# Patient Record
Sex: MALE | Race: WHITE | NOT HISPANIC OR LATINO | Employment: OTHER | URBAN - METROPOLITAN AREA
[De-identification: names, ages, dates, MRNs, and addresses within clinical notes are randomized per-mention and may not be internally consistent; named-entity substitution may affect disease eponyms.]

---

## 2017-06-06 ENCOUNTER — APPOINTMENT (OUTPATIENT)
Dept: LAB | Facility: CLINIC | Age: 67
End: 2017-06-06
Payer: MEDICARE

## 2017-06-06 ENCOUNTER — TRANSCRIBE ORDERS (OUTPATIENT)
Dept: LAB | Facility: CLINIC | Age: 67
End: 2017-06-06

## 2017-06-06 DIAGNOSIS — I10 ESSENTIAL (PRIMARY) HYPERTENSION: ICD-10-CM

## 2017-06-06 DIAGNOSIS — E78.00 PURE HYPERCHOLESTEROLEMIA: ICD-10-CM

## 2017-06-06 DIAGNOSIS — E83.51 HYPOCALCEMIA: ICD-10-CM

## 2017-06-06 DIAGNOSIS — M10.9 GOUT: ICD-10-CM

## 2017-06-06 DIAGNOSIS — D50.9 IRON DEFICIENCY ANEMIA: ICD-10-CM

## 2017-06-06 DIAGNOSIS — D51.0 VITAMIN B12 DEFICIENCY ANEMIA DUE TO INTRINSIC FACTOR DEFICIENCY: ICD-10-CM

## 2017-06-06 LAB
ALBUMIN SERPL BCP-MCNC: 3.3 G/DL (ref 3.5–5)
ALP SERPL-CCNC: 66 U/L (ref 46–116)
ALT SERPL W P-5'-P-CCNC: 32 U/L (ref 12–78)
ANION GAP SERPL CALCULATED.3IONS-SCNC: 6 MMOL/L (ref 4–13)
AST SERPL W P-5'-P-CCNC: 26 U/L (ref 5–45)
BASOPHILS # BLD AUTO: 0.03 THOUSANDS/ΜL (ref 0–0.1)
BASOPHILS NFR BLD AUTO: 1 % (ref 0–1)
BILIRUB SERPL-MCNC: 0.59 MG/DL (ref 0.2–1)
BUN SERPL-MCNC: 17 MG/DL (ref 5–25)
CALCIUM SERPL-MCNC: 8.1 MG/DL (ref 8.3–10.1)
CHLORIDE SERPL-SCNC: 105 MMOL/L (ref 100–108)
CHOLEST SERPL-MCNC: 171 MG/DL (ref 50–200)
CO2 SERPL-SCNC: 30 MMOL/L (ref 21–32)
CREAT SERPL-MCNC: 0.76 MG/DL (ref 0.6–1.3)
EOSINOPHIL # BLD AUTO: 0.27 THOUSAND/ΜL (ref 0–0.61)
EOSINOPHIL NFR BLD AUTO: 5 % (ref 0–6)
ERYTHROCYTE [DISTWIDTH] IN BLOOD BY AUTOMATED COUNT: 13.1 % (ref 11.6–15.1)
GFR SERPL CREATININE-BSD FRML MDRD: >60 ML/MIN/1.73SQ M
GLUCOSE P FAST SERPL-MCNC: 100 MG/DL (ref 65–99)
HCT VFR BLD AUTO: 43.3 % (ref 36.5–49.3)
HDLC SERPL-MCNC: 33 MG/DL (ref 40–60)
HGB BLD-MCNC: 14.7 G/DL (ref 12–17)
LDLC SERPL CALC-MCNC: 110 MG/DL (ref 0–100)
LYMPHOCYTES # BLD AUTO: 1.01 THOUSANDS/ΜL (ref 0.6–4.47)
LYMPHOCYTES NFR BLD AUTO: 20 % (ref 14–44)
MCH RBC QN AUTO: 30.4 PG (ref 26.8–34.3)
MCHC RBC AUTO-ENTMCNC: 33.9 G/DL (ref 31.4–37.4)
MCV RBC AUTO: 90 FL (ref 82–98)
MONOCYTES # BLD AUTO: 0.58 THOUSAND/ΜL (ref 0.17–1.22)
MONOCYTES NFR BLD AUTO: 11 % (ref 4–12)
NEUTROPHILS # BLD AUTO: 3.28 THOUSANDS/ΜL (ref 1.85–7.62)
NEUTS SEG NFR BLD AUTO: 63 % (ref 43–75)
NRBC BLD AUTO-RTO: 0 /100 WBCS
PLATELET # BLD AUTO: 177 THOUSANDS/UL (ref 149–390)
PMV BLD AUTO: 10.6 FL (ref 8.9–12.7)
POTASSIUM SERPL-SCNC: 4 MMOL/L (ref 3.5–5.3)
PROT SERPL-MCNC: 6.1 G/DL (ref 6.4–8.2)
PTH-INTACT SERPL-MCNC: 50.7 PG/ML (ref 14–72)
RBC # BLD AUTO: 4.84 MILLION/UL (ref 3.88–5.62)
SODIUM SERPL-SCNC: 141 MMOL/L (ref 136–145)
TRIGL SERPL-MCNC: 139 MG/DL
URATE SERPL-MCNC: 6.8 MG/DL (ref 4.2–8)
VIT B12 SERPL-MCNC: 2077 PG/ML (ref 100–900)
WBC # BLD AUTO: 5.18 THOUSAND/UL (ref 4.31–10.16)

## 2017-06-06 PROCEDURE — 83970 ASSAY OF PARATHORMONE: CPT

## 2017-06-06 PROCEDURE — 84550 ASSAY OF BLOOD/URIC ACID: CPT

## 2017-06-06 PROCEDURE — 80061 LIPID PANEL: CPT

## 2017-06-06 PROCEDURE — 80053 COMPREHEN METABOLIC PANEL: CPT

## 2017-06-06 PROCEDURE — 85025 COMPLETE CBC W/AUTO DIFF WBC: CPT

## 2017-06-06 PROCEDURE — 82607 VITAMIN B-12: CPT

## 2017-06-06 PROCEDURE — 36415 COLL VENOUS BLD VENIPUNCTURE: CPT

## 2017-06-12 ENCOUNTER — GENERIC CONVERSION - ENCOUNTER (OUTPATIENT)
Dept: OTHER | Facility: OTHER | Age: 67
End: 2017-06-12

## 2017-06-26 ENCOUNTER — ALLSCRIPTS OFFICE VISIT (OUTPATIENT)
Dept: OTHER | Facility: OTHER | Age: 67
End: 2017-06-26

## 2017-07-07 ENCOUNTER — APPOINTMENT (OUTPATIENT)
Dept: LAB | Facility: CLINIC | Age: 67
End: 2017-07-07
Payer: MEDICARE

## 2017-07-07 ENCOUNTER — TRANSCRIBE ORDERS (OUTPATIENT)
Dept: LAB | Facility: CLINIC | Age: 67
End: 2017-07-07

## 2017-07-07 DIAGNOSIS — Z12.5 SPECIAL SCREENING FOR MALIGNANT NEOPLASM OF PROSTATE: Primary | ICD-10-CM

## 2017-07-07 DIAGNOSIS — Z12.5 SPECIAL SCREENING FOR MALIGNANT NEOPLASM OF PROSTATE: ICD-10-CM

## 2017-07-07 LAB — PSA SERPL-MCNC: 2.5 NG/ML (ref 0–4)

## 2017-07-07 PROCEDURE — G0103 PSA SCREENING: HCPCS

## 2017-07-07 PROCEDURE — 36415 COLL VENOUS BLD VENIPUNCTURE: CPT

## 2017-07-20 ENCOUNTER — GENERIC CONVERSION - ENCOUNTER (OUTPATIENT)
Dept: OTHER | Facility: OTHER | Age: 67
End: 2017-07-20

## 2017-11-27 DIAGNOSIS — E78.00 PURE HYPERCHOLESTEROLEMIA: ICD-10-CM

## 2017-12-06 ENCOUNTER — APPOINTMENT (OUTPATIENT)
Dept: LAB | Facility: CLINIC | Age: 67
End: 2017-12-06
Payer: MEDICARE

## 2017-12-06 ENCOUNTER — TRANSCRIBE ORDERS (OUTPATIENT)
Dept: LAB | Facility: CLINIC | Age: 67
End: 2017-12-06

## 2017-12-06 DIAGNOSIS — E78.00 PURE HYPERCHOLESTEROLEMIA: ICD-10-CM

## 2017-12-06 LAB
ALBUMIN SERPL BCP-MCNC: 3.5 G/DL (ref 3.5–5)
ALP SERPL-CCNC: 64 U/L (ref 46–116)
ALT SERPL W P-5'-P-CCNC: 36 U/L (ref 12–78)
ANION GAP SERPL CALCULATED.3IONS-SCNC: 3 MMOL/L (ref 4–13)
AST SERPL W P-5'-P-CCNC: 23 U/L (ref 5–45)
BILIRUB SERPL-MCNC: 0.6 MG/DL (ref 0.2–1)
BUN SERPL-MCNC: 17 MG/DL (ref 5–25)
CALCIUM SERPL-MCNC: 8.5 MG/DL (ref 8.3–10.1)
CHLORIDE SERPL-SCNC: 104 MMOL/L (ref 100–108)
CHOLEST SERPL-MCNC: 167 MG/DL (ref 50–200)
CO2 SERPL-SCNC: 31 MMOL/L (ref 21–32)
CREAT SERPL-MCNC: 0.77 MG/DL (ref 0.6–1.3)
GFR SERPL CREATININE-BSD FRML MDRD: 95 ML/MIN/1.73SQ M
GLUCOSE P FAST SERPL-MCNC: 99 MG/DL (ref 65–99)
HDLC SERPL-MCNC: 30 MG/DL (ref 40–60)
LDLC SERPL CALC-MCNC: 89 MG/DL (ref 0–100)
POTASSIUM SERPL-SCNC: 4.1 MMOL/L (ref 3.5–5.3)
PROT SERPL-MCNC: 6.7 G/DL (ref 6.4–8.2)
SODIUM SERPL-SCNC: 138 MMOL/L (ref 136–145)
TRIGL SERPL-MCNC: 242 MG/DL

## 2017-12-06 PROCEDURE — 36415 COLL VENOUS BLD VENIPUNCTURE: CPT

## 2017-12-06 PROCEDURE — 80053 COMPREHEN METABOLIC PANEL: CPT

## 2017-12-06 PROCEDURE — 80061 LIPID PANEL: CPT

## 2017-12-28 ENCOUNTER — GENERIC CONVERSION - ENCOUNTER (OUTPATIENT)
Dept: FAMILY MEDICINE CLINIC | Facility: CLINIC | Age: 67
End: 2017-12-28

## 2017-12-28 ENCOUNTER — ALLSCRIPTS OFFICE VISIT (OUTPATIENT)
Dept: OTHER | Facility: OTHER | Age: 67
End: 2017-12-28

## 2017-12-29 NOTE — PROGRESS NOTES
Assessment   1  Hyperlipidemia (272 4) (E78 5)  2  Benign enlargement of prostate (600 00) (N40 0)  3  Anemia, pernicious (281 0) (D51 0)  4  Right bundle branch block (RBBB) (426 4) (I45 10)1      1 Amended By: Murray Belcher; Dec 28 2017 9:25 AM EST      Plan   Anemia, pernicious, Herniated lumbar intervertebral disc, Hypercholesteremia,    Hyperlipidemia    · (1) CBC/PLT/DIFF; Status:Active; Requested PIQ:05ZQY1001;    · (1) COMPREHENSIVE METABOLIC PANEL; Status:Active; Requested JWH:16OTF6109;    · (1) HEP C ANTIBODY; Status:Active; Requested NELIDA:55IMD2072;    · (1) LIPID PANEL, FASTING; Status:Active; Requested RFY:52DMC2128;    · (1) URIC ACID; Status:Active; Requested HRA:58NMM1451;    · (1) VITAMIN B12; Status:Active; Requested GBR:20DXM9701; Health Maintenance    · Follow-up visit in 6 months Evaluation and Treatment  Follow-up  Status: Complete     Done: 80GJH7227  Hypertension    · Renew: Metoprolol Succinate  MG Oral Tablet Extended Release 24 Hour; TAKE 1    TABLET DAILY    Discussion/Summary      We are increasing his Toprol from  mg daily  He will continue to monitor his blood pressure at home  Continue follow-up with Urology regarding his BPH and GI regarding his history of colon polyps  He is current with colonoscopy  We reviewed the pathology of his right bundle-branch block  1     The patient was counseled regarding diagnostic results,-- instructions for management  Possible side effects of new medications were reviewed with the patient/guardian today1  The treatment plan was reviewed with the patient/guardian  The patient/guardian understands and agrees with the treatment plan1        1 Amended By: Murray Belcher; Dec 28 2017 9:26 AM EST      Chief Complaint   Checkup    Patient is here today for follow up of chronic conditions described in HPI  History of Present Illness   Patient comes in for checkup        Review of Systems        Constitutional: No fever or chills, feels well, no tiredness, no recent weight gain or weight loss  Cardiovascular: No complaints of slow heart rate, no fast heart rate, no chest pain, no palpitations, no leg claudication, no lower extremity  Respiratory: No complaints of shortness of breath, no wheezing, no cough, no SOB on exertion, no orthopnea or PND  Active Problems   1  Acute sinusitis (461 9) (J01 90)  2  Anemia, iron deficiency (280 9) (D50 9)  3  Anemia, pernicious (281 0) (D51 0)  4  Arthritis, hip (716 95) (M16 10)  5  Benign enlargement of prostate (600 00) (N40 0)  6  Diverticulosis (562 10) (K57 90)  7  Eczema (692 9) (L30 9)  8  Encounter for prostate cancer screening (V76 44) (Z12 5)  9  Encounter for screening colonoscopy (V76 51) (Z12 11)  10  Gout (274 9) (M10 9)  11  Herniated lumbar intervertebral disc (722 10) (M51 26)  12  Hypercholesteremia (272 0) (E78 00)  13  Hyperlipidemia (272 4) (E78 5)  14  Hypertension (401 9) (I10)  15  Hypocalcemia (275 41) (E83 51)  16  Microscopic hematuria (599 72) (R31 29)  17  Need for diphtheria-tetanus-pertussis (Tdap) vaccine (V06 1) (Z23)  18  Need for shingles vaccine (V04 89) (Z23)  19  Obesity (278 00) (E66 9)  20  Preoperative examination (V72 84) (Z01 818)  21  Proteinuria (791 0) (R80 9)  22  Screening for genitourinary condition (V81 6) (Z13 89)  23  Sleep apnea (780 57) (G47 30)    Past Medical History   1  History of anemia (V12 3) (Z86 2)  2  History of colonoscopy (V45 89) (Z98 890)  3  History of esophagogastroduodenoscopy (V15 29) (Z98 89)  4  History of renal calculi (V13 01) (D11 962)     The active problems and past medical history were reviewed and updated today  Surgical History   1  History of Appendectomy  2  History of Arthroscopy Knee  3  History of Colostomy  4  History of Needle Biopsy Of Prostate  5  History of Rotator Cuff Repair  6  History of Total Hip Replacement    Family History   Mother   1  Family history of   2   Family history of chronic obstructive pulmonary disease (V17 6) (Z82 5)  3  Family history of malignant neoplasm of breast (V16 3) (Z80 3)  Father   4  Family history of   5  Family history of thoracic aortic aneurysm (V17 49) (Z82 49)  Family History   6  Denied: Family history of mental disorder  7  Denied: Family history of substance abuse    Social History    · Denied: History of Alcohol use   · Denied: History of Drug use   · Never a smoker    Current Meds   1  Enalapril Maleate 10 MG Oral Tablet; take 1 tablet by mouth once daily; Therapy: 94NWT7102 to (Evaluate:58Ble3385)  Requested for: 75IQM1283; Last     Rx:65Eys5085 Ordered  2  HydrALAZINE HCl - 50 MG Oral Tablet; TAKE 1 TABLET TWICE DAILY  Requested for:     78Arf9370; Last Rx:20Ief3525 Ordered  3  Ibuprofen 400 MG Oral Tablet; take 1 tablet every 6 to 8 hours as needed Recorded  4  Iron 325 (65 Fe) MG Oral Tablet; Take 1 tablet twice daily Recorded  5  Losartan Potassium-HCTZ 100-12 5 MG Oral Tablet; Take 1 tablet daily; Therapy: 86GQG1363 to (Last Rx:82Nev3129)  Requested for: 34Xxp6445 Ordered  6  Metoprolol Succinate ER 50 MG Oral Tablet Extended Release 24 Hour; Take 1 tablet     daily  Requested for: 71Gcp6951; Last Rx:01Hhs3665 Ordered     The medication list was reviewed and updated today  Allergies   1  Ativan    Vitals   Vital Signs    Recorded: 49Stl2114 09:11AM Recorded: 36PVU4900 08:30AM   Heart Rate  83   Systolic 665 458   Diastolic 78 92   Height  6 ft 1 in   Weight  322 lb    BMI Calculated  42 48   BSA Calculated  2 63   O2 Saturation  98     Physical Exam        Constitutional      General appearance: Abnormal   obese  Eyes      Conjunctiva and lids: No swelling, erythema, or discharge  Pupils and irises: Equal, round and reactive to light  Ears, Nose, Mouth, and Throat      External inspection of ears and nose: Normal        Otoscopic examination: Tympanic membrance translucent with normal light reflex   Canals patent without erythema  Oropharynx: Normal with no erythema, edema, exudate or lesions  Pulmonary      Respiratory effort: No increased work of breathing or signs of respiratory distress  Auscultation of lungs: Clear to auscultation, equal breath sounds bilaterally, no wheezes, no rales, no rhonci  Cardiovascular      Auscultation of heart: Normal rate and rhythm, normal S1 and S2, without murmurs  Examination of extremities for edema and/or varicosities: Normal        Carotid pulses: Normal        Abdomen      Abdomen: Non-tender, no masses  Liver and spleen: No hepatomegaly or splenomegaly  Lymphatic      Palpation of lymph nodes in neck: No lymphadenopathy  Musculoskeletal      Gait and station: Normal        Digits and nails: Normal without clubbing or cyanosis  Inspection/palpation of joints, bones, and muscles: Normal        Skin      Skin and subcutaneous tissue: Normal without rashes or lesions  Psychiatric      Orientation to person, place and time: Normal        Mood and affect: Normal           Results/Data   PHQ-9 Adult Depression Screening 00Cjr4513 08:47AM User, s      Test Name Result Flag Reference   PHQ-9 Adult Depression Score 0     Over the last two weeks, how often have you been bothered by any of the following problems? Little interest or pleasure in doing things: Not at all - 0     Feeling down, depressed, or hopeless: Not at all - 0     Trouble falling or staying asleep, or sleeping too much: Not at all - 0     Feeling tired or having little energy: Not at all - 0     Poor appetite or over eating: Not at all - 0     Feeling bad about yourself - or that you are a failure or have let yourself or your family down: Not at all - 0     Trouble concentrating on things, such as reading the newspaper or watching television: Not at all - 0     Moving or speaking so slowly that other people could have noticed   Or the opposite -  being so fidgety or restless that you have been moving around a lot more than usual: Not at all - 0     Thoughts that you would be better off dead, or of hurting yourself in some way: Not at all - 0   PHQ-9 Adult Depression Screening Negative     PHQ-9 Difficulty Level Not difficult at all     PHQ-9 Severity No Depression        Falls Risk Assessment (Dx Z13 89 Screen for Neurologic Disorder) 86WCB0604 08:47AM User, Ahs      Test Name Result Flag Reference   Falls Risk      No falls in the past year      (1) COMPREHENSIVE METABOLIC PANEL 11GAJ3381 58:79YB Tru Beck Order Number: [id number]      Test Name Result Flag Reference   SODIUM 138 mmol/L  136-145   POTASSIUM 4 1 mmol/L  3 5-5 3   CHLORIDE 104 mmol/L  100-108   CARBON DIOXIDE 31 mmol/L  21-32   ANION GAP (CALC) 3 mmol/L L 4-13   BLOOD UREA NITROGEN 17 mg/dL  5-25   CREATININE 0 77 mg/dL  0 60-1 30   Standardized to IDMS reference method   CALCIUM 8 5 mg/dL  8 3-10 1   BILI, TOTAL 0 60 mg/dL  0 20-1 00   ALK PHOSPHATAS 64 U/L     ALT (SGPT) 36 U/L  12-78   Specimen collection should occur prior to Sulfasalazine and/or Sulfapyridine administration due to the potential for falsely depressed results  AST(SGOT) 23 U/L  5-45   Specimen collection should occur prior to Sulfasalazine administration due to the potential for falsely depressed results  ALBUMIN 3 5 g/dL  3 5-5 0   TOTAL PROTEIN 6 7 g/dL  6 4-8 2   eGFR 95 ml/min/1 73sq m     National Kidney Disease Education Program recommendations are as follows:     GFR calculation is accurate only with a steady state creatinine     Chronic Kidney disease less than 60 ml/min/1 73 sq  meters     Kidney failure less than 15 ml/min/1 73 sq  meters  GLUCOSE FASTING 99 mg/dL  65-99   Specimen collection should occur prior to Sulfasalazine administration due to the potential for falsely depressed results   Specimen collection should occur prior to Sulfapyridine administration due to the potential for falsely elevated results  (1) LIPID PANEL, FASTING 09XXF3931 06:50AM Honey Mueller Order Number: [id number]      Test Name Result Flag Reference   CHOLESTEROL 167 mg/dL     HDL,DIRECT 30 mg/dL L 40-60   Specimen collection should occur prior to Metamizole administration due to the potential for falsley depressed results  LDL CHOLESTEROL CALCULATED 89 mg/dL  0-100   Triglyceride:           Normal <150 mg/dl      Borderline High 150-199 mg/dl      High 200-499 mg/dl      Very High >499 mg/dl               Cholesterol:          Desirable <200 mg/dl       Borderline High 200-239 mg/dl       High >239 mg/dl               HDL Cholesterol:          High>59 mg/dL       Low <41 mg/dL               This screening LDL is a calculated result  It does not have the accuracy of the Direct Measured LDL in the monitoring of patients with hyperlipidemia and/or statin therapy  Direct Measure LDL (DPA063) must be ordered separately in these patients  TRIGLYCERIDES 242 mg/dL H <=150   Specimen collection should occur prior to N-Acetylcysteine or Metamizole administration due to the potential for falsely depressed results  (1) PSA (SCREEN) (Dx V76 44 Screen for Prostate Cancer) 77TLG3187 07:07AM EPIC, Provider   Test ordered by: Christine Mcneill      Test Name Result Flag Reference   PROSTATE SPECIFIC ANTIGEN 2 5 ng/mL  0 0-4 0   American Urological Association Guidelines define biochemical recurrence of prostate cancer as a detectable or rising PSA value post-radical prostatectomy that is greater than or equal to 0 2 ng/mL with a second confirmatory level of greater than or equal to 0 2 ng/mL  This bloodwork is non-fasting  Please drink two glasses of water morning of     bloodwork        Signatures    Electronically signed by : LEVI Randolph ; Dec 28 2017  9:22AM EST                       (Author)     Electronically signed by : LEVI Randolph ; Dec 28 2017  9:26AM EST (Author)

## 2018-01-13 VITALS
WEIGHT: 315 LBS | HEART RATE: 86 BPM | OXYGEN SATURATION: 97 % | SYSTOLIC BLOOD PRESSURE: 130 MMHG | HEIGHT: 73 IN | BODY MASS INDEX: 41.75 KG/M2 | DIASTOLIC BLOOD PRESSURE: 90 MMHG

## 2018-01-23 VITALS
SYSTOLIC BLOOD PRESSURE: 152 MMHG | WEIGHT: 315 LBS | HEIGHT: 73 IN | DIASTOLIC BLOOD PRESSURE: 78 MMHG | HEART RATE: 83 BPM | BODY MASS INDEX: 41.75 KG/M2 | OXYGEN SATURATION: 98 %

## 2018-02-27 ENCOUNTER — APPOINTMENT (INPATIENT)
Dept: CT IMAGING | Facility: HOSPITAL | Age: 68
DRG: 193 | End: 2018-02-27
Payer: MEDICARE

## 2018-02-27 ENCOUNTER — APPOINTMENT (EMERGENCY)
Dept: RADIOLOGY | Facility: HOSPITAL | Age: 68
DRG: 193 | End: 2018-02-27
Payer: MEDICARE

## 2018-02-27 ENCOUNTER — APPOINTMENT (INPATIENT)
Dept: NON INVASIVE DIAGNOSTICS | Facility: HOSPITAL | Age: 68
DRG: 193 | End: 2018-02-27
Payer: MEDICARE

## 2018-02-27 ENCOUNTER — HOSPITAL ENCOUNTER (INPATIENT)
Facility: HOSPITAL | Age: 68
LOS: 2 days | Discharge: HOME | DRG: 193 | End: 2018-03-01
Attending: EMERGENCY MEDICINE | Admitting: GENERAL PRACTICE
Payer: MEDICARE

## 2018-02-27 ENCOUNTER — APPOINTMENT (EMERGENCY)
Dept: CT IMAGING | Facility: HOSPITAL | Age: 68
DRG: 193 | End: 2018-02-27
Payer: MEDICARE

## 2018-02-27 DIAGNOSIS — I10 ESSENTIAL HYPERTENSION: Chronic | ICD-10-CM

## 2018-02-27 DIAGNOSIS — E66.01 MORBID OBESITY WITH BMI OF 40.0-44.9, ADULT (HCC): ICD-10-CM

## 2018-02-27 DIAGNOSIS — R77.8 ELEVATED TROPONIN: Primary | ICD-10-CM

## 2018-02-27 DIAGNOSIS — I21.4 NSTEMI (NON-ST ELEVATION MYOCARDIAL INFARCTION) (HCC): ICD-10-CM

## 2018-02-27 DIAGNOSIS — J18.9 CAP (COMMUNITY ACQUIRED PNEUMONIA): ICD-10-CM

## 2018-02-27 PROBLEM — I71.9 AORTIC ANEURYSM (HCC): Status: ACTIVE | Noted: 2018-02-27

## 2018-02-27 PROBLEM — R79.89 ELEVATED TROPONIN: Status: ACTIVE | Noted: 2018-02-27

## 2018-02-27 PROBLEM — G47.33 OBSTRUCTIVE SLEEP APNEA: Status: ACTIVE | Noted: 2018-02-27

## 2018-02-27 LAB
ALBUMIN SERPL BCP-MCNC: 3.3 G/DL (ref 3.5–5)
ALP SERPL-CCNC: 67 U/L (ref 46–116)
ALT SERPL W P-5'-P-CCNC: 35 U/L (ref 12–78)
ANION GAP SERPL CALCULATED.3IONS-SCNC: 5 MMOL/L (ref 4–13)
APTT PPP: 29 SECONDS (ref 23–35)
AST SERPL W P-5'-P-CCNC: 29 U/L (ref 5–45)
ATRIAL RATE: 105 BPM
ATRIAL RATE: 109 BPM
ATRIAL RATE: 88 BPM
ATRIAL RATE: 95 BPM
BASOPHILS # BLD AUTO: 0.02 THOUSANDS/ΜL (ref 0–0.1)
BASOPHILS NFR BLD AUTO: 0 % (ref 0–1)
BILIRUB SERPL-MCNC: 0.5 MG/DL (ref 0.2–1)
BUN SERPL-MCNC: 14 MG/DL (ref 5–25)
CALCIUM SERPL-MCNC: 8.4 MG/DL (ref 8.3–10.1)
CHLORIDE SERPL-SCNC: 104 MMOL/L (ref 100–108)
CO2 SERPL-SCNC: 31 MMOL/L (ref 21–32)
CREAT SERPL-MCNC: 0.93 MG/DL (ref 0.6–1.3)
EOSINOPHIL # BLD AUTO: 0.19 THOUSAND/ΜL (ref 0–0.61)
EOSINOPHIL NFR BLD AUTO: 3 % (ref 0–6)
ERYTHROCYTE [DISTWIDTH] IN BLOOD BY AUTOMATED COUNT: 12.7 % (ref 11.6–15.1)
FLUAV AG SPEC QL: NORMAL
FLUBV AG SPEC QL: NORMAL
GFR SERPL CREATININE-BSD FRML MDRD: 86 ML/MIN/1.73SQ M
GLUCOSE SERPL-MCNC: 105 MG/DL (ref 65–140)
HCT VFR BLD AUTO: 44.1 % (ref 36.5–49.3)
HGB BLD-MCNC: 14.8 G/DL (ref 12–17)
INR PPP: 1.06 (ref 0.86–1.16)
LACTATE SERPL-SCNC: 1.5 MMOL/L (ref 0.5–2)
LYMPHOCYTES # BLD AUTO: 0.99 THOUSANDS/ΜL (ref 0.6–4.47)
LYMPHOCYTES NFR BLD AUTO: 14 % (ref 14–44)
MCH RBC QN AUTO: 30.5 PG (ref 26.8–34.3)
MCHC RBC AUTO-ENTMCNC: 33.6 G/DL (ref 31.4–37.4)
MCV RBC AUTO: 91 FL (ref 82–98)
MONOCYTES # BLD AUTO: 0.51 THOUSAND/ΜL (ref 0.17–1.22)
MONOCYTES NFR BLD AUTO: 7 % (ref 4–12)
NEUTROPHILS # BLD AUTO: 5.26 THOUSANDS/ΜL (ref 1.85–7.62)
NEUTS SEG NFR BLD AUTO: 75 % (ref 43–75)
NRBC BLD AUTO-RTO: 0 /100 WBCS
NT-PROBNP SERPL-MCNC: 46 PG/ML
P AXIS: 22 DEGREES
P AXIS: 43 DEGREES
P AXIS: 44 DEGREES
P AXIS: 45 DEGREES
PLATELET # BLD AUTO: 132 THOUSANDS/UL (ref 149–390)
PLATELET # BLD AUTO: 140 THOUSANDS/UL (ref 149–390)
PMV BLD AUTO: 9.4 FL (ref 8.9–12.7)
PMV BLD AUTO: 9.4 FL (ref 8.9–12.7)
POTASSIUM SERPL-SCNC: 3.9 MMOL/L (ref 3.5–5.3)
PR INTERVAL: 144 MS
PR INTERVAL: 150 MS
PR INTERVAL: 152 MS
PR INTERVAL: 160 MS
PROT SERPL-MCNC: 6.7 G/DL (ref 6.4–8.2)
PROTHROMBIN TIME: 14 SECONDS (ref 12.1–14.4)
QRS AXIS: -61 DEGREES
QRS AXIS: -62 DEGREES
QRS AXIS: -65 DEGREES
QRS AXIS: -65 DEGREES
QRSD INTERVAL: 142 MS
QRSD INTERVAL: 166 MS
QT INTERVAL: 376 MS
QT INTERVAL: 378 MS
QT INTERVAL: 380 MS
QT INTERVAL: 394 MS
QTC INTERVAL: 476 MS
QTC INTERVAL: 477 MS
QTC INTERVAL: 496 MS
QTC INTERVAL: 509 MS
RBC # BLD AUTO: 4.86 MILLION/UL (ref 3.88–5.62)
RSV B RNA SPEC QL NAA+PROBE: NORMAL
S PNEUM AG UR QL: NEGATIVE
SODIUM SERPL-SCNC: 140 MMOL/L (ref 136–145)
T WAVE AXIS: 53 DEGREES
T WAVE AXIS: 66 DEGREES
T WAVE AXIS: 69 DEGREES
T WAVE AXIS: 74 DEGREES
TROPONIN I SERPL-MCNC: 0.03 NG/ML
TROPONIN I SERPL-MCNC: 0.03 NG/ML
TROPONIN I SERPL-MCNC: 0.05 NG/ML
TROPONIN I SERPL-MCNC: 0.05 NG/ML
VENTRICULAR RATE: 105 BPM
VENTRICULAR RATE: 109 BPM
VENTRICULAR RATE: 88 BPM
VENTRICULAR RATE: 95 BPM
WBC # BLD AUTO: 7.02 THOUSAND/UL (ref 4.31–10.16)

## 2018-02-27 PROCEDURE — 85610 PROTHROMBIN TIME: CPT | Performed by: EMERGENCY MEDICINE

## 2018-02-27 PROCEDURE — 84484 ASSAY OF TROPONIN QUANT: CPT | Performed by: PHYSICIAN ASSISTANT

## 2018-02-27 PROCEDURE — 93306 TTE W/DOPPLER COMPLETE: CPT

## 2018-02-27 PROCEDURE — 71250 CT THORAX DX C-: CPT

## 2018-02-27 PROCEDURE — 93005 ELECTROCARDIOGRAM TRACING: CPT

## 2018-02-27 PROCEDURE — 84484 ASSAY OF TROPONIN QUANT: CPT | Performed by: EMERGENCY MEDICINE

## 2018-02-27 PROCEDURE — 87040 BLOOD CULTURE FOR BACTERIA: CPT | Performed by: EMERGENCY MEDICINE

## 2018-02-27 PROCEDURE — 80053 COMPREHEN METABOLIC PANEL: CPT | Performed by: EMERGENCY MEDICINE

## 2018-02-27 PROCEDURE — 71275 CT ANGIOGRAPHY CHEST: CPT

## 2018-02-27 PROCEDURE — 93306 TTE W/DOPPLER COMPLETE: CPT | Performed by: INTERNAL MEDICINE

## 2018-02-27 PROCEDURE — 85730 THROMBOPLASTIN TIME PARTIAL: CPT | Performed by: EMERGENCY MEDICINE

## 2018-02-27 PROCEDURE — 99223 1ST HOSP IP/OBS HIGH 75: CPT | Performed by: INTERNAL MEDICINE

## 2018-02-27 PROCEDURE — 36415 COLL VENOUS BLD VENIPUNCTURE: CPT | Performed by: EMERGENCY MEDICINE

## 2018-02-27 PROCEDURE — 85049 AUTOMATED PLATELET COUNT: CPT | Performed by: PHYSICIAN ASSISTANT

## 2018-02-27 PROCEDURE — 99285 EMERGENCY DEPT VISIT HI MDM: CPT

## 2018-02-27 PROCEDURE — 85025 COMPLETE CBC W/AUTO DIFF WBC: CPT | Performed by: EMERGENCY MEDICINE

## 2018-02-27 PROCEDURE — 93010 ELECTROCARDIOGRAM REPORT: CPT | Performed by: INTERNAL MEDICINE

## 2018-02-27 PROCEDURE — 71046 X-RAY EXAM CHEST 2 VIEWS: CPT

## 2018-02-27 PROCEDURE — 99222 1ST HOSP IP/OBS MODERATE 55: CPT | Performed by: INTERNAL MEDICINE

## 2018-02-27 PROCEDURE — 94640 AIRWAY INHALATION TREATMENT: CPT

## 2018-02-27 PROCEDURE — 87798 DETECT AGENT NOS DNA AMP: CPT | Performed by: EMERGENCY MEDICINE

## 2018-02-27 PROCEDURE — 94760 N-INVAS EAR/PLS OXIMETRY 1: CPT

## 2018-02-27 PROCEDURE — 87205 SMEAR GRAM STAIN: CPT | Performed by: PHYSICIAN ASSISTANT

## 2018-02-27 PROCEDURE — 87070 CULTURE OTHR SPECIMN AEROBIC: CPT | Performed by: PHYSICIAN ASSISTANT

## 2018-02-27 PROCEDURE — 83880 ASSAY OF NATRIURETIC PEPTIDE: CPT | Performed by: EMERGENCY MEDICINE

## 2018-02-27 PROCEDURE — 83605 ASSAY OF LACTIC ACID: CPT | Performed by: EMERGENCY MEDICINE

## 2018-02-27 PROCEDURE — 87449 NOS EACH ORGANISM AG IA: CPT | Performed by: PHYSICIAN ASSISTANT

## 2018-02-27 RX ORDER — METOPROLOL SUCCINATE 100 MG/1
100 TABLET, EXTENDED RELEASE ORAL DAILY
Status: DISCONTINUED | OUTPATIENT
Start: 2018-02-27 | End: 2018-03-01 | Stop reason: HOSPADM

## 2018-02-27 RX ORDER — ONDANSETRON 2 MG/ML
4 INJECTION INTRAMUSCULAR; INTRAVENOUS EVERY 6 HOURS PRN
Status: DISCONTINUED | OUTPATIENT
Start: 2018-02-27 | End: 2018-03-01 | Stop reason: HOSPADM

## 2018-02-27 RX ORDER — LOSARTAN POTASSIUM AND HYDROCHLOROTHIAZIDE 12.5; 1 MG/1; MG/1
1 TABLET ORAL DAILY
COMMUNITY
End: 2018-03-05 | Stop reason: SDUPTHER

## 2018-02-27 RX ORDER — ACETAMINOPHEN 325 MG/1
650 TABLET ORAL EVERY 6 HOURS PRN
Status: DISCONTINUED | OUTPATIENT
Start: 2018-02-27 | End: 2018-03-01 | Stop reason: HOSPADM

## 2018-02-27 RX ORDER — HYDRALAZINE HYDROCHLORIDE 25 MG/1
50 TABLET, FILM COATED ORAL 2 TIMES DAILY
Status: DISCONTINUED | OUTPATIENT
Start: 2018-02-27 | End: 2018-02-28

## 2018-02-27 RX ORDER — AZITHROMYCIN 250 MG/1
500 TABLET, FILM COATED ORAL ONCE
Status: DISCONTINUED | OUTPATIENT
Start: 2018-02-27 | End: 2018-02-27

## 2018-02-27 RX ORDER — 0.9 % SODIUM CHLORIDE 0.9 %
3 VIAL (ML) INJECTION AS NEEDED
Status: DISCONTINUED | OUTPATIENT
Start: 2018-02-27 | End: 2018-03-01 | Stop reason: HOSPADM

## 2018-02-27 RX ORDER — METOPROLOL SUCCINATE 100 MG/1
100 TABLET, EXTENDED RELEASE ORAL DAILY
COMMUNITY
End: 2019-01-31 | Stop reason: SDUPTHER

## 2018-02-27 RX ORDER — HYDRALAZINE HYDROCHLORIDE 50 MG/1
50 TABLET, FILM COATED ORAL DAILY
COMMUNITY
End: 2018-03-01 | Stop reason: HOSPADM

## 2018-02-27 RX ORDER — ASPIRIN 81 MG/1
325 TABLET, CHEWABLE ORAL DAILY
Status: DISCONTINUED | OUTPATIENT
Start: 2018-02-28 | End: 2018-03-01 | Stop reason: HOSPADM

## 2018-02-27 RX ORDER — ASPIRIN 325 MG
325 TABLET ORAL ONCE
Status: COMPLETED | OUTPATIENT
Start: 2018-02-27 | End: 2018-02-27

## 2018-02-27 RX ORDER — OSELTAMIVIR PHOSPHATE 75 MG/1
75 CAPSULE ORAL EVERY 12 HOURS SCHEDULED
Status: DISCONTINUED | OUTPATIENT
Start: 2018-02-27 | End: 2018-02-27

## 2018-02-27 RX ORDER — HYDRALAZINE HYDROCHLORIDE 25 MG/1
50 TABLET, FILM COATED ORAL DAILY
Status: DISCONTINUED | OUTPATIENT
Start: 2018-02-27 | End: 2018-02-27

## 2018-02-27 RX ORDER — SODIUM CHLORIDE 9 MG/ML
75 INJECTION, SOLUTION INTRAVENOUS CONTINUOUS
Status: DISCONTINUED | OUTPATIENT
Start: 2018-02-27 | End: 2018-03-01 | Stop reason: HOSPADM

## 2018-02-27 RX ORDER — ENALAPRIL MALEATE 10 MG/1
10 TABLET ORAL DAILY
COMMUNITY
End: 2018-09-07 | Stop reason: SDUPTHER

## 2018-02-27 RX ORDER — ALBUTEROL SULFATE 2.5 MG/3ML
5 SOLUTION RESPIRATORY (INHALATION) ONCE
Status: COMPLETED | OUTPATIENT
Start: 2018-02-27 | End: 2018-02-27

## 2018-02-27 RX ORDER — ENALAPRIL MALEATE 10 MG/1
10 TABLET ORAL DAILY
Status: DISCONTINUED | OUTPATIENT
Start: 2018-02-27 | End: 2018-03-01 | Stop reason: HOSPADM

## 2018-02-27 RX ADMIN — SODIUM CHLORIDE 75 ML/HR: 0.9 INJECTION, SOLUTION INTRAVENOUS at 23:22

## 2018-02-27 RX ADMIN — METOPROLOL SUCCINATE 100 MG: 100 TABLET, FILM COATED, EXTENDED RELEASE ORAL at 09:00

## 2018-02-27 RX ADMIN — ENALAPRIL MALEATE 10 MG: 10 TABLET ORAL at 13:31

## 2018-02-27 RX ADMIN — AZITHROMYCIN MONOHYDRATE 500 MG: 500 INJECTION, POWDER, LYOPHILIZED, FOR SOLUTION INTRAVENOUS at 10:53

## 2018-02-27 RX ADMIN — ENOXAPARIN SODIUM 40 MG: 40 INJECTION SUBCUTANEOUS at 09:01

## 2018-02-27 RX ADMIN — CEFTRIAXONE 1000 MG: 1 INJECTION, SOLUTION INTRAVENOUS at 09:20

## 2018-02-27 RX ADMIN — HYDRALAZINE HYDROCHLORIDE 50 MG: 25 TABLET, FILM COATED ORAL at 17:06

## 2018-02-27 RX ADMIN — SODIUM CHLORIDE 75 ML/HR: 0.9 INJECTION, SOLUTION INTRAVENOUS at 09:01

## 2018-02-27 RX ADMIN — ASPIRIN 325 MG: 325 TABLET ORAL at 05:47

## 2018-02-27 RX ADMIN — ALBUTEROL SULFATE 5 MG: 2.5 SOLUTION RESPIRATORY (INHALATION) at 04:49

## 2018-02-27 RX ADMIN — IOHEXOL 85 ML: 350 INJECTION, SOLUTION INTRAVENOUS at 08:20

## 2018-02-27 RX ADMIN — IPRATROPIUM BROMIDE 0.5 MG: 0.5 SOLUTION RESPIRATORY (INHALATION) at 04:49

## 2018-02-27 RX ADMIN — OSELTAMIVIR PHOSPHATE 75 MG: 75 CAPSULE ORAL at 09:00

## 2018-02-27 RX ADMIN — LOSARTAN POTASSIUM: 50 TABLET ORAL at 13:31

## 2018-02-27 NOTE — CONSULTS
Consultation - Cardiology Team One  Paresh Augustine 74 y o  male MRN: [id number]  Unit/Bed#: -01 Encounter: [id number]    Inpatient consult to Cardiology  Consult performed by: Joselin Morse ordered by: Rolan [facility]          Physician Requesting Consult: Chanell Jamison MD  Reason for Consult / Principal Problem: SOB, NSTEMI    HPI: Cardiologist Dr Castellano Manasa is a 74y o  year old male who has a history of hypertension presenting with complaints of shortness of breath about 4 days ago  Patient presented with SOB and a general feeling of malaise, progressively worse last night  Denies chest pain/pressure, orthopnea, fever, chills, leg swelling  CTA chest shows no evidence of pulmonary embolism but does show persistent bilateral lower lobe infiltrates suggestive of pneumonia and a stable mild ascending aortic aneurysm  Nonsmoker  Patient's father had aortic aneurysm  REVIEW OF SYSTEMS:  Constitutional:  +malaise, +body aches  Denies fever or chills   Eyes:  Denies change in visual acuity   HENT:  Denies nasal congestion or sore throat   Respiratory:  +SOB  Cardiovascular:  Denies chest pain or edema   GI:  Denies abdominal pain, nausea, vomiting, bloody stools or diarrhea   :  Denies dysuria, frequency, difficulty in micturition and nocturia  Musculoskeletal:  Denies back pain or joint pain   Neurologic:  Denies headache, focal weakness or sensory changes   Endocrine:  Denies polyuria or polydipsia   Lymphatic:  Denies swollen glands   Psychiatric:  Denies depression or anxiety     Historical Information   Past Medical History:   Diagnosis Date    Hypertension      Past Surgical History:   Procedure Laterality Date    COLON SURGERY       History   Alcohol Use    Yes     Comment: social     History   Drug Use No     History   Smoking Status    Never Smoker   Smokeless Tobacco    Never Used       Family History: History reviewed   No pertinent family history  MEDS & ALLERGIES:  all current active meds have been reviewed and current meds: Current Facility-Administered Medications   Medication Dose Route Frequency    acetaminophen (TYLENOL) tablet 650 mg  650 mg Oral Q6H PRN    [START ON 2/28/2018] aspirin chewable tablet 325 mg  325 mg Oral Daily    cefTRIAXone (ROCEPHIN) IVPB (premix) 1,000 mg  1,000 mg Intravenous Q24H    And    azithromycin (ZITHROMAX) 500 mg in sodium chloride 0 9% 250mL IVPB 500 mg  500 mg Intravenous Q24H    enalapril (VASOTEC) tablet 10 mg  10 mg Oral Daily    enoxaparin (LOVENOX) subcutaneous injection 40 mg  40 mg Subcutaneous Daily    hydrALAZINE (APRESOLINE) tablet 50 mg  50 mg Oral Daily    losartan potassium-hydrochlorothiazide (HYZAAR 100/12  5) combo dose   Oral Daily    metoprolol succinate (TOPROL-XL) 24 hr tablet 100 mg  100 mg Oral Daily    ondansetron (ZOFRAN) injection 4 mg  4 mg Intravenous Q6H PRN    oseltamivir (TAMIFLU) capsule 75 mg  75 mg Oral Q12H [address]    sodium chloride (PF) 0 9 % injection 3 mL  3 mL Intravenous PRN    sodium chloride 0 9 % infusion  75 mL/hr Intravenous Continuous       sodium chloride 75 mL/hr     Allergies   Allergen Reactions    Lorazepam Other (See Comments)     Psychosis       OBJECTIVE:  Vitals:   Vitals:    02/27/18 0709   BP: 148/69   Pulse: (!) 107   Resp: 20   Temp:    SpO2: 96%     Body mass index is 43 66 kg/m²  Systolic (29QAU), MOA:778 , Min:148 , VLF:525     Diastolic (97XOX), VPA:92, Min:69, Max:103    No intake or output data in the 24 hours ending 02/27/18 0839  Weight (last 2 days)     Date/Time   Weight    02/27/18 0429  (!)  150 (330 91)            Invasive Devices     Peripheral Intravenous Line            Peripheral IV 02/27/18 Left Antecubital less than 1 day                PHYSICAL EXAMS:  General:  Patient is not in acute distress, laying in the bed comfortably, awake, alert responding to commands  Head: Normocephalic, Atraumatic     HEENT: White sclera, pink conjunctiva,  PERRLA,pharynx benign  Neck:  Supple, no neck vein distention, carotids+2/+2 no bruits, thyromegaly, adenopathy  Respiratory: +decreased breath sounds  Cardiovascular:  +tachycardic  PMI normal, S1-S2 normal, No  Murmurs, thrills, gallops, rubs   Regular rhythm  GI:  Abdomen soft nontender   No hepatosplenomegaly, adenopathy, ascites,or rebound tenderness  Extremities: No edema, normal pulses, no calf tenderness, no joint deformities, no venous disease   Integument:  No skin rashes or ulceration  Lymphatic:  No cervical or inguinal lymphadenopathy  Neurologic:  Patient is awake alert, responding to command, well-oriented to time and place and person moving all extremities    LABORATORY RESULTS:    Results from last 7 days  Lab Units 02/27/18  0457   TROPONIN I ng/mL 0 05*     CBC with diff:   Results from last 7 days  Lab Units 02/27/18  0457   WBC Thousand/uL 7 02   HEMOGLOBIN g/dL 14 8   HEMATOCRIT % 44 1   MCV fL 91   PLATELETS Thousands/uL 140*   MCH pg 30 5   MCHC g/dL 33 6   RDW % 12 7   MPV fL 9 4   NRBC AUTO /100 WBCs 0       CMP:  Results from last 7 days  Lab Units 02/27/18  0457   SODIUM mmol/L 140   POTASSIUM mmol/L 3 9   CHLORIDE mmol/L 104   CO2 mmol/L 31   ANION GAP mmol/L 5   BUN mg/dL 14   CREATININE mg/dL 0 93   GLUCOSE RANDOM mg/dL 105   CALCIUM mg/dL 8 4   AST U/L 29   ALT U/L 35   ALK PHOS U/L 67   TOTAL PROTEIN g/dL 6 7   BILIRUBIN TOTAL mg/dL 0 50   EGFR ml/min/1 73sq m 86       BMP:  Results from last 7 days  Lab Units 02/27/18  0457   SODIUM mmol/L 140   POTASSIUM mmol/L 3 9   CHLORIDE mmol/L 104   CO2 mmol/L 31   BUN mg/dL 14   CREATININE mg/dL 0 93   GLUCOSE RANDOM mg/dL 105   CALCIUM mg/dL 8 4         Results from last 7 days  Lab Units 02/27/18  0457   NT-PRO BNP pg/mL 46                    Results from last 7 days  Lab Units 02/27/18  0631   INR  1 06       Lipid Profile:   Lab Results   Component Value Date    CHOL 167 12/06/2017    CHOL 171 06/06/2017    CHOL 155 10/11/2016     Lab Results   Component Value Date    HDL 30 (L) 12/06/2017    HDL 33 (L) 06/06/2017    HDL 31 (L) 10/11/2016     Lab Results   Component Value Date    LDLCALC 89 12/06/2017    LDLCALC 110 (H) 06/06/2017    LDLCALC 87 10/11/2016     Lab Results   Component Value Date    TRIG 242 (H) 12/06/2017    TRIG 139 06/06/2017    TRIG 187 (H) 10/11/2016       Cardiac testing:   No results found for this or any previous visit  No results found for this or any previous visit  No procedure found  No results found for this or any previous visit  Imaging:   I have personally reviewed pertinent reports  EKG reviewed personally:  Unavailable to me at this time    Telemetry reviewed personally:   Sinus tachycardia    Assessment/Plan:  1  SOB:  Likely secondary to pneumonia, however will rule out cardiac etiology  Will order echocardiogram to assess EF and regional wall motion abnormalities  Will hold off on stress test     2  NSTEMI likely type 2:  Serial troponin 0 05, 0 03  Trending down  Likely secondary to pneumonia  3   Ascending aortic aneurysm:  Incidental finding of 4 5 cm aortic aneurysm seen on CT  Will need repeat CT scan in 1 year to monitor progression  4   Hypertension:  Stable  Continue present regimen  Code Status: Level 1 - Full Code    Counseling / Coordination of Care  Total floor / unit time spent today 35 minutes  Greater than 50% of total time was spent with the patient and / or family counseling and / or coordination of care  A description of the counseling / coordination of care: Review of history, current assessment, development of a plan      Jerrell Bumpers, PA-C  2/27/2018,8:39 AM

## 2018-02-27 NOTE — ASSESSMENT & PLAN NOTE
Nutrition consult to assist with counseling and lifestyle modifications  Patient admits today is the 1st day of his weight loss program   States he has done this before when he needed had hip replacement

## 2018-02-27 NOTE — PROGRESS NOTES
Will add morbid obesity with BMI of 43 to diagnosis and have nutrition see the patient  Noted diagnosis supports NSTEMI type 2 and patient has 4 5 cm aortic aneurysm  Will need re-imaging in future and good BP control  Patient aware of diagnosis

## 2018-02-27 NOTE — SOCIAL WORK
CM met with pt and wife Lakisha Escalante at bedside  Pt lives with his wife in a bilevel house with 10 steps with railing to reach the main living quarters and one step-no railing to enter the residence  Pt has no problem navigating steps and is independent with ADL's  She has used [facility] for OP/PT following hip surgery,  He also used [place] services at that time, but does not remember the name of the agency  He uses a C-pap machine at   His PCP is Dr Rashaad Ortiz  He denies substance abuse or mental health issues  He used [employer] on [employer] and has no problem with his co-pays  He does not have a POA or Advanced Directive  CM supplied info on both  He is retired, but still works part-time  He does drive, but his wife will transport home when medically cleared  CM discussed discharge needs and [facility]  He is planning on returning to work as soon as he is able  He explains that he has been diagnosed with an aneurysm  CM will find out who will be monitoring the size and make sure pt has a f/u appoint  CM department will continue to follow

## 2018-02-27 NOTE — ASSESSMENT & PLAN NOTE
Admit  Begin IV Rocephin and azithromycin  Sputum culture pending  Influenza culture pending  Tamiflu

## 2018-02-27 NOTE — ASSESSMENT & PLAN NOTE
Control BP aggressively  Reimage in next 12 months    Patient has primary care follow-up already scheduled

## 2018-02-27 NOTE — ASSESSMENT & PLAN NOTE
· Likely Type 2, do to pneumonia   Flu testing negative  · Cardiology cleared patient    · Control Bp, continue beta blocker, aspirin

## 2018-02-27 NOTE — PLAN OF CARE
Problem: DISCHARGE PLANNING - CARE MANAGEMENT  Goal: Discharge to post-acute care or home with appropriate resources  INTERVENTIONS:  - Conduct assessment to determine patient/family and health care team treatment goals, and need for post-acute services based on payer coverage, community resources, and patient preferences, and barriers to discharge  - Address psychosocial, clinical, and financial barriers to discharge as identified in assessment in conjunction with the patient/family and health care team  - Arrange appropriate level of post-acute services according to patients   needs and preference and payer coverage in collaboration with the physician and health care team  - Communicate with and update the patient/family, physician, and health care team regarding progress on the discharge plan  - Arrange appropriate transportation to post-acute venues  Outcome: Progressing  CM met with pt and wife Author Matt at bedside  Pt lives with his wife in a bilevel house with 10 steps with railing to reach the main living quarters and one step-no railing to enter the residence  Pt has no problem navigating steps and is independent with ADL's  She has used [facility] for OP/PT following hip surgery,  He also used [place] services at that time, but does not remember the name of the agency  He uses a C-pap machine at hs  His PCP is Dr Spike Eagle  He denies substance abuse or mental health issues  He used [employer] on [employer]  [facility] [facility] and has no problem with his co-pays  He does not have a POA or Advanced Directive  CM supplied info on both  He is retired, but still works part-time  He does drive, but his wife will transport home when medically cleared  CM discussed discharge needs and pt does not feel he will need [place] services  He is planning on returning to work as soon as he is able  He explains that he has been diagnosed with an aneurysm    CM will find out who will be monitoring the size and make sure pt has a f/u appoint  CM department will continue to follow

## 2018-02-27 NOTE — ED NOTES
Patient transported to [address] [place] [address],[address] Msb, RN  02/27/18 [id number] [facility], RN  02/27/18 4399

## 2018-02-27 NOTE — ED PROVIDER NOTES
History  Chief Complaint   Patient presents with    Flu Symptoms     Pt reports flu like symtpoms over the last week  Today pt was driving and felt near syncopal  After taking Nyquil tonight, Pt woke up SOB, denies respiratory hx      53-year-old male presents with multiple complaints  Patient states he has had "flu-like symptoms" for a week which he describes as cough and nasal congestion  Over the past day he has notes progressive, worsening dyspnea that he states he has exertional but worsened acutely tonight upon awakening  Patient also notes dizziness that he describes both as lightheadedness and vertiginous symptoms  Patient states he had an episode of near syncope that occurred yesterday at approximately 1730  Patient states the symptoms resolved spontaneous  ROS: the patient denies any hematochezia or melena, nausea/vomiting, diarrhea, visual changes, gait abnormalities, focal neurologic deficits, headache, chest pain, abdominal pain, back/neck pain, palpitations  All other review of systems reviewed and noted to be negative  The patient denies any history of congestive heart failure, valvular head disease, coronary artery disease, venous thrombotic disease, or cardiac arrhythmia  Patient denies any recent change in medications  Patient denies any use of alcohol or illicit drugs  The patient denies any recent trauma  Objective:  PHYSICAL EXAM:  Constitutional :  Non-toxic  Well developed, well-nourished with no acute distress  Eyes:  PERRL, EOMI  No resting nystagmus or with gaze fixation  HENT: Atraumatic  Neck: no carotid bruit, no tenderness to palpitation  CV:  Regular rate and rhythm, no murmur  Peripheral pulses intact and equal   Respiratory:  Lungs with wheezing bilaterally, mainly inspiratory wheezing without increased expiratory phase  Abdomen: Morbid obesity  Soft, non-tender, non-distended    Back:  No vertebral tenderness  Skin:  Normal color, warm and dry  Extremities: Non-tender, no pedal edema  Neuro:  Alert and answers questions appropriately  No pronator drift  Normal finger to nose  Normal fine motor function with rapid finger movements  Normal hand tap  Cranial nerves II through XII grossly intact  Visual fields grossly intact  Upper and lower motor strength 5/5 and symmetric  Normal light touch sensory exam    Normal DTRs  MDM  Patient presenting with multiple symptoms  Considering patient's dyspnea, likely secondary to progression of viral URI type symptoms, possibly influenza  Patient has diffuse bilateral wheezing with no history of asthma, COPD, or congestive heart failure  Patient states he has had echocardiogram in the past but there is no history in the medical record and no notification of this in the prior evaluation with his primary care physician in December  Will evaluate for cardiac etiologies, infectious etiologies, and metabolic etiologies  Will obtain chest x-ray  Will obtain BNP considering patient's dyspnea and unclear history that would result in his wheezing  Regarding patient's syncope, EKG obtained and demonstrates normal sinus rhythm with bifascicular block  There is no old EKG to compare against   No acute ST segment changes noted  There is a isolated flipped T in aVL and also in V2, on distribution pattern  Considering patient's dyspnea associated with syncope, concerning for potential cardiac event  Will evaluate with cardiac evaluation and place patient on monitor  Will evaluate with infectious evaluation considering symptoms, likely pneumonia versus influenza  Likely admission for continued monitoring and management  Prior to Admission Medications   Prescriptions Last Dose Informant Patient Reported?  Taking?   enalapril (VASOTEC) 10 mg tablet   Yes Yes   Sig: Take 10 mg by mouth daily   hydrALAZINE (APRESOLINE) 50 mg tablet   Yes Yes   Sig: Take 50 mg by mouth daily   losartan-hydrochlorothiazide (HYZAAR) 100-12 5 MG per tablet   Yes Yes   Sig: Take 1 tablet by mouth daily   metoprolol succinate (TOPROL-XL) 100 mg 24 hr tablet   Yes Yes   Sig: Take 100 mg by mouth daily      Facility-Administered Medications: None       Past Medical History:   Diagnosis Date    Hypertension        Past Surgical History:   Procedure Laterality Date    COLON SURGERY         History reviewed  No pertinent family history  I have reviewed and agree with the history as documented  Social History   Substance Use Topics    Smoking status: Never Smoker    Smokeless tobacco: Never Used    Alcohol use Yes      Comment: social        Review of Systems    Physical Exam  ED Triage Vitals [02/27/18 0429]   Temperature Pulse Respirations Blood Pressure SpO2   97 6 °F (36 4 °C) 86 20 (!) 212/103 95 %      Temp Source Heart Rate Source Patient Position - Orthostatic VS BP Location FiO2 (%)   Oral Monitor Lying Right arm --      Pain Score       No Pain           Orthostatic Vital Signs  Vitals:    02/28/18 1500 02/28/18 1900 02/28/18 2352 03/01/18 0309   BP: 167/99 125/88 151/96 142/87   Pulse: 66 74 79 77   Patient Position - Orthostatic VS: Lying Lying Lying Lying       Physical Exam    ED Medications  Medications   sodium chloride (PF) 0 9 % injection 3 mL (not administered)   enalapril (VASOTEC) tablet 10 mg (10 mg Oral Given 2/28/18 0935)   losartan potassium-hydrochlorothiazide (HYZAAR 100/12  5) combo dose ( Oral Given 2/28/18 0826)   metoprolol succinate (TOPROL-XL) 24 hr tablet 100 mg (100 mg Oral Given 2/28/18 0827)   sodium chloride 0 9 % infusion (75 mL/hr Intravenous New Bag 2/28/18 1337)   acetaminophen (TYLENOL) tablet 650 mg (not administered)   enoxaparin (LOVENOX) subcutaneous injection 40 mg (40 mg Subcutaneous Given 2/28/18 0825)   cefTRIAXone (ROCEPHIN) IVPB (premix) 1,000 mg (0 mg Intravenous Stopped 2/28/18 0949)   ondansetron (ZOFRAN) injection 4 mg (not administered)   aspirin chewable tablet 325 mg (324 mg Oral Given 2/28/18 0825)   influenza inactivated quadrivalent vaccine (FLULAVAL) IM injection 0 5 mL (not administered)   azithromycin (ZITHROMAX) tablet 500 mg (not administered)   hydrALAZINE (APRESOLINE) tablet 50 mg (50 mg Oral Given 3/1/18 0521)   albuterol inhalation solution 5 mg (5 mg Nebulization Given 2/27/18 0449)   ipratropium (ATROVENT) 0 02 % inhalation solution 0 5 mg (0 5 mg Nebulization Given 2/27/18 0449)   aspirin tablet 325 mg (325 mg Oral Given 2/27/18 0547)   iohexol (OMNIPAQUE) 350 MG/ML injection (MULTI-DOSE) 85 mL (85 mL Intravenous Given 2/27/18 0820)       Diagnostic Studies  Results Reviewed     Procedure Component Value Units Date/Time    Blood culture #1 [[id number]] Collected:  02/27/18 7970    Lab Status:  Preliminary result Specimen:  Blood from Arm, Left Updated:  02/28/18 0901     Blood Culture No Growth at 24 hrs  Blood culture #2 [[id number]] Collected:  02/27/18 1558    Lab Status:  Preliminary result Specimen:  Blood from Arm, Right Updated:  02/28/18 0901     Blood Culture No Growth at 24 hrs  Influenza A/B and RSV by PCR (indicated for patients >2 mo of age) [[id number]]  (Normal) Collected:  02/27/18 0544    Lab Status:  Final result Specimen:  Nasopharyngeal from Nasopharyngeal Swab Updated:  02/27/18 1157     INFLU A PCR None Detected     INFLU B PCR None Detected     RSV PCR None Detected    Lactic acid, plasma [[id number]]  (Normal) Collected:  02/27/18 7675    Lab Status:  Final result Specimen:  Blood from Arm, Left Updated:  02/27/18 0654     LACTIC ACID 1 5 mmol/L     Narrative:         Result may be elevated if tourniquet was used during collection      Protime-INR [[id number]]  (Normal) Collected:  02/27/18 0631    Lab Status:  Final result Specimen:  Blood from Arm, Left Updated:  02/27/18 0650     Protime 14 0 seconds      INR 1 06    APTT [[id number]]  (Normal) Collected:  02/27/18 0631    Lab Status:  Final result Specimen:  Blood from Arm, Left Updated:  02/27/18 0650     PTT 29 seconds     Narrative: Therapeutic Heparin Range = 60-90 seconds    BNP [[id number]]  (Normal) Collected:  02/27/18 0457    Lab Status:  Final result Specimen:  Blood from Arm, Left Updated:  02/27/18 0531     NT-proBNP 46 pg/mL     Troponin I [[id number]]  (Abnormal) Collected:  02/27/18 0457    Lab Status:  Final result Specimen:  Blood from Arm, Left Updated:  02/27/18 0526     Troponin I 0 05 (H) ng/mL     Narrative:         [employer] Chemistry analyzer 99% cutoff is > 0 04 ng/mL in network labs    o cTnI 99% cutoff is useful only when applied to patients in the clinical setting of myocardial ischemia  o cTnI 99% cutoff should be interpreted in the context of clinical history, ECG findings and possibly cardiac imaging to establish correct diagnosis  o cTnI 99% cutoff may be suggestive but clearly not indicative of a coronary event without the clinical setting of myocardial ischemia  Comprehensive metabolic panel [[id number]]  (Abnormal) Collected:  02/27/18 0457    Lab Status:  Final result Specimen:  Blood from Arm, Left Updated:  02/27/18 0524     Sodium 140 mmol/L      Potassium 3 9 mmol/L      Chloride 104 mmol/L      CO2 31 mmol/L      Anion Gap 5 mmol/L      BUN 14 mg/dL      Creatinine 0 93 mg/dL      Glucose 105 mg/dL      Calcium 8 4 mg/dL      AST 29 U/L      ALT 35 U/L      Alkaline Phosphatase 67 U/L      Total Protein 6 7 g/dL      Albumin 3 3 (L) g/dL      Total Bilirubin 0 50 mg/dL      eGFR 86 ml/min/1 73sq m     Narrative:         National Kidney Disease Education Program recommendations are as follows:  GFR calculation is accurate only with a steady state creatinine  Chronic Kidney disease less than 60 ml/min/1 73 sq  meters  Kidney failure less than 15 ml/min/1 73 sq  meters      CBC and differential [[id number]]  (Abnormal) Collected:  02/27/18 0457    Lab Status:  Final result Specimen:  Blood from Arm, Left Updated:  02/27/18 0507     WBC 7 02 Thousand/uL      RBC 4 86 Million/uL Hemoglobin 14 8 g/dL      Hematocrit 44 1 %      MCV 91 fL      MCH 30 5 pg      MCHC 33 6 g/dL      RDW 12 7 %      MPV 9 4 fL      Platelets 110 (L) Thousands/uL      nRBC 0 /100 WBCs      Neutrophils Relative 75 %      Lymphocytes Relative 14 %      Monocytes Relative 7 %      Eosinophils Relative 3 %      Basophils Relative 0 %      Neutrophils Absolute 5 26 Thousands/µL      Lymphocytes Absolute 0 99 Thousands/µL      Monocytes Absolute 0 51 Thousand/µL      Eosinophils Absolute 0 19 Thousand/µL      Basophils Absolute 0 02 Thousands/µL                  CTA chest pe study   Final Result by Justino Dixon MD (02/27 3331)      No pulmonary embolism  Persistent bilateral lower lobe infiltrates suggestive of pneumonia  Recommend follow-up after appropriate therapy to ensure resolution  Stable mild ascending aortic aneurysm  Workstation performed: [id number]         XR chest 2 views   ED Interpretation by Antonio Landry MD (02/27 5653)   Unusual bilateral lower pattern, lower edema versus infiltrate  CT shows infiltrate  Final Result by Lakisha Still MD (02/27 4911)      Bibasilar infiltrates concerning for pneumonia  As per comments in the PACS workstation, findings are concordant with preliminary interpretation provided by the emergency room physician  Workstation performed: [id number]         CT chest without contrast   Final Result by Aydee Guillen DO (02/27 8441)   1  Bibasilar reticulonodular infiltrates, most compatible with multifocal/multi lobar pneumonia  2   4 5 cm ascending aortic aneurysm  A verbal report will be called by the reading room liaison following this dictation  Workstation performed: [id number]                    Procedures  Procedures       Phone Contacts  ED Phone Contact    ED Course  ED Course as of Mar 01 0608   Tue Feb 27, 2018   4213 Will give aspirin  No EKG findings concerning for potential STEMI    Patient has bifascicular block but no concordant changes  Possibly type 2 NSTEMI secondary to strain on patient's heart from infectious etiology  Considering patient has no significant cardiac history with persistent wheezing, likely secondary to potential congestive heart failure  Likely admission for continued monitoring and evaluation as patient has no known cardiac disease or risk factors that would make it more likely troponin falsely elevated  Troponin I: (!) 0 05   0541 Nebulized albuterol and ipratropium did not significantly improved patient's symptoms  Obtaining CT imaging of patient's chest to evaluate for additional pathology  MDM  CritCare Time    Disposition  Final diagnoses:   Elevated troponin   Morbid obesity with BMI of 40 0-44 9, adult ([facility] [address] )     Time reflects when diagnosis was documented in both MDM as applicable and the Disposition within this note     Time User Action Codes Description Comment    2/27/2018  6:55 AM Chavez Monaco Add [R74 8] Elevated troponin     2/27/2018 12:47 PM Pancho SCOTT Add [E66 01,  Z68 41] Morbid obesity with BMI of 40 0-44 9, adult [facility])       ED Disposition     None      Follow-up Information     Follow up With Specialties Details Why Contact Info    Johnna Armas MD Family Medicine Follow up Appointment set up for 3/5/18 at 2:15pm Rosemaryrajiv Medel [address] [place] [address] 89  [phone]          Current Discharge Medication List      CONTINUE these medications which have NOT CHANGED    Details   enalapril (VASOTEC) 10 mg tablet Take 10 mg by mouth daily      hydrALAZINE (APRESOLINE) 50 mg tablet Take 50 mg by mouth daily      losartan-hydrochlorothiazide (HYZAAR) 100-12 5 MG per tablet Take 1 tablet by mouth daily      metoprolol succinate (TOPROL-XL) 100 mg 24 hr tablet Take 100 mg by mouth daily           No discharge procedures on file      ED Provider  Electronically Signed by           Ny Ivan MD  03/01/18 3555

## 2018-02-27 NOTE — PLAN OF CARE
Problem: DISCHARGE PLANNING - CARE MANAGEMENT  Goal: Discharge to post-acute care or home with appropriate resources  INTERVENTIONS:  - Conduct assessment to determine patient/family and health care team treatment goals, and need for post-acute services based on payer coverage, community resources, and patient preferences, and barriers to discharge  - Address psychosocial, clinical, and financial barriers to discharge as identified in assessment in conjunction with the patient/family and health care team  - Arrange appropriate level of post-acute services according to patients   needs and preference and payer coverage in collaboration with the physician and health care team  - Communicate with and update the patient/family, physician, and health care team regarding progress on the discharge plan  - Arrange appropriate transportation to post-acute venues   Outcome: Progressing  Pt has an aneurysm that measures 4 5 cm  Appoint set up with his PCP-Dr Avi Huggins  for 3/5/18 at 2:15pm to f/u

## 2018-02-27 NOTE — H&P
H&P- Clinton Barnes 1950, 74 y o  male MRN: [id number]    Unit/Bed#: ED 26 Encounter: [id number]    Primary Care Provider: Mitchell Barrow MD   Date and time admitted to hospital: 2/27/2018  4:24 AM        * CAP (community acquired pneumonia)   Assessment & Plan    Admit  Begin IV Rocephin and azithromycin  Sputum culture pending  Influenza culture pending  Tamiflu        Elevated troponin   Assessment & Plan    Cardiology consult  Trend troponin  Telemetry monitoring  CTA PE study pending        HTN (hypertension)   Assessment & Plan    Continue enalpril, hydralazine, Hyzaar, metoprolol          VTE Prophylaxis: Enoxaparin (Lovenox)  / sequential compression device   Code Status: Full  POLST: There is no POLST form on file for this patient (pre-hospital)  Discussion with family: No family available for discussion    Anticipated Length of Stay:  Patient will be admitted on an Inpatient basis with an anticipated length of stay of  more than 2 midnights  Justification for Hospital Stay:  IV antibiotics    Total Time for Visit, including Counseling / Coordination of Care: 30 minutes  Greater than 50% of this total time spent on direct patient counseling and coordination of care  Chief Complaint:   Shortness of breath    History of Present Illness:    Clinton Barnes is a 74 y o  male who presents with complaints shortness of breath  Patient states about 4 days ago began having shortness of breath and chest pressure  He also felt as though he was achy and had coughing  Patient denies any fever, chills, nausea, vomiting, abdominal pain  Patient denies having the flu shot  Patient states that last night he became more short of breath  Review of Systems:    Review of Systems   Respiratory: Positive for shortness of breath  All other systems reviewed and are negative        Past Medical and Surgical History:     Past Medical History:   Diagnosis Date    Hypertension        Past Surgical History: Procedure Laterality Date    COLON SURGERY         Meds/Allergies:    Prior to Admission medications    Medication Sig Start Date End Date Taking? Authorizing Provider   enalapril (VASOTEC) 10 mg tablet Take 10 mg by mouth daily   Yes Historical Provider, MD   hydrALAZINE (APRESOLINE) 50 mg tablet Take 50 mg by mouth daily   Yes Historical Provider, MD   losartan-hydrochlorothiazide (HYZAAR) 100-12 5 MG per tablet Take 1 tablet by mouth daily   Yes Historical Provider, MD   metoprolol succinate (TOPROL-XL) 100 mg 24 hr tablet Take 100 mg by mouth daily   Yes Historical Provider, MD     I have reviewed home medications with patient personally  Allergies: Allergies   Allergen Reactions    Lorazepam Other (See Comments)     Psychosis       Social History:     Marital Status: /Civil Union   Occupation:  Retired  Patient Pre-hospital Living Situation:  Lives at home  Patient Pre-hospital Level of Mobility:  Ambulatory  Patient Pre-hospital Diet Restrictions:  None  Substance Use History:   History   Alcohol Use    Yes     Comment: social     History   Smoking Status    Never Smoker   Smokeless Tobacco    Never Used     History   Drug Use No       Family History:    History reviewed  No pertinent family history  Physical Exam:     Vitals:   Blood Pressure: (!) 200/87 (02/27/18 1227)  Pulse: (!) 109 (02/27/18 0608)  Temperature: 97 6 °F (36 4 °C) (02/27/18 0429)  Temp Source: Oral (02/27/18 0429)  Respirations: 20 (02/27/18 1090)  Height: 6' 1" (185 4 cm) (02/27/18 0429)  Weight - Scale: (!) 150 kg (330 lb 14 6 oz) (02/27/18 0429)  SpO2: 95 % (02/27/18 7498)    Physical Exam   Constitutional: He is oriented to person, place, and time  He appears well-developed and well-nourished  HENT:   Head: Normocephalic and atraumatic     Right Ear: External ear normal    Left Ear: External ear normal    Nose: Nose normal    Mouth/Throat: Oropharynx is clear and moist    Eyes: Conjunctivae and EOM are normal  Pupils are equal, round, and reactive to light  Neck: Normal range of motion  Cardiovascular: Normal rate, regular rhythm and normal heart sounds  Pulmonary/Chest: Effort normal  He has decreased breath sounds  Abdominal: Soft  Bowel sounds are normal    Musculoskeletal: Normal range of motion  He exhibits no edema  Neurological: He is alert and oriented to person, place, and time  Skin: Skin is warm and dry  Psychiatric: He has a normal mood and affect  His behavior is normal  Judgment and thought content normal    Vitals reviewed  Additional Data:     Lab Results: I have personally reviewed pertinent reports  Results from last 7 days  Lab Units 02/27/18  0457   WBC Thousand/uL 7 02   HEMOGLOBIN g/dL 14 8   HEMATOCRIT % 44 1   PLATELETS Thousands/uL 140*   NEUTROS PCT % 75   LYMPHS PCT % 14   MONOS PCT % 7   EOS PCT % 3       Results from last 7 days  Lab Units 02/27/18  0457   SODIUM mmol/L 140   POTASSIUM mmol/L 3 9   CHLORIDE mmol/L 104   CO2 mmol/L 31   BUN mg/dL 14   CREATININE mg/dL 0 93   CALCIUM mg/dL 8 4   TOTAL PROTEIN g/dL 6 7   BILIRUBIN TOTAL mg/dL 0 50   ALK PHOS U/L 67   ALT U/L 35   AST U/L 29   GLUCOSE RANDOM mg/dL 105       Results from last 7 days  Lab Units 02/27/18  0631   INR  1 06       Imaging: I have personally reviewed pertinent reports  CT chest without contrast   Final Result by Chasity Gilmore DO (02/27 5208)   1  Bibasilar reticulonodular infiltrates, most compatible with multifocal/multi lobar pneumonia  2   4 5 cm ascending aortic aneurysm  A verbal report will be called by the reading room liaison following this dictation  Workstation performed: [id number]         XR chest 2 views   ED Interpretation by Mila Vital MD (02/27 0496)   Unusual bilateral lower pattern, lower edema versus infiltrate  CT shows infiltrate            EKG, Pathology, and Other Studies Reviewed on Admission:   · EKG:  Sinus tachycardia with bifascicular block    Allscripts / Epic Records Reviewed: Yes     ** Please Note: This note has been constructed using a voice recognition system   **

## 2018-02-27 NOTE — SOCIAL WORK
Pt has an aneurysm that measures 4 5 cm  Appoint set up with his PCP-Dr Vincenza Schlatter  for 3/5/18 at 2:15pm to f/u

## 2018-02-27 NOTE — CASE MANAGEMENT
Initial Clinical Review    Admission: Date/Time/Statement: 2/27/18 @ 0654     Orders Placed This Encounter   Procedures    Inpatient Admission     Standing Status:   Standing     Number of Occurrences:   1     Order Specific Question:   Admitting Physician     Answer:   Trinh Ryan [[id number]]     Order Specific Question:   Level of Care     Answer:   Med Surg [16]     Order Specific Question:   Estimated length of stay     Answer:   More than 2 Midnights     Order Specific Question:   Certification     Answer:   I certify that inpatient services are medically necessary for this patient for a duration of greater than two midnights  See H&P and MD Progress Notes for additional information about the patient's course of treatment  ED: Date/Time/Mode of Arrival:   ED Arrival Information     Expected Arrival Acuity Means of Arrival Escorted By Service Admission Type    - 2/27/2018 04:21 Urgent Walk-In Self General Medicine Urgent    Arrival Complaint    SOB          Chief Complaint:   Chief Complaint   Patient presents with    Flu Symptoms     Pt reports flu like symtpoms over the last week  Today pt was driving and felt near syncopal  After taking Nyquil tonight, Pt woke up SOB, denies respiratory hx  History of Illness: 74 y o  male who presents with complaints shortness of breath  Patient states about 4 days ago began having shortness of breath and chest pressure  He also felt as though he was achy and had coughing  Patient denies any fever, chills, nausea, vomiting, abdominal pain  Patient denies having the flu shot  Patient states that last night he became more short of breath      ED Vital Signs:   ED Triage Vitals [02/27/18 0429]   Temperature Pulse Respirations Blood Pressure SpO2   97 6 °F (36 4 °C) 86 20 (!) 212/103 95 %      Temp Source Heart Rate Source Patient Position - Orthostatic VS BP Location FiO2 (%)   Oral Monitor Lying Right arm --      Pain Score       No Pain        Wt Readings from Last 1 Encounters:   02/27/18 (!) 150 kg (330 lb 14 6 oz)       Vital Signs (abnormal): Abnormal Labs/Diagnostic Test Results: trop 0 05--alb 3 3--platlets 140  Ct of chest-  Impression:    1   Bibasilar reticulonodular infiltrates, most compatible with multifocal/multi lobar pneumonia  2   4 5 cm ascending aortic aneurysm  ED Treatment:   Medication Administration from 02/27/2018 0421 to 02/27/2018 0759       Date/Time Order Dose Route Action Action by Comments     02/27/2018 0449 albuterol inhalation solution 5 mg 5 mg Nebulization Given Nirmala Craft RN      02/27/2018 0449 ipratropium (ATROVENT) 0 02 % inhalation solution 0 5 mg 0 5 mg Nebulization Given Rigo Hurtado RN      02/27/2018 0547 aspirin tablet 325 mg 325 mg Oral Given Rigo Hurtado RN           Past Medical/Surgical History:    Active Ambulatory Problems     Diagnosis Date Noted    No Active Ambulatory Problems     Resolved Ambulatory Problems     Diagnosis Date Noted    No Resolved Ambulatory Problems     Past Medical History:   Diagnosis Date    Hypertension        Admitting Diagnosis: SOB (shortness of breath) [R06 02]  Elevated troponin [R74 8]    Age/Sex: 74 y o  male    Assessment/Plan:   * CAP (community acquired pneumonia)   Assessment & Plan     Admit  Begin IV Rocephin and azithromycin  Sputum culture pending  Influenza culture pending  Tamiflu          Elevated troponin   Assessment & Plan     Cardiology consult  Trend troponin  Telemetry monitoring  CTA PE study pending          HTN (hypertension)   Assessment & Plan     Continue enalpril, hydralazine, Hyzaar, metoprolol             VTE Prophylaxis: Enoxaparin (Lovenox)  / sequential compression device   Code Status: Full  POLST: There is no POLST form on file for this patient (pre-hospital)  Discussion with family: No family available for discussion     Anticipated Length of Stay:  Patient will be admitted on an Inpatient basis with an anticipated length of stay of  more than 2 midnights  Justification for Hospital Stay:  IV antibiotics     Total Time for Visit, including Counseling / Coordination of Care: 30 minutes  Greater than 50% of this total time spent on direct patient counseling and coordination of care      Chief Complaint:   Shortness of breath      Admission Orders:  Scheduled Meds:   Current Facility-Administered Medications:  acetaminophen 650 mg Oral Q6H PRN Joseph Valencia PA-C    [START ON 2/28/2018] aspirin 325 mg Oral Daily Joseph Valencia PA-C    cefTRIAXone 1,000 mg Intravenous Q24H Joseph Valencia PA-C Last Rate: 1,000 mg (02/27/18 0920)   And        azithromycin 500 mg Intravenous Q24H Joseph Valencia PA-C    enalapril 10 mg Oral Daily Joseph Valencia PA-C    enoxaparin 40 mg Subcutaneous Daily Joseph Valencia PA-C    hydrALAZINE 50 mg Oral Daily Joseph Valencia PA-C    influenza vaccine 0 5 mL Intramuscular Prior to discharge Ghislaine Eduardo MD    losartan potassium-hydrochlorothiazide (HYZAAR 100/12  5) combo dose  Oral Daily Joseph Valencia PA-C    metoprolol succinate 100 mg Oral Daily Joseph Valencia PA-C    ondansetron 4 mg Intravenous Q6H PRN Joseph Valencia PA-C    oseltamivir 75 mg Oral Q12H [address] Joseph Valencia PA-C    sodium chloride (PF) 3 mL Intravenous PRN Melody Thornton MD    sodium chloride 75 mL/hr Intravenous Continuous Joseph Valencia PA-C Last Rate: 75 mL/hr (02/27/18 0901)     Continuous Infusions:   sodium chloride 75 mL/hr Last Rate: 75 mL/hr (02/27/18 0901)     PRN Meds:   acetaminophen    influenza vaccine    ondansetron    Insert peripheral IV **AND** sodium chloride (PF)     Echo  Consult cardiology  cardiac diet  Sputum culture  trop q3  venodynes  telm  Blood cultures  Flu swab

## 2018-02-28 PROBLEM — E87.6 HYPOKALEMIA: Status: ACTIVE | Noted: 2018-02-28

## 2018-02-28 LAB
ANION GAP SERPL CALCULATED.3IONS-SCNC: 8 MMOL/L (ref 4–13)
BUN SERPL-MCNC: 15 MG/DL (ref 5–25)
CALCIUM SERPL-MCNC: 8.1 MG/DL (ref 8.3–10.1)
CHLORIDE SERPL-SCNC: 104 MMOL/L (ref 100–108)
CO2 SERPL-SCNC: 29 MMOL/L (ref 21–32)
CREAT SERPL-MCNC: 0.9 MG/DL (ref 0.6–1.3)
ERYTHROCYTE [DISTWIDTH] IN BLOOD BY AUTOMATED COUNT: 13 % (ref 11.6–15.1)
GFR SERPL CREATININE-BSD FRML MDRD: 89 ML/MIN/1.73SQ M
GLUCOSE SERPL-MCNC: 100 MG/DL (ref 65–140)
HCT VFR BLD AUTO: 39 % (ref 36.5–49.3)
HGB BLD-MCNC: 12.8 G/DL (ref 12–17)
MCH RBC QN AUTO: 30 PG (ref 26.8–34.3)
MCHC RBC AUTO-ENTMCNC: 32.8 G/DL (ref 31.4–37.4)
MCV RBC AUTO: 92 FL (ref 82–98)
PLATELET # BLD AUTO: 123 THOUSANDS/UL (ref 149–390)
PMV BLD AUTO: 9.2 FL (ref 8.9–12.7)
POTASSIUM SERPL-SCNC: 3.3 MMOL/L (ref 3.5–5.3)
RBC # BLD AUTO: 4.26 MILLION/UL (ref 3.88–5.62)
SODIUM SERPL-SCNC: 141 MMOL/L (ref 136–145)
WBC # BLD AUTO: 6.16 THOUSAND/UL (ref 4.31–10.16)

## 2018-02-28 PROCEDURE — 80048 BASIC METABOLIC PNL TOTAL CA: CPT | Performed by: PHYSICIAN ASSISTANT

## 2018-02-28 PROCEDURE — 94760 N-INVAS EAR/PLS OXIMETRY 1: CPT

## 2018-02-28 PROCEDURE — 85027 COMPLETE CBC AUTOMATED: CPT | Performed by: PHYSICIAN ASSISTANT

## 2018-02-28 PROCEDURE — 99232 SBSQ HOSP IP/OBS MODERATE 35: CPT | Performed by: INTERNAL MEDICINE

## 2018-02-28 RX ORDER — HYDRALAZINE HYDROCHLORIDE 25 MG/1
50 TABLET, FILM COATED ORAL EVERY 8 HOURS SCHEDULED
Status: DISCONTINUED | OUTPATIENT
Start: 2018-02-28 | End: 2018-03-01 | Stop reason: HOSPADM

## 2018-02-28 RX ORDER — AZITHROMYCIN 250 MG/1
500 TABLET, FILM COATED ORAL EVERY 24 HOURS
Status: DISCONTINUED | OUTPATIENT
Start: 2018-03-01 | End: 2018-03-01 | Stop reason: HOSPADM

## 2018-02-28 RX ADMIN — ENOXAPARIN SODIUM 40 MG: 40 INJECTION SUBCUTANEOUS at 08:25

## 2018-02-28 RX ADMIN — ASPIRIN 81 MG 324 MG: 81 TABLET ORAL at 08:25

## 2018-02-28 RX ADMIN — CEFTRIAXONE 1000 MG: 1 INJECTION, SOLUTION INTRAVENOUS at 09:19

## 2018-02-28 RX ADMIN — LOSARTAN POTASSIUM: 50 TABLET ORAL at 08:26

## 2018-02-28 RX ADMIN — ENALAPRIL MALEATE 10 MG: 10 TABLET ORAL at 09:35

## 2018-02-28 RX ADMIN — HYDRALAZINE HYDROCHLORIDE 50 MG: 25 TABLET, FILM COATED ORAL at 08:25

## 2018-02-28 RX ADMIN — METOPROLOL SUCCINATE 100 MG: 100 TABLET, FILM COATED, EXTENDED RELEASE ORAL at 08:27

## 2018-02-28 RX ADMIN — HYDRALAZINE HYDROCHLORIDE 50 MG: 25 TABLET, FILM COATED ORAL at 21:00

## 2018-02-28 RX ADMIN — AZITHROMYCIN MONOHYDRATE 500 MG: 500 INJECTION, POWDER, LYOPHILIZED, FOR SOLUTION INTRAVENOUS at 10:06

## 2018-02-28 RX ADMIN — SODIUM CHLORIDE 75 ML/HR: 0.9 INJECTION, SOLUTION INTRAVENOUS at 13:37

## 2018-02-28 NOTE — PROGRESS NOTES
General Cardiology   Progress Note   Yazmin Serrano 74 y o  male MRN: [id number]  Unit/Bed#: -01 Encounter: [id number]      SUBJECTIVE:   SOB significantly improved  Denies chest pain, leg swelling, orthopnea, fevers, chills  OBJECTIVE:   Vitals:  Vitals:    02/28/18 1500   BP: 167/99   Pulse: 66   Resp: 18   Temp: 97 9 °F (36 6 °C)   SpO2: 97%     Body mass index is 43 66 kg/m²  Systolic (18XNX), EPP:019 , Min:122 , EWZ:998     Diastolic (73RFN), JWE:57, Min:69, Max:99      Intake/Output Summary (Last 24 hours) at 02/28/18 1541  Last data filed at 02/28/18 1300   Gross per 24 hour   Intake              680 ml   Output             1375 ml   Net             -695 ml     Weight (last 2 days)     Date/Time   Weight    02/27/18 0429  (!)  150 (330 91)              Telemetry Review: No significant arrhythmias seen on telemetry review  PHYSICAL EXAMS:  General:  Patient is not in acute distress, laying in the bed comfortably, awake, alert responding to commands  Head: Normocephalic, Atraumatic  HEENT:  Both pupils normal-size atraumatic, normocephalic, nonicteric  Neck:  JVP not raised  Trachea central  Respiratory:  Bronchovascular breathing all over the chest without any accompaniment  Cardiovascular:  RRR, no murmurs rubs gallops  GI:  Abdomen soft nontender   Liver and spleen normal size  Lymphatic:  No cervical or inguinal lymphadenopathy  Neurologic:  Patient is awake alert, responding to command, well-oriented to time and place and person moving     LABORATORY RESULTS:    Results from last 7 days  Lab Units 02/27/18  1418 02/27/18  1139 02/27/18  0849   TROPONIN I ng/mL 0 03 0 05* 0 03       CBC with diff:   Results from last 7 days  Lab Units 02/28/18  0456 02/27/18  0849 02/27/18  0457   WBC Thousand/uL 6 16  --  7 02   HEMOGLOBIN g/dL 12 8  --  14 8   HEMATOCRIT % 39 0  --  44 1   MCV fL 92  --  91   PLATELETS Thousands/uL 123* 132* 140*   MCH pg 30 0  --  30 5   MCHC g/dL 32 8  --  33 6   RDW % 13 0  --  12 7   MPV fL 9 2 9 4 9 4   NRBC AUTO /100 WBCs  --   --  0       CMP:  Results from last 7 days  Lab Units 18  0456 18  0457   SODIUM mmol/L 141 140   POTASSIUM mmol/L 3 3* 3 9   CHLORIDE mmol/L 104 104   CO2 mmol/L 29 31   ANION GAP mmol/L 8 5   BUN mg/dL 15 14   CREATININE mg/dL 0 90 0 93   GLUCOSE RANDOM mg/dL 100 105   CALCIUM mg/dL 8 1* 8 4   AST U/L  --  29   ALT U/L  --  35   ALK PHOS U/L  --  67   TOTAL PROTEIN g/dL  --  6 7   BILIRUBIN TOTAL mg/dL  --  0 50   EGFR ml/min/1 73sq m 89 86       BMP:  Results from last 7 days  Lab Units 18  0456 18  0457   SODIUM mmol/L 141 140   POTASSIUM mmol/L 3 3* 3 9   CHLORIDE mmol/L 104 104   CO2 mmol/L 29 31   BUN mg/dL 15 14   CREATININE mg/dL 0 90 0 93   GLUCOSE RANDOM mg/dL 100 105   CALCIUM mg/dL 8 1* 8 4         Results from last 7 days  Lab Units 18  0457   NT-PRO BNP pg/mL 46                    Results from last 7 days  Lab Units 18  0631   INR  1 06       Lipid Profile:   Lab Results   Component Value Date    CHOL 167 2017    CHOL 171 2017    CHOL 155 10/11/2016     Lab Results   Component Value Date    HDL 30 (L) 2017    HDL 33 (L) 2017    HDL 31 (L) 10/11/2016     Lab Results   Component Value Date    LDLCALC 89 2017    LDLCALC 110 (H) 2017    LDLCALC 87 10/11/2016     Lab Results   Component Value Date    TRIG 242 (H) 2017    TRIG 139 2017    TRIG 187 (H) 10/11/2016       Cardiac testing:  Results for orders placed during the hospital encounter of 18   Echo complete with contrast if indicated    Narrative [address] [place] [id number] [phone]    Transthoracic Echocardiogram  2D, M-mode, Doppler, and Color Doppler    Study date:  2018    Patient: Jennifer Adam  MR number: [id number]  Account number: [de-identified]  : 1950  Age: 74 years  Gender: Male  Status: Inpatient  Location: Bedside  Height: 73 in  Weight: 330 lb  BP: 148/ 69 mmHg    Indications: Dyspnea, Shortness of Breath, Wheezing, Tachypnea    Diagnoses: R06 00 - Dyspnea, unspecified, R06 02 - Shortness of breath    Sonographer:  SHARON Suero,RDCS  Interpreting Physician:  Steven Silva MD  Referring Physician:  Nathalia So PA-C  Group:  [facility]    SUMMARY    LEFT VENTRICLE:  Ejection fraction was estimated to be 60 %  Although no diagnostic regional wall motion abnormality was identified, this possibility cannot be completely excluded on the basis of this study  Concentric hypertrophy was present  LEFT ATRIUM:  The atrium was mildly dilated  MITRAL VALVE:  There was mild annular calcification  There was trace regurgitation  AORTIC VALVE:  There was very mild stenosis by doppler  HISTORY: PRIOR HISTORY: Hypertension, Elevated Troponins, Shortness of Breath    PROCEDURE: The procedure was performed at the bedside  This was a routine study  The transthoracic approach was used  The study included complete 2D imaging, M-mode, complete spectral Doppler, and color Doppler  The heart rate was 101 bpm,  at the start of the study  Images were obtained from the parasternal, apical, subcostal, and suprasternal notch acoustic windows  Echocardiographic views were limited due to poor acoustic window availability, decreased penetration, and  lung interference  This was a technically difficult study  LEFT VENTRICLE: Size was normal  Ejection fraction was estimated to be 60 %  Although no diagnostic regional wall motion abnormality was identified, this possibility cannot be completely excluded on the basis of this study  Concentric  hypertrophy was present  RIGHT VENTRICLE: The size was normal  Systolic function was normal  Wall thickness was normal     LEFT ATRIUM: The atrium was mildly dilated  RIGHT ATRIUM: Size was normal     MITRAL VALVE: There was mild annular calcification  Valve structure was normal  There was normal leaflet separation  DOPPLER: The transmitral velocity was within the normal range  There was no evidence for stenosis  There was trace  regurgitation  AORTIC VALVE: The valve was not well visualized  DOPPLER: There was very mild stenosis by doppler  TRICUSPID VALVE: The valve structure was normal  There was normal leaflet separation  DOPPLER: The transtricuspid velocity was within the normal range  There was no evidence for stenosis  There was no regurgitation  PULMONIC VALVE: Leaflets exhibited normal thickness, no calcification, and normal cuspal separation  DOPPLER: The transpulmonic velocity was within the normal range  There was no regurgitation  PERICARDIUM: There was no pericardial effusion  The pericardium was normal in appearance  AORTA: The root exhibited normal size  SYSTEM MEASUREMENT TABLES    2D  %FS: 35 8 %  Ao Diam: 3 4 cm  EDV(Teich): 62 9 ml  EF(Teich): 66 1 %  ESV(Teich): 21 4 ml  IVSd: 1 6 cm  LA Area: 25 1 cm2  LA Diam: 3 6 cm  LVEDV MOD A4C: 147 1 ml  LVEF MOD A4C: 57 3 %  LVESV MOD A4C: 62 8 ml  LVIDd: 3 8 cm  LVIDs: 2 5 cm  LVLd A4C: 9 1 cm  LVLs A4C: 8 cm  LVOT Diam: 2 cm  LVPWd: 1 5 cm  RA Area: 15 1 cm2  SV MOD A4C: 84 3 ml  SV(Teich): 41 6 ml    CW  AV Env  Ti: 216 9 ms  AV VTI: 31 6 cm  AV Vmax: 2 m/s  AV Vmean: 1 5 m/s  AV maxPG: 15 9 mmHg  AV meanP 2 mmHg    MM  TAPSE: 2 6 cm    PW  MIN (VTI): 2 2 cm2  MIN Vmax: 1 9 cm2  E': 0 1 m/s  E/E': 13 9  LVOT Env  Ti: 266 4 ms  LVOT VTI: 23 cm  LVOT Vmax: 1 2 m/s  LVOT Vmean: 0 9 m/s  LVOT maxP 2 mmHg  LVOT meanPG: 3 5 mmHg  LVSV Dopp: 70 9 ml  MV A Alexis: 1 4 m/s  MV Dec Haakon: 10 6 m/s2  MV DecT: 84 2 ms  MV E Alexis: 0 9 m/s  MV E/A Ratio: 0 6  MV PHT: 24 4 ms  MVA By PHT: 9 cm2    [employer] Accredited Echocardiography Laboratory    Prepared and electronically signed by    Omar Campbell MD  Signed 2018 13:17:53         Meds/Allergies   all current active meds have been reviewed and current meds:   Current Facility-Administered Medications   Medication Dose Route Frequency    acetaminophen (TYLENOL) tablet 650 mg  650 mg Oral Q6H PRN    aspirin chewable tablet 325 mg  325 mg Oral Daily    [START ON 3/1/2018] azithromycin (ZITHROMAX) tablet 500 mg  500 mg Oral Q24H    cefTRIAXone (ROCEPHIN) IVPB (premix) 1,000 mg  1,000 mg Intravenous Q24H    enalapril (VASOTEC) tablet 10 mg  10 mg Oral Daily    enoxaparin (LOVENOX) subcutaneous injection 40 mg  40 mg Subcutaneous Daily    hydrALAZINE (APRESOLINE) tablet 50 mg  50 mg Oral BID    influenza inactivated quadrivalent vaccine (FLULAVAL) IM injection 0 5 mL  0 5 mL Intramuscular Prior to discharge    losartan potassium-hydrochlorothiazide (HYZAAR 100/12  5) combo dose   Oral Daily    metoprolol succinate (TOPROL-XL) 24 hr tablet 100 mg  100 mg Oral Daily    ondansetron (ZOFRAN) injection 4 mg  4 mg Intravenous Q6H PRN    sodium chloride (PF) 0 9 % injection 3 mL  3 mL Intravenous PRN    sodium chloride 0 9 % infusion  75 mL/hr Intravenous Continuous     Prescriptions Prior to Admission   Medication    enalapril (VASOTEC) 10 mg tablet    hydrALAZINE (APRESOLINE) 50 mg tablet    losartan-hydrochlorothiazide (HYZAAR) 100-12 5 MG per tablet    metoprolol succinate (TOPROL-XL) 100 mg 24 hr tablet       sodium chloride 75 mL/hr Last Rate: 75 mL/hr (02/28/18 1337)       ASSESSMENT & PLAN   1  SOB:    -Likely secondary to pneumonia  -ECHO demonstrates 60% EF, concentric hypertrophy of LV      2  NSTEMI likely type 2:  Serial troponin 0 05, 0 03  Trending down  Likely secondary to pneumonia      3  Ascending aortic aneurysm:  Incidental finding of 4 5 cm aortic aneurysm seen on CT  Will need repeat CT scan in 1 year to monitor progression      4  Hypertension:   last recorded /99  Will increase hydralazine to 3 times a day  Continue all other medications      Reeda January, ARIES  2/28/2018,3:41 PM    Portions of the record may have been created with voice recognition software   Occasional wrong word or "sound a like" substitutions may have occurred due to the inherent limitations of voice recognition software   Read the chart carefully and recognize, using context, where substitutions have occurred

## 2018-02-28 NOTE — PROGRESS NOTES
The azithromycin has / have been converted to Oral per [facility] IV-to-PO Auto-Conversion Protocol for Adults as approved by the Pharmacy and Therapeutics Committee  The patient met all eligible criteria:  3 Age = 27years old   2) Received at least one dose of the IV form   3) Receiving at least one other scheduled oral/enteral medication   4) Tolerating an oral/enteral diet     and did not have any exclusions:   1) Critical care patient   2) Active GI bleed (IF assessing H2RAs or PPIs)   3) Continuous tube feeding (IF assessing cipro, doxycycline, levofloxacin, minocycline, rifampin, or voriconazole)   4) Receiving PO vancomycin (IF assessing metronidazole)   5) Persistent nausea and/or vomiting   6) Ileus or gastrointestinal obstruction   7) Mark/nasogastric tube set for continuous suction   8) Specific order not to automatically convert to PO (in the order's comments or if discussed in the most recent Infectious Disease or primary team's progress notes)

## 2018-02-28 NOTE — PLAN OF CARE
CARDIOVASCULAR - ADULT     Maintains optimal cardiac output and hemodynamic stability Progressing     Absence of cardiac dysrhythmias or at baseline rhythm Progressing        DISCHARGE PLANNING     Discharge to home or other facility with appropriate resources Progressing        DISCHARGE PLANNING - CARE MANAGEMENT     Discharge to post-acute care or home with appropriate resources Progressing        INFECTION - ADULT     Absence or prevention of progression during hospitalization Progressing        Knowledge Deficit     Patient/family/caregiver demonstrates understanding of disease process, treatment plan, medications, and discharge instructions Progressing        PAIN - ADULT     Verbalizes/displays adequate comfort level or baseline comfort level Progressing        Potential for Falls     Patient will remain free of falls Progressing        RESPIRATORY - ADULT     Achieves optimal ventilation and oxygenation Progressing

## 2018-03-01 ENCOUNTER — TELEPHONE (OUTPATIENT)
Dept: CARDIOLOGY CLINIC | Facility: CLINIC | Age: 68
End: 2018-03-01

## 2018-03-01 VITALS
SYSTOLIC BLOOD PRESSURE: 159 MMHG | OXYGEN SATURATION: 94 % | DIASTOLIC BLOOD PRESSURE: 92 MMHG | BODY MASS INDEX: 41.75 KG/M2 | HEART RATE: 73 BPM | RESPIRATION RATE: 18 BRPM | TEMPERATURE: 97.3 F | WEIGHT: 315 LBS | HEIGHT: 73 IN

## 2018-03-01 LAB
ANION GAP SERPL CALCULATED.3IONS-SCNC: 8 MMOL/L (ref 4–13)
BACTERIA SPT RESP CULT: NORMAL
BUN SERPL-MCNC: 14 MG/DL (ref 5–25)
CALCIUM SERPL-MCNC: 8 MG/DL (ref 8.3–10.1)
CHLORIDE SERPL-SCNC: 107 MMOL/L (ref 100–108)
CO2 SERPL-SCNC: 27 MMOL/L (ref 21–32)
CREAT SERPL-MCNC: 0.75 MG/DL (ref 0.6–1.3)
GFR SERPL CREATININE-BSD FRML MDRD: 96 ML/MIN/1.73SQ M
GLUCOSE SERPL-MCNC: 98 MG/DL (ref 65–140)
GRAM STN SPEC: NORMAL
POTASSIUM SERPL-SCNC: 3.7 MMOL/L (ref 3.5–5.3)
SODIUM SERPL-SCNC: 142 MMOL/L (ref 136–145)

## 2018-03-01 PROCEDURE — 99238 HOSP IP/OBS DSCHRG MGMT 30/<: CPT | Performed by: INTERNAL MEDICINE

## 2018-03-01 PROCEDURE — 80048 BASIC METABOLIC PNL TOTAL CA: CPT | Performed by: INTERNAL MEDICINE

## 2018-03-01 PROCEDURE — 99232 SBSQ HOSP IP/OBS MODERATE 35: CPT | Performed by: INTERNAL MEDICINE

## 2018-03-01 RX ORDER — OSELTAMIVIR PHOSPHATE 75 MG/1
75 CAPSULE ORAL DAILY
Qty: 14 CAPSULE | Refills: 0 | Status: SHIPPED | OUTPATIENT
Start: 2018-03-01 | End: 2018-03-05

## 2018-03-01 RX ORDER — AZITHROMYCIN 500 MG/1
500 TABLET, FILM COATED ORAL EVERY 24 HOURS
Qty: 7 TABLET | Refills: 0 | Status: SHIPPED | OUTPATIENT
Start: 2018-03-02 | End: 2018-03-09

## 2018-03-01 RX ORDER — OSELTAMIVIR PHOSPHATE 75 MG/1
75 CAPSULE ORAL EVERY 24 HOURS
Status: DISCONTINUED | OUTPATIENT
Start: 2018-03-01 | End: 2018-03-01

## 2018-03-01 RX ORDER — HYDRALAZINE HYDROCHLORIDE 50 MG/1
50 TABLET, FILM COATED ORAL EVERY 8 HOURS SCHEDULED
Qty: 90 TABLET | Refills: 0 | Status: SHIPPED | OUTPATIENT
Start: 2018-03-01 | End: 2018-03-05 | Stop reason: SDUPTHER

## 2018-03-01 RX ORDER — CEFDINIR 300 MG/1
300 CAPSULE ORAL EVERY 12 HOURS SCHEDULED
Qty: 14 CAPSULE | Refills: 0 | Status: SHIPPED | OUTPATIENT
Start: 2018-03-01 | End: 2018-03-08

## 2018-03-01 RX ORDER — GUAIFENESIN 600 MG
600 TABLET, EXTENDED RELEASE 12 HR ORAL 2 TIMES DAILY
Qty: 20 TABLET | Refills: 0 | Status: SHIPPED | OUTPATIENT
Start: 2018-03-01 | End: 2018-03-11

## 2018-03-01 RX ORDER — ASPIRIN 81 MG/1
325 TABLET, CHEWABLE ORAL DAILY
Qty: 30 TABLET | Refills: 0 | Status: SHIPPED | OUTPATIENT
Start: 2018-03-02 | End: 2019-04-24

## 2018-03-01 RX ADMIN — ASPIRIN 81 MG 324 MG: 81 TABLET ORAL at 08:49

## 2018-03-01 RX ADMIN — ENALAPRIL MALEATE 10 MG: 10 TABLET ORAL at 08:50

## 2018-03-01 RX ADMIN — ENOXAPARIN SODIUM 40 MG: 40 INJECTION SUBCUTANEOUS at 08:52

## 2018-03-01 RX ADMIN — CEFTRIAXONE 1000 MG: 1 INJECTION, SOLUTION INTRAVENOUS at 08:53

## 2018-03-01 RX ADMIN — AZITHROMYCIN 500 MG: 250 TABLET, FILM COATED ORAL at 08:48

## 2018-03-01 RX ADMIN — METOPROLOL SUCCINATE 100 MG: 100 TABLET, FILM COATED, EXTENDED RELEASE ORAL at 08:53

## 2018-03-01 RX ADMIN — LOSARTAN POTASSIUM: 50 TABLET ORAL at 08:48

## 2018-03-01 RX ADMIN — HYDRALAZINE HYDROCHLORIDE 50 MG: 25 TABLET, FILM COATED ORAL at 05:21

## 2018-03-01 NOTE — PLAN OF CARE
Problem: DISCHARGE PLANNING - CARE MANAGEMENT  Goal: Discharge to post-acute care or home with appropriate resources  INTERVENTIONS:  - Conduct assessment to determine patient/family and health care team treatment goals, and need for post-acute services based on payer coverage, community resources, and patient preferences, and barriers to discharge  - Address psychosocial, clinical, and financial barriers to discharge as identified in assessment in conjunction with the patient/family and health care team  - Arrange appropriate level of post-acute services according to patient's   needs and preference and payer coverage in collaboration with the physician and health care team  - Communicate with and update the patient/family, physician, and health care team regarding progress on the discharge plan  - Arrange appropriate transportation to post-acute venues   Outcome: Completed Date Met: 03/01/18  CM met with pt at bedside  Pt to be discharged home w/no needs  His son Michael Hard will transport home

## 2018-03-01 NOTE — TELEPHONE ENCOUNTER
PT HAS APPT SCHED   L/M ON PT VM ASKING THEM TO CALL BACK AND CONFIRM THIS APPT WORKS FOR THEM AS A HOSP F/U

## 2018-03-01 NOTE — PROGRESS NOTES
Progress Note - Maribeth Null 1950, 74 y o  male MRN: [id number]    Unit/Bed#: -01 Encounter: [id number]    Primary Care Provider: Srinath Quinn MD   Date and time admitted to hospital: 2/27/2018  4:24 AM        * CAP (community acquired pneumonia)   Assessment & Plan    Patient with significant improvement diagnosis remains community-acquired pneumonia day 2  Of ceftriaxone and azithromycin  Pulmonary symptoms improving  Influenza testing negative  Consideration for discharged home on or therapy in a m  if remains stable        NSTEMI (non-ST elevation myocardial infarction) [facility])   Assessment & Plan    · Likely Type 2, do to pneumonia   Flu testing negative  · Cardiology cleared patient  · Control Bp, continue beta blocker, aspirin        Elevated troponin   Assessment & Plan    None ST-elevation myocardial infarction type 2 secondary to respiratory distress, related to pneumonia  Cardiology feels no further investigation warranted  Continue aspirin beta-blocker and other antihypertensives  Morbid obesity with BMI of 40 0-44 9, adult [facility])   Assessment & Plan    Nutrition consult to assist with counseling and lifestyle modifications  Patient admits today is the 1st day of his weight loss program   States he has done this before when he needed had hip replacement  Hypokalemia   Assessment & Plan    This very mild will recheck in a m  and replete if necessary        Obstructive sleep apnea   Assessment & Plan    Continue with home CPAP        Aortic aneurysm (HCC)   Assessment & Plan    Control BP aggressively  Reimage in next 12 months  Patient has primary care follow-up already scheduled            VTE Pharmacologic Prophylaxis:   Pharmacologic: Enoxaparin (Lovenox)  Mechanical: Mechanical VTE prophylaxis in place  Patient Centered Rounds: I have evaluated patient without nursing staff present due to Due to nursing title    Reviewed  before and after visit  Discussions with Specialists or Other Care Team Provider:  Reviewed cardiology note  Education and Discussions with Family / Patient:  None  Time Spent for Care: 30 minutes  More than 50% of total time spent on counseling and coordination of care as described above  Current Length of Stay: 1 day(s)  Current Patient Status: Inpatient   Certification Statement: The patient will continue to require additional inpatient hospital stay due to Continued IV antibiotic therapy    Discharge Plan:  Possible in a m  if doing much better  Code Status: Level 1 - Full Code    Subjective:   Patient states breathing improved  Able to get up and around without discomfort in his chest or breathing  Less cough T-max 98 8° flu is negative  Objective:   Vitals:   Temp (24hrs), Av 1 °F (36 7 °C), Min:97 5 °F (36 4 °C), Max:98 8 °F (37 1 °C)    HR:  [66-84] 74  Resp:  [17-18] 18  BP: (122-167)/(69-99) 125/88  SpO2:  [94 %-97 %] 96 %  Body mass index is 43 66 kg/m²  Input and Output Summary (last 24 hours): Intake/Output Summary (Last 24 hours) at 18 2256  Last data filed at 18 1803   Gross per 24 hour   Intake              880 ml   Output              750 ml   Net              130 ml       Physical Exam:     Physical Exam   Constitutional: He is oriented to person, place, and time  He appears well-developed  No distress  HENT:   Head: Normocephalic and atraumatic  Right Ear: External ear normal    Left Ear: External ear normal    Nose: Nose normal    Mouth/Throat: Oropharynx is clear and moist  No oropharyngeal exudate  Eyes: Conjunctivae and EOM are normal  Pupils are equal, round, and reactive to light  Right eye exhibits no discharge  Left eye exhibits no discharge  No scleral icterus  Neck: Normal range of motion  Neck supple  No JVD present  No thyromegaly present  Cardiovascular: Normal rate, regular rhythm, normal heart sounds and intact distal pulses  Exam reveals no gallop and no friction rub  No murmur heard  Pulmonary/Chest: Breath sounds normal  No respiratory distress  He has no wheezes  He has no rales  Abdominal: Soft  Bowel sounds are normal  He exhibits no distension  There is no tenderness  There is no rebound and no guarding  Obese   Musculoskeletal: Normal range of motion  He exhibits no edema or deformity  Lymphadenopathy:     He has no cervical adenopathy  Neurological: He is alert and oriented to person, place, and time  He has normal reflexes  No cranial nerve deficit  He exhibits normal muscle tone  Skin: Skin is warm and dry  No rash noted  He is not diaphoretic  No erythema  Psychiatric: He has a normal mood and affect  Vitals reviewed  Additional Data:   Labs:    Results from last 7 days  Lab Units 02/28/18 0456  02/27/18 0457   WBC Thousand/uL 6 16  --  7 02   HEMOGLOBIN g/dL 12 8  --  14 8   HEMATOCRIT % 39 0  --  44 1   PLATELETS Thousands/uL 123*  < > 140*   NEUTROS PCT %  --   --  75   LYMPHS PCT %  --   --  14   MONOS PCT %  --   --  7   EOS PCT %  --   --  3   < > = values in this interval not displayed  Results from last 7 days  Lab Units 02/28/18 0456 02/27/18 0457   SODIUM mmol/L 141 140   POTASSIUM mmol/L 3 3* 3 9   CHLORIDE mmol/L 104 104   CO2 mmol/L 29 31   BUN mg/dL 15 14   CREATININE mg/dL 0 90 0 93   CALCIUM mg/dL 8 1* 8 4   TOTAL PROTEIN g/dL  --  6 7   BILIRUBIN TOTAL mg/dL  --  0 50   ALK PHOS U/L  --  67   ALT U/L  --  35   AST U/L  --  29   GLUCOSE RANDOM mg/dL 100 105       Results from last 7 days  Lab Units 02/27/18  0631   INR  1 06       * I Have Reviewed All Lab Data Listed Above  * Additional Pertinent Lab Tests Reviewed:  All Labs Within Last 24 Hours Reviewed    Imaging:    Imaging Reports Reviewed Today Include:  None    Cultures:   Blood Culture:   Lab Results   Component Value Date    BLOODCX No Growth at 24 hrs  02/27/2018    BLOODCX No Growth at 24 hrs  02/27/2018     Urine Culture: No results found for: URINECX  Sputum Culture: No components found for: SPUTUMCX  Wound Culture: No results found for: WOUNDCULT    Last 24 Hours Medication List:     Current Facility-Administered Medications:  acetaminophen 650 mg Oral Q6H PRN Ronold Matter, PA-C    aspirin 325 mg Oral Daily Ronold Matter, PA-C    [START ON 3/1/2018] azithromycin 500 mg Oral Q24H Chanell Jamison MD    cefTRIAXone 1,000 mg Intravenous Q24H Ronold Matter, PA-C Last Rate: Stopped (02/28/18 4474)   enalapril 10 mg Oral Daily Ronold Matter, PA-C    enoxaparin 40 mg Subcutaneous Daily Ronold Matter, PA-C    hydrALAZINE 50 mg Oral [facility] Reeda January, PA-C    influenza vaccine 0 5 mL Intramuscular Prior to discharge Chanell Jamison MD    losartan potassium-hydrochlorothiazide (HYZAAR 100/12  5) combo dose  Oral Daily Ronold Matter, PA-C    metoprolol succinate 100 mg Oral Daily Ronold Matter, PA-C    ondansetron 4 mg Intravenous Q6H PRN Ronold Matter, PA-C    sodium chloride (PF) 3 mL Intravenous PRN Garry Wells MD    sodium chloride 75 mL/hr Intravenous Continuous Ronold Matter, PA-C Last Rate: 75 mL/hr (02/28/18 3002)        Today, Patient Was Seen By: Chanell Jamison MD    ** Please Note: Dragon 360 Dictation voice to text software may have been used in the creation of this document   **

## 2018-03-01 NOTE — PROGRESS NOTES
General Cardiology   Progress Note   Yazmin Serrano 74 y o  male MRN: [id number]  Unit/Bed#: -01 Encounter: [id number]        Subjective:   No significant events since the last encounter  SOB improved  Denies chest pain, palpitations, lightheadedness, leg swelling, syncope  Objective:   Vitals:  Vitals:    03/01/18 0700   BP: 159/92   Pulse: 73   Resp: 18   Temp: (!) 97 3 °F (36 3 °C)   SpO2: 94%       Body mass index is 43 66 kg/m²  Systolic (09YJT), PXA:820 , Min:125 , VFE:370     Diastolic (57RIV), IPC:60, Min:72, Max:99      Intake/Output Summary (Last 24 hours) at 03/01/18 1034  Last data filed at 03/01/18 0829   Gross per 24 hour   Intake              920 ml   Output             2000 ml   Net            -1080 ml     Weight (last 2 days)     Date/Time   Weight    02/27/18 0429  (!)  150 (330 91)              Telemetry Review: No significant arrhythmias seen on telemetry review  PHYSICAL EXAMS:  General:  Patient is not in acute distress, laying in the bed comfortably, awake, alert responding to commands  Head: Normocephalic, Atraumatic  HEENT: White sclera, pink conjunctiva,  PERRLA,pharynx benign  Neck:  Supple, no neck vein distention, carotids+2/+2 no bruits, thyromegaly, adenopathy  Respiratory: clear to P/A  Cardiovascular:  PMI normal, S1-S2 normal, No  Murmurs, thrills, gallops, rubs   Regular rhythm  GI:  Abdomen soft nontender   No hepatosplenomegaly, adenopathy, ascites,or rebound tenderness  Extremities: No edema, normal pulses, no calf tenderness, no joint deformities, no venous disease   Integument:  No skin rashes or ulceration  Lymphatic:  No cervical or inguinal lymphadenopathy  Neurologic:  Patient is awake alert, responding to command, well-oriented to time and place and person moving all extremities      LABORATORY RESULTS:    Results from last 7 days  Lab Units 02/27/18  1418 02/27/18  1139 02/27/18  0849   TROPONIN I ng/mL 0 03 0 05* 0 03     CBC with diff:   Results from last 7 days  Lab Units 02/28/18 0456 02/27/18  0849 02/27/18  0457   WBC Thousand/uL 6 16  --  7 02   HEMOGLOBIN g/dL 12 8  --  14 8   HEMATOCRIT % 39 0  --  44 1   MCV fL 92  --  91   PLATELETS Thousands/uL 123* 132* 140*   MCH pg 30 0  --  30 5   MCHC g/dL 32 8  --  33 6   RDW % 13 0  --  12 7   MPV fL 9 2 9 4 9 4   NRBC AUTO /100 WBCs  --   --  0       CMP:  Results from last 7 days  Lab Units 03/01/18  0430 02/28/18 0456 02/27/18  0457   SODIUM mmol/L 142 141 140   POTASSIUM mmol/L 3 7 3 3* 3 9   CHLORIDE mmol/L 107 104 104   CO2 mmol/L 27 29 31   ANION GAP mmol/L 8 8 5   BUN mg/dL 14 15 14   CREATININE mg/dL 0 75 0 90 0 93   GLUCOSE RANDOM mg/dL 98 100 105   CALCIUM mg/dL 8 0* 8 1* 8 4   AST U/L  --   --  29   ALT U/L  --   --  35   ALK PHOS U/L  --   --  67   TOTAL PROTEIN g/dL  --   --  6 7   BILIRUBIN TOTAL mg/dL  --   --  0 50   EGFR ml/min/1 73sq m 96 89 86       BMP:  Results from last 7 days  Lab Units 03/01/18  0430 02/28/18 0456 02/27/18 0457   SODIUM mmol/L 142 141 140   POTASSIUM mmol/L 3 7 3 3* 3 9   CHLORIDE mmol/L 107 104 104   CO2 mmol/L 27 29 31   BUN mg/dL 14 15 14   CREATININE mg/dL 0 75 0 90 0 93   GLUCOSE RANDOM mg/dL 98 100 105   CALCIUM mg/dL 8 0* 8 1* 8 4           Results from last 7 days  Lab Units 02/27/18  0457   NT-PRO BNP pg/mL 46                    Results from last 7 days  Lab Units 02/27/18  0631   INR  1 06       Lipid Profile:   Lab Results   Component Value Date    CHOL 167 12/06/2017    CHOL 171 06/06/2017    CHOL 155 10/11/2016     Lab Results   Component Value Date    HDL 30 (L) 12/06/2017    HDL 33 (L) 06/06/2017    HDL 31 (L) 10/11/2016     Lab Results   Component Value Date    LDLCALC 89 12/06/2017    LDLCALC 110 (H) 06/06/2017    LDLCALC 87 10/11/2016     Lab Results   Component Value Date    TRIG 242 (H) 12/06/2017    TRIG 139 06/06/2017    TRIG 187 (H) 10/11/2016       Cardiac testing:   Results for orders placed during the hospital encounter of 02/27/18   Echo complete with contrast if indicated    [place], [place] [address]  [phone]    Transthoracic Echocardiogram  2D, M-mode, Doppler, and Color Doppler    Study date:  2018    Patient: Peace Clark  MR number: [id number]  Account number: [de-identified]  : 1950  Age: 74 years  Gender: Male  Status: Inpatient  Location: Bedside  Height: 73 in  Weight: 330 lb  BP: 148/ 69 mmHg    Indications: Dyspnea, Shortness of Breath, Wheezing, Tachypnea    Diagnoses: R06 00 - Dyspnea, unspecified, R06 02 - Shortness of breath    Sonographer:  SHARON Stewart,RDCS  Interpreting Physician:  Gallo Keen MD  Referring Physician:  Jossie Buerger, PA-C  Group:  [facility]    SUMMARY    LEFT VENTRICLE:  Ejection fraction was estimated to be 60 %  Although no diagnostic regional wall motion abnormality was identified, this possibility cannot be completely excluded on the basis of this study  Concentric hypertrophy was present  LEFT ATRIUM:  The atrium was mildly dilated  MITRAL VALVE:  There was mild annular calcification  There was trace regurgitation  AORTIC VALVE:  There was very mild stenosis by doppler  HISTORY: PRIOR HISTORY: Hypertension, Elevated Troponins, Shortness of Breath    PROCEDURE: The procedure was performed at the bedside  This was a routine study  The transthoracic approach was used  The study included complete 2D imaging, M-mode, complete spectral Doppler, and color Doppler  The heart rate was 101 bpm,  at the start of the study  Images were obtained from the parasternal, apical, subcostal, and suprasternal notch acoustic windows  Echocardiographic views were limited due to poor acoustic window availability, decreased penetration, and  lung interference  This was a technically difficult study  LEFT VENTRICLE: Size was normal  Ejection fraction was estimated to be 60 %   Although no diagnostic regional wall motion abnormality was identified, this possibility cannot be completely excluded on the basis of this study  Concentric  hypertrophy was present  RIGHT VENTRICLE: The size was normal  Systolic function was normal  Wall thickness was normal     LEFT ATRIUM: The atrium was mildly dilated  RIGHT ATRIUM: Size was normal     MITRAL VALVE: There was mild annular calcification  Valve structure was normal  There was normal leaflet separation  DOPPLER: The transmitral velocity was within the normal range  There was no evidence for stenosis  There was trace  regurgitation  AORTIC VALVE: The valve was not well visualized  DOPPLER: There was very mild stenosis by doppler  TRICUSPID VALVE: The valve structure was normal  There was normal leaflet separation  DOPPLER: The transtricuspid velocity was within the normal range  There was no evidence for stenosis  There was no regurgitation  PULMONIC VALVE: Leaflets exhibited normal thickness, no calcification, and normal cuspal separation  DOPPLER: The transpulmonic velocity was within the normal range  There was no regurgitation  PERICARDIUM: There was no pericardial effusion  The pericardium was normal in appearance  AORTA: The root exhibited normal size  SYSTEM MEASUREMENT TABLES    2D  %FS: 35 8 %  Ao Diam: 3 4 cm  EDV(Teich): 62 9 ml  EF(Teich): 66 1 %  ESV(Teich): 21 4 ml  IVSd: 1 6 cm  LA Area: 25 1 cm2  LA Diam: 3 6 cm  LVEDV MOD A4C: 147 1 ml  LVEF MOD A4C: 57 3 %  LVESV MOD A4C: 62 8 ml  LVIDd: 3 8 cm  LVIDs: 2 5 cm  LVLd A4C: 9 1 cm  LVLs A4C: 8 cm  LVOT Diam: 2 cm  LVPWd: 1 5 cm  RA Area: 15 1 cm2  SV MOD A4C: 84 3 ml  SV(Teich): 41 6 ml    CW  AV Env  Ti: 216 9 ms  AV VTI: 31 6 cm  AV Vmax: 2 m/s  AV Vmean: 1 5 m/s  AV maxPG: 15 9 mmHg  AV meanP 2 mmHg    MM  TAPSE: 2 6 cm    PW  MIN (VTI): 2 2 cm2  MIN Vmax: 1 9 cm2  E': 0 1 m/s  E/E': 13 9  LVOT Env  Ti: 266 4 ms  LVOT VTI: 23 cm  LVOT Vmax: 1 2 m/s  LVOT Vmean: 0 9 m/s  LVOT maxP 2 mmHg  LVOT meanPG: 3 5 mmHg  LVSV Dopp: 70 9 ml  MV A Alexis: 1 4 m/s  MV Dec [place]: 10 6 m/s2  MV DecT: 84 2 ms  MV E Alexis: 0 9 m/s  MV E/A Ratio: 0 6  MV PHT: 24 4 ms  MVA By PHT: 9 cm2    [address] [facility]redited [facility]    Prepared and electronically signed by    Glorious Carrel, MD  Signed 2018 13:17:53         Meds/Allergies   all current active meds have been reviewed and current meds:   Current Facility-Administered Medications   Medication Dose Route Frequency    acetaminophen (TYLENOL) tablet 650 mg  650 mg Oral Q6H PRN    aspirin chewable tablet 325 mg  325 mg Oral Daily    azithromycin (ZITHROMAX) tablet 500 mg  500 mg Oral Q24H    cefTRIAXone (ROCEPHIN) IVPB (premix) 1,000 mg  1,000 mg Intravenous Q24H    enalapril (VASOTEC) tablet 10 mg  10 mg Oral Daily    enoxaparin (LOVENOX) subcutaneous injection 40 mg  40 mg Subcutaneous Daily    hydrALAZINE (APRESOLINE) tablet 50 mg  50 mg Oral Q8H [address]    influenza inactivated quadrivalent vaccine (FLULAVAL) IM injection 0 5 mL  0 5 mL Intramuscular Prior to discharge    losartan potassium-hydrochlorothiazide (HYZAAR 100/12  5) combo dose   Oral Daily    metoprolol succinate (TOPROL-XL) 24 hr tablet 100 mg  100 mg Oral Daily    ondansetron (ZOFRAN) injection 4 mg  4 mg Intravenous Q6H PRN    sodium chloride (PF) 0 9 % injection 3 mL  3 mL Intravenous PRN    sodium chloride 0 9 % infusion  75 mL/hr Intravenous Continuous     Prescriptions Prior to Admission   Medication    enalapril (VASOTEC) 10 mg tablet    hydrALAZINE (APRESOLINE) 50 mg tablet    losartan-hydrochlorothiazide (HYZAAR) 100-12 5 MG per tablet    metoprolol succinate (TOPROL-XL) 100 mg 24 hr tablet         sodium chloride 75 mL/hr Last Rate: 75 mL/hr (18 1337)       Assessment/Plan:  1   SOB:    -Likely secondary to pneumonia     -ECHO demonstrates 60% EF, concentric hypertrophy of LV      2   NSTEMI likely type 2:  Serial troponin 0 05, 0 03   Trending down   Likely secondary to pneumonia      3   Ascending aortic aneurysm:  Incidental finding of 4 5 cm aortic aneurysm seen on CT  Will need repeat CT scan in 1 year to monitor progression  Will establish care with Dr Alexis Rogers for yearly follow-up      4   Hypertension:   last recorded /99  Will increase hydralazine to 3 times a day  Continue all other medications  F/u [place] 2 weeks  ** Please Note: Dragon 360 Dictation voice to text software may have been used in the creation of this document   **

## 2018-03-01 NOTE — ASSESSMENT & PLAN NOTE
Patient with significant improvement diagnosis remains community-acquired pneumonia day 2  Of ceftriaxone and azithromycin  Pulmonary symptoms improving  Influenza testing negative    Consideration for discharged home on or therapy in a   if remains stable

## 2018-03-01 NOTE — DISCHARGE INSTR - AVS FIRST PAGE
If your wife has tested positive for the flu , I have provided tamiflu for you once daily for the next two weeks  This should only be used if she has had a positive flu test, otherwise do not fill or take  There are risks of adverse reaction that you can review with the pharmacist if you  the drugs so that you are aware

## 2018-03-01 NOTE — SOCIAL WORK
CM met with pt at bedside  Pt to be discharged home w/no needs  His son Michael Hard will transport home

## 2018-03-01 NOTE — ASSESSMENT & PLAN NOTE
None ST-elevation myocardial infarction type 2 secondary to respiratory distress, related to pneumonia  Cardiology feels no further investigation warranted  Continue aspirin beta-blocker and other antihypertensives

## 2018-03-03 NOTE — ASSESSMENT & PLAN NOTE
Patient treated for community-acquired pneumonia  Found to be influenza negative  He will complete a course of antibiotic therapy orally at home consisting of 7 more days of azithromycin /Omnicef

## 2018-03-03 NOTE — ASSESSMENT & PLAN NOTE
Continue losartan with hydrochlorothiazide, ACE-inhibitor and Toprol  Patient has been instructed is hydralazine has been increased at the request of Cardiology to improve blood pressure control in the context of knowing he has a an acute abdominal aneurysm

## 2018-03-03 NOTE — DISCHARGE SUMMARY
Discharge- Tiffany Kilpatrick 1950, 74 y o  male MRN: [id number]    Unit/Bed#: -01 Encounter: [id number]    Primary Care Provider: Hussein Chapa MD   Date and time admitted to hospital: 2/27/2018  4:24 AM        * CAP (community acquired pneumonia)   Assessment & Plan    Patient treated for community-acquired pneumonia  Found to be influenza negative  He will complete a course of antibiotic therapy orally at home consisting of 7 more days of azithromycin /Omnicef  NSTEMI (non-ST elevation myocardial infarction) [facility])   Assessment & Plan    · Likely Type 2, do to pneumonia   Flu testing negative  · Cardiology cleared patient  · Control Bp, continue beta blocker, aspirin        Elevated troponin   Assessment & Plan    Non ST-elevation myocardial infarction type 2 secondary to respiratory distress, related to pneumonia  Cardiology feels no further investigation warranted  Continue aspirin beta-blocker and other antihypertensives  Morbid obesity with BMI of 40 0-44 9, adult [facility])   Assessment & Plan    Nutrition consult to assist with counseling and lifestyle modifications  Patient admits today is the 1st day of his weight loss program   States he has done this before when he needed had hip replacement  HTN (hypertension)   Assessment & Plan    Continue losartan with hydrochlorothiazide, ACE-inhibitor and Toprol  Patient has been instructed is hydralazine has been increased at the request of Cardiology to improve blood pressure control in the context of knowing he has a an acute abdominal aneurysm  Hypokalemia   Assessment & Plan    Repleted as needed        Obstructive sleep apnea   Assessment & Plan    Continue with home CPAP        Aortic aneurysm (HCC)   Assessment & Plan    Control BP aggressively  Reimage in next 12 months    Patient has primary care follow-up already scheduled                Resolved Problems  Date Reviewed: 2/28/2018    None          Consultations During Hospital Stay:  · Cardiology  · Nutrition    Procedures Performed:     · X-ray chest:  By basilar infiltrates concerning for pneumonia  · Chest  Without contrast: bibasilar reticulonodular infiltrates, multifocal lobar pneumonia  · 4 5 ascending aortic aneurysm  · CTA PE study: Persistent bilateral lower lobe infiltrates, ascending aoritc aneurysm stable  Significant Findings / Test Results:     · As above  · Crt: 0 75    Incidental Findings:   · Aneurysm as described    Test Results Pending at Discharge (will require follow up): · None     Outpatient Tests Requested:  · None    Complications:  None    Reason for Admission: Shortness of breath and cough    Hospital Course:     Yojana Badillo is a 74 y o  male patient who originally presented to the hospital on 2/27/2018 due to 4 days of shortness of breath and chest pressure  Symptoms accompanied by achiness and cough  Patient was found to have elevated blood pressure, elevated heart rate without fever  He underwent CT of chest which showed bilateral reticular nodular infiltrates and an undiagnosed aortic aneurysm  He was found to have elevated cardiac markers felt to represent a NSTEMI type 2   Cardiology saw the patient and cleared him from further testing as this was felt to be secondary to his pneumonia  Recommendations were for continue aspirin therapy, and better blood pressure control   His   He was initially started on tamiflu until his testing came back negative  With aggressive pulmonary toilet the patient felt much improved by day 3 of admission   He was discharged to home on increased oral hydralazine and a prescription for prophylactic tamiflu as he reported his wife was possibly being diagnosed with the flu officially  Please see above list of diagnoses and related plan for additional information  Condition at Discharge: good     Discharge Day Visit / Exam:     Subjective:  I feel great!    Patient able to move around without shortness of breath   Still with small cough , less wet sounding  Vitals: Blood Pressure: 159/92 (03/01/18 0700)  Pulse: 73 (03/01/18 0700)  Temperature: (!) 97 3 °F (36 3 °C) (03/01/18 0700)  Temp Source: Oral (03/01/18 0700)  Respirations: 18 (03/01/18 0700)  Height: 6' 1" (185 4 cm) (02/27/18 0429)  Weight - Scale: (!) 150 kg (330 lb 14 6 oz) (02/27/18 0429)  SpO2: 94 % (03/01/18 0700)  Exam:   Physical Exam  General well developed , well nourished obese male in no acute distress  Nc, At PERRL, EOMi, anicteric , MMM, no oral lesions  Chest Decreased with no wheezing, no rhonchi at present but cough is loose  CVS: RRR, S1, S2 no MRG  Abd: obese, not tender or distended  Ext: no CCCE  Neuro: awake alert and oriented CN II-xII intact    Discussion with Family: none, spouse ill and at the doctor    Discharge instructions/Information to patient and family:   See after visit summary for information provided to patient and family  Provisions for Follow-Up Care:  See after visit summary for information related to follow-up care and any pertinent home health orders  Disposition:     Home    For Discharges to [facility]:   · Not Applicable to this Patient - Not Applicable to this Patient    Planned Readmission: None     Discharge Statement:  I spent 25 minutes discharging the patient  This time was spent on the day of discharge  I had direct contact with the patient on the day of discharge  Greater than 50% of the total time was spent examining patient, answering all patient questions, arranging and discussing plan of care with patient as well as directly providing post-discharge instructions  Additional time then spent on discharge activities  Discharge Medications:  See after visit summary for reconciled discharge medications provided to patient and family        ** Please Note: This note has been constructed using a voice recognition system **

## 2018-03-03 NOTE — ASSESSMENT & PLAN NOTE
Non ST-elevation myocardial infarction type 2 secondary to respiratory distress, related to pneumonia  Cardiology feels no further investigation warranted  Continue aspirin beta-blocker and other antihypertensives

## 2018-03-04 LAB
BACTERIA BLD CULT: NORMAL
BACTERIA BLD CULT: NORMAL

## 2018-03-05 ENCOUNTER — OFFICE VISIT (OUTPATIENT)
Dept: FAMILY MEDICINE CLINIC | Facility: CLINIC | Age: 68
End: 2018-03-05
Payer: MEDICARE

## 2018-03-05 VITALS
HEIGHT: 73 IN | WEIGHT: 315 LBS | HEART RATE: 76 BPM | TEMPERATURE: 97.6 F | OXYGEN SATURATION: 96 % | SYSTOLIC BLOOD PRESSURE: 136 MMHG | DIASTOLIC BLOOD PRESSURE: 90 MMHG | BODY MASS INDEX: 41.75 KG/M2

## 2018-03-05 DIAGNOSIS — I71.2 THORACIC AORTIC ANEURYSM WITHOUT RUPTURE (HCC): ICD-10-CM

## 2018-03-05 DIAGNOSIS — I10 ESSENTIAL HYPERTENSION: Primary | Chronic | ICD-10-CM

## 2018-03-05 DIAGNOSIS — J18.9 COMMUNITY ACQUIRED PNEUMONIA, UNSPECIFIED LATERALITY: ICD-10-CM

## 2018-03-05 PROCEDURE — 99214 OFFICE O/P EST MOD 30 MIN: CPT | Performed by: FAMILY MEDICINE

## 2018-03-05 RX ORDER — LOSARTAN POTASSIUM AND HYDROCHLOROTHIAZIDE 12.5; 1 MG/1; MG/1
1 TABLET ORAL DAILY
Qty: 30 TABLET | Refills: 5 | Status: SHIPPED | OUTPATIENT
Start: 2018-03-05 | End: 2018-09-07 | Stop reason: SDUPTHER

## 2018-03-05 RX ORDER — HYDRALAZINE HYDROCHLORIDE 50 MG/1
50 TABLET, FILM COATED ORAL EVERY 8 HOURS SCHEDULED
Qty: 90 TABLET | Refills: 5 | Status: SHIPPED | OUTPATIENT
Start: 2018-03-05 | End: 2018-12-31 | Stop reason: SDUPTHER

## 2018-03-05 NOTE — PROGRESS NOTES
Assessment/Plan:           Problem List Items Addressed This Visit     CAP (community acquired pneumonia)    Hypertension - Primary (Chronic)    Relevant Medications    losartan-hydrochlorothiazide (HYZAAR) 100-12 5 MG per tablet    hydrALAZINE (APRESOLINE) 50 mg tablet    Aortic aneurysm (HCC)            Subjective:      Patient ID: Mauricio Michaels is a 74 y o  male  Patient comes in for hospital follow-up  Had community acquired pneumonia  While in the hospital is found have aortic aneurysm  He also had elevated troponin felt to be due to his pneumonia  The following portions of the patient's history were reviewed and updated as appropriate: allergies, current medications, past family history, past medical history, past social history, past surgical history and problem list     Review of Systems   Constitutional: Positive for fatigue  HENT: Negative  Respiratory: Negative  Cardiovascular: Negative  Objective:      /90   Pulse 76   Temp 97 6 °F (36 4 °C)   Ht 6' 1" (1 854 m)   Wt (!) 146 kg (321 lb 6 4 oz)   SpO2 96%   BMI 42 40 kg/m²          Physical Exam   Constitutional: He is oriented to person, place, and time  He appears well-developed and well-nourished  HENT:   Head: Normocephalic and atraumatic  Right Ear: Tympanic membrane normal    Left Ear: Tympanic membrane normal    Eyes: EOM are normal  Pupils are equal, round, and reactive to light  Neck: Neck supple  Cardiovascular: Normal rate, regular rhythm and normal heart sounds  Pulmonary/Chest: Effort normal and breath sounds normal    Abdominal: Soft  Bowel sounds are normal    Musculoskeletal: Normal range of motion  Neurological: He is alert and oriented to person, place, and time  Skin: Skin is warm and dry  Psychiatric: He has a normal mood and affect  Thought content normal    Nursing note and vitals reviewed

## 2018-03-05 NOTE — PATIENT INSTRUCTIONS
He will follow up with Cardiology regarding elevated troponin and is thoracic aneurysm    He will return here with regular appointment

## 2018-03-21 ENCOUNTER — TELEPHONE (OUTPATIENT)
Dept: CARDIOLOGY CLINIC | Facility: CLINIC | Age: 68
End: 2018-03-21

## 2018-04-17 ENCOUNTER — OFFICE VISIT (OUTPATIENT)
Dept: CARDIOLOGY CLINIC | Facility: CLINIC | Age: 68
End: 2018-04-17
Payer: MEDICARE

## 2018-04-17 VITALS
DIASTOLIC BLOOD PRESSURE: 90 MMHG | WEIGHT: 315 LBS | SYSTOLIC BLOOD PRESSURE: 138 MMHG | HEART RATE: 77 BPM | OXYGEN SATURATION: 96 % | BODY MASS INDEX: 41.75 KG/M2 | HEIGHT: 73 IN

## 2018-04-17 DIAGNOSIS — R77.8 ELEVATED TROPONIN: ICD-10-CM

## 2018-04-17 DIAGNOSIS — E66.01 MORBID OBESITY WITH BMI OF 40.0-44.9, ADULT (HCC): ICD-10-CM

## 2018-04-17 DIAGNOSIS — R06.00 DYSPNEA ON EXERTION: ICD-10-CM

## 2018-04-17 DIAGNOSIS — I10 ESSENTIAL HYPERTENSION: Chronic | ICD-10-CM

## 2018-04-17 DIAGNOSIS — I71.2 THORACIC AORTIC ANEURYSM WITHOUT RUPTURE (HCC): ICD-10-CM

## 2018-04-17 DIAGNOSIS — G47.33 OBSTRUCTIVE SLEEP APNEA: ICD-10-CM

## 2018-04-17 DIAGNOSIS — I21.4 NSTEMI (NON-ST ELEVATION MYOCARDIAL INFARCTION) (HCC): Primary | ICD-10-CM

## 2018-04-17 PROBLEM — R06.09 DYSPNEA ON EXERTION: Status: ACTIVE | Noted: 2018-04-17

## 2018-04-17 PROCEDURE — 99214 OFFICE O/P EST MOD 30 MIN: CPT | Performed by: INTERNAL MEDICINE

## 2018-04-17 RX ORDER — FERROUS SULFATE 325(65) MG
325 TABLET ORAL
COMMUNITY

## 2018-04-17 RX ORDER — COLCHICINE 0.6 MG/1
TABLET, FILM COATED ORAL
Refills: 0 | COMMUNITY
Start: 2018-04-09 | End: 2018-06-28

## 2018-04-17 NOTE — PROGRESS NOTES
Cardiology Consultation     Shailesh Whitaker  [id number]  1950  [facility] [address] [address]    Chief complaints:  Hospital follow-up NSTEMI, hypertension and shortness of breath     HPI:  74 old male with past medical history of hypertension is here for follow-up from hospital discharge for NSTEMI and shortness of breath  He had CTA while he was in the hospital which showed ascending aortic aneurysm measuring 4 5 cm  Patient also had significant elevated blood pressure in hospital and was started on hydralazine 50 mg t i d     Since discharge the dyspnea has significantly improved and the blood pressure is also significantly improved  He denies any cardiac symptoms including chest pain, shortness of breath, palpitations, dizziness or syncope  Blood pressure is still mildly elevated  Patient is working was salt restriction weight loss  Patient has lost about 5 lb since discharge        Past Medical History:   Diagnosis Date    Anemia     [facility]     Elevated troponin     History of colonoscopy     6/5/13    History of esophagogastroduodenoscopy     6/5/13    Hypertension     Hypokalemia     Morbid obesity (HCC)     Renal calculi     STEMI ([facility] elevation myocardial infarction) ([facility] )      Social History     Social History    Marital status: /Civil Union     Spouse name: N/A    Number of children: N/A    Years of education: N/A     Occupational History    Not on file       Social History Main Topics    Smoking status: Never Smoker    Smokeless tobacco: Never Used    Alcohol use Yes      Comment: social    Drug use: No    Sexual activity: Not on file     Other Topics Concern    Not on file     Social History Narrative    No narrative on file      Family History   Problem Relation Age of Onset   Davy Stoll COPD Mother    Davygeorges Stoll Breast cancer Mother malignant neoplasm    Other Father      thoracic aortic aneurysm     Past Surgical History:   Procedure Laterality Date    APPENDECTOMY      ARTHROSCOPY KNEE Left     COLON SURGERY      COLOSTOMY      ROTATOR CUFF REPAIR Left     SATURATION BIOPSY OF PROSTATE      TOTAL HIP ARTHROPLASTY      Last Assessed:5/8/15       Current Outpatient Prescriptions:     aspirin 81 mg chewable tablet, Chew 4 tablets (325 mg total) daily, Disp: 30 tablet, Rfl: 0    COLCRYS 0 6 MG tablet, , Disp: , Rfl: 0    enalapril (VASOTEC) 10 mg tablet, Take 10 mg by mouth daily, Disp: , Rfl:     hydrALAZINE (APRESOLINE) 50 mg tablet, Take 1 tablet (50 mg total) by mouth every 8 (eight) hours, Disp: 90 tablet, Rfl: 5    losartan-hydrochlorothiazide (HYZAAR) 100-12 5 MG per tablet, Take 1 tablet by mouth daily, Disp: 30 tablet, Rfl: 5    metoprolol succinate (TOPROL-XL) 100 mg 24 hr tablet, Take 100 mg by mouth daily, Disp: , Rfl:   Allergies   Allergen Reactions    Lorazepam Other (See Comments)     Psychosis     There were no vitals filed for this visit      Labs:  Admission on 02/27/2018, Discharged on 03/01/2018   Component Date Value    WBC 02/27/2018 7 02     RBC 02/27/2018 4 86     Hemoglobin 02/27/2018 14 8     Hematocrit 02/27/2018 44 1     MCV 02/27/2018 91     MCH 02/27/2018 30 5     MCHC 02/27/2018 33 6     RDW 02/27/2018 12 7     MPV 02/27/2018 9 4     Platelets 61/59/8910 140*    nRBC 02/27/2018 0     Neutrophils Relative 02/27/2018 75     Lymphocytes Relative 02/27/2018 14     Monocytes Relative 02/27/2018 7     Eosinophils Relative 02/27/2018 3     Basophils Relative 02/27/2018 0     Neutrophils Absolute 02/27/2018 5 26     Lymphocytes Absolute 02/27/2018 0 99     Monocytes Absolute 02/27/2018 0 51     Eosinophils Absolute 02/27/2018 0 19     Basophils Absolute 02/27/2018 0 02     Sodium 02/27/2018 140     Potassium 02/27/2018 3 9     Chloride 02/27/2018 104     CO2 02/27/2018 31     Anion Gap 02/27/2018 5     BUN 02/27/2018 14     Creatinine 02/27/2018 0 93     Glucose 02/27/2018 105     Calcium 02/27/2018 8 4     AST 02/27/2018 29     ALT 02/27/2018 35     Alkaline Phosphatase 02/27/2018 67     Total Protein 02/27/2018 6 7     Albumin 02/27/2018 3 3*    Total Bilirubin 02/27/2018 0 50     eGFR 02/27/2018 86     Troponin I 02/27/2018 0 05*    NT-proBNP 02/27/2018 46     INFLU A PCR 02/27/2018 None Detected     INFLU B PCR 02/27/2018 None Detected     RSV PCR 02/27/2018 None Detected     Blood Culture 02/27/2018 No Growth After 5 Days   Blood Culture 02/27/2018 No Growth After 5 Days       LACTIC ACID 02/27/2018 1 5     Protime 02/27/2018 14 0     INR 02/27/2018 1 06     PTT 02/27/2018 29     Sputum Culture 02/27/2018 3+ Growth of      Gram Stain Result 02/27/2018 1+ Epithelial cells per low power field     Gram Stain Result 02/27/2018 2+ Polys     Gram Stain Result 02/27/2018 2+ Gram positive cocci in pairs     Gram Stain Result 02/27/2018 Rare Gram negative rods     Gram Stain Result 02/27/2018 1+ Gram positive cocci in clusters     Strep pneumoniae antigen* 02/27/2018 Negative     Platelets 97/24/8017 132*    MPV 02/27/2018 9 4     Troponin I 02/27/2018 0 03     Troponin I 02/27/2018 0 05*    Troponin I 02/27/2018 0 03     Ventricular Rate 02/27/2018 95     Atrial Rate 02/27/2018 95     AL Interval 02/27/2018 144     QRSD Interval 02/27/2018 166     QT Interval 02/27/2018 380     QTC Interval 02/27/2018 477     P Axis 02/27/2018 22     QRS Axis 02/27/2018 -61     T Wave Axis 02/27/2018 53     Ventricular Rate 02/27/2018 105     Atrial Rate 02/27/2018 105     AL Interval 02/27/2018 150     QRSD Interval 02/27/2018 142     QT Interval 02/27/2018 376     QTC Interval 02/27/2018 496     P Axis 02/27/2018 43     QRS Axis 02/27/2018 -65     T Wave Axis 02/27/2018 69     Ventricular Rate 02/27/2018 109     Atrial Rate 02/27/2018 109     AL Interval 02/27/2018 152     QRSD Interval 02/27/2018 142     QT Interval 02/27/2018 378     QTC Interval 02/27/2018 509     P Axis 02/27/2018 45     QRS Axis 02/27/2018 -72     T Wave Axis 02/27/2018 74     Ventricular Rate 02/27/2018 88     Atrial Rate 02/27/2018 88     RI Interval 02/27/2018 160     QRSD Interval 02/27/2018 142     QT Interval 02/27/2018 394     QTC Interval 02/27/2018 476     P Axis 02/27/2018 44     QRS Axis 02/27/2018 -62     T Wave Axis 02/27/2018 66     Sodium 02/28/2018 141     Potassium 02/28/2018 3 3*    Chloride 02/28/2018 104     CO2 02/28/2018 29     Anion Gap 02/28/2018 8     BUN 02/28/2018 15     Creatinine 02/28/2018 0 90     Glucose 02/28/2018 100     Calcium 02/28/2018 8 1*    eGFR 02/28/2018 89     WBC 02/28/2018 6 16     RBC 02/28/2018 4 26     Hemoglobin 02/28/2018 12 8     Hematocrit 02/28/2018 39 0     MCV 02/28/2018 92     MCH 02/28/2018 30 0     MCHC 02/28/2018 32 8     RDW 02/28/2018 13 0     Platelets 87/47/5818 123*    MPV 02/28/2018 9 2     Sodium 03/01/2018 142     Potassium 03/01/2018 3 7     Chloride 03/01/2018 107     CO2 03/01/2018 27     Anion Gap 03/01/2018 8     BUN 03/01/2018 14     Creatinine 03/01/2018 0 75     Glucose 03/01/2018 98     Calcium 03/01/2018 8 0*    eGFR 03/01/2018 96      Imaging: No results found  Review of Systems:  Review of Systems   Constitutional: Negative for diaphoresis and fatigue  HENT: Negative for congestion and facial swelling  Eyes: Negative for photophobia and visual disturbance  Respiratory: Negative for chest tightness and shortness of breath  Cardiovascular: Negative for chest pain, palpitations and leg swelling  Gastrointestinal: Negative for abdominal pain and blood in stool  Endocrine: Negative for cold intolerance and heat intolerance  Musculoskeletal: Negative for arthralgias and myalgias  Skin: Negative for pallor and rash     Neurological: Negative for dizziness and syncope  Physical Exam:  Physical Exam   Constitutional: He is oriented to person, place, and time  He appears well-developed and well-nourished  HENT:   Head: Normocephalic and atraumatic  Eyes: Conjunctivae and EOM are normal  Pupils are equal, round, and reactive to light  Neck: Normal range of motion  Neck supple  No JVD present  No thyromegaly present  Cardiovascular: Normal rate, regular rhythm, normal heart sounds and intact distal pulses  Exam reveals no gallop and no friction rub  No murmur heard  Pulmonary/Chest: Effort normal and breath sounds normal  No respiratory distress  He has no wheezes  He has no rales  Abdominal: Soft  Bowel sounds are normal  He exhibits no distension  There is no tenderness  Musculoskeletal: Normal range of motion  He exhibits no edema  Neurological: He is alert and oriented to person, place, and time  He has normal reflexes  Skin: Skin is warm and dry  Psychiatric: He has a normal mood and affect  Discussion/Summary:  1   SOB: Improvement   -Likely secondary to pneumonia     -ECHO demonstrates 60% EF, concentric hypertrophy of LV      2  NSTEMI likely type 2:  Serial troponin 0 05, 0 03  Will get SPECT to r/o ischemia      3   Ascending aortic aneurysm:  Incidental finding of 4 5 cm aortic aneurysm seen on CT  Will need repeat CT scan in 6 months to monitor progression  Patient has family history of aortic aneurysm rupture in father at the age of 61     3   Hypertension:  Improved    Continue current medications, salt restriction and weight loss    Follow-up in 6 months

## 2018-05-02 ENCOUNTER — HOSPITAL ENCOUNTER (OUTPATIENT)
Dept: NON INVASIVE DIAGNOSTICS | Facility: CLINIC | Age: 68
Discharge: HOME | End: 2018-05-02
Payer: MEDICARE

## 2018-05-02 DIAGNOSIS — R06.00 DYSPNEA ON EXERTION: ICD-10-CM

## 2018-05-02 DIAGNOSIS — R77.8 ELEVATED TROPONIN: ICD-10-CM

## 2018-05-02 DIAGNOSIS — I21.4 NSTEMI (NON-ST ELEVATION MYOCARDIAL INFARCTION) (HCC): ICD-10-CM

## 2018-05-02 LAB
MAX DIASTOLIC BP: 84 MMHG
MAX HEART RATE: 94 BPM
MAX PREDICTED HEART RATE: 155 BPM
MAX. SYSTOLIC BP: 138 MMHG
PROTOCOL NAME: NORMAL
REASON FOR TERMINATION: NORMAL
TARGET HR FORMULA: NORMAL
TIME IN EXERCISE PHASE: NORMAL

## 2018-05-02 PROCEDURE — 78452 HT MUSCLE IMAGE SPECT MULT: CPT

## 2018-05-02 PROCEDURE — 93017 CV STRESS TEST TRACING ONLY: CPT

## 2018-05-02 PROCEDURE — A9502 TC99M TETROFOSMIN: HCPCS

## 2018-05-02 RX ORDER — AMINOPHYLLINE DIHYDRATE 25 MG/ML
50 INJECTION, SOLUTION INTRAVENOUS ONCE
Status: CANCELLED | OUTPATIENT
Start: 2018-05-02 | End: 2018-05-02

## 2018-05-02 RX ADMIN — REGADENOSON 0.4 MG: 0.08 INJECTION, SOLUTION INTRAVENOUS at 09:08

## 2018-05-03 ENCOUNTER — TELEPHONE (OUTPATIENT)
Dept: CARDIOLOGY CLINIC | Facility: CLINIC | Age: 68
End: 2018-05-03

## 2018-06-12 ENCOUNTER — APPOINTMENT (OUTPATIENT)
Dept: LAB | Facility: CLINIC | Age: 68
End: 2018-06-12
Payer: MEDICARE

## 2018-06-12 DIAGNOSIS — E78.5 HYPERLIPIDEMIA: ICD-10-CM

## 2018-06-12 DIAGNOSIS — D51.0 VITAMIN B12 DEFICIENCY ANEMIA DUE TO INTRINSIC FACTOR DEFICIENCY: ICD-10-CM

## 2018-06-12 DIAGNOSIS — M51.26 OTHER INTERVERTEBRAL DISC DISPLACEMENT, LUMBAR REGION: ICD-10-CM

## 2018-06-12 DIAGNOSIS — E78.00 PURE HYPERCHOLESTEROLEMIA: ICD-10-CM

## 2018-06-12 LAB
ALBUMIN SERPL BCP-MCNC: 3.5 G/DL (ref 3.5–5)
ALP SERPL-CCNC: 66 U/L (ref 46–116)
ALT SERPL W P-5'-P-CCNC: 40 U/L (ref 12–78)
ANION GAP SERPL CALCULATED.3IONS-SCNC: 6 MMOL/L (ref 4–13)
AST SERPL W P-5'-P-CCNC: 46 U/L (ref 5–45)
BASOPHILS # BLD AUTO: 0.03 THOUSANDS/ΜL (ref 0–0.1)
BASOPHILS NFR BLD AUTO: 1 % (ref 0–1)
BILIRUB SERPL-MCNC: 0.53 MG/DL (ref 0.2–1)
BUN SERPL-MCNC: 20 MG/DL (ref 5–25)
CALCIUM SERPL-MCNC: 8.4 MG/DL (ref 8.3–10.1)
CHLORIDE SERPL-SCNC: 104 MMOL/L (ref 100–108)
CHOLEST SERPL-MCNC: 150 MG/DL (ref 50–200)
CO2 SERPL-SCNC: 29 MMOL/L (ref 21–32)
CREAT SERPL-MCNC: 0.89 MG/DL (ref 0.6–1.3)
EOSINOPHIL # BLD AUTO: 0.31 THOUSAND/ΜL (ref 0–0.61)
EOSINOPHIL NFR BLD AUTO: 5 % (ref 0–6)
ERYTHROCYTE [DISTWIDTH] IN BLOOD BY AUTOMATED COUNT: 13.2 % (ref 11.6–15.1)
GFR SERPL CREATININE-BSD FRML MDRD: 90 ML/MIN/1.73SQ M
GLUCOSE P FAST SERPL-MCNC: 92 MG/DL (ref 65–99)
HCT VFR BLD AUTO: 46.1 % (ref 36.5–49.3)
HCV AB SER QL: NORMAL
HDLC SERPL-MCNC: 27 MG/DL (ref 40–60)
HGB BLD-MCNC: 15.1 G/DL (ref 12–17)
IMM GRANULOCYTES # BLD AUTO: 0.01 THOUSAND/UL (ref 0–0.2)
IMM GRANULOCYTES NFR BLD AUTO: 0 % (ref 0–2)
LDLC SERPL CALC-MCNC: 91 MG/DL (ref 0–100)
LYMPHOCYTES # BLD AUTO: 0.94 THOUSANDS/ΜL (ref 0.6–4.47)
LYMPHOCYTES NFR BLD AUTO: 16 % (ref 14–44)
MCH RBC QN AUTO: 29.2 PG (ref 26.8–34.3)
MCHC RBC AUTO-ENTMCNC: 32.8 G/DL (ref 31.4–37.4)
MCV RBC AUTO: 89 FL (ref 82–98)
MONOCYTES # BLD AUTO: 0.47 THOUSAND/ΜL (ref 0.17–1.22)
MONOCYTES NFR BLD AUTO: 8 % (ref 4–12)
NEUTROPHILS # BLD AUTO: 4 THOUSANDS/ΜL (ref 1.85–7.62)
NEUTS SEG NFR BLD AUTO: 70 % (ref 43–75)
NONHDLC SERPL-MCNC: 123 MG/DL
NRBC BLD AUTO-RTO: 0 /100 WBCS
PLATELET # BLD AUTO: 172 THOUSANDS/UL (ref 149–390)
PMV BLD AUTO: 10.2 FL (ref 8.9–12.7)
POTASSIUM SERPL-SCNC: 3.9 MMOL/L (ref 3.5–5.3)
PROT SERPL-MCNC: 6.6 G/DL (ref 6.4–8.2)
RBC # BLD AUTO: 5.17 MILLION/UL (ref 3.88–5.62)
SODIUM SERPL-SCNC: 139 MMOL/L (ref 136–145)
TRIGL SERPL-MCNC: 160 MG/DL
URATE SERPL-MCNC: 7.2 MG/DL (ref 4.2–8)
VIT B12 SERPL-MCNC: 1016 PG/ML (ref 100–900)
WBC # BLD AUTO: 5.76 THOUSAND/UL (ref 4.31–10.16)

## 2018-06-12 PROCEDURE — 86803 HEPATITIS C AB TEST: CPT

## 2018-06-12 PROCEDURE — 80061 LIPID PANEL: CPT

## 2018-06-12 PROCEDURE — 84550 ASSAY OF BLOOD/URIC ACID: CPT

## 2018-06-12 PROCEDURE — 36415 COLL VENOUS BLD VENIPUNCTURE: CPT

## 2018-06-12 PROCEDURE — 85025 COMPLETE CBC W/AUTO DIFF WBC: CPT

## 2018-06-12 PROCEDURE — 80053 COMPREHEN METABOLIC PANEL: CPT

## 2018-06-12 PROCEDURE — 82607 VITAMIN B-12: CPT

## 2018-06-14 DIAGNOSIS — E78.5 HYPERLIPIDEMIA: ICD-10-CM

## 2018-06-14 DIAGNOSIS — M51.26 OTHER INTERVERTEBRAL DISC DISPLACEMENT, LUMBAR REGION: ICD-10-CM

## 2018-06-14 DIAGNOSIS — E78.00 PURE HYPERCHOLESTEROLEMIA: ICD-10-CM

## 2018-06-14 DIAGNOSIS — D51.0 VITAMIN B12 DEFICIENCY ANEMIA DUE TO INTRINSIC FACTOR DEFICIENCY: ICD-10-CM

## 2018-06-28 ENCOUNTER — OFFICE VISIT (OUTPATIENT)
Dept: FAMILY MEDICINE CLINIC | Facility: CLINIC | Age: 68
End: 2018-06-28
Payer: MEDICARE

## 2018-06-28 VITALS
DIASTOLIC BLOOD PRESSURE: 82 MMHG | TEMPERATURE: 97.3 F | BODY MASS INDEX: 41.62 KG/M2 | OXYGEN SATURATION: 97 % | SYSTOLIC BLOOD PRESSURE: 122 MMHG | HEIGHT: 73 IN | WEIGHT: 314 LBS | HEART RATE: 98 BPM

## 2018-06-28 DIAGNOSIS — I10 ESSENTIAL HYPERTENSION: Primary | Chronic | ICD-10-CM

## 2018-06-28 DIAGNOSIS — G47.33 OBSTRUCTIVE SLEEP APNEA: ICD-10-CM

## 2018-06-28 DIAGNOSIS — E78.5 HYPERLIPIDEMIA, UNSPECIFIED HYPERLIPIDEMIA TYPE: ICD-10-CM

## 2018-06-28 DIAGNOSIS — D51.9 ANEMIA DUE TO VITAMIN B12 DEFICIENCY, UNSPECIFIED B12 DEFICIENCY TYPE: ICD-10-CM

## 2018-06-28 DIAGNOSIS — I71.2 THORACIC AORTIC ANEURYSM WITHOUT RUPTURE (HCC): ICD-10-CM

## 2018-06-28 DIAGNOSIS — I25.10 CORONARY ARTERY DISEASE INVOLVING NATIVE CORONARY ARTERY OF NATIVE HEART WITHOUT ANGINA PECTORIS: ICD-10-CM

## 2018-06-28 PROBLEM — R77.8 ELEVATED TROPONIN: Status: RESOLVED | Noted: 2018-02-27 | Resolved: 2018-06-28

## 2018-06-28 PROBLEM — J18.9 CAP (COMMUNITY ACQUIRED PNEUMONIA): Status: RESOLVED | Noted: 2018-02-27 | Resolved: 2018-06-28

## 2018-06-28 PROBLEM — E87.6 HYPOKALEMIA: Status: RESOLVED | Noted: 2018-02-28 | Resolved: 2018-06-28

## 2018-06-28 PROBLEM — D64.9 ANEMIA: Status: ACTIVE | Noted: 2018-06-28

## 2018-06-28 PROBLEM — R79.89 ELEVATED TROPONIN: Status: RESOLVED | Noted: 2018-02-27 | Resolved: 2018-06-28

## 2018-06-28 PROBLEM — I21.4 NSTEMI (NON-ST ELEVATION MYOCARDIAL INFARCTION) (HCC): Status: RESOLVED | Noted: 2018-02-27 | Resolved: 2018-06-28

## 2018-06-28 PROCEDURE — 99214 OFFICE O/P EST MOD 30 MIN: CPT | Performed by: FAMILY MEDICINE

## 2018-06-28 RX ORDER — IBUPROFEN 400 MG/1
200 TABLET ORAL EVERY 6 HOURS PRN
COMMUNITY

## 2018-06-28 NOTE — PROGRESS NOTES
Assessment/Plan:           Problem List Items Addressed This Visit     Hypertension - Primary (Chronic)    Relevant Orders    Comprehensive metabolic panel    Uric acid    Aortic aneurysm (HCC)    Obstructive sleep apnea    Relevant Orders    Cpap DME    Coronary artery disease involving native coronary artery of native heart without angina pectoris     Follow-up with Cardiology         Hyperlipidemia    Relevant Orders    Lipid panel    Anemia     Continue iron and B12 supplementation         Relevant Orders    CBC and differential            Subjective:      Patient ID: Oliver Paz is a 79 y o  male  Patient comes in for a checkup  He had a non STEMI and was found to have aortic aneurysm since he was here last         The following portions of the patient's history were reviewed and updated as appropriate: allergies, current medications, past family history, past medical history, past social history, past surgical history and problem list     Review of Systems   Constitutional: Negative  HENT: Negative  Respiratory: Negative  Cardiovascular: Negative  Objective:      /82   Pulse 98   Temp (!) 97 3 °F (36 3 °C)   Ht 6' 1" (1 854 m)   Wt (!) 142 kg (314 lb)   SpO2 97%   BMI 41 43 kg/m²          Physical Exam   Constitutional: He is oriented to person, place, and time  He appears well-developed and well-nourished  HENT:   Head: Normocephalic and atraumatic  Right Ear: Tympanic membrane normal    Left Ear: Tympanic membrane normal    Eyes: EOM are normal  Pupils are equal, round, and reactive to light  Neck: Neck supple  Cardiovascular: Normal rate, regular rhythm and normal heart sounds  Pulmonary/Chest: Effort normal and breath sounds normal    Abdominal: Soft  Bowel sounds are normal    Musculoskeletal: Normal range of motion  Neurological: He is alert and oriented to person, place, and time  Skin: Skin is warm and dry     Psychiatric: He has a normal mood and affect  Thought content normal    Nursing note and vitals reviewed

## 2018-07-10 ENCOUNTER — TRANSCRIBE ORDERS (OUTPATIENT)
Dept: LAB | Facility: CLINIC | Age: 68
End: 2018-07-10

## 2018-07-10 ENCOUNTER — APPOINTMENT (OUTPATIENT)
Dept: LAB | Facility: CLINIC | Age: 68
End: 2018-07-10
Payer: MEDICARE

## 2018-07-10 DIAGNOSIS — Z12.5 SPECIAL SCREENING FOR MALIGNANT NEOPLASM OF PROSTATE: Primary | ICD-10-CM

## 2018-07-10 DIAGNOSIS — Z12.5 SPECIAL SCREENING FOR MALIGNANT NEOPLASM OF PROSTATE: ICD-10-CM

## 2018-07-10 LAB — PSA SERPL-MCNC: 2.1 NG/ML (ref 0–4)

## 2018-07-10 PROCEDURE — 36415 COLL VENOUS BLD VENIPUNCTURE: CPT

## 2018-07-10 PROCEDURE — G0103 PSA SCREENING: HCPCS

## 2018-09-07 DIAGNOSIS — I10 ESSENTIAL HYPERTENSION: Chronic | ICD-10-CM

## 2018-09-07 RX ORDER — ENALAPRIL MALEATE 10 MG/1
10 TABLET ORAL DAILY
Qty: 90 TABLET | Refills: 0 | Status: SHIPPED | OUTPATIENT
Start: 2018-09-07 | End: 2018-11-27 | Stop reason: SDUPTHER

## 2018-09-07 RX ORDER — LOSARTAN POTASSIUM AND HYDROCHLOROTHIAZIDE 12.5; 1 MG/1; MG/1
1 TABLET ORAL DAILY
Qty: 90 TABLET | Refills: 0 | Status: SHIPPED | OUTPATIENT
Start: 2018-09-07 | End: 2018-11-27 | Stop reason: SDUPTHER

## 2018-10-12 ENCOUNTER — OFFICE VISIT (OUTPATIENT)
Dept: CARDIOLOGY CLINIC | Facility: CLINIC | Age: 68
End: 2018-10-12
Payer: MEDICARE

## 2018-10-12 VITALS
HEIGHT: 73 IN | SYSTOLIC BLOOD PRESSURE: 140 MMHG | OXYGEN SATURATION: 96 % | WEIGHT: 315 LBS | BODY MASS INDEX: 41.75 KG/M2 | DIASTOLIC BLOOD PRESSURE: 94 MMHG | HEART RATE: 93 BPM

## 2018-10-12 DIAGNOSIS — E66.01 MORBID OBESITY WITH BMI OF 40.0-44.9, ADULT (HCC): ICD-10-CM

## 2018-10-12 DIAGNOSIS — I51.7 MODERATE CONCENTRIC LEFT VENTRICULAR HYPERTROPHY: ICD-10-CM

## 2018-10-12 DIAGNOSIS — I71.2 THORACIC AORTIC ANEURYSM WITHOUT RUPTURE (HCC): Primary | ICD-10-CM

## 2018-10-12 DIAGNOSIS — I10 ESSENTIAL HYPERTENSION: Chronic | ICD-10-CM

## 2018-10-12 DIAGNOSIS — E78.5 HYPERLIPIDEMIA, UNSPECIFIED HYPERLIPIDEMIA TYPE: ICD-10-CM

## 2018-10-12 PROCEDURE — 99214 OFFICE O/P EST MOD 30 MIN: CPT | Performed by: INTERNAL MEDICINE

## 2018-10-12 NOTE — PROGRESS NOTES
Cardiology Consultation     Shantal Baca  [id number]  1950  [place] [address]    Chief complaints:  Hospital follow-up NSTEMI, hypertension and shortness of breath      HPI:   24-year-old male with past medical history of hypertension, mild ascending aortic aneurysm, moderate concentric left ventricular hypertrophy, mild hypertriglyceridemia is here for follow-up  Patient is doing well and denies having any cardiac symptoms including chest pain, shortness of breath, palpitations, dizziness or syncope       patient's blood pressure is elevated in office  But patient is checks his blood pressure at home and systolic blood pressure is usually in 120s  Patient Active Problem List   Diagnosis    Hypertension    Morbid obesity with BMI of 40 0-44 9, adult ([facility] )    [facility]    Obstructive sleep apnea    Dyspnea on exertion    Coronary artery disease involving native coronary artery of native heart without angina pectoris    Hyperlipidemia    Anemia     Past Medical History:   Diagnosis Date    Anemia     Aortic aneurysm (HCC)     Elevated troponin     History of colonoscopy     6/5/13    History of esophagogastroduodenoscopy     6/5/13    Hypertension     Hypokalemia     Morbid obesity (HCC)     Renal calculi     STEMI (ST elevation myocardial infarction) ([facility] )      Social History     Social History    Marital status: /Civil Union     Spouse name: N/A    Number of children: N/A    Years of education: N/A     Occupational History    Not on file       Social History Main Topics    Smoking status: Never Smoker    Smokeless tobacco: Never Used    Alcohol use Yes      Comment: social    Drug use: No    Sexual activity: Not on file     Other Topics Concern    Not on file     Social History Narrative    No narrative on file      Family History   Problem Relation Age of Onset    COPD Mother     Breast cancer Mother         malignant neoplasm    Other Father         thoracic aortic aneurysm     Past Surgical History:   Procedure Laterality Date    APPENDECTOMY      ARTHROSCOPY KNEE Left     COLON SURGERY      COLOSTOMY      ROTATOR CUFF REPAIR Left     SATURATION BIOPSY OF PROSTATE      TOTAL HIP ARTHROPLASTY      Last Assessed:5/8/15       Current Outpatient Prescriptions:     enalapril (VASOTEC) 10 mg tablet, Take 1 tablet (10 mg total) by mouth daily, Disp: 90 tablet, Rfl: 0    ferrous sulfate 325 (65 Fe) mg tablet, Take 325 mg by mouth daily with breakfast, Disp: , Rfl:     hydrALAZINE (APRESOLINE) 50 mg tablet, Take 1 tablet (50 mg total) by mouth every 8 (eight) hours (Patient taking differently: Take 50 mg by mouth 3 (three) times a day  ), Disp: 90 tablet, Rfl: 5    ibuprofen (MOTRIN) 400 mg tablet, Take 200 mg by mouth every 6 (six) hours as needed for mild pain (PRN ), Disp: , Rfl:     losartan-hydrochlorothiazide (HYZAAR) 100-12 5 MG per tablet, Take 1 tablet by mouth daily, Disp: 90 tablet, Rfl: 0    metoprolol succinate (TOPROL-XL) 100 mg 24 hr tablet, Take 100 mg by mouth daily, Disp: , Rfl:     aspirin 81 mg chewable tablet, Chew 4 tablets (325 mg total) daily (Patient not taking: Reported on 10/12/2018 ), Disp: 30 tablet, Rfl: 0  Allergies   Allergen Reactions    Lorazepam Other (See Comments)     very agitated  Psychosis     Vitals:    10/12/18 0816 10/12/18 0821   BP: (!) 160/104 140/94   BP Location: Left arm Right arm   Patient Position: Sitting Sitting   Cuff Size: Large Large   Pulse: 93    SpO2: 96%    Weight: (!) 145 kg (320 lb)    Height: 6' 1" (1 854 m)        Labs:  No visits with results within 2 Month(s) from this visit  Latest known visit with results is:   Appointment on 07/10/2018   Component Date Value    PSA 07/10/2018 2 1      Imaging: No results found      Review of Systems:  Review of Systems Constitutional: Negative for diaphoresis and fatigue  HENT: Negative for congestion and facial swelling  Eyes: Negative for photophobia and visual disturbance  Respiratory: Negative for chest tightness and shortness of breath  Cardiovascular: Negative for chest pain, palpitations and leg swelling  Gastrointestinal: Negative for abdominal pain and blood in stool  Endocrine: Negative for cold intolerance and heat intolerance  Musculoskeletal: Negative for arthralgias and myalgias  Skin: Negative for pallor and rash  Neurological: Negative for dizziness and syncope  Physical Exam:  Physical Exam   Constitutional: He is oriented to person, place, and time  He appears well-developed and well-nourished  HENT:   Head: Normocephalic and atraumatic  Eyes: Pupils are equal, round, and reactive to light  Conjunctivae and EOM are normal    Neck: Normal range of motion  Neck supple  No JVD present  No thyromegaly present  Cardiovascular: Normal rate, regular rhythm, normal heart sounds and intact distal pulses  Exam reveals no gallop and no friction rub  No murmur heard  Pulmonary/Chest: Effort normal and breath sounds normal  No respiratory distress  He has no wheezes  He has no rales  Abdominal: Soft  Bowel sounds are normal  He exhibits no distension  There is no tenderness  Musculoskeletal: Normal range of motion  He exhibits no edema  Neurological: He is alert and oriented to person, place, and time  He has normal reflexes  Skin: Skin is warm and dry  Psychiatric: He has a normal mood and affect  Discussion/Summary:  1   Ascending aortic aneurysm:  Incidental finding of 4 5 cm aortic aneurysm seen on CT on February 2018  Patient has family history of aortic aneurysm rupture in father at the age of 61     I will get CTA  Of abdomen and pelvis to evaluate for thoracic and abdominal aortic aneurysm      2   Hypertension:    High in office but blood pressure is well controlled in home  Continue to monitor blood pressure at home  Continue current medications, salt restriction and weight loss  3   Moderate concentric Left ventricular hypertrophy,  Septal diameter was 1 6 cm, posterior wall diameter was 1 5 cm   repeat echocardiogram next year do evaluate progression   on antihypertensive medications    No evidence of CHF on exam     3   Hyperlipidemia, low HDL and high triglycerides   recommended exercise     Follow-up in 6 months

## 2018-10-26 ENCOUNTER — TELEPHONE (OUTPATIENT)
Dept: CARDIOLOGY CLINIC | Facility: CLINIC | Age: 68
End: 2018-10-26

## 2018-10-26 DIAGNOSIS — I71.2 THORACIC AORTIC ANEURYSM WITHOUT RUPTURE (HCC): Primary | ICD-10-CM

## 2018-10-26 NOTE — TELEPHONE ENCOUNTER
PT IS HAVING A PROC ON 10/30/18 & RADIOLOGY WOULD LIKE PT'S CMP BW ORDER PUT INTO EPIC SO PT CAN GET IT DONE BEFORE HIS PROC   THERE IS A CMP THAT IS ACTIVE FROM [facility] BUT [facility] WANTS A MORE CURRENT SCRIPT IN EPIC FOR THE CMP

## 2018-10-30 ENCOUNTER — APPOINTMENT (OUTPATIENT)
Dept: LAB | Facility: HOSPITAL | Age: 68
End: 2018-10-30
Attending: INTERNAL MEDICINE
Payer: MEDICARE

## 2018-10-30 ENCOUNTER — TELEPHONE (OUTPATIENT)
Dept: CARDIOLOGY CLINIC | Facility: CLINIC | Age: 68
End: 2018-10-30

## 2018-10-30 ENCOUNTER — HOSPITAL ENCOUNTER (OUTPATIENT)
Dept: CT IMAGING | Facility: HOSPITAL | Age: 68
Discharge: HOME | End: 2018-10-30
Attending: INTERNAL MEDICINE
Payer: MEDICARE

## 2018-10-30 DIAGNOSIS — I71.2 THORACIC AORTIC ANEURYSM WITHOUT RUPTURE (HCC): ICD-10-CM

## 2018-10-30 LAB
ALBUMIN SERPL BCP-MCNC: 3.5 G/DL (ref 3.5–5)
ALP SERPL-CCNC: 74 U/L (ref 46–116)
ALT SERPL W P-5'-P-CCNC: 39 U/L (ref 12–78)
ANION GAP SERPL CALCULATED.3IONS-SCNC: 4 MMOL/L (ref 4–13)
AST SERPL W P-5'-P-CCNC: 26 U/L (ref 5–45)
BILIRUB SERPL-MCNC: 0.6 MG/DL (ref 0.2–1)
BUN SERPL-MCNC: 17 MG/DL (ref 5–25)
CALCIUM SERPL-MCNC: 9 MG/DL (ref 8.3–10.1)
CHLORIDE SERPL-SCNC: 105 MMOL/L (ref 100–108)
CO2 SERPL-SCNC: 34 MMOL/L (ref 21–32)
CREAT SERPL-MCNC: 0.94 MG/DL (ref 0.6–1.3)
GFR SERPL CREATININE-BSD FRML MDRD: 84 ML/MIN/1.73SQ M
GLUCOSE P FAST SERPL-MCNC: 99 MG/DL (ref 65–99)
POTASSIUM SERPL-SCNC: 4.4 MMOL/L (ref 3.5–5.3)
PROT SERPL-MCNC: 6.8 G/DL (ref 6.4–8.2)
SODIUM SERPL-SCNC: 143 MMOL/L (ref 136–145)

## 2018-10-30 PROCEDURE — 74175 CTA ABDOMEN W/CONTRAST: CPT

## 2018-10-30 PROCEDURE — 80053 COMPREHEN METABOLIC PANEL: CPT

## 2018-10-30 PROCEDURE — 36415 COLL VENOUS BLD VENIPUNCTURE: CPT

## 2018-10-30 PROCEDURE — 71275 CT ANGIOGRAPHY CHEST: CPT

## 2018-10-30 RX ADMIN — IOHEXOL 100 ML: 350 INJECTION, SOLUTION INTRAVENOUS at 12:45

## 2018-10-30 NOTE — TELEPHONE ENCOUNTER
----- Message from Jerry Mcnamara MD sent at 10/30/2018  2:35 PM EDT -----  Please call the patient and tell him that his labs are normal

## 2018-10-31 ENCOUNTER — TELEPHONE (OUTPATIENT)
Dept: CARDIOLOGY CLINIC | Facility: CLINIC | Age: 68
End: 2018-10-31

## 2018-10-31 ENCOUNTER — IMMUNIZATION (OUTPATIENT)
Dept: FAMILY MEDICINE CLINIC | Facility: CLINIC | Age: 68
End: 2018-10-31
Payer: MEDICARE

## 2018-10-31 DIAGNOSIS — Z23 ENCOUNTER FOR IMMUNIZATION: ICD-10-CM

## 2018-10-31 PROCEDURE — 90662 IIV NO PRSV INCREASED AG IM: CPT

## 2018-10-31 PROCEDURE — G0008 ADMIN INFLUENZA VIRUS VAC: HCPCS

## 2018-10-31 NOTE — TELEPHONE ENCOUNTER
----- Message from Zulema Gruber MD sent at 10/31/2018  3:31 PM EDT -----  Please call the patient and tell him that he has ascending aortic aneurysm measuring 4 5 cm which is unchanged from previous test   No indication for surgery at this time

## 2018-11-27 ENCOUNTER — TELEPHONE (OUTPATIENT)
Dept: FAMILY MEDICINE CLINIC | Facility: CLINIC | Age: 68
End: 2018-11-27

## 2018-11-27 DIAGNOSIS — I10 ESSENTIAL HYPERTENSION: Chronic | ICD-10-CM

## 2018-11-27 RX ORDER — ENALAPRIL MALEATE 10 MG/1
10 TABLET ORAL DAILY
Qty: 90 TABLET | Refills: 0 | Status: SHIPPED | OUTPATIENT
Start: 2018-11-27 | End: 2019-01-31 | Stop reason: SDUPTHER

## 2018-11-27 RX ORDER — LOSARTAN POTASSIUM AND HYDROCHLOROTHIAZIDE 12.5; 1 MG/1; MG/1
1 TABLET ORAL DAILY
Qty: 90 TABLET | Refills: 0 | Status: SHIPPED | OUTPATIENT
Start: 2018-11-27 | End: 2019-01-31 | Stop reason: SDUPTHER

## 2018-11-27 NOTE — TELEPHONE ENCOUNTER
rf losartan -hctz 100-12 5mg   And enalapril maleate  10mg   [facility] [place]   Can you fill since BD isnt here ?

## 2018-12-18 ENCOUNTER — APPOINTMENT (OUTPATIENT)
Dept: LAB | Facility: CLINIC | Age: 68
End: 2018-12-18
Payer: MEDICARE

## 2018-12-18 ENCOUNTER — TRANSCRIBE ORDERS (OUTPATIENT)
Dept: LAB | Facility: CLINIC | Age: 68
End: 2018-12-18

## 2018-12-18 DIAGNOSIS — E78.5 HYPERLIPIDEMIA, UNSPECIFIED HYPERLIPIDEMIA TYPE: ICD-10-CM

## 2018-12-18 DIAGNOSIS — I10 ESSENTIAL HYPERTENSION: Chronic | ICD-10-CM

## 2018-12-18 DIAGNOSIS — D51.9 ANEMIA DUE TO VITAMIN B12 DEFICIENCY, UNSPECIFIED B12 DEFICIENCY TYPE: ICD-10-CM

## 2018-12-18 DIAGNOSIS — I10 ESSENTIAL HYPERTENSION, MALIGNANT: ICD-10-CM

## 2018-12-18 DIAGNOSIS — I10 ESSENTIAL HYPERTENSION, MALIGNANT: Primary | ICD-10-CM

## 2018-12-18 LAB
ALBUMIN SERPL BCP-MCNC: 3.3 G/DL (ref 3.5–5)
ALP SERPL-CCNC: 61 U/L (ref 46–116)
ALT SERPL W P-5'-P-CCNC: 31 U/L (ref 12–78)
ANION GAP SERPL CALCULATED.3IONS-SCNC: 7 MMOL/L (ref 4–13)
AST SERPL W P-5'-P-CCNC: 22 U/L (ref 5–45)
BASOPHILS # BLD AUTO: 0.04 THOUSANDS/ΜL (ref 0–0.1)
BASOPHILS NFR BLD AUTO: 1 % (ref 0–1)
BILIRUB SERPL-MCNC: 0.42 MG/DL (ref 0.2–1)
BUN SERPL-MCNC: 21 MG/DL (ref 5–25)
CALCIUM SERPL-MCNC: 8.4 MG/DL (ref 8.3–10.1)
CHLORIDE SERPL-SCNC: 104 MMOL/L (ref 100–108)
CHOLEST SERPL-MCNC: 172 MG/DL (ref 50–200)
CO2 SERPL-SCNC: 27 MMOL/L (ref 21–32)
CREAT SERPL-MCNC: 0.86 MG/DL (ref 0.6–1.3)
EOSINOPHIL # BLD AUTO: 0.23 THOUSAND/ΜL (ref 0–0.61)
EOSINOPHIL NFR BLD AUTO: 5 % (ref 0–6)
ERYTHROCYTE [DISTWIDTH] IN BLOOD BY AUTOMATED COUNT: 12.6 % (ref 11.6–15.1)
GFR SERPL CREATININE-BSD FRML MDRD: 90 ML/MIN/1.73SQ M
GLUCOSE P FAST SERPL-MCNC: 103 MG/DL (ref 65–99)
HCT VFR BLD AUTO: 42.7 % (ref 36.5–49.3)
HDLC SERPL-MCNC: 28 MG/DL (ref 40–60)
HGB BLD-MCNC: 13.9 G/DL (ref 12–17)
IMM GRANULOCYTES # BLD AUTO: 0.01 THOUSAND/UL (ref 0–0.2)
IMM GRANULOCYTES NFR BLD AUTO: 0 % (ref 0–2)
LDLC SERPL CALC-MCNC: 106 MG/DL (ref 0–100)
LYMPHOCYTES # BLD AUTO: 0.9 THOUSANDS/ΜL (ref 0.6–4.47)
LYMPHOCYTES NFR BLD AUTO: 19 % (ref 14–44)
MCH RBC QN AUTO: 28.8 PG (ref 26.8–34.3)
MCHC RBC AUTO-ENTMCNC: 32.6 G/DL (ref 31.4–37.4)
MCV RBC AUTO: 88 FL (ref 82–98)
MONOCYTES # BLD AUTO: 0.49 THOUSAND/ΜL (ref 0.17–1.22)
MONOCYTES NFR BLD AUTO: 11 % (ref 4–12)
NEUTROPHILS # BLD AUTO: 2.99 THOUSANDS/ΜL (ref 1.85–7.62)
NEUTS SEG NFR BLD AUTO: 64 % (ref 43–75)
NONHDLC SERPL-MCNC: 144 MG/DL
NRBC BLD AUTO-RTO: 0 /100 WBCS
PLATELET # BLD AUTO: 167 THOUSANDS/UL (ref 149–390)
PMV BLD AUTO: 10.4 FL (ref 8.9–12.7)
POTASSIUM SERPL-SCNC: 3.8 MMOL/L (ref 3.5–5.3)
PROT SERPL-MCNC: 6.6 G/DL (ref 6.4–8.2)
RBC # BLD AUTO: 4.83 MILLION/UL (ref 3.88–5.62)
SODIUM SERPL-SCNC: 138 MMOL/L (ref 136–145)
TRIGL SERPL-MCNC: 188 MG/DL
URATE SERPL-MCNC: 7.8 MG/DL (ref 4.2–8)
WBC # BLD AUTO: 4.66 THOUSAND/UL (ref 4.31–10.16)

## 2018-12-18 PROCEDURE — 36415 COLL VENOUS BLD VENIPUNCTURE: CPT

## 2018-12-18 PROCEDURE — 80061 LIPID PANEL: CPT

## 2018-12-18 PROCEDURE — 80053 COMPREHEN METABOLIC PANEL: CPT

## 2018-12-18 PROCEDURE — 85025 COMPLETE CBC W/AUTO DIFF WBC: CPT

## 2018-12-18 PROCEDURE — 84550 ASSAY OF BLOOD/URIC ACID: CPT

## 2018-12-31 ENCOUNTER — OFFICE VISIT (OUTPATIENT)
Dept: FAMILY MEDICINE CLINIC | Facility: CLINIC | Age: 68
End: 2018-12-31
Payer: MEDICARE

## 2018-12-31 VITALS
BODY MASS INDEX: 41.75 KG/M2 | SYSTOLIC BLOOD PRESSURE: 132 MMHG | WEIGHT: 315 LBS | TEMPERATURE: 98.4 F | DIASTOLIC BLOOD PRESSURE: 90 MMHG | OXYGEN SATURATION: 98 % | RESPIRATION RATE: 16 BRPM | HEART RATE: 81 BPM | HEIGHT: 73 IN

## 2018-12-31 DIAGNOSIS — G47.33 OBSTRUCTIVE SLEEP APNEA: ICD-10-CM

## 2018-12-31 DIAGNOSIS — M10.9 GOUT, UNSPECIFIED CAUSE, UNSPECIFIED CHRONICITY, UNSPECIFIED SITE: ICD-10-CM

## 2018-12-31 DIAGNOSIS — I71.2 THORACIC AORTIC ANEURYSM WITHOUT RUPTURE (HCC): ICD-10-CM

## 2018-12-31 DIAGNOSIS — I10 ESSENTIAL HYPERTENSION: Chronic | ICD-10-CM

## 2018-12-31 DIAGNOSIS — E66.01 MORBID OBESITY WITH BMI OF 40.0-44.9, ADULT (HCC): ICD-10-CM

## 2018-12-31 DIAGNOSIS — E78.5 HYPERLIPIDEMIA, UNSPECIFIED HYPERLIPIDEMIA TYPE: Primary | ICD-10-CM

## 2018-12-31 DIAGNOSIS — Z00.00 MEDICARE ANNUAL WELLNESS VISIT, SUBSEQUENT: ICD-10-CM

## 2018-12-31 DIAGNOSIS — R73.9 ELEVATED BLOOD SUGAR: ICD-10-CM

## 2018-12-31 DIAGNOSIS — I25.10 CORONARY ARTERY DISEASE INVOLVING NATIVE CORONARY ARTERY OF NATIVE HEART WITHOUT ANGINA PECTORIS: ICD-10-CM

## 2018-12-31 PROCEDURE — 99214 OFFICE O/P EST MOD 30 MIN: CPT | Performed by: FAMILY MEDICINE

## 2018-12-31 PROCEDURE — G0439 PPPS, SUBSEQ VISIT: HCPCS | Performed by: FAMILY MEDICINE

## 2018-12-31 RX ORDER — COLCHICINE 0.6 MG/1
TABLET ORAL
Qty: 15 TABLET | Refills: 3 | Status: SHIPPED | OUTPATIENT
Start: 2018-12-31 | End: 2019-04-24

## 2018-12-31 RX ORDER — HYDRALAZINE HYDROCHLORIDE 50 MG/1
50 TABLET, FILM COATED ORAL EVERY 8 HOURS SCHEDULED
Qty: 270 TABLET | Refills: 3 | Status: SHIPPED | OUTPATIENT
Start: 2018-12-31 | End: 2018-12-31 | Stop reason: SDUPTHER

## 2018-12-31 RX ORDER — HYDRALAZINE HYDROCHLORIDE 50 MG/1
50 TABLET, FILM COATED ORAL EVERY 8 HOURS SCHEDULED
Qty: 270 TABLET | Refills: 0 | Status: SHIPPED | OUTPATIENT
Start: 2018-12-31 | End: 2019-04-26 | Stop reason: SDUPTHER

## 2018-12-31 NOTE — PROGRESS NOTES
Assessment/Plan:    Return visit 6 months with fasting blood work prior to visit       Problem List Items Addressed This Visit     Hypertension (Chronic)     Continue losartan-hydrochlorothiazide 100-12 5 an enalapril 10 mg daily along with hydralazine 50 mg t i d  and Toprol- mg daily         Relevant Medications    hydrALAZINE (APRESOLINE) 50 mg tablet    Morbid obesity with BMI of 40 0-44 9, adult ([facility] )    Aortic aneurysm ([facility] )     This is stable and being followed by Cardiology         Obstructive sleep apnea     Continue nasal CPAP         Coronary artery disease involving native coronary artery of native heart without angina pectoris     Follow-up with Cardiology         Hyperlipidemia - Primary    Relevant Orders    Comprehensive metabolic panel    Lipid panel    Gout    Relevant Medications    colchicine (COLCRYS) 0 6 mg tablet    Other Relevant Orders    Uric acid    Elevated blood sugar     Low carb diet         Relevant Orders    Hemoglobin A1C      Other Visit Diagnoses     Medicare annual wellness visit, subsequent                Subjective:      Patient ID: Shantal Kelley is a 79 y o  male  Patient comes in for a checkup  He recently had a gout attack  He has not had 1 several years  The following portions of the patient's history were reviewed and updated as appropriate: allergies, current medications, past family history, past medical history, past social history, past surgical history and problem list     Review of Systems   Constitutional: Negative  Respiratory: Negative  Cardiovascular: Negative  Objective:      /90   Pulse 81   Temp 98 4 °F (36 9 °C)   Resp 16   Ht 6' 1" (1 854 m)   Wt (!) 148 kg (327 lb)   SpO2 98%   BMI 43 14 kg/m²          Physical Exam   Constitutional: He is oriented to person, place, and time  He appears well-developed and well-nourished  Obese   HENT:   Head: Normocephalic and atraumatic     Right Ear: Tympanic membrane and external ear normal    Left Ear: Tympanic membrane and external ear normal    Eyes: Pupils are equal, round, and reactive to light  EOM are normal    Neck: Neck supple  Cardiovascular: Normal rate, regular rhythm and normal heart sounds  Pulmonary/Chest: Effort normal and breath sounds normal    Abdominal: Soft  Bowel sounds are normal    Musculoskeletal: Normal range of motion  Neurological: He is alert and oriented to person, place, and time  Skin: Skin is warm and dry  Psychiatric: He has a normal mood and affect   Thought content normal

## 2018-12-31 NOTE — ASSESSMENT & PLAN NOTE
Continue losartan-hydrochlorothiazide 100-12 5 an enalapril 10 mg daily along with hydralazine 50 mg t i d  and Toprol- mg daily

## 2018-12-31 NOTE — PROGRESS NOTES
Assessment and Plan:    Problem List Items Addressed This Visit     None      Visit Diagnoses     Medicare annual wellness visit, subsequent    -  Primary        Health Maintenance Due   Topic Date Due    Medicare Annual Wellness Visit (AWV)  1950         HPI:  Terri Stephens is a 79 y o  male here for his Subsequent Wellness Visit      Patient Active Problem List   Diagnosis    Hypertension    Morbid obesity with BMI of 40 0-44 9, adult ([address] )    Aortic aneurysm (HCC)    Obstructive sleep apnea    Dyspnea on exertion    Coronary artery disease involving native coronary artery of native heart without angina pectoris    Hyperlipidemia    Anemia    Moderate concentric left ventricular hypertrophy     Past Medical History:   Diagnosis Date    Anemia     Aortic aneurysm (HCC)     Elevated troponin     History of colonoscopy     6/5/13    History of esophagogastroduodenoscopy     6/5/13    Hypertension     Hypokalemia     Morbid obesity (HCC)     Renal calculi     STEMI (ST elevation myocardial infarction) (HCC)      Past Surgical History:   Procedure Laterality Date    APPENDECTOMY      ARTHROSCOPY KNEE Left     COLON SURGERY      COLOSTOMY      ROTATOR CUFF REPAIR Left     SATURATION BIOPSY OF PROSTATE      TOTAL HIP ARTHROPLASTY      Last Assessed:5/8/15     Family History   Problem Relation Age of Onset    COPD Mother     Breast cancer Mother         malignant neoplasm    Other Father         thoracic aortic aneurysm     History   Smoking Status    Never Smoker   Smokeless Tobacco    Never Used     History   Alcohol Use    Yes     Comment: social      History   Drug Use No       Current Outpatient Prescriptions   Medication Sig Dispense Refill    aspirin 81 mg chewable tablet Chew 4 tablets (325 mg total) daily 30 tablet 0    enalapril (VASOTEC) 10 mg tablet Take 1 tablet (10 mg total) by mouth daily 90 tablet 0    ferrous sulfate 325 (65 Fe) mg tablet Take 325 mg by mouth daily with breakfast      hydrALAZINE (APRESOLINE) 50 mg tablet Take 1 tablet (50 mg total) by mouth every 8 (eight) hours (Patient taking differently: Take 50 mg by mouth 3 (three) times a day  ) 90 tablet 5    ibuprofen (MOTRIN) 400 mg tablet Take 200 mg by mouth every 6 (six) hours as needed for mild pain (PRN )      losartan-hydrochlorothiazide (HYZAAR) 100-12 5 MG per tablet Take 1 tablet by mouth daily 90 tablet 0    metoprolol succinate (TOPROL-XL) 100 mg 24 hr tablet Take 100 mg by mouth daily       No current facility-administered medications for this visit  Allergies   Allergen Reactions    Lorazepam Other (See Comments)     very agitated  Psychosis     Immunization History   Administered Date(s) Administered    Influenza 10/31/2018    Influenza, high dose seasonal 0 5 mL 10/31/2018       Patient Care Team:  Cody Feliciano MD as PCP - General  Anu Alves MD    Medicare Screening Tests and Risk Assessments:  Last Medicare Wellness visit information reviewed, patient interviewed, no change since last AWV  Health Risk Assessment:  Patient rates overall health as fair  Patient feels that their physical health rating is Slightly worse  Eyesight was rated as Same  Hearing was rated as Same  Patient feels that their emotional and mental health rating is Same  Pain experienced by patient in the last 7 days has been None  Patient states that he has experienced weight loss or gain in last 6 months  Emotional/Mental Health:  Patient has been feeling nervous/anxious  PHQ-9 Depression Screening:    Frequency of the following problems over the past two weeks:      1  Little interest or pleasure in doing things: 0 - not at all      2  Feeling down, depressed, or hopeless: 0 - not at all  PHQ-2 Score: 0          Broken Bones/Falls:     Fall Risk Assessment:    In the past year, patient has experienced: No history of falling in past year          Bladder/Bowel:  Patient has not leaked urine accidently in the last six months  Patient reports no loss of bowel control  Immunizations:  Patient has had a flu vaccination within the last year  Patient has not received a pneumonia shot  Patient has not received a shingles shot  Patient has received tetanus/diphtheria shot  Home Safety:  Patient does not have trouble with stairs inside or outside of their home  Patient currently reports that there are no safety hazards present in home, working smoke alarms, working carbon monoxide detectors  Preventative Screenings:   prostate cancer screen performed, colon cancer screen completed, cholesterol screen completed, glaucoma eye exam completed,     Nutrition:  Current diet: Regular with servings of the following:    Medications:  Patient is currently taking over-the-counter supplements  Patient is able to manage medications  Lifestyle Choices:  Patient reports no tobacco use  Patient has not smoked or used tobacco in the past   Patient reports alcohol use  Alcohol use per week: Varies  Patient drives a vehicle  Patient wears seat belt  Current level of exercise of physical activity described by patient as: walking  Activities of Daily Living:  Can get out of bed by his or her self, able to dress self, able to make own meals, able to do own shopping, able to bathe self, can do own laundry/housekeeping, can manage own money, pay bills and track expenses    Previous Hospitalizations:  Hospitalization or ED visit in past 12 months  Number of hospitalizations within the last year: 1-2        Advanced Directives:  Patient has decided on a power of   Patient has spoken to designated power of   Patient has completed advanced directive          Preventative Screening/Counseling:      Cardiovascular:      General: Screening Current          Diabetes:      General: Screening Current          Colorectal Cancer:      General: Screening Current          Prostate Cancer: General: Screening Current          Osteoporosis:      General: Screening Not Indicated          AAA:      General: Screening Current          Glaucoma:      General: Risks and Benefits Discussed          HIV:      General: Screening Not Indicated          Advanced Directives:   Patient has living will for healthcare, has durable POA for healthcare, Information on ACP and/or AD provided  No 5 wishes given  End of life assessment reviewed with patient  Provider agrees with end of life decisions        Immunizations:      Influenza: Influenza UTD This Year      Pneumococcal: Lifetime Vaccine Completed      Shingrix: Risks & Benefits Discussed      TDAP: Risks & Benefits Discussed

## 2019-01-09 ENCOUNTER — CLINICAL SUPPORT (OUTPATIENT)
Dept: FAMILY MEDICINE CLINIC | Facility: CLINIC | Age: 69
End: 2019-01-09
Payer: MEDICARE

## 2019-01-09 DIAGNOSIS — Z23 NEED FOR VACCINATION WITH 13-POLYVALENT PNEUMOCOCCAL CONJUGATE VACCINE: Primary | ICD-10-CM

## 2019-01-09 PROCEDURE — G0009 ADMIN PNEUMOCOCCAL VACCINE: HCPCS

## 2019-01-09 PROCEDURE — 90670 PCV13 VACCINE IM: CPT

## 2019-01-31 DIAGNOSIS — I10 ESSENTIAL HYPERTENSION: Chronic | ICD-10-CM

## 2019-01-31 RX ORDER — ENALAPRIL MALEATE 10 MG/1
10 TABLET ORAL DAILY
Qty: 90 TABLET | Refills: 3 | Status: SHIPPED | OUTPATIENT
Start: 2019-01-31 | End: 2019-04-24 | Stop reason: SDUPTHER

## 2019-01-31 RX ORDER — METOPROLOL SUCCINATE 100 MG/1
100 TABLET, EXTENDED RELEASE ORAL DAILY
Qty: 90 TABLET | Refills: 3 | Status: SHIPPED | OUTPATIENT
Start: 2019-01-31 | End: 2020-01-05

## 2019-01-31 RX ORDER — LOSARTAN POTASSIUM AND HYDROCHLOROTHIAZIDE 12.5; 1 MG/1; MG/1
1 TABLET ORAL DAILY
Qty: 90 TABLET | Refills: 3 | Status: SHIPPED | OUTPATIENT
Start: 2019-01-31 | End: 2019-04-24

## 2019-01-31 NOTE — TELEPHONE ENCOUNTER
rf metoprolol succinate (TOPROL-XL) 100 mg 24 hr tablet  enalapril (VASOTEC) 10 mg tablet  losartan-hydrochlorothiazide (HYZAAR) 100-12 5 MG per tablet  [facility]  [place]

## 2019-04-24 ENCOUNTER — OFFICE VISIT (OUTPATIENT)
Dept: CARDIOLOGY CLINIC | Facility: CLINIC | Age: 69
End: 2019-04-24
Payer: MEDICARE

## 2019-04-24 VITALS
BODY MASS INDEX: 41.75 KG/M2 | HEART RATE: 83 BPM | WEIGHT: 315 LBS | HEIGHT: 73 IN | SYSTOLIC BLOOD PRESSURE: 156 MMHG | DIASTOLIC BLOOD PRESSURE: 86 MMHG | OXYGEN SATURATION: 96 %

## 2019-04-24 DIAGNOSIS — G47.33 OBSTRUCTIVE SLEEP APNEA: ICD-10-CM

## 2019-04-24 DIAGNOSIS — E66.01 MORBID OBESITY WITH BMI OF 40.0-44.9, ADULT (HCC): ICD-10-CM

## 2019-04-24 DIAGNOSIS — I35.0 NON-RHEUMATIC AORTIC STENOSIS: ICD-10-CM

## 2019-04-24 DIAGNOSIS — E78.2 MIXED HYPERLIPIDEMIA: ICD-10-CM

## 2019-04-24 DIAGNOSIS — I10 ESSENTIAL HYPERTENSION: ICD-10-CM

## 2019-04-24 DIAGNOSIS — I51.7 MODERATE CONCENTRIC LEFT VENTRICULAR HYPERTROPHY: ICD-10-CM

## 2019-04-24 DIAGNOSIS — I71.2 THORACIC AORTIC ANEURYSM WITHOUT RUPTURE (HCC): Primary | ICD-10-CM

## 2019-04-24 PROCEDURE — 99214 OFFICE O/P EST MOD 30 MIN: CPT | Performed by: INTERNAL MEDICINE

## 2019-04-24 RX ORDER — ENALAPRIL MALEATE 10 MG/1
15 TABLET ORAL DAILY
Qty: 135 TABLET | Refills: 1 | Status: SHIPPED | OUTPATIENT
Start: 2019-04-24 | End: 2019-06-14 | Stop reason: SDUPTHER

## 2019-04-24 RX ORDER — HYDROCHLOROTHIAZIDE 25 MG/1
25 TABLET ORAL DAILY
Qty: 90 TABLET | Refills: 1 | Status: SHIPPED | OUTPATIENT
Start: 2019-04-24 | End: 2019-10-09 | Stop reason: SDUPTHER

## 2019-04-26 ENCOUNTER — TELEPHONE (OUTPATIENT)
Dept: FAMILY MEDICINE CLINIC | Facility: CLINIC | Age: 69
End: 2019-04-26

## 2019-04-26 DIAGNOSIS — I10 ESSENTIAL HYPERTENSION: Chronic | ICD-10-CM

## 2019-04-26 RX ORDER — HYDRALAZINE HYDROCHLORIDE 50 MG/1
50 TABLET, FILM COATED ORAL EVERY 8 HOURS SCHEDULED
Qty: 270 TABLET | Refills: 3 | Status: SHIPPED | OUTPATIENT
Start: 2019-04-26 | End: 2020-09-16 | Stop reason: SDUPTHER

## 2019-06-14 DIAGNOSIS — I10 ESSENTIAL HYPERTENSION: ICD-10-CM

## 2019-06-14 RX ORDER — ENALAPRIL MALEATE 10 MG/1
15 TABLET ORAL DAILY
Qty: 135 TABLET | Refills: 3 | Status: SHIPPED | OUTPATIENT
Start: 2019-06-14 | End: 2020-06-01 | Stop reason: SDUPTHER

## 2019-06-18 ENCOUNTER — APPOINTMENT (OUTPATIENT)
Dept: LAB | Facility: CLINIC | Age: 69
End: 2019-06-18
Payer: MEDICARE

## 2019-06-18 DIAGNOSIS — M10.9 GOUT, UNSPECIFIED CAUSE, UNSPECIFIED CHRONICITY, UNSPECIFIED SITE: ICD-10-CM

## 2019-06-18 DIAGNOSIS — E78.5 HYPERLIPIDEMIA, UNSPECIFIED HYPERLIPIDEMIA TYPE: ICD-10-CM

## 2019-06-18 DIAGNOSIS — R73.9 ELEVATED BLOOD SUGAR: ICD-10-CM

## 2019-06-18 LAB
ALBUMIN SERPL BCP-MCNC: 3.4 G/DL (ref 3.5–5)
ALP SERPL-CCNC: 62 U/L (ref 46–116)
ALT SERPL W P-5'-P-CCNC: 33 U/L (ref 12–78)
ANION GAP SERPL CALCULATED.3IONS-SCNC: 3 MMOL/L (ref 4–13)
AST SERPL W P-5'-P-CCNC: 25 U/L (ref 5–45)
BILIRUB SERPL-MCNC: 0.45 MG/DL (ref 0.2–1)
BUN SERPL-MCNC: 23 MG/DL (ref 5–25)
CALCIUM SERPL-MCNC: 8.3 MG/DL (ref 8.3–10.1)
CHLORIDE SERPL-SCNC: 105 MMOL/L (ref 100–108)
CHOLEST SERPL-MCNC: 159 MG/DL (ref 50–200)
CO2 SERPL-SCNC: 30 MMOL/L (ref 21–32)
CREAT SERPL-MCNC: 1.01 MG/DL (ref 0.6–1.3)
EST. AVERAGE GLUCOSE BLD GHB EST-MCNC: 94 MG/DL
GFR SERPL CREATININE-BSD FRML MDRD: 77 ML/MIN/1.73SQ M
GLUCOSE P FAST SERPL-MCNC: 104 MG/DL (ref 65–99)
HBA1C MFR BLD: 4.9 % (ref 4.2–6.3)
HDLC SERPL-MCNC: 30 MG/DL (ref 40–60)
LDLC SERPL CALC-MCNC: 92 MG/DL (ref 0–100)
NONHDLC SERPL-MCNC: 129 MG/DL
POTASSIUM SERPL-SCNC: 3.8 MMOL/L (ref 3.5–5.3)
PROT SERPL-MCNC: 6.4 G/DL (ref 6.4–8.2)
SODIUM SERPL-SCNC: 138 MMOL/L (ref 136–145)
TRIGL SERPL-MCNC: 186 MG/DL
URATE SERPL-MCNC: 9.3 MG/DL (ref 4.2–8)

## 2019-06-18 PROCEDURE — 84550 ASSAY OF BLOOD/URIC ACID: CPT

## 2019-06-18 PROCEDURE — 36415 COLL VENOUS BLD VENIPUNCTURE: CPT

## 2019-06-18 PROCEDURE — 83036 HEMOGLOBIN GLYCOSYLATED A1C: CPT

## 2019-06-18 PROCEDURE — 80061 LIPID PANEL: CPT

## 2019-06-18 PROCEDURE — 80053 COMPREHEN METABOLIC PANEL: CPT

## 2019-07-02 ENCOUNTER — OFFICE VISIT (OUTPATIENT)
Dept: FAMILY MEDICINE CLINIC | Facility: CLINIC | Age: 69
End: 2019-07-02
Payer: MEDICARE

## 2019-07-02 VITALS
TEMPERATURE: 97.5 F | HEIGHT: 73 IN | HEART RATE: 78 BPM | BODY MASS INDEX: 41.75 KG/M2 | DIASTOLIC BLOOD PRESSURE: 76 MMHG | RESPIRATION RATE: 16 BRPM | WEIGHT: 315 LBS | SYSTOLIC BLOOD PRESSURE: 138 MMHG

## 2019-07-02 DIAGNOSIS — E66.01 MORBID OBESITY WITH BMI OF 40.0-44.9, ADULT (HCC): ICD-10-CM

## 2019-07-02 DIAGNOSIS — Z12.5 PROSTATE CANCER SCREENING: ICD-10-CM

## 2019-07-02 DIAGNOSIS — I10 ESSENTIAL HYPERTENSION: Primary | Chronic | ICD-10-CM

## 2019-07-02 DIAGNOSIS — I71.2 THORACIC AORTIC ANEURYSM WITHOUT RUPTURE (HCC): ICD-10-CM

## 2019-07-02 DIAGNOSIS — R73.9 ELEVATED BLOOD SUGAR: ICD-10-CM

## 2019-07-02 DIAGNOSIS — M10.9 GOUT, UNSPECIFIED CAUSE, UNSPECIFIED CHRONICITY, UNSPECIFIED SITE: ICD-10-CM

## 2019-07-02 DIAGNOSIS — E78.2 MIXED HYPERLIPIDEMIA: ICD-10-CM

## 2019-07-02 DIAGNOSIS — G47.33 OBSTRUCTIVE SLEEP APNEA: ICD-10-CM

## 2019-07-02 PROCEDURE — 99214 OFFICE O/P EST MOD 30 MIN: CPT | Performed by: FAMILY MEDICINE

## 2019-07-02 NOTE — PROGRESS NOTES
Assessment/Plan:  Return visit in 6 months with fasting blood work prior to visit     Diagnoses and all orders for this visit:    Essential hypertension    Mixed hyperlipidemia  -     CBC and differential; Future  -     Comprehensive metabolic panel; Future  -     Lipid panel; Future    Thoracic aortic aneurysm without rupture (HCC)    Obstructive sleep apnea    Gout, unspecified cause, unspecified chronicity, unspecified site  -     Uric acid; Future    Morbid obesity with BMI of 40 0-44 9, adult (HCC)    Elevated blood sugar  -     Hemoglobin A1C; Future    Prostate cancer screening  -     PSA, Total Screen; Future        Obstructive sleep apnea  Continue CPAP    Hypertension  Continue Vasa tech 15 mg, HydroDIURIL 25 mg and Toprol- mg daily as well as hydralazine 50 mg t i d     Elevated blood sugar  Low carb diet    Morbid obesity with BMI of 40 0-44 9, adult (HCC)  Weight loss recommended        Subjective:      Patient ID: Demetrio Kawasaki is a 81 y o  male  Patient comes in for checkup  His blood pressure medications were recently changed which are noted      The following portions of the patient's history were reviewed and updated as appropriate:   He has a past medical history of Anemia, Aortic aneurysm ([address] ), Elevated troponin, History of colonoscopy, History of esophagogastroduodenoscopy, Hypertension, Hypokalemia, Morbid obesity ([address] ), Renal calculi, and STEMI (ST elevation myocardial infarction) ([address] )  ,  does not have any pertinent problems on file  ,   has a past surgical history that includes Colon surgery; Appendectomy; ARTHROSCOPY KNEE (Left); Colostomy; Saturation biopsy of prostate; Rotator cuff repair (Left); and Total hip arthroplasty  ,  family history includes Breast cancer in his mother; COPD in his mother; Other in his father  ,   reports that he has never smoked  He has never used smokeless tobacco  He reports that he drinks alcohol  He reports that he does not use drugs  ,  is allergic to lorazepam   Current Outpatient Medications   Medication Sig Dispense Refill    enalapril (VASOTEC) 10 mg tablet Take 1 5 tablets (15 mg total) by mouth daily 135 tablet 3    ferrous sulfate 325 (65 Fe) mg tablet Take 325 mg by mouth daily with breakfast      hydrALAZINE (APRESOLINE) 50 mg tablet Take 1 tablet (50 mg total) by mouth every 8 (eight) hours 270 tablet 3    hydrochlorothiazide (HYDRODIURIL) 25 mg tablet Take 1 tablet (25 mg total) by mouth daily 90 tablet 1    ibuprofen (MOTRIN) 400 mg tablet Take 200 mg by mouth every 6 (six) hours as needed for mild pain (PRN )      metoprolol succinate (TOPROL-XL) 100 mg 24 hr tablet Take 1 tablet (100 mg total) by mouth daily 90 tablet 3     No current facility-administered medications for this visit  Review of Systems   Respiratory: Negative  Cardiovascular: Negative  Gastrointestinal: Negative  Objective:  Vitals:    07/02/19 0847   BP: 138/76   Pulse: 78   Resp: 16   Temp: 97 5 °F (36 4 °C)   Weight: (!) 147 kg (324 lb)   Height: 6' 1" (1 854 m)     Body mass index is 42 75 kg/m²  Physical Exam   Constitutional: He is oriented to person, place, and time  He appears well-developed  Obese   HENT:   Head: Normocephalic and atraumatic  Right Ear: Tympanic membrane and external ear normal    Left Ear: Tympanic membrane and external ear normal    Eyes: Pupils are equal, round, and reactive to light  EOM are normal    Neck: Neck supple  Cardiovascular: Normal rate, regular rhythm and normal heart sounds  Pulmonary/Chest: Effort normal and breath sounds normal    Abdominal: Soft  Bowel sounds are normal    Musculoskeletal: Normal range of motion  Neurological: He is alert and oriented to person, place, and time  Skin: Skin is warm and dry  Psychiatric: He has a normal mood and affect  Thought content normal      BMI Counseling: Body mass index is 42 75 kg/m²  Discussed the patient's BMI with him  The BMI is above average  BMI counseling and education was provided to the patient  Nutrition recommendations include decreasing overall calorie intake

## 2019-07-02 NOTE — ASSESSMENT & PLAN NOTE
Continue Vasa tech 15 mg, HydroDIURIL 25 mg and Toprol- mg daily as well as hydralazine 50 mg t i d

## 2019-07-16 ENCOUNTER — HOSPITAL ENCOUNTER (OUTPATIENT)
Dept: NON INVASIVE DIAGNOSTICS | Facility: CLINIC | Age: 69
Discharge: HOME | End: 2019-07-16
Payer: MEDICARE

## 2019-07-16 DIAGNOSIS — I71.2 THORACIC AORTIC ANEURYSM WITHOUT RUPTURE (HCC): ICD-10-CM

## 2019-07-16 DIAGNOSIS — I51.7 MODERATE CONCENTRIC LEFT VENTRICULAR HYPERTROPHY: ICD-10-CM

## 2019-07-16 PROCEDURE — 93306 TTE W/DOPPLER COMPLETE: CPT | Performed by: INTERNAL MEDICINE

## 2019-07-16 PROCEDURE — 93306 TTE W/DOPPLER COMPLETE: CPT

## 2019-07-19 ENCOUNTER — TELEPHONE (OUTPATIENT)
Dept: CARDIOLOGY CLINIC | Facility: CLINIC | Age: 69
End: 2019-07-19

## 2019-07-19 NOTE — TELEPHONE ENCOUNTER
S/w pt and he verbally understood per Dr Mendez Mcgovern aortic valve-midl stenosis-aorta mild enlargment and overall okay echo

## 2019-07-23 ENCOUNTER — APPOINTMENT (OUTPATIENT)
Dept: LAB | Facility: CLINIC | Age: 69
End: 2019-07-23
Payer: MEDICARE

## 2019-07-23 ENCOUNTER — TRANSCRIBE ORDERS (OUTPATIENT)
Dept: LAB | Facility: CLINIC | Age: 69
End: 2019-07-23

## 2019-07-23 DIAGNOSIS — Z12.5 SPECIAL SCREENING FOR MALIGNANT NEOPLASM OF PROSTATE: ICD-10-CM

## 2019-07-23 DIAGNOSIS — Z12.5 SPECIAL SCREENING FOR MALIGNANT NEOPLASM OF PROSTATE: Primary | ICD-10-CM

## 2019-07-23 LAB — PSA SERPL-MCNC: 3.1 NG/ML (ref 0–4)

## 2019-07-23 PROCEDURE — G0103 PSA SCREENING: HCPCS

## 2019-07-23 PROCEDURE — 36415 COLL VENOUS BLD VENIPUNCTURE: CPT

## 2019-10-09 DIAGNOSIS — I10 ESSENTIAL HYPERTENSION: ICD-10-CM

## 2019-10-09 RX ORDER — HYDROCHLOROTHIAZIDE 25 MG/1
25 TABLET ORAL DAILY
Qty: 90 TABLET | Refills: 3 | Status: SHIPPED | OUTPATIENT
Start: 2019-10-09 | End: 2020-09-16 | Stop reason: SDUPTHER

## 2019-10-23 ENCOUNTER — IMMUNIZATIONS (OUTPATIENT)
Dept: FAMILY MEDICINE CLINIC | Facility: CLINIC | Age: 69
End: 2019-10-23
Payer: MEDICARE

## 2019-10-23 DIAGNOSIS — Z23 ENCOUNTER FOR IMMUNIZATION: ICD-10-CM

## 2019-10-23 PROCEDURE — G0008 ADMIN INFLUENZA VIRUS VAC: HCPCS

## 2019-10-23 PROCEDURE — 90662 IIV NO PRSV INCREASED AG IM: CPT

## 2019-12-16 NOTE — PROGRESS NOTES
Assessment    1  Encounter for preventive health examination (V70 0) (Z00 00)   2  Hyperlipidemia (272 4) (E78 5)   3  Benign enlargement of prostate (600 00) (N40 0)   4  Anemia, pernicious (281 0) (D51 0)    Plan  Anemia, pernicious, Herniated lumbar intervertebral disc, Hypercholesteremia,  Hyperlipidemia    · (1) CBC/PLT/DIFF; Status:Active; Requested CPN:[id number];    · (1) COMPREHENSIVE METABOLIC PANEL; Status:Active; Requested JDS:[id number];    · (1) HEP C ANTIBODY; Status:Active; Requested KVX:97CJX1887;    · (1) LIPID PANEL, FASTING; Status:Active; Requested [id number];    · (1) URIC ACID; Status:Active; Requested ZMI:[id number];    · (1) VITAMIN B12; Status:Active; Requested AZC:[id number]; Health Maintenance    · Follow-up visit in 6 months Evaluation and Treatment  Follow-up  Status: Complete   Done: 83ZHM1877  Hypertension    · Metoprolol Succinate  MG Oral Tablet Extended Release 24 Hour; TAKE 1  TABLET DAILY    Discussion/Summary    He will be due for follow-up colonoscopy next year  Impression: Welcome to Medicare Visit  Cardiovascular screening and counseling: screening is current  Diabetes screening and counseling: screening is current  Colorectal cancer screening and counseling: screening is current  Prostate cancer screening and counseling: screening is current  Osteoporosis screening and counseling: screening not indicated  Abdominal aortic aneurysm screening and counseling: screening not indicated  Glaucoma screening and counseling: screening is current  HIV screening and counseling: screening not indicated  Hepatitis C Screening: the patient was counseled on Hepatitis C screening  The patient agrees to Hepatitis C screening  Chief Complaint  Welcome to [facility]      Advance Directives  Advance Directive [facility]:   NO - Patient does not have an advance health care directive  History of Present Illness  HPI: Patient comes in for checkup     Welcome to Medicare and Wellness Visits: The patient is being seen for the welcome to medicare visit  Medicare Screening and Risk Factors   Hospitalizations: no previous hospitalizations  Medicare Screening Tests Risk Questions   Osteoporosis risk assessment:  and over 48years of age  HIV risk assessment: none indicated  Drug and Alcohol Use: The patient has never smoked cigarettes and has never used smokeless tobacco  The patient reports occasional alcohol use  He has never used illicit drugs  Diet and Physical Activity: Current diet includes well balanced meals  He exercises 3-5 times per week  Exercise: walking  Mood Disorder and Cognitive Impairment Screening: PHQ-9 Depression Scale   Over the past 2 weeks, how often have you been bothered by the following problems? 1 ) Little interest or pleasure in doing things? Not at all    2 ) Feeling down, depressed or hopeless? Not at all    3 ) Trouble falling asleep or sleeping too much? Not at all    4 ) Feeling tired or having little energy? Not at all    5 ) Poor appetite or overeating? Not at all    6 ) Feeling bad about yourself, or that you are a failure, or have let yourself or your family down? Not at all    7 ) Trouble concentrating on things, such as reading a newspaper or watching television? Not at all    8 ) Moving or speaking so slowly that other people could have noticed, or the opposite, moving or speaking faster than usual? Not at all    9 ) Thoughts that you would be off dead or of hurting yourself in some way? Not at all  Functional Ability/Level of Safety: Hearing is normal bilaterally, normal in the right ear and normal in the left ear  He does not use a hearing aid  The patient is currently able to do activities of daily living without limitations, able to do instrumental activities of daily living without limitations, able to participate in social activities without limitations and able to drive without limitations   Activities of daily living details: does not need help using the phone, no transportation help needed, does not need help shopping, no meal preparation help needed, does not need help doing housework, does not need help doing laundry, does not need help managing medications and does not need help managing money  Fall risk factors: The patient fell 0 times in the past 12 months  Home safety risk factors:  no unfamiliar surroundings, no loose rugs, no poor household lighting, no uneven floors, no household clutter, grab bars in the bathroom and handrails on the stairs  Advance Directives: Advance directives: durable power of  for health care directives, but no living will and no advance directives  Co-Managers and Medical Equipment/Suppliers: See Patient Care Team      Patient Care Team    Care Team Member Role Specialty Office Number   Nicole Jolley MD  Colon and Rectal Surgery [phone]   [place] M[place] GARCIA  Orthopedic Surgery [phone]     Review of Systems    Constitutional: negative  Cardiovascular: negative  Respiratory: negative  Active Problems    1  Acute sinusitis (461 9) (J01 90)   2  Anemia, iron deficiency (280 9) (D50 9)   3  Anemia, pernicious (281 0) (D51 0)   4  Arthritis, hip (716 95) (M16 10)   5  Benign enlargement of prostate (600 00) (N40 0)   6  Diverticulosis (562 10) (K57 90)   7  Eczema (692 9) (L30 9)   8  Encounter for prostate cancer screening (V76 44) (Z12 5)   9  Encounter for screening colonoscopy (V76 51) (Z12 11)   10  Gout (274 9) (M10 9)   11  Herniated lumbar intervertebral disc (722 10) (M51 26)   12  Hypercholesteremia (272 0) (E78 00)   13  Hyperlipidemia (272 4) (E78 5)   14  Hypertension (401 9) (I10)   15  Hypocalcemia (275 41) (E83 51)   16  Microscopic hematuria (599 72) (R31 29)   17  Need for diphtheria-tetanus-pertussis (Tdap) vaccine (V06 1) (Z23)   18  Need for shingles vaccine (V04 89) (Z23)   19  Obesity (278 00) (E66 9)   20   Preoperative examination (V72 84) (Z01 818)   21  Proteinuria (791 0) (R80 9)   22  Screening for genitourinary condition (V81 6) (Z13 89)   23  Sleep apnea (780 57) (G47 30)    Past Medical History    · History of anemia (V12 3) (Z86 2)   · History of colonoscopy (V45 89) (B64 037)   · History of esophagogastroduodenoscopy (V15 29) (Z98 89)   · History of renal calculi (V13 01) (J92 277)    The active problems and past medical history were reviewed and updated today  Surgical History    · History of Appendectomy   · History of Arthroscopy Knee   · History of Colostomy   · History of Needle Biopsy Of Prostate   · History of Rotator Cuff Repair   · History of Total Hip Replacement    The surgical history was reviewed and updated today  Family History  Mother    · Family history of    · Family history of chronic obstructive pulmonary disease (V17 6) (Z82 5)   · Family history of malignant neoplasm of breast (V16 3) (Z80 3)  Father    · Family history of    · Family history of thoracic aortic aneurysm (V17 49) (Z82 49)  Family History    · Denied: Family history of mental disorder   · Denied: Family history of substance abuse    The family history was reviewed and updated today  Social History    · Denied: History of Alcohol use   · Denied: History of Drug use   · Never a smoker  The social history was reviewed and updated today  The social history was reviewed and is unchanged  Current Meds   1  Enalapril Maleate 10 MG Oral Tablet; take 1 tablet by mouth once daily; Therapy: 40YAQ6802 to (Evaluate:71Wrr4943)  Requested for: 85SYV6062; Last   Rx:2017 Ordered   2  HydrALAZINE HCl - 50 MG Oral Tablet; TAKE 1 TABLET TWICE DAILY  Requested for:   87Dyg1619; Last Rx:74Zvs6422 Ordered   3  Ibuprofen 400 MG Oral Tablet; take 1 tablet every 6 to 8 hours as needed Recorded   4  Iron 325 (65 Fe) MG Oral Tablet; Take 1 tablet twice daily Recorded   5   Losartan Potassium-HCTZ 100-12 5 MG Oral Tablet; Take 1 tablet daily; Therapy: 60RJH2136 to (Last Rx:19Zyp2985)  Requested for: 98Xti8352 Ordered   6  Metoprolol Succinate ER 50 MG Oral Tablet Extended Release 24 Hour; Take 1 tablet   daily  Requested for: 30Imy4175; Last Rx:95Ijc4697 Ordered    The medication list was reviewed and updated today  Allergies    1  Ativan    Immunizations   1    Tdap  Temporarily Deferred: Pt requests deferral    Zoster  Temporarily Deferred: Pt requests deferral     Vitals  Signs    Systolic: 095  Diastolic: 78   Heart Rate: 83  Systolic: 007  Diastolic: 92  Height: 6 ft 1 in  Weight: 322 lb   BMI Calculated: 42 48  BSA Calculated: 2 63  O2 Saturation: 98    Results/Data  PHQ-9 Adult Depression Screening 46Jqy6710 08:47AM User, [employer]     Test Name Result Flag Reference   PHQ-9 Adult Depression Score 0     Over the last two weeks, how often have you been bothered by any of the following problems? Little interest or pleasure in doing things: Not at all - 0  Feeling down, depressed, or hopeless: Not at all - 0  Trouble falling or staying asleep, or sleeping too much: Not at all - 0  Feeling tired or having little energy: Not at all - 0  Poor appetite or over eating: Not at all - 0  Feeling bad about yourself - or that you are a failure or have let yourself or your family down: Not at all - 0  Trouble concentrating on things, such as reading the newspaper or watching television: Not at all - 0  Moving or speaking so slowly that other people could have noticed   Or the opposite -  being so fidgety or restless that you have been moving around a lot more than usual: Not at all - 0  Thoughts that you would be better off dead, or of hurting yourself in some way: Not at all - 0   PHQ-9 Adult Depression Screening Negative     PHQ-9 Difficulty Level Not difficult at all     PHQ-9 Severity No Depression       Falls Risk Assessment (Dx Z13 89 Screen for Neurologic Disorder) 55OPY9501 08:47AM User, [employer]     Test Name Result Flag Reference   Falls Risk      No falls in the past year       Procedure    Procedure:   Results: 20/13 in both eyes without corrective device, 20/13 in the right eye without corrective device, 20/13 in the left eye without corrective device      Signatures   Electronically signed by : LEVI Robles ; Dec 28 2017  9:17AM EST                       (Author) Cyclophosphamide Counseling:  I discussed with the patient the risks of cyclophosphamide including but not limited to hair loss, hormonal abnormalities, decreased fertility, abdominal pain, diarrhea, nausea and vomiting, bone marrow suppression and infection. The patient understands that monitoring is required while taking this medication.

## 2019-12-18 ENCOUNTER — APPOINTMENT (OUTPATIENT)
Dept: LAB | Facility: CLINIC | Age: 69
End: 2019-12-18
Payer: MEDICARE

## 2019-12-18 DIAGNOSIS — R73.9 ELEVATED BLOOD SUGAR: ICD-10-CM

## 2019-12-18 DIAGNOSIS — M10.9 GOUT, UNSPECIFIED CAUSE, UNSPECIFIED CHRONICITY, UNSPECIFIED SITE: ICD-10-CM

## 2019-12-18 DIAGNOSIS — Z12.5 PROSTATE CANCER SCREENING: ICD-10-CM

## 2019-12-18 DIAGNOSIS — E78.2 MIXED HYPERLIPIDEMIA: ICD-10-CM

## 2019-12-18 LAB
ALBUMIN SERPL BCP-MCNC: 3.5 G/DL (ref 3.5–5)
ALP SERPL-CCNC: 64 U/L (ref 46–116)
ALT SERPL W P-5'-P-CCNC: 38 U/L (ref 12–78)
ANION GAP SERPL CALCULATED.3IONS-SCNC: 0 MMOL/L (ref 4–13)
AST SERPL W P-5'-P-CCNC: 22 U/L (ref 5–45)
BASOPHILS # BLD AUTO: 0.05 THOUSANDS/ΜL (ref 0–0.1)
BASOPHILS NFR BLD AUTO: 1 % (ref 0–1)
BILIRUB SERPL-MCNC: 0.55 MG/DL (ref 0.2–1)
BUN SERPL-MCNC: 16 MG/DL (ref 5–25)
CALCIUM SERPL-MCNC: 8.6 MG/DL (ref 8.3–10.1)
CHLORIDE SERPL-SCNC: 106 MMOL/L (ref 100–108)
CHOLEST SERPL-MCNC: 171 MG/DL (ref 50–200)
CO2 SERPL-SCNC: 34 MMOL/L (ref 21–32)
CREAT SERPL-MCNC: 0.93 MG/DL (ref 0.6–1.3)
EOSINOPHIL # BLD AUTO: 0.34 THOUSAND/ΜL (ref 0–0.61)
EOSINOPHIL NFR BLD AUTO: 7 % (ref 0–6)
ERYTHROCYTE [DISTWIDTH] IN BLOOD BY AUTOMATED COUNT: 14.3 % (ref 11.6–15.1)
EST. AVERAGE GLUCOSE BLD GHB EST-MCNC: 97 MG/DL
GFR SERPL CREATININE-BSD FRML MDRD: 85 ML/MIN/1.73SQ M
GLUCOSE P FAST SERPL-MCNC: 96 MG/DL (ref 65–99)
HBA1C MFR BLD: 5 % (ref 4.2–6.3)
HCT VFR BLD AUTO: 45.4 % (ref 36.5–49.3)
HDLC SERPL-MCNC: 31 MG/DL
HGB BLD-MCNC: 14.1 G/DL (ref 12–17)
IMM GRANULOCYTES # BLD AUTO: 0.01 THOUSAND/UL (ref 0–0.2)
IMM GRANULOCYTES NFR BLD AUTO: 0 % (ref 0–2)
LDLC SERPL CALC-MCNC: 96 MG/DL (ref 0–100)
LYMPHOCYTES # BLD AUTO: 1.17 THOUSANDS/ΜL (ref 0.6–4.47)
LYMPHOCYTES NFR BLD AUTO: 23 % (ref 14–44)
MCH RBC QN AUTO: 27.6 PG (ref 26.8–34.3)
MCHC RBC AUTO-ENTMCNC: 31.1 G/DL (ref 31.4–37.4)
MCV RBC AUTO: 89 FL (ref 82–98)
MONOCYTES # BLD AUTO: 0.58 THOUSAND/ΜL (ref 0.17–1.22)
MONOCYTES NFR BLD AUTO: 11 % (ref 4–12)
NEUTROPHILS # BLD AUTO: 2.95 THOUSANDS/ΜL (ref 1.85–7.62)
NEUTS SEG NFR BLD AUTO: 58 % (ref 43–75)
NONHDLC SERPL-MCNC: 140 MG/DL
NRBC BLD AUTO-RTO: 0 /100 WBCS
PLATELET # BLD AUTO: 160 THOUSANDS/UL (ref 149–390)
PMV BLD AUTO: 10.7 FL (ref 8.9–12.7)
POTASSIUM SERPL-SCNC: 4.2 MMOL/L (ref 3.5–5.3)
PROT SERPL-MCNC: 6.7 G/DL (ref 6.4–8.2)
PSA SERPL-MCNC: 3.2 NG/ML (ref 0–4)
RBC # BLD AUTO: 5.11 MILLION/UL (ref 3.88–5.62)
SODIUM SERPL-SCNC: 140 MMOL/L (ref 136–145)
TRIGL SERPL-MCNC: 221 MG/DL
URATE SERPL-MCNC: 8.2 MG/DL (ref 4.2–8)
WBC # BLD AUTO: 5.1 THOUSAND/UL (ref 4.31–10.16)

## 2019-12-18 PROCEDURE — 36415 COLL VENOUS BLD VENIPUNCTURE: CPT

## 2019-12-18 PROCEDURE — 84550 ASSAY OF BLOOD/URIC ACID: CPT

## 2019-12-18 PROCEDURE — 83036 HEMOGLOBIN GLYCOSYLATED A1C: CPT

## 2019-12-18 PROCEDURE — 80053 COMPREHEN METABOLIC PANEL: CPT

## 2019-12-18 PROCEDURE — 80061 LIPID PANEL: CPT

## 2019-12-18 PROCEDURE — G0103 PSA SCREENING: HCPCS

## 2019-12-18 PROCEDURE — 85025 COMPLETE CBC W/AUTO DIFF WBC: CPT

## 2020-01-05 DIAGNOSIS — I10 ESSENTIAL HYPERTENSION: Chronic | ICD-10-CM

## 2020-01-05 RX ORDER — METOPROLOL SUCCINATE 100 MG/1
TABLET, EXTENDED RELEASE ORAL
Qty: 90 TABLET | Refills: 0 | Status: SHIPPED | OUTPATIENT
Start: 2020-01-05 | End: 2020-01-07 | Stop reason: SDUPTHER

## 2020-01-07 ENCOUNTER — OFFICE VISIT (OUTPATIENT)
Dept: FAMILY MEDICINE CLINIC | Facility: CLINIC | Age: 70
End: 2020-01-07
Payer: MEDICARE

## 2020-01-07 VITALS
WEIGHT: 315 LBS | BODY MASS INDEX: 41.75 KG/M2 | TEMPERATURE: 97 F | OXYGEN SATURATION: 99 % | RESPIRATION RATE: 16 BRPM | SYSTOLIC BLOOD PRESSURE: 134 MMHG | HEART RATE: 78 BPM | DIASTOLIC BLOOD PRESSURE: 80 MMHG | HEIGHT: 73 IN

## 2020-01-07 DIAGNOSIS — M10.9 GOUT, UNSPECIFIED CAUSE, UNSPECIFIED CHRONICITY, UNSPECIFIED SITE: Primary | ICD-10-CM

## 2020-01-07 DIAGNOSIS — I10 ESSENTIAL HYPERTENSION: Chronic | ICD-10-CM

## 2020-01-07 DIAGNOSIS — I71.2 THORACIC AORTIC ANEURYSM WITHOUT RUPTURE (HCC): ICD-10-CM

## 2020-01-07 DIAGNOSIS — E66.01 MORBID OBESITY WITH BMI OF 40.0-44.9, ADULT (HCC): ICD-10-CM

## 2020-01-07 DIAGNOSIS — E78.2 MIXED HYPERLIPIDEMIA: ICD-10-CM

## 2020-01-07 DIAGNOSIS — Z00.00 MEDICARE ANNUAL WELLNESS VISIT, SUBSEQUENT: ICD-10-CM

## 2020-01-07 PROBLEM — R06.00 DYSPNEA ON EXERTION: Status: RESOLVED | Noted: 2018-04-17 | Resolved: 2020-01-07

## 2020-01-07 PROBLEM — R06.09 DYSPNEA ON EXERTION: Status: RESOLVED | Noted: 2018-04-17 | Resolved: 2020-01-07

## 2020-01-07 PROCEDURE — G0439 PPPS, SUBSEQ VISIT: HCPCS | Performed by: FAMILY MEDICINE

## 2020-01-07 PROCEDURE — 1123F ACP DISCUSS/DSCN MKR DOCD: CPT | Performed by: FAMILY MEDICINE

## 2020-01-07 PROCEDURE — 99214 OFFICE O/P EST MOD 30 MIN: CPT | Performed by: FAMILY MEDICINE

## 2020-01-07 RX ORDER — METOPROLOL SUCCINATE 100 MG/1
100 TABLET, EXTENDED RELEASE ORAL DAILY
Qty: 90 TABLET | Refills: 5 | Status: SHIPPED | OUTPATIENT
Start: 2020-01-07 | End: 2020-03-30 | Stop reason: SDUPTHER

## 2020-01-07 RX ORDER — COLCHICINE 0.6 MG/1
TABLET, FILM COATED ORAL
Qty: 30 TABLET | Refills: 3 | Status: SHIPPED | OUTPATIENT
Start: 2020-01-07 | End: 2020-12-04 | Stop reason: SDUPTHER

## 2020-01-07 NOTE — PATIENT INSTRUCTIONS
Medicare Preventive Visit Patient Instructions  Thank you for completing your Welcome to Medicare Visit or Medicare Annual Wellness Visit today  Your next wellness visit will be due in one year (1/7/2021)  The screening/preventive services that you may require over the next 5-10 years are detailed below  Some tests may not apply to you based off risk factors and/or age  Screening tests ordered at today's visit but not completed yet may show as past due  Also, please note that scanned in results may not display below  Preventive Screenings:  Service Recommendations Previous Testing/Comments   Colorectal Cancer Screening  · Colonoscopy    · Fecal Occult Blood Test (FOBT)/Fecal Immunochemical Test (FIT)  · Fecal DNA/Cologuard Test  · Flexible Sigmoidoscopy Age: 54-65 years old   Colonoscopy: every 10 years (May be performed more frequently if at higher risk)  OR  FOBT/FIT: every 1 year  OR  Cologuard: every 3 years  OR  Sigmoidoscopy: every 5 years  Screening may be recommended earlier than age 48 if at higher risk for colorectal cancer  Also, an individualized decision between you and your healthcare provider will decide whether screening between the ages of 74-80 would be appropriate   Colonoscopy: 06/05/2013  FOBT/FIT: Not on file  Cologuard: Not on file  Sigmoidoscopy: Not on file    Screening Current     Prostate Cancer Screening Individualized decision between patient and health care provider in men between ages of 53-78   Medicare will cover every 12 months beginning on the day after your 50th birthday PSA: 3 2 ng/mL     Screening Current     Hepatitis C Screening Once for adults born between 1945 and 1965  More frequently in patients at high risk for Hepatitis C Hep C Antibody: 06/12/2018    Screening Current   Diabetes Screening 1-2 times per year if you're at risk for diabetes or have pre-diabetes Fasting glucose: 96 mg/dL   A1C: 5 0 %    Screening Current   Cholesterol Screening Once every 5 years if you don't have a lipid disorder  May order more often based on risk factors  Lipid panel: 12/18/2019    Screening Not Indicated  History Lipid Disorder      Other Preventive Screenings Covered by Medicare:  1  Abdominal Aortic Aneurysm (AAA) Screening: covered once if your at risk  You're considered to be at risk if you have a family history of AAA or a male between the age of 73-68 who smoking at least 100 cigarettes in your lifetime  2  Lung Cancer Screening: covers low dose CT scan once per year if you meet all of the following conditions: (1) Age 50-69; (2) No signs or symptoms of lung cancer; (3) Current smoker or have quit smoking within the last 15 years; (4) You have a tobacco smoking history of at least 30 pack years (packs per day x number of years you smoked); (5) You get a written order from a healthcare provider  3  Glaucoma Screening: covered annually if you're considered high risk: (1) You have diabetes OR (2) Family history of glaucoma OR (3)  aged 48 and older OR (3)  American aged 72 and older  3  Osteoporosis Screening: covered every 2 years if you meet one of the following conditions: (1) Have a vertebral abnormality; (2) On glucocorticoid therapy for more than 3 months; (3) Have primary hyperparathyroidism; (4) On osteoporosis medications and need to assess response to drug therapy  5  HIV Screening: covered annually if you're between the age of 12-76  Also covered annually if you are younger than 13 and older than 72 with risk factors for HIV infection  For pregnant patients, it is covered up to 3 times per pregnancy      Immunizations:  Immunization Recommendations   Influenza Vaccine Annual influenza vaccination during flu season is recommended for all persons aged >= 6 months who do not have contraindications   Pneumococcal Vaccine (Prevnar and Pneumovax)  * Prevnar = PCV13  * Pneumovax = PPSV23 Adults 25-60 years old: 1-3 doses may be recommended based on certain risk factors  Adults 72 years old: Prevnar (PCV13) vaccine recommended followed by Pneumovax (PPSV23) vaccine  If already received PPSV23 since turning 65, then PCV13 recommended at least one year after PPSV23 dose  Hepatitis B Vaccine 3 dose series if at intermediate or high risk (ex: diabetes, end stage renal disease, liver disease)   Tetanus (Td) Vaccine - COST NOT COVERED BY MEDICARE PART B Following completion of primary series, a booster dose should be given every 10 years to maintain immunity against tetanus  Td may also be given as tetanus wound prophylaxis  Tdap Vaccine - COST NOT COVERED BY MEDICARE PART B Recommended at least once for all adults  For pregnant patients, recommended with each pregnancy  Shingles Vaccine (Shingrix) - COST NOT COVERED BY MEDICARE PART B  2 shot series recommended in those aged 48 and above     Health Maintenance Due:      Topic Date Due    CRC Screening: Colonoscopy  06/05/2023    Hepatitis C Screening  Completed     Immunizations Due:      Topic Date Due    DTaP,Tdap,and Td Vaccines (1 - Tdap) 06/16/1961     Advance Directives   What are advance directives? Advance directives are legal documents that state your wishes and plans for medical care  These plans are made ahead of time in case you lose your ability to make decisions for yourself  Advance directives can apply to any medical decision, such as the treatments you want, and if you want to donate organs  What are the types of advance directives? There are many types of advance directives, and each state has rules about how to use them  You may choose a combination of any of the following:  · Living will: This is a written record of the treatment you want  You can also choose which treatments you do not want, which to limit, and which to stop at a certain time  This includes surgery, medicine, IV fluid, and tube feedings  · Durable power of  for healthcare [facility]):   This is a written record that states who you want to make healthcare choices for you when you are unable to make them for yourself  This person, called a proxy, is usually a family member or a friend  You may choose more than 1 proxy  · Do not resuscitate (DNR) order:  A DNR order is used in case your heart stops beating or you stop breathing  It is a request not to have certain forms of treatment, such as CPR  A DNR order may be included in other types of advance directives  · Medical directive: This covers the care that you want if you are in a coma, near death, or unable to make decisions for yourself  You can list the treatments you want for each condition  Treatment may include pain medicine, surgery, blood transfusions, dialysis, IV or tube feedings, and a ventilator (breathing machine)  · Values history: This document has questions about your views, beliefs, and how you feel and think about life  This information can help others choose the care that you would choose  Why are advance directives important? An advance directive helps you control your care  Although spoken wishes may be used, it is better to have your wishes written down  Spoken wishes can be misunderstood, or not followed  Treatments may be given even if you do not want them  An advance directive may make it easier for your family to make difficult choices about your care  Weight Management   Why it is important to manage your weight:  Being overweight increases your risk of health conditions such as heart disease, high blood pressure, type 2 diabetes, and certain types of cancer  It can also increase your risk for osteoarthritis, sleep apnea, and other respiratory problems  Aim for a slow, steady weight loss  Even a small amount of weight loss can lower your risk of health problems  How to lose weight safely:  A safe and healthy way to lose weight is to eat fewer calories and get regular exercise   You can lose up about 1 pound a week by decreasing the number of calories you eat by 500 calories each day  Healthy meal plan for weight management:  A healthy meal plan includes a variety of foods, contains fewer calories, and helps you stay healthy  A healthy meal plan includes the following:  · Eat whole-grain foods more often  A healthy meal plan should contain fiber  Fiber is the part of grains, fruits, and vegetables that is not broken down by your body  Whole-grain foods are healthy and provide extra fiber in your diet  Some examples of whole-grain foods are whole-wheat breads and pastas, oatmeal, brown rice, and bulgur  · Eat a variety of vegetables every day  Include dark, leafy greens such as spinach, kale, quique greens, and mustard greens  Eat yellow and orange vegetables such as carrots, sweet potatoes, and winter squash  · Eat a variety of fruits every day  Choose fresh or canned fruit (canned in its own juice or light syrup) instead of juice  Fruit juice has very little or no fiber  · Eat low-fat dairy foods  Drink fat-free (skim) milk or 1% milk  Eat fat-free yogurt and low-fat cottage cheese  Try low-fat cheeses such as mozzarella and other reduced-fat cheeses  · Choose meat and other protein foods that are low in fat  Choose beans or other legumes such as split peas or lentils  Choose fish, skinless poultry (chicken or turkey), or lean cuts of red meat (beef or pork)  Before you cook meat or poultry, cut off any visible fat  · Use less fat and oil  Try baking foods instead of frying them  Add less fat, such as margarine, sour cream, regular salad dressing and mayonnaise to foods  Eat fewer high-fat foods  Some examples of high-fat foods include french fries, doughnuts, ice cream, and cakes  · Eat fewer sweets  Limit foods and drinks that are high in sugar  This includes candy, cookies, regular soda, and sweetened drinks  Exercise:  Exercise at least 30 minutes per day on most days of the week   Some examples of exercise include walking, biking, dancing, and swimming  You can also fit in more physical activity by taking the stairs instead of the elevator or parking farther away from stores  Ask your healthcare provider about the best exercise plan for you  © Copyright [employer] 2018 Information is for End User's use only and may not be sold, redistributed or otherwise used for commercial purposes  All illustrations and images included in CareNotes® are the copyrighted property of [employer]  or [facility] Preventive Visit Patient Instructions  Thank you for completing your Welcome to Medicare Visit or Medicare Annual Wellness Visit today  Your next wellness visit will be due in one year (1/7/2021)  The screening/preventive services that you may require over the next 5-10 years are detailed below  Some tests may not apply to you based off risk factors and/or age  Screening tests ordered at today's visit but not completed yet may show as past due  Also, please note that scanned in results may not display below  Preventive Screenings:  Service Recommendations Previous Testing/Comments   Colorectal Cancer Screening  · Colonoscopy    · Fecal Occult Blood Test (FOBT)/Fecal Immunochemical Test (FIT)  · Fecal DNA/Cologuard Test  · Flexible Sigmoidoscopy Age: 54-65 years old   Colonoscopy: every 10 years (May be performed more frequently if at higher risk)  OR  FOBT/FIT: every 1 year  OR  Cologuard: every 3 years  OR  Sigmoidoscopy: every 5 years  Screening may be recommended earlier than age 48 if at higher risk for colorectal cancer  Also, an individualized decision between you and your healthcare provider will decide whether screening between the ages of 74-80 would be appropriate   Colonoscopy: 06/05/2013  FOBT/FIT: Not on file  Cologuard: Not on file  Sigmoidoscopy: Not on file    Screening Current     Prostate Cancer Screening Individualized decision between patient and health care provider in men between ages of 53-78 Medicare will cover every 12 months beginning on the day after your 50th birthday PSA: 3 2 ng/mL     Screening Current     Hepatitis C Screening Once for adults born between 1945 and 1965  More frequently in patients at high risk for Hepatitis C Hep C Antibody: 06/12/2018    Screening Current   Diabetes Screening 1-2 times per year if you're at risk for diabetes or have pre-diabetes Fasting glucose: 96 mg/dL   A1C: 5 0 %    Screening Current   Cholesterol Screening Once every 5 years if you don't have a lipid disorder  May order more often based on risk factors  Lipid panel: 12/18/2019    Screening Not Indicated  History Lipid Disorder      Other Preventive Screenings Covered by Medicare:  6  Abdominal Aortic Aneurysm (AAA) Screening: covered once if your at risk  You're considered to be at risk if you have a family history of AAA or a male between the age of 73-68 who smoking at least 100 cigarettes in your lifetime  7  Lung Cancer Screening: covers low dose CT scan once per year if you meet all of the following conditions: (1) Age 50-69; (2) No signs or symptoms of lung cancer; (3) Current smoker or have quit smoking within the last 15 years; (4) You have a tobacco smoking history of at least 30 pack years (packs per day x number of years you smoked); (5) You get a written order from a healthcare provider  8  Glaucoma Screening: covered annually if you're considered high risk: (1) You have diabetes OR (2) Family history of glaucoma OR (3)  aged 48 and older OR (3)  American aged 72 and older  5  Osteoporosis Screening: covered every 2 years if you meet one of the following conditions: (1) Have a vertebral abnormality; (2) On glucocorticoid therapy for more than 3 months; (3) Have primary hyperparathyroidism; (4) On osteoporosis medications and need to assess response to drug therapy  10  HIV Screening: covered annually if you're between the age of 12-76   Also covered annually if you are younger than 13 and older than 72 with risk factors for HIV infection  For pregnant patients, it is covered up to 3 times per pregnancy  Immunizations:  Immunization Recommendations   Influenza Vaccine Annual influenza vaccination during flu season is recommended for all persons aged >= 6 months who do not have contraindications   Pneumococcal Vaccine (Prevnar and Pneumovax)  * Prevnar = PCV13  * Pneumovax = PPSV23 Adults 25-60 years old: 1-3 doses may be recommended based on certain risk factors  Adults 72 years old: Prevnar (PCV13) vaccine recommended followed by Pneumovax (PPSV23) vaccine  If already received PPSV23 since turning 65, then PCV13 recommended at least one year after PPSV23 dose  Hepatitis B Vaccine 3 dose series if at intermediate or high risk (ex: diabetes, end stage renal disease, liver disease)   Tetanus (Td) Vaccine - COST NOT COVERED BY MEDICARE PART B Following completion of primary series, a booster dose should be given every 10 years to maintain immunity against tetanus  Td may also be given as tetanus wound prophylaxis  Tdap Vaccine - COST NOT COVERED BY MEDICARE PART B Recommended at least once for all adults  For pregnant patients, recommended with each pregnancy  Shingles Vaccine (Shingrix) - COST NOT COVERED BY MEDICARE PART B  2 shot series recommended in those aged 48 and above     Health Maintenance Due:      Topic Date Due    CRC Screening: Colonoscopy  06/05/2023    Hepatitis C Screening  Completed     Immunizations Due:      Topic Date Due    DTaP,Tdap,and Td Vaccines (1 - Tdap) 06/16/1961     Advance Directives   What are advance directives? Advance directives are legal documents that state your wishes and plans for medical care  These plans are made ahead of time in case you lose your ability to make decisions for yourself  Advance directives can apply to any medical decision, such as the treatments you want, and if you want to donate organs     What are the types of advance directives? There are many types of advance directives, and each state has rules about how to use them  You may choose a combination of any of the following:  · Living will: This is a written record of the treatment you want  You can also choose which treatments you do not want, which to limit, and which to stop at a certain time  This includes surgery, medicine, IV fluid, and tube feedings  · Durable power of  for healthcare [facility]): This is a written record that states who you want to make healthcare choices for you when you are unable to make them for yourself  This person, called a proxy, is usually a family member or a friend  You may choose more than 1 proxy  · Do not resuscitate (DNR) order:  A DNR order is used in case your heart stops beating or you stop breathing  It is a request not to have certain forms of treatment, such as CPR  A DNR order may be included in other types of advance directives  · Medical directive: This covers the care that you want if you are in a coma, near death, or unable to make decisions for yourself  You can list the treatments you want for each condition  Treatment may include pain medicine, surgery, blood transfusions, dialysis, IV or tube feedings, and a ventilator (breathing machine)  · Values history: This document has questions about your views, beliefs, and how you feel and think about life  This information can help others choose the care that you would choose  Why are advance directives important? An advance directive helps you control your care  Although spoken wishes may be used, it is better to have your wishes written down  Spoken wishes can be misunderstood, or not followed  Treatments may be given even if you do not want them  An advance directive may make it easier for your family to make difficult choices about your care     Weight Management   Why it is important to manage your weight:  Being overweight increases your risk of health conditions such as heart disease, high blood pressure, type 2 diabetes, and certain types of cancer  It can also increase your risk for osteoarthritis, sleep apnea, and other respiratory problems  Aim for a slow, steady weight loss  Even a small amount of weight loss can lower your risk of health problems  How to lose weight safely:  A safe and healthy way to lose weight is to eat fewer calories and get regular exercise  You can lose up about 1 pound a week by decreasing the number of calories you eat by 500 calories each day  Healthy meal plan for weight management:  A healthy meal plan includes a variety of foods, contains fewer calories, and helps you stay healthy  A healthy meal plan includes the following:  · Eat whole-grain foods more often  A healthy meal plan should contain fiber  Fiber is the part of grains, fruits, and vegetables that is not broken down by your body  Whole-grain foods are healthy and provide extra fiber in your diet  Some examples of whole-grain foods are whole-wheat breads and pastas, oatmeal, brown rice, and bulgur  · Eat a variety of vegetables every day  Include dark, leafy greens such as spinach, kale, quique greens, and mustard greens  Eat yellow and orange vegetables such as carrots, sweet potatoes, and winter squash  · Eat a variety of fruits every day  Choose fresh or canned fruit (canned in its own juice or light syrup) instead of juice  Fruit juice has very little or no fiber  · Eat low-fat dairy foods  Drink fat-free (skim) milk or 1% milk  Eat fat-free yogurt and low-fat cottage cheese  Try low-fat cheeses such as mozzarella and other reduced-fat cheeses  · Choose meat and other protein foods that are low in fat  Choose beans or other legumes such as split peas or lentils  Choose fish, skinless poultry (chicken or turkey), or lean cuts of red meat (beef or pork)  Before you cook meat or poultry, cut off any visible fat  · Use less fat and oil    Try baking foods instead of frying them  Add less fat, such as margarine, sour cream, regular salad dressing and mayonnaise to foods  Eat fewer high-fat foods  Some examples of high-fat foods include french fries, doughnuts, ice cream, and cakes  · Eat fewer sweets  Limit foods and drinks that are high in sugar  This includes candy, cookies, regular soda, and sweetened drinks  Exercise:  Exercise at least 30 minutes per day on most days of the week  Some examples of exercise include walking, biking, dancing, and swimming  You can also fit in more physical activity by taking the stairs instead of the elevator or parking farther away from stores  Ask your healthcare provider about the best exercise plan for you  © Copyright [employer] 2018 Information is for End User's use only and may not be sold, redistributed or otherwise used for commercial purposes  All illustrations and images included in CareNotes® are the copyrighted property of [employer]  or [employer] [facility] Carbohydrate Counting   AMBULATORY CARE:   Carbohydrate counting  is a way to plan your meals by counting the amount of carbohydrate in foods  Carbohydrates are the sugars, starches, and fiber found in fruit, grains, vegetables, and milk products  Carbohydrates increase your blood sugar levels  Carbohydrate counting can help you eat the right amount of carbohydrate to keep your blood sugar levels under control  What you need to know about planning meals using carbohydrate counting:  · A dietitian or healthcare provider will help you develop a healthy meal plan that works best for you  You will be taught how much carbohydrate to eat or drink for each meal and snack  Your meal plan will be based on your age, weight, usual food intake, and physical activity level  If you have diabetes, it will also include your blood sugar levels and diabetes medicine   Once you know how much carbohydrate you should eat, you can decide what type of food you want to eat  · You will need to know what foods contain carbohydrate and how much they contain  Keep track of the amount of carbohydrate in meals and snacks in order to follow your meal plan  Do not avoid carbohydrates or skip meals  Your blood sugar may fall too low if you do not eat enough carbohydrate or you skip meals  Foods that contain carbohydrate:   · Breads:  Each serving of food listed below contains about 15 g of carbohydrate   ¨ 1 slice of bread (1 ounce) or 1 flour or corn tortilla (6 inch)    ¨ ½ of a hamburger bun or ¼ of a large bagel (about 1 ounce)    ¨ 1 pancake (about 4 inches across and ¼ inch thick)    · Cereals and grains:  Serving sizes of ready-to-eat cereals vary  Look at the serving size and the total carbohydrate amount listed on the food label  Each serving of food listed below contains about 15 g of carbohydrate   ¨ ¾ cup of dry, unsweetened, ready-to-eat cereal or ¼ cup of low-fat granola     ¨ ½ cup of oatmeal or other cooked cereal     ¨ ? cup of cooked rice or pasta    · Starchy vegetables and beans:  Each serving of food listed below contains about 15 g of carbohydrate   ¨ ½ cup of corn, green peas, sweet potatoes, or mashed potatoes    ¨ ¼ of a large baked potato    ¨ ½ cup of beans, lentils, and peas (garbanzo, martinez, kidney, white, split, black-eyed)    · Crackers and snacks:  Each serving of food listed below contains about 15 g of carbohydrate   ¨ 3 declan cracker squares or 8 animal crackers     ¨ 6 saltine-type crackers    ¨ 3 cups of popcorn or ¾ ounce of pretzels, potato chips, or tortilla chips    · Fruit:  Each serving of food listed below contains about 15 g of carbohydrate       ¨ 1 small (4 ounce) piece of fresh fruit or ¾ to 1 cup of fresh fruit    ¨ ½ cup of canned or frozen fruit, packed in natural juice    ¨ ½ cup (4 ounces) of unsweetened fruit juice    ¨ 2 tablespoons of dried fruit    · Desserts or sugary foods:  Each serving of food listed below contains about 15 g of carbohydrate   ¨ 2-inch square unfrosted cake or brownie     ¨ 2 small cookies    ¨ ½ cup of ice cream, frozen yogurt, or nondairy frozen yogurt    ¨ ¼ cup of sherbet or sorbet    ¨ 1 tablespoon of regular syrup, jam, or jelly    ¨ 2 tablespoons of light syrup    · Milk and yogurt:  Foods from the milk group contain about 12 g of carbohydrate per serving  ¨ 1 cup of fat-free or low-fat milk    ¨ 1 cup of soy milk    ¨ ? cup of fat-free, yogurt sweetened with artificial sweetener    · Non-starchy vegetables:  Each serving contains about 5 g of carbohydrate   Three servings of non-starch vegetables count as 1 carbohydrate serving  ¨ ½ cup of cooked vegetables or 1 cup of raw vegetables  This includes beets, broccoli, cabbage, cauliflower, cucumber, mushrooms, tomatoes, and zucchini    ¨ ½ cup of vegetable juice  How to use carbohydrate counting to plan meals:   · Count carbohydrate amounts using serving sizes:      ¨ Pasta dinner example: You plan to have pasta, tossed salad, and an 8-ounce glass of milk  Your healthcare provider tells you that you may have 4 carbohydrate servings for dinner  One carbohydrate serving of pasta is ? cup  One cup of pasta will equal 3 carbohydrate servings  An 8-ounce glass of milk will count as 1 carbohydrate serving  These amounts of food would equal 4 carbohydrate servings  One cup of tossed salad does not count toward your carbohydrate servings  · Count carbohydrate amounts using food labels:  Find the total amount of carbohydrate in a packaged food by reading the food label  Food labels tell you the serving size of the food and the total carbohydrate amount in each serving  Find the serving size on the food label and then decide how many servings you will eat  Multiply the number of servings you plan to eat by the carbohydrate amount per serving  ¨ Granola bar snack example:   Your meal plan allows you to have 2 carbohydrate servings (30 grams) of carbohydrate for a snack  You plan to eat 1 package of granola bars, which contains 2 bars  According to the food label, the serving size of food in this package is 1 bar  Each serving (1 bar) contains 25 grams of carbohydrate  The total amount of carbohydrate in this package of granola bars would be 50 g  Based on your meal plan, you should eat only 1 bar  Follow up with your healthcare provider as directed:  Write down your questions so you remember to ask them during your visits  © 2017 [facility] Information is for End User's use only and may not be sold, redistributed or otherwise used for commercial purposes  All illustrations and images included in CareNotes® are the copyrighted property of [employer] , [employer]  or Rodríg[employer] Mejia  The above information is an  only  It is not intended as medical advice for individual conditions or treatments  Talk to your doctor, nurse or pharmacist before following any medical regimen to see if it is safe and effective for you

## 2020-01-07 NOTE — PROGRESS NOTES
BMI Counseling: Body mass index is 43 54 kg/m²  The BMI is above normal  Nutrition recommendations include decreasing portion sizes and moderation in carbohydrate intake  Exercise recommendations include exercising 3-5 times per week  No pharmacotherapy was ordered  Assessment/Plan:  Return visit in 6 months with fasting blood work prior to visit     Diagnoses and all orders for this visit:    Gout, unspecified cause, unspecified chronicity, unspecified site  -     COLCRYS 0 6 MG tablet; 1 q 3 h prn gout  -     Uric acid; Future    Medicare annual wellness visit, subsequent    Essential hypertension  -     metoprolol succinate (TOPROL-XL) 100 mg 24 hr tablet; Take 1 tablet (100 mg total) by mouth daily    Mixed hyperlipidemia  -     Comprehensive metabolic panel; Future  -     Lipid panel; Future    Thoracic aortic aneurysm without rupture ([facility] )    Morbid obesity with BMI of 40 0-44 9, adult ([facility] )        Aortic aneurysm [facility])  Follow-up with Cardiology    Hypertension  Continue enalapril 15 mg daily and hydralazine 50 mg t i d  Hyperlipidemia  Low carb diet        Subjective:      Patient ID: Chasity Lara is a 81 y o  male  Patient comes in for checkup  He recently had gout attack which she has not had several years  He continues to follow with Cardiology for dilated aortic root  The following portions of the patient's history were reviewed and updated as appropriate:   He has a past medical history of Anemia, Aortic aneurysm ([facility] ), Elevated troponin, History of colonoscopy, History of esophagogastroduodenoscopy, Hypertension, Hypokalemia, Morbid obesity ([facility] ), Renal calculi, and STEMI (ST elevation myocardial infarction) ([facility] )  ,  does not have any pertinent problems on file  ,   has a past surgical history that includes Colon surgery; Appendectomy; ARTHROSCOPY KNEE (Left); Colostomy; Saturation biopsy of prostate; Rotator cuff repair (Left); and Total hip arthroplasty  ,  family history includes Breast cancer in his mother; COPD in his mother; Other in his father  ,   reports that he has never smoked  He has never used smokeless tobacco  He reports that he drinks alcohol  He reports that he does not use drugs  ,  is allergic to lorazepam   Current Outpatient Medications   Medication Sig Dispense Refill    COLCRYS 0 6 MG tablet 1 q 3 h prn gout 30 tablet 3    enalapril (VASOTEC) 10 mg tablet Take 1 5 tablets (15 mg total) by mouth daily 135 tablet 3    ferrous sulfate 325 (65 Fe) mg tablet Take 325 mg by mouth daily with breakfast      hydrALAZINE (APRESOLINE) 50 mg tablet Take 1 tablet (50 mg total) by mouth every 8 (eight) hours 270 tablet 3    hydrochlorothiazide (HYDRODIURIL) 25 mg tablet Take 1 tablet (25 mg total) by mouth daily 90 tablet 3    ibuprofen (MOTRIN) 400 mg tablet Take 200 mg by mouth every 6 (six) hours as needed for mild pain (PRN )      metoprolol succinate (TOPROL-XL) 100 mg 24 hr tablet Take 1 tablet (100 mg total) by mouth daily 90 tablet 5     No current facility-administered medications for this visit  Review of Systems   Constitutional: Negative  Respiratory: Negative  Cardiovascular: Negative  Objective:  Vitals:    01/07/20 0856   BP: 134/80   Pulse: 78   Resp: 16   Temp: (!) 97 °F (36 1 °C)   SpO2: 99%   Weight: (!) 150 kg (330 lb)   Height: 6' 1" (1 854 m)     Body mass index is 43 54 kg/m²  Physical Exam   Constitutional: He is oriented to person, place, and time  He appears well-developed  Obese   HENT:   Head: Normocephalic and atraumatic  Right Ear: Tympanic membrane and external ear normal    Left Ear: Tympanic membrane and external ear normal    Eyes: Pupils are equal, round, and reactive to light  EOM are normal    Neck: Neck supple  Cardiovascular: Normal rate, regular rhythm and normal heart sounds  Pulmonary/Chest: Effort normal and breath sounds normal    Abdominal: Soft   Bowel sounds are normal    Musculoskeletal: Normal range of motion  Neurological: He is alert and oriented to person, place, and time  Skin: Skin is warm and dry  Psychiatric: He has a normal mood and affect   Thought content normal

## 2020-01-07 NOTE — PROGRESS NOTES
Assessment and Plan:     Problem List Items Addressed This Visit     None      Visit Diagnoses     Medicare annual wellness visit, subsequent    -  Primary           Preventive health issues were discussed with patient, and age appropriate screening tests were ordered as noted in patient's After Visit Summary  Personalized health advice and appropriate referrals for health education or preventive services given if needed, as noted in patient's After Visit Summary       History of Present Illness:     Patient presents for Medicare Annual Wellness visit    Patient Care Team:  Luda Rodriguez MD as PCP - General  Oskar Hicks MD     Problem List:     Patient Active Problem List   Diagnosis    Hypertension    Morbid obesity with BMI of 40 0-44 9, adult ([facility] )    Aortic aneurysm ([facility] )    Obstructive sleep apnea    Dyspnea on exertion    Hyperlipidemia    Anemia    Moderate concentric left ventricular hypertrophy    Gout    Elevated blood sugar      Past Medical and Surgical History:     Past Medical History:   Diagnosis Date    Anemia     Aortic aneurysm ([facility] )     Elevated troponin     History of colonoscopy     6/5/13    History of esophagogastroduodenoscopy     6/5/13    Hypertension     Hypokalemia     Morbid obesity (HCC)     Renal calculi     STEMI (ST elevation myocardial infarction) (HCC)      Past Surgical History:   Procedure Laterality Date    APPENDECTOMY      ARTHROSCOPY KNEE Left     COLON SURGERY      COLOSTOMY      ROTATOR CUFF REPAIR Left     SATURATION BIOPSY OF PROSTATE      TOTAL HIP ARTHROPLASTY      Last Assessed:5/8/15      Family History:     Family History   Problem Relation Age of Onset   Timothy Hoops COPD Mother     Breast cancer Mother         malignant neoplasm    Other Father         thoracic aortic aneurysm      Social History:     Social History     Socioeconomic History    Marital status: /Civil Union     Spouse name: None    Number of children: None    Years of education: None    Highest education level: None   Occupational History    None   Social Needs    Financial resource strain: None    Food insecurity:     Worry: None     Inability: None    Transportation needs:     Medical: None     Non-medical: None   Tobacco Use    Smoking status: Never Smoker    Smokeless tobacco: Never Used   Substance and Sexual Activity    Alcohol use: Yes     Comment: social    Drug use: No    Sexual activity: None   Lifestyle    Physical activity:     Days per week: None     Minutes per session: None    Stress: None   Relationships    Social connections:     Talks on phone: None     Gets together: None     Attends Judaism service: None     Active member of club or organization: None     Attends meetings of clubs or organizations: None     Relationship status: None    Intimate partner violence:     Fear of current or ex partner: None     Emotionally abused: None     Physically abused: None     Forced sexual activity: None   Other Topics Concern    None   Social History Narrative    None       Medications and Allergies:     Current Outpatient Medications   Medication Sig Dispense Refill    COLCRYS 0 6 MG tablet   0    enalapril (VASOTEC) 10 mg tablet Take 1 5 tablets (15 mg total) by mouth daily 135 tablet 3    ferrous sulfate 325 (65 Fe) mg tablet Take 325 mg by mouth daily with breakfast      hydrALAZINE (APRESOLINE) 50 mg tablet Take 1 tablet (50 mg total) by mouth every 8 (eight) hours 270 tablet 3    hydrochlorothiazide (HYDRODIURIL) 25 mg tablet Take 1 tablet (25 mg total) by mouth daily 90 tablet 3    ibuprofen (MOTRIN) 400 mg tablet Take 200 mg by mouth every 6 (six) hours as needed for mild pain (PRN )      metoprolol succinate (TOPROL-XL) 100 mg 24 hr tablet take 1 tablet by mouth once daily 90 tablet 0     No current facility-administered medications for this visit        Allergies   Allergen Reactions    Lorazepam Other (See Comments)     very agitated  Psychosis      Immunizations:     Immunization History   Administered Date(s) Administered    INFLUENZA 10/31/2018    Influenza, high dose seasonal 0 5 mL 10/31/2018, 10/23/2019    Pneumococcal Conjugate 13-Valent 01/09/2019      Health Maintenance:         Topic Date Due    CRC Screening: Colonoscopy  06/05/2023    Hepatitis C Screening  Completed         Topic Date Due    DTaP,Tdap,and Td Vaccines (1 - Tdap) 06/16/1961      Medicare Health Risk Assessment:     /80   Pulse 78   Temp (!) 97 °F (36 1 °C)   Resp 16   Ht 6' 1" (1 854 m)   Wt (!) 150 kg (330 lb)   SpO2 99%   BMI 43 54 kg/m²      Eden Mcginnis is here for his Subsequent Wellness visit  Last Medicare Wellness visit information reviewed, patient interviewed and updates made to the record today  Health Risk Assessment:   Patient rates overall health as good  Patient feels that their physical health rating is same  Eyesight was rated as same  Hearing was rated as same  Patient feels that their emotional and mental health rating is same  Pain experienced in the last 7 days has been some  Patient's pain rating has been 2/10  Patient states that he has experienced no weight loss or gain in last 6 months  Depression Screening:   PHQ-2 Score: 0      Fall Risk Screening: In the past year, patient has experienced: no history of falling in past year      Home Safety:  Patient does not have trouble with stairs inside or outside of their home  Patient has working smoke alarms and has working carbon monoxide detector  Home safety hazards include: none  Nutrition:   Current diet is Regular  Medications:   Patient is currently taking over-the-counter supplements  OTC medications include: see medication list  Patient is able to manage medications       Activities of Daily Living (ADLs)/Instrumental Activities of Daily Living (IADLs):   Walk and transfer into and out of bed and chair?: Yes  Dress and groom yourself?: Yes    Bathe or shower yourself?: Yes    Feed yourself? Yes  Do your laundry/housekeeping?: Yes  Manage your money, pay your bills and track your expenses?: Yes  Make your own meals?: Yes    Do your own shopping?: Yes    Previous Hospitalizations:   Any hospitalizations or ED visits within the last 12 months?: No      Advance Care Planning:   Living will: Yes    Durable POA for healthcare:  Yes    Advanced directive: Yes    Advanced directive counseling given: Yes    End of Life Decisions reviewed with patient: Yes    Provider agrees with end of life decisions: Yes      PREVENTIVE SCREENINGS      Cardiovascular Screening:    General: Screening Not Indicated and History Lipid Disorder      Diabetes Screening:     General: Screening Current      Colorectal Cancer Screening:     General: Screening Current      Prostate Cancer Screening:    General: Screening Current      Osteoporosis Screening:    General: Screening Not Indicated      Abdominal Aortic Aneurysm (AAA) Screening:    Risk factors include: age between 73-69 yo        General: Screening Not Indicated      Lung Cancer Screening:     General: Screening Not Indicated      Hepatitis C Screening:    General: Screening Current      Jerardo Oquendo MD

## 2020-03-30 DIAGNOSIS — I10 ESSENTIAL HYPERTENSION: Chronic | ICD-10-CM

## 2020-03-30 RX ORDER — METOPROLOL SUCCINATE 100 MG/1
100 TABLET, EXTENDED RELEASE ORAL DAILY
Qty: 90 TABLET | Refills: 5 | Status: SHIPPED | OUTPATIENT
Start: 2020-03-30 | End: 2021-06-15

## 2020-04-29 ENCOUNTER — TELEMEDICINE (OUTPATIENT)
Dept: CARDIOLOGY CLINIC | Facility: CLINIC | Age: 70
End: 2020-04-29
Payer: MEDICARE

## 2020-04-29 VITALS
SYSTOLIC BLOOD PRESSURE: 131 MMHG | HEIGHT: 73 IN | DIASTOLIC BLOOD PRESSURE: 77 MMHG | WEIGHT: 315 LBS | BODY MASS INDEX: 41.75 KG/M2 | HEART RATE: 76 BPM

## 2020-04-29 DIAGNOSIS — E78.2 MIXED HYPERLIPIDEMIA: ICD-10-CM

## 2020-04-29 DIAGNOSIS — I35.0 NON-RHEUMATIC AORTIC STENOSIS: ICD-10-CM

## 2020-04-29 DIAGNOSIS — I10 ESSENTIAL HYPERTENSION: Primary | ICD-10-CM

## 2020-04-29 DIAGNOSIS — I71.2 THORACIC AORTIC ANEURYSM WITHOUT RUPTURE (HCC): ICD-10-CM

## 2020-04-29 DIAGNOSIS — G47.33 OBSTRUCTIVE SLEEP APNEA: ICD-10-CM

## 2020-04-29 DIAGNOSIS — E66.01 MORBID OBESITY WITH BMI OF 40.0-44.9, ADULT (HCC): ICD-10-CM

## 2020-04-29 DIAGNOSIS — I51.7 MODERATE CONCENTRIC LEFT VENTRICULAR HYPERTROPHY: ICD-10-CM

## 2020-04-29 PROCEDURE — 99215 OFFICE O/P EST HI 40 MIN: CPT | Performed by: INTERNAL MEDICINE

## 2020-06-01 DIAGNOSIS — I10 ESSENTIAL HYPERTENSION: ICD-10-CM

## 2020-06-01 RX ORDER — ENALAPRIL MALEATE 10 MG/1
15 TABLET ORAL DAILY
Qty: 135 TABLET | Refills: 3 | Status: SHIPPED | OUTPATIENT
Start: 2020-06-01 | End: 2021-01-07

## 2020-06-19 ENCOUNTER — APPOINTMENT (OUTPATIENT)
Dept: LAB | Facility: CLINIC | Age: 70
End: 2020-06-19
Payer: MEDICARE

## 2020-06-19 DIAGNOSIS — M10.9 GOUT, UNSPECIFIED CAUSE, UNSPECIFIED CHRONICITY, UNSPECIFIED SITE: ICD-10-CM

## 2020-06-19 DIAGNOSIS — E78.2 MIXED HYPERLIPIDEMIA: ICD-10-CM

## 2020-06-19 LAB
ALBUMIN SERPL BCP-MCNC: 3.4 G/DL (ref 3.5–5)
ALP SERPL-CCNC: 61 U/L (ref 46–116)
ALT SERPL W P-5'-P-CCNC: 29 U/L (ref 12–78)
ANION GAP SERPL CALCULATED.3IONS-SCNC: 4 MMOL/L (ref 4–13)
AST SERPL W P-5'-P-CCNC: 21 U/L (ref 5–45)
BILIRUB SERPL-MCNC: 0.46 MG/DL (ref 0.2–1)
BUN SERPL-MCNC: 20 MG/DL (ref 5–25)
CALCIUM SERPL-MCNC: 8.6 MG/DL (ref 8.3–10.1)
CHLORIDE SERPL-SCNC: 105 MMOL/L (ref 100–108)
CHOLEST SERPL-MCNC: 186 MG/DL (ref 50–200)
CO2 SERPL-SCNC: 31 MMOL/L (ref 21–32)
CREAT SERPL-MCNC: 0.96 MG/DL (ref 0.6–1.3)
GFR SERPL CREATININE-BSD FRML MDRD: 81 ML/MIN/1.73SQ M
GLUCOSE P FAST SERPL-MCNC: 92 MG/DL (ref 65–99)
HDLC SERPL-MCNC: 32 MG/DL
LDLC SERPL CALC-MCNC: 121 MG/DL (ref 0–100)
NONHDLC SERPL-MCNC: 154 MG/DL
POTASSIUM SERPL-SCNC: 3.9 MMOL/L (ref 3.5–5.3)
PROT SERPL-MCNC: 6.5 G/DL (ref 6.4–8.2)
SODIUM SERPL-SCNC: 140 MMOL/L (ref 136–145)
TRIGL SERPL-MCNC: 164 MG/DL
URATE SERPL-MCNC: 9.4 MG/DL (ref 4.2–8)

## 2020-06-19 PROCEDURE — 80061 LIPID PANEL: CPT

## 2020-06-19 PROCEDURE — 84550 ASSAY OF BLOOD/URIC ACID: CPT

## 2020-06-19 PROCEDURE — 36415 COLL VENOUS BLD VENIPUNCTURE: CPT

## 2020-06-19 PROCEDURE — 80053 COMPREHEN METABOLIC PANEL: CPT

## 2020-07-07 ENCOUNTER — OFFICE VISIT (OUTPATIENT)
Dept: FAMILY MEDICINE CLINIC | Facility: CLINIC | Age: 70
End: 2020-07-07
Payer: MEDICARE

## 2020-07-07 VITALS
WEIGHT: 315 LBS | TEMPERATURE: 97.4 F | DIASTOLIC BLOOD PRESSURE: 86 MMHG | HEART RATE: 74 BPM | SYSTOLIC BLOOD PRESSURE: 142 MMHG | OXYGEN SATURATION: 97 % | HEIGHT: 73 IN | BODY MASS INDEX: 41.75 KG/M2

## 2020-07-07 DIAGNOSIS — I10 ESSENTIAL HYPERTENSION: Primary | Chronic | ICD-10-CM

## 2020-07-07 DIAGNOSIS — E78.2 MIXED HYPERLIPIDEMIA: ICD-10-CM

## 2020-07-07 DIAGNOSIS — M10.9 GOUT, UNSPECIFIED CAUSE, UNSPECIFIED CHRONICITY, UNSPECIFIED SITE: ICD-10-CM

## 2020-07-07 DIAGNOSIS — I71.2 THORACIC AORTIC ANEURYSM WITHOUT RUPTURE (HCC): ICD-10-CM

## 2020-07-07 DIAGNOSIS — Z12.5 PROSTATE CANCER SCREENING: ICD-10-CM

## 2020-07-07 PROCEDURE — 3008F BODY MASS INDEX DOCD: CPT | Performed by: FAMILY MEDICINE

## 2020-07-07 PROCEDURE — 3079F DIAST BP 80-89 MM HG: CPT | Performed by: FAMILY MEDICINE

## 2020-07-07 PROCEDURE — 1036F TOBACCO NON-USER: CPT | Performed by: FAMILY MEDICINE

## 2020-07-07 PROCEDURE — 4040F PNEUMOC VAC/ADMIN/RCVD: CPT | Performed by: FAMILY MEDICINE

## 2020-07-07 PROCEDURE — 1160F RVW MEDS BY RX/DR IN RCRD: CPT | Performed by: FAMILY MEDICINE

## 2020-07-07 PROCEDURE — 3077F SYST BP >= 140 MM HG: CPT | Performed by: FAMILY MEDICINE

## 2020-07-07 PROCEDURE — 99214 OFFICE O/P EST MOD 30 MIN: CPT | Performed by: FAMILY MEDICINE

## 2020-07-07 NOTE — ASSESSMENT & PLAN NOTE
Continue colchicine as needed  We discussed starting allopurinol  Patient does not wish to take this now  His gout occurs about once per year and is mild

## 2020-07-07 NOTE — ASSESSMENT & PLAN NOTE
Continue hydralazine 50 mg t i d , hydrochlorothiazide 25 mg daily, vase tick 10 mg daily and Toprol- mg daily

## 2020-07-07 NOTE — PROGRESS NOTES
Assessment/Plan:    Return visit in 6 months with fasting blood work prior to visit     Problem List Items Addressed This Visit        Cardiovascular and Mediastinum    Hypertension - Primary (Chronic)     Continue hydralazine 50 mg t i d , hydrochlorothiazide 25 mg daily, vase tick 10 mg daily and Toprol- mg daily         Aortic aneurysm (HCC)     Follow-up with Cardiology            Other    Hyperlipidemia     Low carb diet         Relevant Orders    CBC and differential    Comprehensive metabolic panel    Lipid panel    Gout     Continue colchicine as needed  We discussed starting allopurinol  Patient does not wish to take this now  His gout occurs about once per year and is mild  Relevant Orders    Uric acid      Other Visit Diagnoses     Prostate cancer screening        Relevant Orders    PSA, Total Screen            Subjective:      Patient ID: Tiffany Kilpatrick is a 78 y o  male  Patient comes in for checkup  He had a gout attacks since he was here last   This resolved within 24 hours with colchicine  The following portions of the patient's history were reviewed and updated as appropriate:   He has a past medical history of Anemia, Aortic aneurysm ([facility] ), Elevated troponin, History of colonoscopy, History of esophagogastroduodenoscopy, Hypertension, Hypokalemia, Morbid obesity ([facility] ), Renal calculi, and STEMI (ST elevation myocardial infarction) ([facility] )  ,  does not have any pertinent problems on file  ,   has a past surgical history that includes Colon surgery; Appendectomy; ARTHROSCOPY KNEE (Left); Colostomy; Saturation biopsy of prostate; Rotator cuff repair (Left); and Total hip arthroplasty  ,  family history includes Breast cancer in his mother; COPD in his mother; Other in his father  ,   reports that he has never smoked  He has never used smokeless tobacco  He reports that he drinks alcohol  He reports that he does not use drugs  ,  is allergic to lorazepam   Current Outpatient Medications   Medication Sig Dispense Refill    enalapril (VASOTEC) 10 mg tablet Take 1 5 tablets (15 mg total) by mouth daily 135 tablet 3    ferrous sulfate 325 (65 Fe) mg tablet Take 325 mg by mouth daily with breakfast      hydrALAZINE (APRESOLINE) 50 mg tablet Take 1 tablet (50 mg total) by mouth every 8 (eight) hours 270 tablet 3    hydrochlorothiazide (HYDRODIURIL) 25 mg tablet Take 1 tablet (25 mg total) by mouth daily 90 tablet 3    ibuprofen (MOTRIN) 400 mg tablet Take 200 mg by mouth every 6 (six) hours as needed for mild pain (PRN )      metoprolol succinate (TOPROL-XL) 100 mg 24 hr tablet Take 1 tablet (100 mg total) by mouth daily 90 tablet 5    COLCRYS 0 6 MG tablet 1 q 3 h prn gout (Patient not taking: Reported on 7/7/2020) 30 tablet 3     No current facility-administered medications for this visit  Review of Systems   Constitutional: Negative  Respiratory: Negative  Cardiovascular: Negative  Objective:  Vitals:    07/07/20 0847   BP: 142/86   Pulse: 74   Temp: (!) 97 4 °F (36 3 °C)   SpO2: 97%   Weight: (!) 149 kg (328 lb)   Height: 6' 1" (1 854 m)     Body mass index is 43 27 kg/m²  Physical Exam   Constitutional: He is oriented to person, place, and time  He appears well-developed  Obese   HENT:   Head: Normocephalic and atraumatic  Right Ear: External ear normal    Left Ear: External ear normal    Eyes: Pupils are equal, round, and reactive to light  EOM are normal    Neck: Neck supple  Cardiovascular: Normal rate, regular rhythm and normal heart sounds  Pulmonary/Chest: Effort normal and breath sounds normal    Abdominal: Soft  Bowel sounds are normal    Musculoskeletal: Normal range of motion  Neurological: He is alert and oriented to person, place, and time  Skin: Skin is warm and dry  Psychiatric: He has a normal mood and affect   Thought content normal

## 2020-07-29 ENCOUNTER — APPOINTMENT (OUTPATIENT)
Dept: LAB | Facility: CLINIC | Age: 70
End: 2020-07-29
Payer: MEDICARE

## 2020-07-29 ENCOUNTER — TRANSCRIBE ORDERS (OUTPATIENT)
Dept: LAB | Facility: CLINIC | Age: 70
End: 2020-07-29

## 2020-07-29 DIAGNOSIS — Z12.5 ENCOUNTER FOR SCREENING FOR MALIGNANT NEOPLASM OF PROSTATE: ICD-10-CM

## 2020-07-29 DIAGNOSIS — Z12.5 ENCOUNTER FOR SCREENING FOR MALIGNANT NEOPLASM OF PROSTATE: Primary | ICD-10-CM

## 2020-07-29 LAB — PSA SERPL-MCNC: 2.6 NG/ML (ref 0–4)

## 2020-07-29 PROCEDURE — 36415 COLL VENOUS BLD VENIPUNCTURE: CPT

## 2020-07-29 PROCEDURE — G0103 PSA SCREENING: HCPCS

## 2020-09-16 ENCOUNTER — IMMUNIZATIONS (OUTPATIENT)
Dept: FAMILY MEDICINE CLINIC | Facility: CLINIC | Age: 70
End: 2020-09-16
Payer: MEDICARE

## 2020-09-16 DIAGNOSIS — Z23 ENCOUNTER FOR IMMUNIZATION: Primary | ICD-10-CM

## 2020-09-16 DIAGNOSIS — I10 ESSENTIAL HYPERTENSION: Chronic | ICD-10-CM

## 2020-09-16 PROCEDURE — 90662 IIV NO PRSV INCREASED AG IM: CPT

## 2020-09-16 PROCEDURE — G0008 ADMIN INFLUENZA VIRUS VAC: HCPCS

## 2020-09-16 RX ORDER — HYDRALAZINE HYDROCHLORIDE 50 MG/1
50 TABLET, FILM COATED ORAL EVERY 8 HOURS SCHEDULED
Qty: 270 TABLET | Refills: 3 | Status: SHIPPED | OUTPATIENT
Start: 2020-09-16 | End: 2021-11-09

## 2020-09-16 RX ORDER — HYDROCHLOROTHIAZIDE 25 MG/1
25 TABLET ORAL DAILY
Qty: 90 TABLET | Refills: 3 | Status: SHIPPED | OUTPATIENT
Start: 2020-09-16 | End: 2021-10-26

## 2020-10-19 NOTE — ASSESSMENT & PLAN NOTE
Summarization Of Episode

                           Created on:2020



Patient:TAB SHARPE

Sex:Male

:2000

External Reference #:[id number]





Demographics







                          Address                   [address]



                                                    [address]



                                                    [place] [address]

 

                          Home Phone                [phone]

 

                          Preferred Language        en

 

                          Marital Status            Not  or 

 

                          Worship Affiliation     NO

 

                          Race                      WH

 

                          Ethnic Group              Not  or 









Author







                          Organization              [facility]









Support







                Name            Relationship    Address         Phone

 

                SYDNEY             Unavailable     Unavailable     Unavailable

 

                N/A             Unavailable     NA              Unavailable



                                                [place] [id number] 

 

                HOWIE DUNAWAY MOTHER          [address]    [phone]



                                                [place] [address] 









Care Team Providers







                    Name                Role                Phone

 

                    VITA DIXON Unavailable         Unavailable









Re-disclosure Warning

The records that you are about to access may contain information from federally-
assisted alcohol or drug abuse programs. If such information is present, then 
the following federally mandated warning applies: This information has been 
disclosed to you from records protected by federal confidentiality rules (42 CFR
part 2). The federal rules prohibit you from making any further disclosure of 
this information unless further disclosure is expressly permitted by the written
consent of the person to whom it pertains or as otherwise permitted by 42 CFR 
part 2. A general authorization for the release of medical or other information 
is NOT sufficient for this purpose. The Federal rules restrict any use of the 
information to criminally investigate or prosecute any alcohol or drug abuse 
patient.The records that you are about to access may contain highly sensitive 
health information, the redisclosure of which is protected by Article 27-F of 
the [facility] Public Health law. If you continue you may haveaccess to 
information: Regarding HIV / AIDS; Provided by facilities licensed or operated 
by the [facility] Office of Mental Health; or Provided by the [facility]
Office for People With Developmental Disabilities. If such information is 
present, then the following [place] mandated warning applies: This 
information has been disclosed to you from confidential records which are 
protected by state law. State law prohibits you from making any further 
disclosure of this information without the specific written consent of the 
person to whom it pertains, or as otherwise permitted by law. Any unauthorized 
further disclosure in violation of state law may result in a fine or half-way 
sentence or both. A general authorization for the release of medical or other 
information is NOT sufficient authorization for further disclosure.



Encounters







           Encounter  Providers  Location   Date       Indications Data Source(s

)

 

           Outpatient Attender: VITA MIRANDA          2019            [facility]                12[facility]16[facility]00 [facility]

r



                      ROGERAdmitter: VITA DA SILVA                                      



                      MATTPremerrer: VITA GORMAN                                  







Insurance Providers







          Payer name Policy type Policy ID Covered   Covered party's Policy    P

john



                    / Coverage           party ID  relationship to Daniels    Inf

ormation



                    type                          daniels              

 

          MVP MEDICAID           [id number]           [place]                  [id number]

[id number]



          HMO                                                         

 

                                                                      

 

          MVP ESSENTIAL           [id number]           SP                  [id number]

[id number]



          PLAN 1 2                                                    

 

                    O                                                 

 

          [employer] O         [id number]                  [id number]

[id number]







Problems, Conditions, and Diagnoses







           Code       Display Name Description Problem Type Effective Dates Data

 Source(s)

 

           Z00.00     Encounter for ENCNTR FOR Diagnosis  2019 [facility]



                      general adult GENERAL ADULT            12:16:00 PM EDT [facility]



                      medical    MEDICAL EXAM W/O                       



                      examination ABNORMAL FINDINGS                       



                      without abnormal                                  



                      findings                                    

 

           Z00.129    Encounter for ENCNTR FOR Diagnosis  2019 [facility]



                      routine child ROUTINE CHILD            12:16:00 PM EDT [facility]



                      health     HEALTH EXAM W/O                       



                      examination ABNORMAL FINDINGS                       



                      without abnormal                                  



                      findings                                    







Results







                    ID                  Date                Data Source

 

                    [id number] 2019 12:27:00 PM EDT [facility]



                    0                                       











          Name      Value     Range     Interpretation Description Data      Sup

porting



                                        Code                Source(s) Document(s

)

 

          Color of Urine           YELLOW              <content  [facility]     



                                                  styleCode="[facility]   



                                                  d">Color, Medical   



                                                  Urine     Center    



                                                  </content>YELL           



                                                  OW <content           



                                                  styleCode="Suni           



                                                  lics"> (YELLOW           



                                                  )</content>           

 

          UNK                 CLEAR               <content  [facility]     



                                                  styleCode="[facility]   



                                                  d">Urine  Medical   



                                                  Clarity   Center    



                                                  </content>KANDI           



                                                  R <content           



                                                  styleCode="Suni           



                                                  lics"> (CLEAR           



                                                  )</content>           

 

          Specific            1.015-1.02           <content  [facility]     



          gravity of           5                   styleCode="[facility]   



          Urine by Test                               d">Urine  Medical   



          strip                                   Specific  Center    



                                                  Gravity             



                                                  </content>1.02           



                                                  5 <content           



                                                  styleCode="Suni           



                                                  lics">              



                                                  (1.015-1.025           



                                                  )</content>           

 

          UNK                 NEGATIVE            <content  [facility]     



                                                  styleCode="[facility]   



                                                  d">Urine  Medical   



                                                  Bilirubin Center    



                                                  </content>NEGA           



                                                  TIVE <content           



                                                  styleCode="Suni           



                                                  lics">              



                                                  (NEGATIVE           



                                                  )</content>           

 

          Hemoglobin           NEGATIVE            <content  [facility]     



          [Presence] in                               styleCode="[facility]   



          Urine by Test                               d">Urine Blood Medical   



          strip                                   </content>NEGA Center    



                                                  TIVE <content           



                                                  styleCode="Suni           



                                                  lics">              



                                                  (NEGATIVE           



                                                  )</content>           

 

          Glucose             NEGATIVE            <content  [facility]     



          [Mass/volume]                               styleCode="[facility]   



          in Urine by                               d">Urine  Medical   



          Test strip                               Glucose   Center    



                                                  </content>NEGA           



                                                  TIVE                



                                                  MG/DL<content           



                                                  styleCode="Suni           



                                                  lics">              



                                                  (NEGATIVE           



                                                  MG/DL)</conten           



                                                  t>                  

 

          Ketones             NEGATIVE            <content  [facility]     



          [Mass/volume]                               styleCode="[facility]   



          in Urine by                               d">Urine  Medical   



          Test strip                               Ketone    Center    



                                                  </content>NEGA           



                                                  TIVE                



                                                  MG/DL<content           



                                                  styleCode="Suni           



                                                  lics">              



                                                  (NEGATIVE           



                                                  MG/DL)</conten           



                                                  t>                  

 

          Nitrite             NEGATIVE            <content  [facility]     



          [Presence] in                               styleCode="[facility]   



          Urine by Test                               d">Urine  Medical   



          strip                                   Nitrite   Center    



                                                  </content>NEGA           



                                                  TIVE <content           



                                                  styleCode="Suni           



                                                  lics">              



                                                  (NEGATIVE           



                                                  )</content>           

 

          Urobilinogen           0.2-1.0             <content  [facility]     



          [Units/volume]                               styleCode="[facility]   



          in Urine by                               d">Urine  Medical   



          Test strip                               Urobilinogen Center    



                                                  </content>0.2           



                                                  MG/DL<content           



                                                  styleCode="Suni           



                                                  lics">              



                                                  (0.2-1.0            



                                                  MG/DL)</conten           



                                                  t>                  

 

          Protein             NEGATIVE            <content  [facility]     



          [Mass/volume]                               styleCode="[facility]   



          in Urine by                               d">Urine  Medical   



          Test strip                               Protein   Center    



                                                  </content>NEGA           



                                                  TIVE                



                                                  MG/DL<content           



                                                  styleCode="Suni           



                                                  lics">              



                                                  (NEGATIVE           



                                                  MG/DL)</conten           



                                                  t>                  

 

          pH of Urine by           4.5-8.0             <content  [facility]     



          Test strip                               styleCode="[facility]   



                                                  d">Urine pH Medical   



                                                  </content>6.0 Center    



                                                  <content            



                                                  styleCode="Suni           



                                                  lics">              



                                                  (4.5-8.0            



                                                  )</content>           

 

          Leukocyte           NEGATIVE            <content  [facility]     



          esterase                                styleCode="[facility]   



          [Presence] in                               d">Urine  Medical   



          Urine by Test                               Leukocyte Center    



          strip                                   </content>NEGA           



                                                  TIVE <content           



                                                  styleCode="Suni           



                                                  lics">              



                                                  (NEGATIVE           



                                                  )</content>           











                    ID                  Date                Data Source

 

                    Liver               2019 12:27:00 PM EDT [facility]



                    Profile.[id number]                     











          Name      Value     Range     Interpretation Description Data      Sup

porting



                                        Code                Source(s) Document(s

)

 

          Alanine             7-50                <content  [facility]     



          aminotransferase                               styleCode="Bold"> [facility]   



          [Enzymatic                               Alanine   Medical   



          activity/volume]                               Aminotransferase Center

    



          in Serum or Plasma                               (ALT)               



                                                  </content>13           



                                                  IU/L<content           



                                                  styleCode="Italic           



                                                  s"> (7-50           



                                                  IU/L)</content>           

 

          Aspartate           21-36               <content  [facility]     



          aminotransferase                               styleCode="Bold"> [facility]   



          [Enzymatic                               Aspartate Medical   



          activity/volume]                               Aminotransferase Center

    



          in Serum or Plasma                               (AST)               



                                                  </content>21           



                                                  IU/L<content           



                                                  styleCode="Italic           



                                                  s"> (21-36           



                                                  IU/L)</content>           

 

          Alkaline                          <content  [facility]     



          phosphatase                               styleCode="Bold"> [facility]   



          [Enzymatic                               Alkaline  Medical   



          activity/volume]                               Phosphatase (ALP) Cente

r    



          in Serum or Plasma                               </content>105        

   



                                                  IU/L<content           



                                                  styleCode="Italic           



                                                  s"> (           



                                                  IU/L)</content>           

 

          Bilirubin.total           0.2-1.3             <content  [facility]     



          [Mass/volume] in                               styleCode="Bold"> [facility]

[facility]   



          Serum or Plasma                               Bilirubin Total Medical 

  



                                                  </content>0.5 Center    



                                                  MG/DL<content           



                                                  styleCode="Italic           



                                                  s"> (0.2-1.3           



                                                  MG/DL)</content>           

 

          Albumin             3.1-4.8             <content  [facility]     



          [Mass/volume] in                               styleCode="Bold"> [facility]

[facility]   



          Serum or Plasma                               Albumin   Medical   



                                                  </content>4.6 Center    



                                                  G/DL<content           



                                                  styleCode="Italic           



                                                  s"> (3.1-4.8           



                                                  G/DL)</content>           











                    ID                  Date                Data Source

 

                    LIPID.[id number] 2019 12:27:00 PM EDT [facility]











          Name      Value     Range     Interpretation Description Data      Sup

porting



                                        Code                Source(s) Document(s

)

 

          Cholesterol           -<200               <content  [facility]     



          [Mass/volume]                               styleCode="[facility] Lenin   



          in Serum or                               d">Cholesterol Medical   



          Plasma                                  </content>142 Center    



                                                  MG/DL<content           



                                                  styleCode="Suni           



                                                  lics"> (-<200           



                                                  MG/DL)</conten           



                                                  t>                  











                    ID                  Date                Data Source

 

                    HematologyRou.[id number] 2019 12:27:00 PM EDT [facility]

[facility]



                    0400                                    











          Name      Value     Range     Interpretation Description Data      Sup

porting



                                        Code                Source(s) Document(s

)

 

          Leukocytes           5.0-13.0            <content  [facility]     



          [#/volume] in                               styleCode="Bold Lenin   



          Blood by                                ">[facility] Blood Medical   



          Automated count                               Cell Count Center    



                                                  </content>5.29           



                                                  [facility]<content           



                                                  styleCode="Ital           



                                                  ics"> (5.0-13.0           



                                                  [facility])</content           



                                                  >                   

 

          Erythrocytes           3.9-5.3   Above high <content  [facility]     



          [#/volume] in                     normal    styleCode="Bold Lenin   



          Blood by                                ">Red Blood Medical   



          Automated count                               Cell Count Center    



                                                  </content>5.58           



                                                  [facility] H<content           



                                                  styleCode="Ital           



                                                  ics"> (3.9-5.3           



                                                  [facility])</content           



                                                  >                   

 

          Hemoglobin           11.5-16.            <content  [facility]     



          [Mass/volume] in           0                   styleCode="Bold Lenin

   



          Blood                                   ">Hemoglobin Medical   



                                                  </content>15.9 Center    



                                                  G/DL<content           



                                                  styleCode="Ital           



                                                  ics">               



                                                  (11.5-16.0           



                                                  G/DL)</content>           

 

          Hematocrit           36.0-46.            <content  [facility]     



          [Volume             0                   styleCode="[facility]old Lenin   



          Fraction] of                               ">Hematocrit Medical   



          Blood by                                </content>45.5 Center    



          Automated count                               %<content           



                                                  styleCode="Ital           



                                                  ics">               



                                                  (36.0-46.0           



                                                  %)</content>           

 

          Erythrocyte mean           24.0-32.            <content  [facility]     



          corpuscular           0                   styleCode="Bold [facility]   



          hemoglobin                               ">[facility]   



          [Entitic mass]                               [facility]    



          by Automated                               Hemoglobin           



          count                                   </content>28.5           



                                                  PG<content           



                                                  styleCode="Ital           



                                                  ics">               



                                                  (24.0-32.0           



                                                  PG)</content>           

 

          Erythrocyte mean           75.0-95.            <content  [facility]     



          corpuscular           0                   styleCode="[facility]   



          volume [Entitic                               ">[facility]   



          volume] by                               Corpuscular Center    



          Automated count                               Volume              



                                                  </content>81.5           



                                                  FL<content           



                                                  styleCode="Ital           



                                                  ics">               



                                                  (75.0-95.0           



                                                  [place])</content>           

 

          Erythrocyte mean           31.0-37.            <content  [facility]     



          corpuscular           0                   styleCode="[facility]   



          hemoglobin                               ">[facility]   



          concentration                               Hgb       Center    



          [Mass/volume] by                               Concentration          

 



          Automated count                               (MCHC)              



                                                  </content>34.9           



                                                  G/DL<content           



                                                  styleCode="Ital           



                                                  ics">               



                                                  (31.0-37.0           



                                                  G/DL)</content>           

 

          Erythrocyte           12.7-14.            <content  [facility]     



          distribution           5                   styleCode="[facility]   



          width [Ratio] by                               ">[facility]   



          Automated count                               Distribution Center    



                                                  Width               



                                                  </content>13.2           



                                                  %<content           



                                                  styleCode="Ital           



                                                  ics">               



                                                  (12.7-14.5           



                                                  %)</content>           

 

          UNK                 1.5-8.0             <content  [facility]     



                                                  styleCode="[facility]   



                                                  ">Neutrophil Medical   



                                                  Count     Center    



                                                  </content>3.35           



                                                  [facility]<content           



                                                  styleCode="Ital           



                                                  ics"> (1.5-8.0           



                                                  [facility])</content           



                                                  >                   

 

          Platelet mean           8.0-11.0            <content  [facility]     



          volume [Entitic                               styleCode="[facility] 

  



          volume] in Blood                               ">[facility]

   



          by Automated                               Volume    Center    



          count                                   </content>11.0           



                                                  FL<content           



                                                  styleCode="Ital           



                                                  ics"> (8.0-11.0           



                                                  [place])</content>           

 

          Neutrophils           40.0-74.            <content  [facility]     



          [#/volume] in           0                   styleCode="[facility]   



          Blood by                                ">Neutrophil Medical   



          Automated count                               </content>63.3 Center   

 



                                                  %<content           



                                                  styleCode="Ital           



                                                  ics">               



                                                  (40.0-74.0           



                                                  %)</content>           

 

          Platelets           140-400             <content  [facility]     



          [#/volume] in                               styleCode="[facility]   



          Blood by                                ">Platelet Medical   



          Automated count                               Count     Center    



                                                  </content>160           



                                                  [facility]<content           



                                                  styleCode="Ital           



                                                  ics"> (140-400           



                                                  [facility])</content           



                                                  >                   

 

          Eosinophils           0-5.0               <content  [facility]     



          [#/volume] in                               styleCode="[facility]   



          Blood by                                ">Eosinophil Medical   



          Automated count                               </content>2.5 Center    



                                                  %<content           



                                                  styleCode="Ital           



                                                  ics"> (0-5.0           



                                                  %)</content>           

 

          UNK                 0.4-0.8             <content  [facility]     



                                                  styleCode="[facility]   



                                                  ">Monocyte Medical   



                                                  Count     Center    



                                                  </content>0.44           



                                                  [facility]<content           



                                                  styleCode="Ital           



                                                  ics"> (0.4-0.8           



                                                  [facility])</content           



                                                  >                   

 

          Lymphocytes           14.0-45.            <content  [facility]     



          [#/volume] in           0                   styleCode="[facility]   



          Blood by                                ">Lymphocyte Medical   



          Automated count                               </content>24.8 Center   

 



                                                  %<content           



                                                  styleCode="Ital           



                                                  ics">               



                                                  (14.0-45.0           



                                                  %)</content>           

 

          UNK                 2.5-3.5   Below low normal <content  [facility]     



                                                  styleCode="[facility]   



                                                  ">Lymphocyte Medical   



                                                  Count     Center    



                                                  </content>1.31           



                                                  [facility] L<content           



                                                  styleCode="Ital           



                                                  ics"> (2.5-3.5           



                                                  [facility])</content           



                                                  >                   

 

          UNK                 0.2-0.4   Below low normal <content  [facility]     



                                                  styleCode="[facility]   



                                                  ">Eosinophil Medical   



                                                  Count     Center    



                                                  </content>0.13           



                                                  [facility] L<content           



                                                  styleCode="Ital           



                                                  ics"> (0.2-0.4           



                                                  [facility])</content           



                                                  >                   

 

          Monocytes           2.0-7.0   Above high <content  [facility]     



          [#/volume] in                     normal    styleCode="[facility]   



          Blood by                                ">Monocyte Medical   



          Automated count                               </content>8.3 % Center  

  



                                                  H<content           



                                                  styleCode="Ital           



                                                  ics"> (2.0-7.0           



                                                  %)</content>           

 

          Basophils           0.0-2.0             <content  [facility]     



          [#/volume] in                               styleCode="[facility]   



          Blood by                                ">Basophil Medical   



          Automated count                               </content>0.9 Center    



                                                  %<content           



                                                  styleCode="Ital           



                                                  ics"> (0.0-2.0           



                                                  %)</content>           

 

          UNK                 0-0.1               <content  [facility]     



                                                  styleCode="[facility]   



                                                  ">Immature Medical   



                                                  Granulocyte Center    



                                                  Count               



                                                  </content>0.01           



                                                  [facility]<content           



                                                  styleCode="Ital           



                                                  ics"> (0-0.1           



                                                  [facility])</content           



                                                  >                   

 

          UNK                 0                   <content  [facility]     



                                                  styleCode="[facility]   



                                                  ">Nucleated Red Medical   



                                                  Blood Cell Center    



                                                  </content>0.0           



                                                  /100<content           



                                                  styleCode="Ital           



                                                  ics"> (0            



                                                  /100)</content>           

 

          UNK                 0.0-0.2             <content  [facility]     



                                                  styleCode="[facility]   



                                                  ">Basophil Medical   



                                                  Count     Center    



                                                  </content>0.05           



                                                  [facility]<content           



                                                  styleCode="Ital           



                                                  ics"> (0.0-0.2           



                                                  [facility])</content           



                                                  >                   

 

          UNK                 0.0                 <content  [facility]     



                                                  styleCode="[facility]   



                                                  ">Nucleated Red Medical   



                                                  Blood Cell Center    



                                                  Count               



                                                  </content>0.00           



                                                  [facility]<content           



                                                  styleCode="Ital           



                                                  ics"> (0.0           



                                                  [facility])</content           



                                                  >                   

 

          UNK                 < 1                 <content  [facility]     



                                                  styleCode="[facility]   



                                                  ">Immature Medical   



                                                  Granulocyte Center    



                                                  Ratio               



                                                  </content>0.2           



                                                  %<content           



                                                  styleCode="Ital           



                                                  ics"> (< 1           



                                                  %)</content>           











                    ID                  Date                Data Source

 

                    GFR(Creatinine).[id number] 2019 12:27:00 PM EDT [facility]



                    0-0400                                  











          Name      Value     Range     Interpretation Code Description Data Ronit

rce(s) Supporting



                                                                      Document(s

)

 

          UNK                                     <content  [facility] 



                                                  styleCode="[facility]"> [facility]

er 



                                                  EGFR                



                                                  </content>NOT           



                                                  VALID ON PATIENTS           



                                                  LESS THAN 18           



                                                  YEARS OLD. GFR           



                                                  (Reference Range:           



                                                  not                 



                                                  available)<br/>           











                    ID                  Date                Data Source

 

                    CHMROUTINECCDA.[id number] 2019 12:27:00 PM EDT [facility]



                    -0400                                   











          Name      Value     Range     Interpretation Description Data      Sup

porting



                                        Code                Source(s) Document(s

)

 

          UNK                 >= 1.0              <content  [facility] 



                                                  styleCode="[facility]   



                                                  ">AG Ratio Center    



                                                  </content>1.5           



                                                  <content            



                                                  styleCode="Ital           



                                                  ics"> (>= 1.0           



                                                  )</content>           

 

          UNK                 2.3-3.5             <content  [facility] 



                                                  styleCode="[facility]   



                                                  ">Globulin Center    



                                                  </content>3.1           



                                                  G/DL<content           



                                                  styleCode="Ital           



                                                  ics"> (2.3-3.5           



                                                  G/DL)</content>           

 

          Protein             6.3-8.2             <content  [facility] 



          [Mass/volum                               styleCode="[facility]   



          e] in Serum                               ">Total Protein Center    



          or Plasma                               </content>7.7           



                                                  G/DL<content           



                                                  styleCode="Ital           



                                                  ics"> (6.3-8.2           



                                                  G/DL)</content>           











                    ID                  Date                Data Source

 

                    [facility].[id number] 2019 12:27:00 PM EDT [facility]

[facility]











          Name      Value     Range     Interpretation Description Data      Sup

porting



                                        Code                Source(s) Document(s

)

 

          Potassium           3.5-5.3             <content  [facility]     



          [Moles/volume] in                               styleCode="Bold"> [facility]

[facility]   



          Serum or Plasma                               Potassium Medical   



                                                  </content>4.3 Center    



                                                  MEQ/L<content           



                                                  styleCode="Italic           



                                                  s"> (3.5-5.3           



                                                  MEQ/L)</content>           

 

          Chloride                          <content  [facility]     



          [Moles/volume] in                               styleCode="Bold"> [facility]

[facility]   



          Serum or Plasma                               Chloride  Medical   



                                                  </content>104 Center    



                                                  MEQ/L<content           



                                                  styleCode="Italic           



                                                  s"> (           



                                                  MEQ/L)</content>           

 

          Sodium              137-145             <content  [facility]     



          [Moles/volume] in                               styleCode="Bold"> [facility]

[facility]   



          Serum or Plasma                               Sodium    Medical   



                                                  </content>142 Center    



                                                  MEQ/L<content           



                                                  styleCode="Italic           



                                                  s"> (137-145           



                                                  MEQ/L)</content>           

 

          Carbon dioxide,           22-30               <content  [facility]     



          total                                   styleCode="Bold"> [facility]   



          [Moles/volume] in                               Carbon Dioxide Medical

   



          Serum or Plasma                               </content>29 Center    



                                                  MEQ/L<content           



                                                  styleCode="Italic           



                                                  s"> (22-30           



                                                  MEQ/L)</content>           

 

          Creatinine           0.5-1.3             <content  [facility]     



          [Mass/volume] in                               styleCode="[facility]old"> [facility]

[facility]   



          Serum or Plasma                               Creatinine Medical   



                                                  </content>0.8 Center    



                                                  MG/DL<content           



                                                  styleCode="Italic           



                                                  s"> (0.5-1.3           



                                                  MG/DL)</content>           

 

          Glucose                           <content  [facility]     



          [Mass/volume] in                               styleCode="Bold"> [facility]

   



          Serum or Plasma                               Glucose   Medical   



                                                  </content>83 Center    



                                                  MG/DL<content           



                                                  styleCode="Italic           



                                                  s"> (           



                                                  MG/DL)</content>           

 

          UNK                 9-20                <content  [facility]     



                                                  styleCode="Bold"> [facility]   



                                                  BUN </content>14 Medical   



                                                  MG/DL<content Center    



                                                  styleCode="Italic           



                                                  s"> (9-20           



                                                  MG/DL)</content>           

 

          Alanine             7-50                <content  [facility]     



          aminotransferase                               styleCode="Bold"> [facility]

[facility]   



          [Enzymatic                               Alanine   Medical   



          activity/volume]                               Aminotransferase Center

    



          in Serum or Plasma                               (ALT)               



                                                  </content>13           



                                                  IU/L<content           



                                                  styleCode="Italic           



                                                  s"> (7-50           



                                                  IU/L)</content>           

 

          Calcium             8.4-10.             <content  [facility]     



          [Mass/volume] in           2                   styleCode="Bold"> [facility]

[facility]   



          Serum or Plasma                               Calcium   Medical   



                                                  </content>10.2 Center    



                                                  MG/DL<content           



                                                  styleCode="Italic           



                                                  s"> (8.4-10.2           



                                                  MG/DL)</content>           

 

          UNK                                     <content  [facility]     



                                                  styleCode="Bold"> [facility]   



                                                  EGFR      Medical   



                                                  </content>NOT Center    



                                                  VALID ON PATIENTS           



                                                  LESS THAN 18           



                                                  YEARS OLD. GFR           



                                                  (Reference Range:           



                                                  not                 



                                                  available)<br/>           

 

          Alkaline                          <content  [facility]     



          phosphatase                               styleCode="Bold"> [facility]   



          [Enzymatic                               Alkaline  Medical   



          activity/volume]                               Phosphatase (ALP) Cente

r    



          in Serum or Plasma                               </content>105        

   



                                                  IU/L<content           



                                                  styleCode="Italic           



                                                  s"> (           



                                                  IU/L)</content>           

 

          Aspartate           21-36               <content  [facility]     



          aminotransferase                               styleCode="Bold"> [facility]

[facility]   



          [Enzymatic                               Aspartate Medical   



          activity/volume]                               Aminotransferase Center

    



          in Serum or Plasma                               (AST)               



                                                  </content>21           



                                                  IU/L<content           



                                                  styleCode="Italic           



                                                  s"> (21-36           



                                                  IU/L)</content>           

 

          Bilirubin.total           0.2-1.3             <content  [facility]     



          [Mass/volume] in                               styleCode="Bold"> [facility]

[facility]   



          Serum or Plasma                               Bilirubin Total Medical 

  



                                                  </content>0.5 Center    



                                                  MG/DL<content           



                                                  styleCode="Italic           



                                                  s"> (0.2-1.3           



                                                  MG/DL)</content>           

 

          Albumin             3.1-4.8             <content  [facility]     



          [Mass/volume] in                               styleCode="Bold"> [facility]

[facility]   



          Serum or Plasma                               Albumin   Medical   



                                                  </content>4.6 Center    



                                                  G/DL<content           



                                                  styleCode="Italic           



                                                  s"> (3.1-4.8           



                                                  G/DL)</content>           









                                        Procedure This is stable and being followed by Cardiology

## 2020-12-04 DIAGNOSIS — M10.9 GOUT, UNSPECIFIED CAUSE, UNSPECIFIED CHRONICITY, UNSPECIFIED SITE: ICD-10-CM

## 2020-12-04 RX ORDER — COLCHICINE 0.6 MG/1
TABLET, FILM COATED ORAL
Qty: 30 TABLET | Refills: 0 | Status: SHIPPED | OUTPATIENT
Start: 2020-12-04 | End: 2021-03-16 | Stop reason: SDUPTHER

## 2020-12-22 ENCOUNTER — LAB (OUTPATIENT)
Dept: LAB | Facility: CLINIC | Age: 70
End: 2020-12-22
Payer: MEDICARE

## 2020-12-22 DIAGNOSIS — E78.2 MIXED HYPERLIPIDEMIA: ICD-10-CM

## 2020-12-22 DIAGNOSIS — M10.9 GOUT, UNSPECIFIED CAUSE, UNSPECIFIED CHRONICITY, UNSPECIFIED SITE: ICD-10-CM

## 2020-12-22 DIAGNOSIS — Z12.5 PROSTATE CANCER SCREENING: ICD-10-CM

## 2020-12-22 LAB
ALBUMIN SERPL BCP-MCNC: 3.4 G/DL (ref 3.5–5)
ALP SERPL-CCNC: 71 U/L (ref 46–116)
ALT SERPL W P-5'-P-CCNC: 33 U/L (ref 12–78)
ANION GAP SERPL CALCULATED.3IONS-SCNC: 5 MMOL/L (ref 4–13)
AST SERPL W P-5'-P-CCNC: 27 U/L (ref 5–45)
BASOPHILS # BLD AUTO: 0.04 THOUSANDS/ΜL (ref 0–0.1)
BASOPHILS NFR BLD AUTO: 1 % (ref 0–1)
BILIRUB SERPL-MCNC: 0.66 MG/DL (ref 0.2–1)
BUN SERPL-MCNC: 19 MG/DL (ref 5–25)
CALCIUM ALBUM COR SERPL-MCNC: 9.1 MG/DL (ref 8.3–10.1)
CALCIUM SERPL-MCNC: 8.6 MG/DL (ref 8.3–10.1)
CHLORIDE SERPL-SCNC: 105 MMOL/L (ref 100–108)
CHOLEST SERPL-MCNC: 176 MG/DL (ref 50–200)
CO2 SERPL-SCNC: 30 MMOL/L (ref 21–32)
CREAT SERPL-MCNC: 0.9 MG/DL (ref 0.6–1.3)
EOSINOPHIL # BLD AUTO: 0.29 THOUSAND/ΜL (ref 0–0.61)
EOSINOPHIL NFR BLD AUTO: 4 % (ref 0–6)
ERYTHROCYTE [DISTWIDTH] IN BLOOD BY AUTOMATED COUNT: 13.2 % (ref 11.6–15.1)
GFR SERPL CREATININE-BSD FRML MDRD: 87 ML/MIN/1.73SQ M
GLUCOSE P FAST SERPL-MCNC: 98 MG/DL (ref 65–99)
HCT VFR BLD AUTO: 46.7 % (ref 36.5–49.3)
HDLC SERPL-MCNC: 29 MG/DL
HGB BLD-MCNC: 15 G/DL (ref 12–17)
IMM GRANULOCYTES # BLD AUTO: 0.02 THOUSAND/UL (ref 0–0.2)
IMM GRANULOCYTES NFR BLD AUTO: 0 % (ref 0–2)
LDLC SERPL CALC-MCNC: 112 MG/DL (ref 0–100)
LYMPHOCYTES # BLD AUTO: 1.28 THOUSANDS/ΜL (ref 0.6–4.47)
LYMPHOCYTES NFR BLD AUTO: 19 % (ref 14–44)
MCH RBC QN AUTO: 29.2 PG (ref 26.8–34.3)
MCHC RBC AUTO-ENTMCNC: 32.1 G/DL (ref 31.4–37.4)
MCV RBC AUTO: 91 FL (ref 82–98)
MONOCYTES # BLD AUTO: 0.78 THOUSAND/ΜL (ref 0.17–1.22)
MONOCYTES NFR BLD AUTO: 11 % (ref 4–12)
NEUTROPHILS # BLD AUTO: 4.51 THOUSANDS/ΜL (ref 1.85–7.62)
NEUTS SEG NFR BLD AUTO: 65 % (ref 43–75)
NONHDLC SERPL-MCNC: 147 MG/DL
NRBC BLD AUTO-RTO: 0 /100 WBCS
PLATELET # BLD AUTO: 190 THOUSANDS/UL (ref 149–390)
PMV BLD AUTO: 10.4 FL (ref 8.9–12.7)
POTASSIUM SERPL-SCNC: 3.8 MMOL/L (ref 3.5–5.3)
PROT SERPL-MCNC: 6.6 G/DL (ref 6.4–8.2)
RBC # BLD AUTO: 5.14 MILLION/UL (ref 3.88–5.62)
SODIUM SERPL-SCNC: 140 MMOL/L (ref 136–145)
TRIGL SERPL-MCNC: 173 MG/DL
URATE SERPL-MCNC: 8.2 MG/DL (ref 4.2–8)
WBC # BLD AUTO: 6.92 THOUSAND/UL (ref 4.31–10.16)

## 2020-12-22 PROCEDURE — 85025 COMPLETE CBC W/AUTO DIFF WBC: CPT

## 2020-12-22 PROCEDURE — 80053 COMPREHEN METABOLIC PANEL: CPT

## 2020-12-22 PROCEDURE — 36415 COLL VENOUS BLD VENIPUNCTURE: CPT

## 2020-12-22 PROCEDURE — 84550 ASSAY OF BLOOD/URIC ACID: CPT

## 2020-12-22 PROCEDURE — 80061 LIPID PANEL: CPT

## 2021-01-07 ENCOUNTER — OFFICE VISIT (OUTPATIENT)
Dept: FAMILY MEDICINE CLINIC | Facility: CLINIC | Age: 71
End: 2021-01-07
Payer: MEDICARE

## 2021-01-07 VITALS
HEIGHT: 73 IN | OXYGEN SATURATION: 99 % | SYSTOLIC BLOOD PRESSURE: 144 MMHG | HEART RATE: 82 BPM | WEIGHT: 315 LBS | DIASTOLIC BLOOD PRESSURE: 88 MMHG | TEMPERATURE: 97.1 F | BODY MASS INDEX: 41.75 KG/M2

## 2021-01-07 DIAGNOSIS — G47.33 OBSTRUCTIVE SLEEP APNEA: ICD-10-CM

## 2021-01-07 DIAGNOSIS — Z23 NEED FOR PNEUMOCOCCAL VACCINATION: Primary | ICD-10-CM

## 2021-01-07 DIAGNOSIS — I10 ESSENTIAL HYPERTENSION: Chronic | ICD-10-CM

## 2021-01-07 DIAGNOSIS — E66.01 MORBID OBESITY WITH BMI OF 40.0-44.9, ADULT (HCC): ICD-10-CM

## 2021-01-07 DIAGNOSIS — E78.2 MIXED HYPERLIPIDEMIA: ICD-10-CM

## 2021-01-07 DIAGNOSIS — I71.2 THORACIC AORTIC ANEURYSM WITHOUT RUPTURE (HCC): ICD-10-CM

## 2021-01-07 DIAGNOSIS — M10.9 GOUT, UNSPECIFIED CAUSE, UNSPECIFIED CHRONICITY, UNSPECIFIED SITE: ICD-10-CM

## 2021-01-07 PROCEDURE — 90732 PPSV23 VACC 2 YRS+ SUBQ/IM: CPT

## 2021-01-07 PROCEDURE — 99214 OFFICE O/P EST MOD 30 MIN: CPT | Performed by: FAMILY MEDICINE

## 2021-01-07 PROCEDURE — G0009 ADMIN PNEUMOCOCCAL VACCINE: HCPCS

## 2021-01-07 RX ORDER — ENALAPRIL MALEATE 20 MG/1
20 TABLET ORAL DAILY
Qty: 90 TABLET | Refills: 5 | Status: SHIPPED | OUTPATIENT
Start: 2021-01-07 | End: 2022-01-25

## 2021-01-07 NOTE — ASSESSMENT & PLAN NOTE
Increase vase takes 20 mg, continue Apresoline 50 mg t i d , hydrochlorothiazide 25 mg and Toprol- mg daily

## 2021-01-07 NOTE — PROGRESS NOTES
BMI Counseling: Body mass index is 43 2 kg/m²  The BMI is above normal  Nutrition recommendations include decreasing portion sizes and moderation in carbohydrate intake  Exercise recommendations include exercising 3-5 times per week  No pharmacotherapy was ordered  Assessment/Plan:    Return visit in 6 months fasting blood work prior to visit   Problem List Items Addressed This Visit        Respiratory    Obstructive sleep apnea       Cardiovascular and Mediastinum    Hypertension (Chronic)     Increase vase takes 20 mg, continue Apresoline 50 mg t i d , hydrochlorothiazide 25 mg and Toprol- mg daily         Relevant Medications    enalapril (VASOTEC) 20 mg tablet    Aortic aneurysm (HCC)       Other    Morbid obesity with BMI of 40 0-44 9, adult (HCC)    Hyperlipidemia     Low carb diet         Relevant Orders    Lipid panel    Comprehensive metabolic panel    Gout     Continue colchicine as needed         Relevant Orders    Uric acid      Other Visit Diagnoses     Need for pneumococcal vaccination    -  Primary    Relevant Orders    PNEUMOCOCCAL POLYSACCHARIDE VACCINE 23-VALENT =>3YO SQ IM (Completed)            Subjective:      Patient ID: Taty Berry is a 78 y o  male  Patient comes in for checkup  His blood pressure has been running in the 140s over the high 80s for last 2 weeks  He had 1 gout attacks since he was here last it was quickly relieved with colchicine  The following portions of the patient's history were reviewed and updated as appropriate:   He has a past medical history of Anemia, Aortic aneurysm ([facility] [address] ), Elevated troponin, History of colonoscopy, History of esophagogastroduodenoscopy, Hypertension, Hypokalemia, Morbid obesity ([address] ), Renal calculi, and STEMI (ST elevation myocardial infarction) ([facility] [address] )  ,  does not have any pertinent problems on file  ,   has a past surgical history that includes Colon surgery; Appendectomy; ARTHROSCOPY KNEE (Left); Colostomy;  Saturation biopsy of prostate; Rotator cuff repair (Left); and Total hip arthroplasty  ,  family history includes Breast cancer in his mother; COPD in his mother; Other in his father  ,   reports that he has never smoked  He has never used smokeless tobacco  He reports current alcohol use  He reports that he does not use drugs  ,  is allergic to lorazepam   Current Outpatient Medications   Medication Sig Dispense Refill    Colcrys 0 6 MG tablet 1 q 3 h prn gout 30 tablet 0    enalapril (VASOTEC) 20 mg tablet Take 1 tablet (20 mg total) by mouth daily 90 tablet 5    ferrous sulfate 325 (65 Fe) mg tablet Take 325 mg by mouth daily with breakfast      hydrALAZINE (APRESOLINE) 50 mg tablet Take 1 tablet (50 mg total) by mouth every 8 (eight) hours 270 tablet 3    hydrochlorothiazide (HYDRODIURIL) 25 mg tablet Take 1 tablet (25 mg total) by mouth daily 90 tablet 3    ibuprofen (MOTRIN) 400 mg tablet Take 200 mg by mouth every 6 (six) hours as needed for mild pain (PRN )      metoprolol succinate (TOPROL-XL) 100 mg 24 hr tablet Take 1 tablet (100 mg total) by mouth daily 90 tablet 5     No current facility-administered medications for this visit  Review of Systems   Constitutional: Negative  Respiratory: Negative  Cardiovascular: Negative  Neurological: Negative  Objective:  Vitals:    01/07/21 0828   BP: 144/88   BP Location: Left arm   Patient Position: Sitting   Cuff Size: Large   Pulse: 82   Temp: (!) 97 1 °F (36 2 °C)   TempSrc: Tympanic   SpO2: 99%   Weight: (!) 149 kg (327 lb 6 4 oz)   Height: 6' 1" (1 854 m)     Body mass index is 43 2 kg/m²  Physical Exam  Constitutional:       Appearance: He is well-developed  He is obese  HENT:      Head: Normocephalic and atraumatic  Eyes:      Pupils: Pupils are equal, round, and reactive to light  Neck:      Musculoskeletal: Neck supple  Cardiovascular:      Rate and Rhythm: Normal rate and regular rhythm  Heart sounds: Normal heart sounds  Pulmonary:      Effort: Pulmonary effort is normal       Breath sounds: Normal breath sounds  Abdominal:      General: Bowel sounds are normal       Palpations: Abdomen is soft  Musculoskeletal: Normal range of motion  Skin:     General: Skin is warm and dry  Neurological:      Mental Status: He is alert and oriented to person, place, and time  Psychiatric:         Thought Content:  Thought content normal

## 2021-03-10 DIAGNOSIS — Z23 ENCOUNTER FOR IMMUNIZATION: ICD-10-CM

## 2021-03-16 DIAGNOSIS — M10.9 GOUT, UNSPECIFIED CAUSE, UNSPECIFIED CHRONICITY, UNSPECIFIED SITE: ICD-10-CM

## 2021-03-16 RX ORDER — COLCHICINE 0.6 MG/1
TABLET, FILM COATED ORAL
Qty: 30 TABLET | Refills: 0 | Status: SHIPPED | OUTPATIENT
Start: 2021-03-16 | End: 2021-07-07

## 2021-03-18 ENCOUNTER — IMMUNIZATIONS (OUTPATIENT)
Dept: FAMILY MEDICINE CLINIC | Facility: HOSPITAL | Age: 71
End: 2021-03-18

## 2021-03-18 DIAGNOSIS — Z23 ENCOUNTER FOR IMMUNIZATION: Primary | ICD-10-CM

## 2021-03-18 PROCEDURE — 91300 SARS-COV-2 / COVID-19 MRNA VACCINE (PFIZER-BIONTECH) 30 MCG: CPT

## 2021-03-18 PROCEDURE — 0001A SARS-COV-2 / COVID-19 MRNA VACCINE (PFIZER-BIONTECH) 30 MCG: CPT

## 2021-04-09 ENCOUNTER — IMMUNIZATIONS (OUTPATIENT)
Dept: FAMILY MEDICINE CLINIC | Facility: HOSPITAL | Age: 71
End: 2021-04-09

## 2021-04-09 DIAGNOSIS — Z23 ENCOUNTER FOR IMMUNIZATION: Primary | ICD-10-CM

## 2021-04-09 PROCEDURE — 0002A SARS-COV-2 / COVID-19 MRNA VACCINE (PFIZER-BIONTECH) 30 MCG: CPT

## 2021-04-09 PROCEDURE — 91300 SARS-COV-2 / COVID-19 MRNA VACCINE (PFIZER-BIONTECH) 30 MCG: CPT

## 2021-06-15 DIAGNOSIS — I10 ESSENTIAL HYPERTENSION: Chronic | ICD-10-CM

## 2021-06-15 RX ORDER — METOPROLOL SUCCINATE 100 MG/1
TABLET, EXTENDED RELEASE ORAL
Qty: 90 TABLET | Refills: 5 | Status: SHIPPED | OUTPATIENT
Start: 2021-06-15 | End: 2022-06-28

## 2021-06-24 ENCOUNTER — APPOINTMENT (OUTPATIENT)
Dept: LAB | Facility: CLINIC | Age: 71
End: 2021-06-24
Payer: MEDICARE

## 2021-06-24 DIAGNOSIS — M10.9 GOUT, UNSPECIFIED CAUSE, UNSPECIFIED CHRONICITY, UNSPECIFIED SITE: ICD-10-CM

## 2021-06-24 DIAGNOSIS — E78.2 MIXED HYPERLIPIDEMIA: ICD-10-CM

## 2021-06-24 LAB
ALBUMIN SERPL BCP-MCNC: 3.4 G/DL (ref 3.5–5)
ALP SERPL-CCNC: 63 U/L (ref 46–116)
ALT SERPL W P-5'-P-CCNC: 29 U/L (ref 12–78)
ANION GAP SERPL CALCULATED.3IONS-SCNC: 3 MMOL/L (ref 4–13)
AST SERPL W P-5'-P-CCNC: 19 U/L (ref 5–45)
BILIRUB SERPL-MCNC: 0.71 MG/DL (ref 0.2–1)
BUN SERPL-MCNC: 18 MG/DL (ref 5–25)
CALCIUM ALBUM COR SERPL-MCNC: 9.2 MG/DL (ref 8.3–10.1)
CALCIUM SERPL-MCNC: 8.7 MG/DL (ref 8.3–10.1)
CHLORIDE SERPL-SCNC: 105 MMOL/L (ref 100–108)
CHOLEST SERPL-MCNC: 190 MG/DL (ref 50–200)
CO2 SERPL-SCNC: 32 MMOL/L (ref 21–32)
CREAT SERPL-MCNC: 0.84 MG/DL (ref 0.6–1.3)
GFR SERPL CREATININE-BSD FRML MDRD: 89 ML/MIN/1.73SQ M
GLUCOSE P FAST SERPL-MCNC: 98 MG/DL (ref 65–99)
HDLC SERPL-MCNC: 40 MG/DL
LDLC SERPL CALC-MCNC: 126 MG/DL (ref 0–100)
NONHDLC SERPL-MCNC: 150 MG/DL
POTASSIUM SERPL-SCNC: 4.1 MMOL/L (ref 3.5–5.3)
PROT SERPL-MCNC: 7 G/DL (ref 6.4–8.2)
SODIUM SERPL-SCNC: 140 MMOL/L (ref 136–145)
TRIGL SERPL-MCNC: 120 MG/DL
URATE SERPL-MCNC: 8.1 MG/DL (ref 4.2–8)

## 2021-06-24 PROCEDURE — 80061 LIPID PANEL: CPT

## 2021-06-24 PROCEDURE — 84550 ASSAY OF BLOOD/URIC ACID: CPT

## 2021-06-24 PROCEDURE — 80053 COMPREHEN METABOLIC PANEL: CPT

## 2021-06-24 PROCEDURE — 36415 COLL VENOUS BLD VENIPUNCTURE: CPT

## 2021-07-07 ENCOUNTER — TELEPHONE (OUTPATIENT)
Dept: FAMILY MEDICINE CLINIC | Facility: CLINIC | Age: 71
End: 2021-07-07

## 2021-07-07 ENCOUNTER — OFFICE VISIT (OUTPATIENT)
Dept: FAMILY MEDICINE CLINIC | Facility: CLINIC | Age: 71
End: 2021-07-07
Payer: MEDICARE

## 2021-07-07 VITALS
HEIGHT: 73 IN | SYSTOLIC BLOOD PRESSURE: 128 MMHG | BODY MASS INDEX: 41.75 KG/M2 | DIASTOLIC BLOOD PRESSURE: 72 MMHG | WEIGHT: 315 LBS | TEMPERATURE: 97.1 F | OXYGEN SATURATION: 98 % | HEART RATE: 69 BPM

## 2021-07-07 DIAGNOSIS — G47.33 OBSTRUCTIVE SLEEP APNEA: Primary | ICD-10-CM

## 2021-07-07 DIAGNOSIS — M10.9 GOUT, UNSPECIFIED CAUSE, UNSPECIFIED CHRONICITY, UNSPECIFIED SITE: ICD-10-CM

## 2021-07-07 DIAGNOSIS — Z00.00 MEDICARE ANNUAL WELLNESS VISIT, INITIAL: ICD-10-CM

## 2021-07-07 DIAGNOSIS — Z12.5 PROSTATE CANCER SCREENING: ICD-10-CM

## 2021-07-07 DIAGNOSIS — I10 ESSENTIAL HYPERTENSION: Chronic | ICD-10-CM

## 2021-07-07 DIAGNOSIS — E66.01 MORBID OBESITY WITH BMI OF 40.0-44.9, ADULT (HCC): ICD-10-CM

## 2021-07-07 DIAGNOSIS — E78.2 MIXED HYPERLIPIDEMIA: ICD-10-CM

## 2021-07-07 DIAGNOSIS — I71.2 THORACIC AORTIC ANEURYSM WITHOUT RUPTURE (HCC): ICD-10-CM

## 2021-07-07 PROBLEM — R73.9 ELEVATED BLOOD SUGAR: Status: RESOLVED | Noted: 2019-07-02 | Resolved: 2021-07-07

## 2021-07-07 PROBLEM — I71.20 THORACIC AORTIC ANEURYSM WITHOUT RUPTURE: Status: ACTIVE | Noted: 2021-07-07

## 2021-07-07 PROBLEM — I71.9 AORTIC ANEURYSM (HCC): Status: RESOLVED | Noted: 2018-02-27 | Resolved: 2021-07-07

## 2021-07-07 PROCEDURE — G0438 PPPS, INITIAL VISIT: HCPCS | Performed by: FAMILY MEDICINE

## 2021-07-07 PROCEDURE — 99214 OFFICE O/P EST MOD 30 MIN: CPT | Performed by: FAMILY MEDICINE

## 2021-07-07 PROCEDURE — 1123F ACP DISCUSS/DSCN MKR DOCD: CPT | Performed by: FAMILY MEDICINE

## 2021-07-07 RX ORDER — ALLOPURINOL 100 MG/1
100 TABLET ORAL DAILY
Qty: 90 TABLET | Refills: 3 | Status: SHIPPED | OUTPATIENT
Start: 2021-07-07 | End: 2021-10-22

## 2021-07-07 RX ORDER — INDOMETHACIN 50 MG/1
50 CAPSULE ORAL
Qty: 60 CAPSULE | Refills: 5 | Status: SHIPPED | OUTPATIENT
Start: 2021-07-07 | End: 2021-10-22

## 2021-07-07 NOTE — PROGRESS NOTES
Assessment/Plan:     return visit in 6 months with fasting blood work prior to visit     Problem List Items Addressed This Visit        Respiratory    Obstructive sleep apnea - Primary    Relevant Orders    PAP DME Resupply/Reorder       Cardiovascular and Mediastinum    Hypertension (Chronic)       Continue hydralazine 50 mg t i d , enalapril 20 mg, hydrochlorothiazide 25 mg and Toprol- mg daily         Thoracic aortic aneurysm without rupture (HCC)      Follow-up to Cardiology            Other    Morbid obesity with BMI of 40 0-44 9, adult (HCC)    Hyperlipidemia    Relevant Orders    CBC and differential    Comprehensive metabolic panel    Lipid panel    Gout    Relevant Medications    allopurinol (ZYLOPRIM) 100 mg tablet    indomethacin (INDOCIN) 50 mg capsule    Other Relevant Orders    Uric acid      Other Visit Diagnoses     Medicare annual wellness visit, initial        Prostate cancer screening        Relevant Orders    PSA, Total Screen            Subjective:      Patient ID: Marvin Vergara is a 73 y o  male  Patient comes in for checkup  He has been having problems with gout attacks about 3 times over the last 7 months  His   Insurance will no longer cover  Colchicine  He also needs prescription for new mask for his CPAP  The following portions of the patient's history were reviewed and updated as appropriate:   Past Medical History:  He has a past medical history of Anemia, Aortic aneurysm ([facility] [address] ), Elevated troponin, History of colonoscopy, History of esophagogastroduodenoscopy, Hypertension, Hypokalemia, Morbid obesity ([facility] [address] ), Renal calculi, and STEMI ([facility] elevation [facility] infarction) [facility]  ,  _______________________________________________________________________  Medical Problems:  does not have any pertinent problems on file ,  _______________________________________________________________________  Past Surgical History:   has a past surgical history that includes Colon surgery; Appendectomy; ARTHROSCOPY KNEE (Left); Colostomy; Saturation biopsy of prostate; Rotator cuff repair (Left); and Total hip arthroplasty  ,  _______________________________________________________________________  Family History:  family history includes Breast cancer in his mother; COPD in his mother; Other in his father ,  _______________________________________________________________________  Social History:   reports that he has never smoked  He has never used smokeless tobacco  He reports current alcohol use  He reports that he does not use drugs  ,  _______________________________________________________________________  Allergies:  is allergic to lorazepam   _______________________________________________________________________  Current Outpatient Medications   Medication Sig Dispense Refill    enalapril (VASOTEC) 20 mg tablet Take 1 tablet (20 mg total) by mouth daily 90 tablet 5    ferrous sulfate 325 (65 Fe) mg tablet Take 325 mg by mouth daily with breakfast      hydrALAZINE (APRESOLINE) 50 mg tablet Take 1 tablet (50 mg total) by mouth every 8 (eight) hours 270 tablet 3    hydrochlorothiazide (HYDRODIURIL) 25 mg tablet Take 1 tablet (25 mg total) by mouth daily 90 tablet 3    ibuprofen (MOTRIN) 400 mg tablet Take 200 mg by mouth every 6 (six) hours as needed for mild pain (PRN )      metoprolol succinate (TOPROL-XL) 100 mg 24 hr tablet take 1 tablet by mouth once daily 90 tablet 5    allopurinol (ZYLOPRIM) 100 mg tablet Take 1 tablet (100 mg total) by mouth daily 90 tablet 3    indomethacin (INDOCIN) 50 mg capsule Take 1 capsule (50 mg total) by mouth 3 (three) times a day with meals 60 capsule 5     No current facility-administered medications for this visit      _______________________________________________________________________  Review of Systems   Constitutional: Negative  Respiratory: Negative  Cardiovascular: Negative  Endocrine: Negative  Neurological: Negative  Objective:  Vitals:    07/07/21 0851 07/07/21 0921   BP: 140/96 128/72   BP Location: Left arm    Patient Position: Sitting    Cuff Size: Large    Pulse: 69    Temp: (!) 97 1 °F (36 2 °C)    TempSrc: Tympanic    SpO2: 98%    Weight: (!) 146 kg (321 lb 9 6 oz)    Height: 6' 1" (1 854 m)      Body mass index is 42 43 kg/m²  Physical Exam  Constitutional:       Appearance: He is well-developed  He is obese  HENT:      Head: Normocephalic and atraumatic  Eyes:      Pupils: Pupils are equal, round, and reactive to light  Cardiovascular:      Rate and Rhythm: Normal rate and regular rhythm  Heart sounds: Normal heart sounds  Pulmonary:      Effort: Pulmonary effort is normal       Breath sounds: Normal breath sounds  Abdominal:      General: Bowel sounds are normal       Palpations: Abdomen is soft  Musculoskeletal:         General: Normal range of motion  Cervical back: Neck supple  Skin:     General: Skin is warm and dry  Neurological:      Mental Status: He is alert and oriented to person, place, and time  Psychiatric:         Thought Content:  Thought content normal

## 2021-07-07 NOTE — PROGRESS NOTES
Assessment and Plan:     Problem List Items Addressed This Visit     None      Visit Diagnoses     Medicare annual wellness visit, initial    -  Primary           Preventive health issues were discussed with patient, and age appropriate screening tests were ordered as noted in patient's After Visit Summary  Personalized health advice and appropriate referrals for health education or preventive services given if needed, as noted in patient's After Visit Summary       History of Present Illness:     Patient presents for Medicare Annual Wellness visit    Patient Care Team:  Francia Toth MD as PCP - General Angela Alex MD     Problem List:     Patient Active Problem List   Diagnosis    Hypertension    Morbid obesity with BMI of 40 0-44 9, adult ([facility] [address] )    [facility]    Obstructive sleep apnea    Hyperlipidemia    Anemia    Moderate concentric left ventricular hypertrophy    Gout    Elevated blood sugar      Past Medical and Surgical History:     Past Medical History:   Diagnosis Date    Anemia     Aortic aneurysm ([facility] [address] )     Elevated troponin     History of colonoscopy     6/5/13    History of esophagogastroduodenoscopy     6/5/13    Hypertension     Hypokalemia     Morbid obesity (HCC)     Renal calculi     [facility]) [facility]      Past Surgical History:   Procedure Laterality Date    APPENDECTOMY      ARTHROSCOPY KNEE Left     COLON SURGERY      COLOSTOMY      ROTATOR CUFF REPAIR Left     SATURATION BIOPSY OF PROSTATE      TOTAL HIP ARTHROPLASTY      Last Assessed:5/8/15      Family History:     Family History   Problem Relation Age of Onset   Emory Amato COPD Mother     Breast cancer Mother         malignant neoplasm    Other Father         thoracic aortic aneurysm      Social History:     Social History     Socioeconomic History    Marital status: /Civil Union     Spouse name: None    Number of children: None    Years of education: None    Highest education level: None   Occupational History    None   Tobacco Use    Smoking status: Never Smoker    Smokeless tobacco: Never Used   Vaping Use    Vaping Use: Never used   Substance and Sexual Activity    Alcohol use: Yes     Comment: social    Drug use: Never    Sexual activity: None   Other Topics Concern    None   Social History Narrative    None     Social Determinants of Health     Financial Resource Strain:     Difficulty of Paying Living Expenses:    Food Insecurity:     Worried About Running Out of Food in the Last Year:     Ran Out of Food in the Last Year:    Transportation Needs:     Lack of Transportation (Medical):      Lack of Transportation (Non-Medical):    Physical Activity:     Days of Exercise per Week:     Minutes of Exercise per Session:    Stress:     Feeling of Stress :    Social Connections:     Frequency of Communication with Friends and Family:     Frequency of Social Gatherings with Friends and Family:     Attends Yazdanism Services:     Active Member of Clubs or Organizations:     Attends Club or Organization Meetings:     Marital Status:    Intimate Partner Violence:     Fear of Current or Ex-Partner:     Emotionally Abused:     Physically Abused:     Sexually Abused:       Medications and Allergies:     Current Outpatient Medications   Medication Sig Dispense Refill    enalapril (VASOTEC) 20 mg tablet Take 1 tablet (20 mg total) by mouth daily 90 tablet 5    ferrous sulfate 325 (65 Fe) mg tablet Take 325 mg by mouth daily with breakfast      hydrALAZINE (APRESOLINE) 50 mg tablet Take 1 tablet (50 mg total) by mouth every 8 (eight) hours 270 tablet 3    hydrochlorothiazide (HYDRODIURIL) 25 mg tablet Take 1 tablet (25 mg total) by mouth daily 90 tablet 3    ibuprofen (MOTRIN) 400 mg tablet Take 200 mg by mouth every 6 (six) hours as needed for mild pain (PRN )      metoprolol succinate (TOPROL-XL) 100 mg 24 hr tablet take 1 tablet by mouth once daily 90 tablet 5    Colcrys 0 6 MG tablet 1 q 3 h prn gout (Patient not taking: Reported on 7/7/2021) 30 tablet 0     No current facility-administered medications for this visit  Allergies   Allergen Reactions    Lorazepam Other (See Comments)     very agitated  Psychosis      Immunizations:     Immunization History   Administered Date(s) Administered    INFLUENZA 10/31/2018    Influenza, high dose seasonal 0 7 mL 10/31/2018, 10/23/2019, 09/16/2020    Pneumococcal Conjugate 13-Valent 01/09/2019    Pneumococcal Polysaccharide PPV23 01/07/2021    SARS-CoV-2 / COVID-19 mRNA IM (Pfizer-BioNTech) 03/18/2021, 04/09/2021      Health Maintenance:         Topic Date Due    Colorectal Cancer Screening  09/12/2023    Hepatitis C Screening  Completed         Topic Date Due    DTaP,Tdap,and Td Vaccines (1 - Tdap) Never done    Influenza Vaccine (1) 09/01/2021      Medicare Health Risk Assessment:     /96 (BP Location: Left arm, Patient Position: Sitting, Cuff Size: Large)   Pulse 69   Temp (!) 97 1 °F (36 2 °C) (Tympanic)   Ht 6' 1" (1 854 m)   Wt (!) 146 kg (321 lb 9 6 oz)   SpO2 98%   BMI 42 43 kg/m²      Levar Stroud is here for his Initial Wellness visit  Last Medicare Wellness visit information reviewed, patient interviewed and updates made to the record today  Health Risk Assessment:   Patient rates overall health as very good  Patient feels that their physical health rating is same  Patient is very satisfied with their life  Eyesight was rated as same  Hearing was rated as same  Patient feels that their emotional and mental health rating is same  Patients states they are never, rarely angry  Patient states they are sometimes unusually tired/fatigued  Pain experienced in the last 7 days has been some  Patient's pain rating has been 3/10  Patient states that he has experienced no weight loss or gain in last 6 months  Depression Screening:   PHQ-2 Score: 0      Fall Risk Screening:    In the past year, patient has experienced: no history of falling in past year      Home Safety:  Patient has trouble with stairs inside or outside of their home  Patient has working smoke alarms and has working carbon monoxide detector  Home safety hazards include: none  Nutrition:   Current diet is Regular  Medications:   Patient is currently taking over-the-counter supplements  OTC medications include: see medication list  Patient is able to manage medications  Activities of Daily Living (ADLs)/Instrumental Activities of Daily Living (IADLs):   Walk and transfer into and out of bed and chair?: Yes  Dress and groom yourself?: Yes    Bathe or shower yourself?: Yes    Feed yourself? Yes  Do your laundry/housekeeping?: Yes  Manage your money, pay your bills and track your expenses?: Yes  Make your own meals?: Yes    Do your own shopping?: Yes    Previous Hospitalizations:   Any hospitalizations or ED visits within the last 12 months?: No      Advance Care Planning:   Living will: Yes    Durable POA for healthcare:  Yes    Advanced directive: Yes    Advanced directive counseling given: Yes    Five wishes given: No    End of Life Decisions reviewed with patient: Yes    Provider agrees with end of life decisions: Yes      PREVENTIVE SCREENINGS      Cardiovascular Screening:    General: Screening Not Indicated and History Lipid Disorder      Diabetes Screening:     General: Screening Current      Colorectal Cancer Screening:     General: Screening Current      Prostate Cancer Screening:    General: Screening Current      Osteoporosis Screening:    General: Screening Not Indicated      Abdominal Aortic Aneurysm (AAA) Screening:    Risk factors include: age between 73-69 yo        General: Screening Not Indicated      Lung Cancer Screening:     General: Screening Not Indicated      Hepatitis C Screening:    General: Screening Current    Screening, Brief Intervention, and Referral to Treatment (SBIRT)    Screening  Typical number of drinks in a day: 0  Typical number of drinks in a week: 4  Interpretation: Low risk drinking behavior  Single Item Drug Screening:  How often have you used an illegal drug (including marijuana) or a prescription medication for non-medical reasons in the past year? never    Single Item Drug Screen Score: 0  Interpretation: Negative screen for possible drug use disorder    Other Counseling Topics:   Car/seat belt/driving safety and sunscreen         Mojgan Mensah MD

## 2021-07-07 NOTE — TELEPHONE ENCOUNTER
Patient was in office for appointment  Requested new CPAP mask and that order be faxed to cpap  [employer] at [phone]  Order was placed by Dr Adeline Newell and faxed to cpap com  Faxed confirmation received

## 2021-07-07 NOTE — PATIENT INSTRUCTIONS
Medicare Preventive Visit Patient Instructions  Thank you for completing your Welcome to Medicare Visit or Medicare Annual Wellness Visit today  Your next wellness visit will be due in one year (7/8/2022)  The screening/preventive services that you may require over the next 5-10 years are detailed below  Some tests may not apply to you based off risk factors and/or age  Screening tests ordered at today's visit but not completed yet may show as past due  Also, please note that scanned in results may not display below  Preventive Screenings:  Service Recommendations Previous Testing/Comments   Colorectal Cancer Screening  · Colonoscopy    · Fecal Occult Blood Test (FOBT)/Fecal Immunochemical Test (FIT)  · Fecal DNA/Cologuard Test  · Flexible Sigmoidoscopy Age: 54-65 years old   Colonoscopy: every 10 years (May be performed more frequently if at higher risk)  OR  FOBT/FIT: every 1 year  OR  Cologuard: every 3 years  OR  Sigmoidoscopy: every 5 years  Screening may be recommended earlier than age 48 if at higher risk for colorectal cancer  Also, an individualized decision between you and your healthcare provider will decide whether screening between the ages of 74-80 would be appropriate   Colonoscopy: 09/12/2018  FOBT/FIT: Not on file  Cologuard: Not on file  Sigmoidoscopy: Not on file    Screening Current     Prostate Cancer Screening Individualized decision between patient and health care provider in men between ages of 53-78   Medicare will cover every 12 months beginning on the day after your 50th birthday PSA: 2 6 ng/mL     Screening Current     Hepatitis C Screening Once for adults born between 1945 and 1965  More frequently in patients at high risk for Hepatitis C Hep C Antibody: 06/12/2018    Screening Current   Diabetes Screening 1-2 times per year if you're at risk for diabetes or have pre-diabetes Fasting glucose: 98 mg/dL   A1C: 5 0 %    Screening Current   Cholesterol Screening Once every 5 years if you don't have a lipid disorder  May order more often based on risk factors  Lipid panel: 06/24/2021    Screening Not Indicated  History Lipid Disorder      Other Preventive Screenings Covered by Medicare:  1  Abdominal Aortic Aneurysm (AAA) Screening: covered once if your at risk  You're considered to be at risk if you have a family history of AAA or a male between the age of 73-68 who smoking at least 100 cigarettes in your lifetime  2  Lung Cancer Screening: covers low dose CT scan once per year if you meet all of the following conditions: (1) Age 50-69; (2) No signs or symptoms of lung cancer; (3) Current smoker or have quit smoking within the last 15 years; (4) You have a tobacco smoking history of at least 30 pack years (packs per day x number of years you smoked); (5) You get a written order from a healthcare provider  3  Glaucoma Screening: covered annually if you're considered high risk: (1) You have diabetes OR (2) Family history of glaucoma OR (3)  aged 48 and older OR (3)  American aged 72 and older  3  Osteoporosis Screening: covered every 2 years if you meet one of the following conditions: (1) Have a vertebral abnormality; (2) On glucocorticoid therapy for more than 3 months; (3) Have primary hyperparathyroidism; (4) On osteoporosis medications and need to assess response to drug therapy  5  HIV Screening: covered annually if you're between the age of 12-76  Also covered annually if you are younger than 13 and older than 72 with risk factors for HIV infection  For pregnant patients, it is covered up to 3 times per pregnancy      Immunizations:  Immunization Recommendations   Influenza Vaccine Annual influenza vaccination during flu season is recommended for all persons aged >= 6 months who do not have contraindications   Pneumococcal Vaccine (Prevnar and Pneumovax)  * Prevnar = PCV13  * Pneumovax = PPSV23 Adults 25-60 years old: 1-3 doses may be recommended based on certain risk factors  Adults 72 years old: Prevnar (PCV13) vaccine recommended followed by Pneumovax (PPSV23) vaccine  If already received PPSV23 since turning 65, then PCV13 recommended at least one year after PPSV23 dose  Hepatitis B Vaccine 3 dose series if at intermediate or high risk (ex: diabetes, end stage renal disease, liver disease)   Tetanus (Td) Vaccine - COST NOT COVERED BY MEDICARE PART B Following completion of primary series, a booster dose should be given every 10 years to maintain immunity against tetanus  Td may also be given as tetanus wound prophylaxis  Tdap Vaccine - COST NOT COVERED BY MEDICARE PART B Recommended at least once for all adults  For pregnant patients, recommended with each pregnancy  Shingles Vaccine (Shingrix) - COST NOT COVERED BY MEDICARE PART B  2 shot series recommended in those aged 48 and above     Health Maintenance Due:      Topic Date Due    Colorectal Cancer Screening  09/12/2023    Hepatitis C Screening  Completed     Immunizations Due:      Topic Date Due    DTaP,Tdap,and Td Vaccines (1 - Tdap) Never done    Influenza Vaccine (1) 09/01/2021     Advance Directives   What are advance directives? Advance directives are legal documents that state your wishes and plans for medical care  These plans are made ahead of time in case you lose your ability to make decisions for yourself  Advance directives can apply to any medical decision, such as the treatments you want, and if you want to donate organs  What are the types of advance directives? There are many types of advance directives, and each state has rules about how to use them  You may choose a combination of any of the following:  · Living will: This is a written record of the treatment you want  You can also choose which treatments you do not want, which to limit, and which to stop at a certain time  This includes surgery, medicine, IV fluid, and tube feedings     · Durable power of  for healthcare [facility]): This is a written record that states who you want to make healthcare choices for you when you are unable to make them for yourself  This person, called a proxy, is usually a family member or a friend  You may choose more than 1 proxy  · Do not resuscitate (DNR) order:  A DNR order is used in case your heart stops beating or you stop breathing  It is a request not to have certain forms of treatment, such as CPR  A DNR order may be included in other types of advance directives  · Medical directive: This covers the care that you want if you are in a coma, near death, or unable to make decisions for yourself  You can list the treatments you want for each condition  Treatment may include pain medicine, surgery, blood transfusions, dialysis, IV or tube feedings, and a ventilator (breathing machine)  · Values history: This document has questions about your views, beliefs, and how you feel and think about life  This information can help others choose the care that you would choose  Why are advance directives important? An advance directive helps you control your care  Although spoken wishes may be used, it is better to have your wishes written down  Spoken wishes can be misunderstood, or not followed  Treatments may be given even if you do not want them  An advance directive may make it easier for your family to make difficult choices about your care  Weight Management   Why it is important to manage your weight:  Being overweight increases your risk of health conditions such as heart disease, high blood pressure, type 2 diabetes, and certain types of cancer  It can also increase your risk for osteoarthritis, sleep apnea, and other respiratory problems  Aim for a slow, steady weight loss  Even a small amount of weight loss can lower your risk of health problems  How to lose weight safely:  A safe and healthy way to lose weight is to eat fewer calories and get regular exercise   You can lose up about 1 pound a week by decreasing the number of calories you eat by 500 calories each day  Healthy meal plan for weight management:  A healthy meal plan includes a variety of foods, contains fewer calories, and helps you stay healthy  A healthy meal plan includes the following:  · Eat whole-grain foods more often  A healthy meal plan should contain fiber  Fiber is the part of grains, fruits, and vegetables that is not broken down by your body  Whole-grain foods are healthy and provide extra fiber in your diet  Some examples of whole-grain foods are whole-wheat breads and pastas, oatmeal, brown rice, and bulgur  · Eat a variety of vegetables every day  Include dark, leafy greens such as spinach, kale, quique greens, and mustard greens  Eat yellow and orange vegetables such as carrots, sweet potatoes, and winter squash  · Eat a variety of fruits every day  Choose fresh or canned fruit (canned in its own juice or light syrup) instead of juice  Fruit juice has very little or no fiber  · Eat low-fat dairy foods  Drink fat-free (skim) milk or 1% milk  Eat fat-free yogurt and low-fat cottage cheese  Try low-fat cheeses such as mozzarella and other reduced-fat cheeses  · Choose meat and other protein foods that are low in fat  Choose beans or other legumes such as split peas or lentils  Choose fish, skinless poultry (chicken or turkey), or lean cuts of red meat (beef or pork)  Before you cook meat or poultry, cut off any visible fat  · Use less fat and oil  Try baking foods instead of frying them  Add less fat, such as margarine, sour cream, regular salad dressing and mayonnaise to foods  Eat fewer high-fat foods  Some examples of high-fat foods include french fries, doughnuts, ice cream, and cakes  · Eat fewer sweets  Limit foods and drinks that are high in sugar  This includes candy, cookies, regular soda, and sweetened drinks  Exercise:  Exercise at least 30 minutes per day on most days of the week   Some examples of exercise include walking, biking, dancing, and swimming  You can also fit in more physical activity by taking the stairs instead of the elevator or parking farther away from stores  Ask your healthcare provider about the best exercise plan for you  © Copyright [employer] 2018 Information is for End User's use only and may not be sold, redistributed or otherwise used for commercial purposes  All illustrations and images included in CareNotes® are the copyrighted property of [employer]  or [facility] Preventive Visit Patient Instructions  Thank you for completing your Welcome to Medicare Visit or Medicare Annual Wellness Visit today  Your next wellness visit will be due in one year (7/8/2022)  The screening/preventive services that you may require over the next 5-10 years are detailed below  Some tests may not apply to you based off risk factors and/or age  Screening tests ordered at today's visit but not completed yet may show as past due  Also, please note that scanned in results may not display below  Preventive Screenings:  Service Recommendations Previous Testing/Comments   Colorectal Cancer Screening  · Colonoscopy    · Fecal Occult Blood Test (FOBT)/Fecal Immunochemical Test (FIT)  · Fecal DNA/Cologuard Test  · Flexible Sigmoidoscopy Age: 54-65 years old   Colonoscopy: every 10 years (May be performed more frequently if at higher risk)  OR  FOBT/FIT: every 1 year  OR  Cologuard: every 3 years  OR  Sigmoidoscopy: every 5 years  Screening may be recommended earlier than age 48 if at higher risk for colorectal cancer  Also, an individualized decision between you and your healthcare provider will decide whether screening between the ages of 74-80 would be appropriate   Colonoscopy: 09/12/2018  FOBT/FIT: Not on file  Cologuard: Not on file  Sigmoidoscopy: Not on file    Screening Current     Prostate Cancer Screening Individualized decision between patient and health care provider in men between ages of 53-78   Medicare will cover every 12 months beginning on the day after your 50th birthday PSA: 2 6 ng/mL     Screening Current     Hepatitis C Screening Once for adults born between 1945 and 1965  More frequently in patients at high risk for Hepatitis C Hep C Antibody: 06/12/2018    Screening Current   Diabetes Screening 1-2 times per year if you're at risk for diabetes or have pre-diabetes Fasting glucose: 98 mg/dL   A1C: 5 0 %    Screening Current   Cholesterol Screening Once every 5 years if you don't have a lipid disorder  May order more often based on risk factors  Lipid panel: 06/24/2021    Screening Not Indicated  History Lipid Disorder      Other Preventive Screenings Covered by Medicare:  6  Abdominal Aortic Aneurysm (AAA) Screening: covered once if your at risk  You're considered to be at risk if you have a family history of AAA or a male between the age of 73-68 who smoking at least 100 cigarettes in your lifetime  7  Lung Cancer Screening: covers low dose CT scan once per year if you meet all of the following conditions: (1) Age 50-69; (2) No signs or symptoms of lung cancer; (3) Current smoker or have quit smoking within the last 15 years; (4) You have a tobacco smoking history of at least 30 pack years (packs per day x number of years you smoked); (5) You get a written order from a healthcare provider  8  Glaucoma Screening: covered annually if you're considered high risk: (1) You have diabetes OR (2) Family history of glaucoma OR (3)  aged 48 and older OR (3)  American aged 72 and older  5  Osteoporosis Screening: covered every 2 years if you meet one of the following conditions: (1) Have a vertebral abnormality; (2) On glucocorticoid therapy for more than 3 months; (3) Have primary hyperparathyroidism; (4) On osteoporosis medications and need to assess response to drug therapy    10  HIV Screening: covered annually if you're between the age of 15-65  Also covered annually if you are younger than 13 and older than 72 with risk factors for HIV infection  For pregnant patients, it is covered up to 3 times per pregnancy  Immunizations:  Immunization Recommendations   Influenza Vaccine Annual influenza vaccination during flu season is recommended for all persons aged >= 6 months who do not have contraindications   Pneumococcal Vaccine (Prevnar and Pneumovax)  * Prevnar = PCV13  * Pneumovax = PPSV23 Adults 25-60 years old: 1-3 doses may be recommended based on certain risk factors  Adults 72 years old: Prevnar (PCV13) vaccine recommended followed by Pneumovax (PPSV23) vaccine  If already received PPSV23 since turning 65, then PCV13 recommended at least one year after PPSV23 dose  Hepatitis B Vaccine 3 dose series if at intermediate or high risk (ex: diabetes, end stage renal disease, liver disease)   Tetanus (Td) Vaccine - COST NOT COVERED BY MEDICARE PART B Following completion of primary series, a booster dose should be given every 10 years to maintain immunity against tetanus  Td may also be given as tetanus wound prophylaxis  Tdap Vaccine - COST NOT COVERED BY MEDICARE PART B Recommended at least once for all adults  For pregnant patients, recommended with each pregnancy  Shingles Vaccine (Shingrix) - COST NOT COVERED BY MEDICARE PART B  2 shot series recommended in those aged 48 and above     Health Maintenance Due:      Topic Date Due    Colorectal Cancer Screening  09/12/2023    Hepatitis C Screening  Completed     Immunizations Due:      Topic Date Due    DTaP,Tdap,and Td Vaccines (1 - Tdap) Never done    Influenza Vaccine (1) 09/01/2021     Advance Directives   What are advance directives? Advance directives are legal documents that state your wishes and plans for medical care  These plans are made ahead of time in case you lose your ability to make decisions for yourself   Advance directives can apply to any medical decision, such as the treatments you want, and if you want to donate organs  What are the types of advance directives? There are many types of advance directives, and each state has rules about how to use them  You may choose a combination of any of the following:  · Living will: This is a written record of the treatment you want  You can also choose which treatments you do not want, which to limit, and which to stop at a certain time  This includes surgery, medicine, IV fluid, and tube feedings  · Durable power of  for healthcare [facility]): This is a written record that states who you want to make healthcare choices for you when you are unable to make them for yourself  This person, called a proxy, is usually a family member or a friend  You may choose more than 1 proxy  · Do not resuscitate (DNR) order:  A DNR order is used in case your heart stops beating or you stop breathing  It is a request not to have certain forms of treatment, such as CPR  A DNR order may be included in other types of advance directives  · Medical directive: This covers the care that you want if you are in a coma, near death, or unable to make decisions for yourself  You can list the treatments you want for each condition  Treatment may include pain medicine, surgery, blood transfusions, dialysis, IV or tube feedings, and a ventilator (breathing machine)  · Values history: This document has questions about your views, beliefs, and how you feel and think about life  This information can help others choose the care that you would choose  Why are advance directives important? An advance directive helps you control your care  Although spoken wishes may be used, it is better to have your wishes written down  Spoken wishes can be misunderstood, or not followed  Treatments may be given even if you do not want them  An advance directive may make it easier for your family to make difficult choices about your care     Weight Management   Why it is important to manage your weight:  Being overweight increases your risk of health conditions such as heart disease, high blood pressure, type 2 diabetes, and certain types of cancer  It can also increase your risk for osteoarthritis, sleep apnea, and other respiratory problems  Aim for a slow, steady weight loss  Even a small amount of weight loss can lower your risk of health problems  How to lose weight safely:  A safe and healthy way to lose weight is to eat fewer calories and get regular exercise  You can lose up about 1 pound a week by decreasing the number of calories you eat by 500 calories each day  Healthy meal plan for weight management:  A healthy meal plan includes a variety of foods, contains fewer calories, and helps you stay healthy  A healthy meal plan includes the following:  · Eat whole-grain foods more often  A healthy meal plan should contain fiber  Fiber is the part of grains, fruits, and vegetables that is not broken down by your body  Whole-grain foods are healthy and provide extra fiber in your diet  Some examples of whole-grain foods are whole-wheat breads and pastas, oatmeal, brown rice, and bulgur  · Eat a variety of vegetables every day  Include dark, leafy greens such as spinach, kale, quique greens, and mustard greens  Eat yellow and orange vegetables such as carrots, sweet potatoes, and winter squash  · Eat a variety of fruits every day  Choose fresh or canned fruit (canned in its own juice or light syrup) instead of juice  Fruit juice has very little or no fiber  · Eat low-fat dairy foods  Drink fat-free (skim) milk or 1% milk  Eat fat-free yogurt and low-fat cottage cheese  Try low-fat cheeses such as mozzarella and other reduced-fat cheeses  · Choose meat and other protein foods that are low in fat  Choose beans or other legumes such as split peas or lentils  Choose fish, skinless poultry (chicken or turkey), or lean cuts of red meat (beef or pork)   Before you cook meat or poultry, cut off any visible fat  · Use less fat and oil  Try baking foods instead of frying them  Add less fat, such as margarine, sour cream, regular salad dressing and mayonnaise to foods  Eat fewer high-fat foods  Some examples of high-fat foods include french fries, doughnuts, ice cream, and cakes  · Eat fewer sweets  Limit foods and drinks that are high in sugar  This includes candy, cookies, regular soda, and sweetened drinks  Exercise:  Exercise at least 30 minutes per day on most days of the week  Some examples of exercise include walking, biking, dancing, and swimming  You can also fit in more physical activity by taking the stairs instead of the elevator or parking farther away from stores  Ask your healthcare provider about the best exercise plan for you  © Copyright [employer] 2018 Information is for End User's use only and may not be sold, redistributed or otherwise used for commercial purposes   All illustrations and images included in CareNotes® are the copyrighted property of [employer]  or [address] [place] [address]

## 2021-07-18 ENCOUNTER — APPOINTMENT (EMERGENCY)
Dept: RADIOLOGY | Facility: HOSPITAL | Age: 71
End: 2021-07-18
Payer: MEDICARE

## 2021-07-18 ENCOUNTER — HOSPITAL ENCOUNTER (EMERGENCY)
Facility: HOSPITAL | Age: 71
Discharge: HOME | End: 2021-07-18
Attending: EMERGENCY MEDICINE
Payer: MEDICARE

## 2021-07-18 VITALS
DIASTOLIC BLOOD PRESSURE: 70 MMHG | BODY MASS INDEX: 42.47 KG/M2 | RESPIRATION RATE: 20 BRPM | TEMPERATURE: 97.8 F | HEART RATE: 82 BPM | SYSTOLIC BLOOD PRESSURE: 130 MMHG | OXYGEN SATURATION: 99 % | WEIGHT: 315 LBS

## 2021-07-18 DIAGNOSIS — M10.9 ACUTE GOUT: Primary | ICD-10-CM

## 2021-07-18 DIAGNOSIS — K21.9 GASTROESOPHAGEAL REFLUX DISEASE, UNSPECIFIED WHETHER ESOPHAGITIS PRESENT: ICD-10-CM

## 2021-07-18 PROCEDURE — 73630 X-RAY EXAM OF FOOT: CPT

## 2021-07-18 PROCEDURE — 99284 EMERGENCY DEPT VISIT MOD MDM: CPT | Performed by: EMERGENCY MEDICINE

## 2021-07-18 PROCEDURE — 99284 EMERGENCY DEPT VISIT MOD MDM: CPT

## 2021-07-18 RX ORDER — PREDNISONE 10 MG/1
TABLET ORAL
Qty: 25 TABLET | Refills: 0 | Status: SHIPPED | OUTPATIENT
Start: 2021-07-19 | End: 2021-07-28

## 2021-07-18 RX ORDER — OXYCODONE HYDROCHLORIDE 5 MG/1
5 TABLET ORAL EVERY 6 HOURS PRN
Qty: 10 TABLET | Refills: 0 | Status: SHIPPED | OUTPATIENT
Start: 2021-07-18 | End: 2021-07-28

## 2021-07-18 RX ORDER — OXYCODONE HYDROCHLORIDE 5 MG/1
5 TABLET ORAL ONCE
Status: COMPLETED | OUTPATIENT
Start: 2021-07-18 | End: 2021-07-18

## 2021-07-18 RX ORDER — PANTOPRAZOLE SODIUM 40 MG/1
40 TABLET, DELAYED RELEASE ORAL ONCE
Status: COMPLETED | OUTPATIENT
Start: 2021-07-18 | End: 2021-07-18

## 2021-07-18 RX ORDER — PANTOPRAZOLE SODIUM 20 MG/1
20 TABLET, DELAYED RELEASE ORAL DAILY
Qty: 20 TABLET | Refills: 0 | Status: SHIPPED | OUTPATIENT
Start: 2021-07-19 | End: 2021-10-22

## 2021-07-18 RX ORDER — PANTOPRAZOLE SODIUM 40 MG/1
40 TABLET, DELAYED RELEASE ORAL
Status: DISCONTINUED | OUTPATIENT
Start: 2021-07-19 | End: 2021-07-18

## 2021-07-18 RX ADMIN — OXYCODONE HYDROCHLORIDE 5 MG: 5 TABLET ORAL at 13:39

## 2021-07-18 RX ADMIN — PREDNISONE 50 MG: 20 TABLET ORAL at 13:39

## 2021-07-18 RX ADMIN — PANTOPRAZOLE SODIUM 40 MG: 40 TABLET, DELAYED RELEASE ORAL at 15:24

## 2021-07-18 NOTE — ED PROVIDER NOTES
Pt Name: Virginia Wahl  MRN: [id number]  [place] 1950  Age/Sex: 73 y o  male  Date of evaluation: 7/18/2021  PCP: Quan Esparza MD    [address] [place] [address]    Chief Complaint   Patient presents with    Foot Swelling     pt has hx of gout, PCP switched medication 2 weeks ago and since then pt has had swelling and pain in right foot  pt also complains of dizziness and feeling faint         HPI    73 y o  male presenting with right foot swelling and pain  Patient has history of gout  States he recently saw his family doctor, he was started on allopurinol and indomethacin, however after 2 days of taking that he started having a gouty flare in his right foot and worsening pain  He states his insurance does not cover colchicine anymore, therefore he is unable to take that  Denies increased meat intake or alcohol intake  Stops taking allopurinol and indomethacin  States he has been taking ibuprofen  He is on hydrochlorothiazide which she stop taking 2 days ago  Denies fevers, chills, nausea, vomiting  No injuries  No history of diabetes            Past Medical and Surgical History    Past Medical History:   Diagnosis Date    Anemia     Aortic aneurysm (HCC)     Elevated troponin     History of colonoscopy     6/5/13    History of esophagogastroduodenoscopy     6/5/13    Hypertension     Hypokalemia     Morbid obesity (HCC)     Renal calculi     STEMI (ST elevation myocardial infarction) (HCC)        Past Surgical History:   Procedure Laterality Date    APPENDECTOMY      ARTHROSCOPY KNEE Left     COLON SURGERY      COLOSTOMY      ROTATOR CUFF REPAIR Left     SATURATION BIOPSY OF PROSTATE      TOTAL HIP ARTHROPLASTY      Last Assessed:5/8/15       Family History   Problem Relation Age of Onset   [employer] COPD Mother     Breast cancer Mother         malignant neoplasm    Other Father         thoracic aortic aneurysm       Social History     Tobacco Use    Smoking status: Never Smoker    Smokeless tobacco: Never Used   Vaping Use    Vaping Use: Never used   Substance Use Topics    Alcohol use: Yes     Comment: social    Drug use: Never           Allergies    Allergies   Allergen Reactions    Lorazepam Other (See Comments)     very agitated  Psychosis       Home Medications    Prior to Admission medications    Medication Sig Start Date End Date Taking? Authorizing Provider   allopurinol (ZYLOPRIM) 100 mg tablet Take 1 tablet (100 mg total) by mouth daily 7/7/21   Sugey Glez MD   enalapril (VASOTEC) 20 mg tablet Take 1 tablet (20 mg total) by mouth daily 1/7/21   Sugey Glez MD   ferrous sulfate 325 (65 Fe) mg tablet Take 325 mg by mouth daily with breakfast    Historical Provider, MD   hydrALAZINE (APRESOLINE) 50 mg tablet Take 1 tablet (50 mg total) by mouth every 8 (eight) hours 9/16/20   Sugey Glez MD   hydrochlorothiazide (HYDRODIURIL) 25 mg tablet Take 1 tablet (25 mg total) by mouth daily 9/16/20   Sugey Glez MD   ibuprofen (MOTRIN) 400 mg tablet Take 200 mg by mouth every 6 (six) hours as needed for mild pain (PRN )    Historical Provider, MD   indomethacin (INDOCIN) 50 mg capsule Take 1 capsule (50 mg total) by mouth 3 (three) times a day with meals 7/7/21   Sugey Glez MD   metoprolol succinate (TOPROL-XL) 100 mg 24 hr tablet take 1 tablet by mouth once daily 6/15/21   Sugey Glez MD           Review of Systems    Review of Systems   Constitutional: Negative for chills and fever  Respiratory: Negative for cough and shortness of breath  Cardiovascular: Negative for chest pain and palpitations  Gastrointestinal: Negative for nausea and vomiting  Musculoskeletal: Positive for arthralgias  Skin: Positive for color change  Negative for wound  Neurological: Negative for weakness and numbness             Physical Exam      ED Triage Vitals   Temperature Pulse Respirations Blood Pressure SpO2   07/18/21 1305 07/18/21 1305 07/18/21 1305 07/18/21 1305 07/18/21 1305   97 8 °F (36 6 °C) 82 20 126/71 98 %      Temp Source Heart Rate Source Patient Position - Orthostatic VS BP Location FiO2 (%)   07/18/21 1305 07/18/21 1305 07/18/21 1305 07/18/21 1305 --   Oral Monitor Sitting Right arm       Pain Score       07/18/21 1339       8               Physical Exam  Constitutional:       General: He is not in acute distress  Appearance: He is not ill-appearing  HENT:      Head: Normocephalic and atraumatic  Nose: Nose normal    Eyes:      Extraocular Movements: Extraocular movements intact  Pupils: Pupils are equal, round, and reactive to light  Cardiovascular:      Rate and Rhythm: Normal rate and regular rhythm  Pulmonary:      Effort: Pulmonary effort is normal  No respiratory distress  Abdominal:      General: There is no distension  Palpations: Abdomen is soft  Tenderness: There is no abdominal tenderness  Musculoskeletal:         General: No deformity  Cervical back: Normal range of motion and neck supple  Comments: Swelling, tenderness, erythema of right 1st-5th MTPs, as well as of lateral right foot  Skin:     General: Skin is warm  Findings: No erythema  Neurological:      Mental Status: He is alert and oriented to person, place, and time  Mental status is at baseline  Diagnostic Results      Labs:    Results Reviewed     None          All labs reviewed and utilized in the medical decision making process    Radiology:    XR foot 3+ views RIGHT    (Results Pending)       All radiology studies independently viewed by me and interpreted by the radiologist     Procedure    Procedures        MDM    Exam primarily concerning for acute gouty flare  Patient already stopped taking his hydrochlorothiazide, at recommended continuing cessation of this medication, and likely replacement after speaking with his family doctor  He is on other blood pressure medications, currently his blood pressure is controlled    Colchicine is not covered by his insurance  I did recommend indomethacin that has been prescribed him already  Will also give him prednisone, oxycodone for pain  Will obtain x-ray  Doubt cellulitis or infection otherwise  No acute bony fractures or soft tissue gas on my interpretation of x-ray  Wife present in room now, states patient also has had some upset stomach, nausea, epigastric burning abdominal pain  This started since he started taking the indomethacin and ibuprofen  Currently he is asymptomatic from this standpoint, no nausea or vomiting, no abdominal pain, abdominal exam is benign, no tenderness  Likely has had some gastritis/reflux due to NSAID use  I am prescribing prednisone as well, and he is going to be taking indomethacin, which could with further worsened this therefore given a dose of Protonix in the emergency department  Also prescribed Protonix  Advised to use Tums, Pepto-Bismol, Pepcid as needed  Diet discussed with patient  Advised close PCP follow-up  Return precautions discussed, patient verbalized understanding  Medications   pantoprazole (PROTONIX) EC tablet 40 mg (has no administration in time range)   oxyCODONE (ROXICODONE) IR tablet 5 mg (5 mg Oral Given 7/18/21 1339)   predniSONE tablet 50 mg (50 mg Oral Given 7/18/21 1339)           FINAL IMPRESSION    Final diagnoses:   Acute gout   Gastroesophageal reflux disease, unspecified whether esophagitis present         DISPOSITION    Time reflects when diagnosis was documented in both MDM as applicable and the Disposition within this note     Time User Action Codes Description Comment    7/18/2021  1:53 PM Margaret George Add [M10 9] Acute gout     7/18/2021  2:11 PM Margaret George Add [K21 9] Gastroesophageal reflux disease, unspecified whether esophagitis present       ED Disposition     ED Disposition Condition Date/Time Comment    Discharge Stable Sun Jul 18, 2021  1:51 PM Cony 39 discharge to home/self care              Follow-up Information     Follow up With Specialties Details Why Contact [place] MD Family Medicine Schedule an appointment as soon as possible for a visit in 2 days  [facility] [address] TO:    Francia Toth MD  [facility] [address] [facility]    Schedule an appointment as soon as possible for a visit in 2 days        DISCHARGE MEDICATIONS:    Patient's Medications   Discharge Prescriptions    OXYCODONE (ROXICODONE) 5 MG IMMEDIATE RELEASE TABLET    Take 1 tablet (5 mg total) by mouth every 6 (six) hours as needed for severe pain for up to 10 daysMax Daily Amount: 20 mg       Start Date: 7/18/2021 End Date: 7/28/2021       Order Dose: 5 mg       Quantity: 10 tablet    Refills: 0    PANTOPRAZOLE (PROTONIX) 20 MG TABLET    Take 1 tablet (20 mg total) by mouth daily       Start Date: 7/19/2021 End Date: --       Order Dose: 20 mg       Quantity: 20 tablet    Refills: 0    PREDNISONE 10 MG TABLET    Take 5 tablets (50 mg total) by mouth daily for 1 day, THEN 4 tablets (40 mg total) daily for 2 days, THEN 3 tablets (30 mg total) daily for 2 days, THEN 2 tablets (20 mg total) daily for 2 days, THEN 1 tablet (10 mg total) daily for 2 days  Start Date: 7/19/2021 End Date: 7/28/2021       Order Dose: --       Quantity: 25 tablet    Refills: 0       No discharge procedures on file  Mary Mcclelland DO        This note was partially completed using voice recognition technology, and was scanned for gross errors; however some errors may still exist  Please contact the author with any questions or requests for clarification        Mary Mcclelland DO  07/18/21 8124

## 2021-08-03 ENCOUNTER — TELEPHONE (OUTPATIENT)
Dept: FAMILY MEDICINE CLINIC | Facility: CLINIC | Age: 71
End: 2021-08-03

## 2021-08-03 DIAGNOSIS — M10.9 GOUT, UNSPECIFIED CAUSE, UNSPECIFIED CHRONICITY, UNSPECIFIED SITE: Primary | ICD-10-CM

## 2021-08-03 RX ORDER — COLCHICINE 0.6 MG/1
TABLET ORAL
Qty: 30 TABLET | Refills: 5 | Status: SHIPPED | OUTPATIENT
Start: 2021-08-03 | End: 2022-07-25

## 2021-08-03 NOTE — TELEPHONE ENCOUNTER
Pt dropped off a letter for Dr Darron Martin requesting [facility] rx for his gout  He will use [facility]  He has had another flare of gout while on indomethacin and allopurinol  Letter on Dr Darron Martin' in box

## 2021-08-11 ENCOUNTER — APPOINTMENT (OUTPATIENT)
Dept: LAB | Facility: CLINIC | Age: 71
End: 2021-08-11
Payer: MEDICARE

## 2021-08-11 DIAGNOSIS — Z12.5 ENCOUNTER FOR SCREENING FOR MALIGNANT NEOPLASM OF PROSTATE: ICD-10-CM

## 2021-08-11 LAB — PSA SERPL-MCNC: 3.5 NG/ML (ref 0–4)

## 2021-08-11 PROCEDURE — 36415 COLL VENOUS BLD VENIPUNCTURE: CPT

## 2021-08-11 PROCEDURE — G0103 PSA SCREENING: HCPCS

## 2021-10-14 ENCOUNTER — IMMUNIZATIONS (OUTPATIENT)
Dept: FAMILY MEDICINE CLINIC | Facility: CLINIC | Age: 71
End: 2021-10-14
Payer: MEDICARE

## 2021-10-14 DIAGNOSIS — Z23 ENCOUNTER FOR IMMUNIZATION: Primary | ICD-10-CM

## 2021-10-14 PROCEDURE — 90662 IIV NO PRSV INCREASED AG IM: CPT

## 2021-10-14 PROCEDURE — G0008 ADMIN INFLUENZA VIRUS VAC: HCPCS

## 2021-10-22 ENCOUNTER — OFFICE VISIT (OUTPATIENT)
Dept: FAMILY MEDICINE CLINIC | Facility: CLINIC | Age: 71
End: 2021-10-22
Payer: MEDICARE

## 2021-10-22 VITALS
TEMPERATURE: 98.2 F | SYSTOLIC BLOOD PRESSURE: 140 MMHG | BODY MASS INDEX: 41.64 KG/M2 | RESPIRATION RATE: 20 BRPM | HEIGHT: 73 IN | WEIGHT: 314.2 LBS | HEART RATE: 94 BPM | DIASTOLIC BLOOD PRESSURE: 80 MMHG | OXYGEN SATURATION: 96 %

## 2021-10-22 DIAGNOSIS — L03.115 CELLULITIS OF RIGHT LOWER EXTREMITY: Primary | ICD-10-CM

## 2021-10-22 PROCEDURE — 99213 OFFICE O/P EST LOW 20 MIN: CPT | Performed by: FAMILY MEDICINE

## 2021-10-22 RX ORDER — TAMSULOSIN HYDROCHLORIDE 0.4 MG/1
0.4 CAPSULE ORAL DAILY
COMMUNITY
Start: 2021-09-08

## 2021-10-22 RX ORDER — PREDNISONE 20 MG/1
60 TABLET ORAL DAILY
COMMUNITY
Start: 2021-10-17 | End: 2021-10-22

## 2021-10-22 RX ORDER — CEPHALEXIN 500 MG/1
CAPSULE ORAL
COMMUNITY
Start: 2021-10-17 | End: 2022-01-31 | Stop reason: ALTCHOICE

## 2021-10-22 RX ORDER — FINASTERIDE 5 MG/1
5 TABLET, FILM COATED ORAL DAILY
COMMUNITY
Start: 2021-09-08

## 2021-10-22 RX ORDER — AMOXICILLIN 500 MG/1
2000 CAPSULE ORAL AS NEEDED
COMMUNITY
Start: 2021-09-20 | End: 2022-01-31 | Stop reason: ALTCHOICE

## 2021-10-26 ENCOUNTER — OFFICE VISIT (OUTPATIENT)
Dept: DERMATOLOGY | Facility: CLINIC | Age: 71
End: 2021-10-26
Payer: MEDICARE

## 2021-10-26 DIAGNOSIS — L23.3 ALLERGIC CONTACT DERMATITIS DUE TO DRUGS IN CONTACT WITH SKIN: Primary | ICD-10-CM

## 2021-10-26 DIAGNOSIS — I10 ESSENTIAL HYPERTENSION: Chronic | ICD-10-CM

## 2021-10-26 PROCEDURE — 99203 OFFICE O/P NEW LOW 30 MIN: CPT | Performed by: DERMATOLOGY

## 2021-10-26 RX ORDER — CLOBETASOL PROPIONATE 0.5 MG/G
OINTMENT TOPICAL 2 TIMES DAILY
Qty: 60 G | Refills: 0 | Status: SHIPPED | OUTPATIENT
Start: 2021-10-26 | End: 2022-01-31 | Stop reason: ALTCHOICE

## 2021-10-26 RX ORDER — HYDROCHLOROTHIAZIDE 25 MG/1
TABLET ORAL
Qty: 90 TABLET | Refills: 3 | Status: SHIPPED | OUTPATIENT
Start: 2021-10-26

## 2021-11-09 DIAGNOSIS — I10 ESSENTIAL HYPERTENSION: Chronic | ICD-10-CM

## 2021-11-09 RX ORDER — HYDRALAZINE HYDROCHLORIDE 50 MG/1
TABLET, FILM COATED ORAL
Qty: 270 TABLET | Refills: 3 | Status: SHIPPED | OUTPATIENT
Start: 2021-11-09 | End: 2022-03-03 | Stop reason: SDUPTHER

## 2021-12-28 ENCOUNTER — APPOINTMENT (OUTPATIENT)
Dept: LAB | Facility: CLINIC | Age: 71
End: 2021-12-28
Payer: MEDICARE

## 2021-12-28 DIAGNOSIS — E78.2 MIXED HYPERLIPIDEMIA: ICD-10-CM

## 2021-12-28 DIAGNOSIS — Z12.5 PROSTATE CANCER SCREENING: ICD-10-CM

## 2021-12-28 DIAGNOSIS — M10.9 GOUT, UNSPECIFIED CAUSE, UNSPECIFIED CHRONICITY, UNSPECIFIED SITE: ICD-10-CM

## 2021-12-28 LAB
ALBUMIN SERPL BCP-MCNC: 3.5 G/DL (ref 3.5–5)
ALP SERPL-CCNC: 62 U/L (ref 46–116)
ALT SERPL W P-5'-P-CCNC: 34 U/L (ref 12–78)
ANION GAP SERPL CALCULATED.3IONS-SCNC: 5 MMOL/L (ref 4–13)
AST SERPL W P-5'-P-CCNC: 21 U/L (ref 5–45)
BASOPHILS # BLD AUTO: 0.05 THOUSANDS/ΜL (ref 0–0.1)
BASOPHILS NFR BLD AUTO: 1 % (ref 0–1)
BILIRUB SERPL-MCNC: 0.65 MG/DL (ref 0.2–1)
BUN SERPL-MCNC: 19 MG/DL (ref 5–25)
CALCIUM SERPL-MCNC: 8.7 MG/DL (ref 8.3–10.1)
CHLORIDE SERPL-SCNC: 104 MMOL/L (ref 100–108)
CHOLEST SERPL-MCNC: 181 MG/DL
CO2 SERPL-SCNC: 32 MMOL/L (ref 21–32)
CREAT SERPL-MCNC: 0.96 MG/DL (ref 0.6–1.3)
EOSINOPHIL # BLD AUTO: 0.41 THOUSAND/ΜL (ref 0–0.61)
EOSINOPHIL NFR BLD AUTO: 7 % (ref 0–6)
ERYTHROCYTE [DISTWIDTH] IN BLOOD BY AUTOMATED COUNT: 13.4 % (ref 11.6–15.1)
GFR SERPL CREATININE-BSD FRML MDRD: 80 ML/MIN/1.73SQ M
GLUCOSE P FAST SERPL-MCNC: 102 MG/DL (ref 65–99)
HCT VFR BLD AUTO: 45.8 % (ref 36.5–49.3)
HDLC SERPL-MCNC: 32 MG/DL
HGB BLD-MCNC: 14.8 G/DL (ref 12–17)
IMM GRANULOCYTES # BLD AUTO: 0.01 THOUSAND/UL (ref 0–0.2)
IMM GRANULOCYTES NFR BLD AUTO: 0 % (ref 0–2)
LDLC SERPL CALC-MCNC: 114 MG/DL (ref 0–100)
LYMPHOCYTES # BLD AUTO: 1.11 THOUSANDS/ΜL (ref 0.6–4.47)
LYMPHOCYTES NFR BLD AUTO: 20 % (ref 14–44)
MCH RBC QN AUTO: 28.8 PG (ref 26.8–34.3)
MCHC RBC AUTO-ENTMCNC: 32.3 G/DL (ref 31.4–37.4)
MCV RBC AUTO: 89 FL (ref 82–98)
MONOCYTES # BLD AUTO: 0.61 THOUSAND/ΜL (ref 0.17–1.22)
MONOCYTES NFR BLD AUTO: 11 % (ref 4–12)
NEUTROPHILS # BLD AUTO: 3.48 THOUSANDS/ΜL (ref 1.85–7.62)
NEUTS SEG NFR BLD AUTO: 61 % (ref 43–75)
NONHDLC SERPL-MCNC: 149 MG/DL
NRBC BLD AUTO-RTO: 0 /100 WBCS
PLATELET # BLD AUTO: 178 THOUSANDS/UL (ref 149–390)
PMV BLD AUTO: 10 FL (ref 8.9–12.7)
POTASSIUM SERPL-SCNC: 3.9 MMOL/L (ref 3.5–5.3)
PROT SERPL-MCNC: 6.9 G/DL (ref 6.4–8.2)
PSA SERPL-MCNC: 1.7 NG/ML (ref 0–4)
RBC # BLD AUTO: 5.13 MILLION/UL (ref 3.88–5.62)
SODIUM SERPL-SCNC: 141 MMOL/L (ref 136–145)
TRIGL SERPL-MCNC: 176 MG/DL
URATE SERPL-MCNC: 7.6 MG/DL (ref 4.2–8)
WBC # BLD AUTO: 5.67 THOUSAND/UL (ref 4.31–10.16)

## 2021-12-28 PROCEDURE — 84550 ASSAY OF BLOOD/URIC ACID: CPT

## 2021-12-28 PROCEDURE — 85025 COMPLETE CBC W/AUTO DIFF WBC: CPT

## 2021-12-28 PROCEDURE — 80053 COMPREHEN METABOLIC PANEL: CPT

## 2021-12-28 PROCEDURE — 36415 COLL VENOUS BLD VENIPUNCTURE: CPT

## 2021-12-28 PROCEDURE — 80061 LIPID PANEL: CPT

## 2021-12-28 PROCEDURE — G0103 PSA SCREENING: HCPCS

## 2022-01-25 DIAGNOSIS — I10 ESSENTIAL HYPERTENSION: Chronic | ICD-10-CM

## 2022-01-25 RX ORDER — ENALAPRIL MALEATE 20 MG/1
TABLET ORAL
Qty: 90 TABLET | Refills: 5 | Status: SHIPPED | OUTPATIENT
Start: 2022-01-25

## 2022-01-31 ENCOUNTER — OFFICE VISIT (OUTPATIENT)
Dept: FAMILY MEDICINE CLINIC | Facility: CLINIC | Age: 72
End: 2022-01-31
Payer: MEDICARE

## 2022-01-31 VITALS
OXYGEN SATURATION: 100 % | TEMPERATURE: 97.7 F | DIASTOLIC BLOOD PRESSURE: 86 MMHG | SYSTOLIC BLOOD PRESSURE: 148 MMHG | WEIGHT: 315 LBS | BODY MASS INDEX: 41.75 KG/M2 | HEART RATE: 76 BPM | HEIGHT: 73 IN

## 2022-01-31 DIAGNOSIS — E78.2 MIXED HYPERLIPIDEMIA: ICD-10-CM

## 2022-01-31 DIAGNOSIS — M10.9 GOUT, UNSPECIFIED CAUSE, UNSPECIFIED CHRONICITY, UNSPECIFIED SITE: ICD-10-CM

## 2022-01-31 DIAGNOSIS — D51.9 ANEMIA DUE TO VITAMIN B12 DEFICIENCY, UNSPECIFIED B12 DEFICIENCY TYPE: ICD-10-CM

## 2022-01-31 DIAGNOSIS — G47.33 OBSTRUCTIVE SLEEP APNEA: ICD-10-CM

## 2022-01-31 DIAGNOSIS — E66.01 MORBID OBESITY WITH BMI OF 40.0-44.9, ADULT (HCC): ICD-10-CM

## 2022-01-31 DIAGNOSIS — I71.2 THORACIC AORTIC ANEURYSM WITHOUT RUPTURE (HCC): ICD-10-CM

## 2022-01-31 DIAGNOSIS — I10 PRIMARY HYPERTENSION: Primary | Chronic | ICD-10-CM

## 2022-01-31 PROBLEM — L03.115 CELLULITIS OF RIGHT LOWER EXTREMITY: Status: RESOLVED | Noted: 2021-10-22 | Resolved: 2022-01-31

## 2022-01-31 PROCEDURE — 99214 OFFICE O/P EST MOD 30 MIN: CPT | Performed by: FAMILY MEDICINE

## 2022-01-31 NOTE — ASSESSMENT & PLAN NOTE
Continue Elavil 20 mg daily, Apresoline 50 mg t i d  Hydrochlorothiazide 25 mg daily and Toprol- mg daily  Continue to monitor blood pressure home where he gets better readings

## 2022-01-31 NOTE — PROGRESS NOTES
BMI Counseling: Body mass index is 42 75 kg/m²  The BMI is above normal  Nutrition recommendations include decreasing portion sizes and moderation in carbohydrate intake  Exercise recommendations include exercising 3-5 times per week  No pharmacotherapy was ordered  Rationale for BMI follow-up plan is due to patient being overweight or obese  Depression Screening and Follow-up Plan: Patient was screened for depression during today's encounter  They screened negative with a PHQ-2 score of 0  Assessment/Plan:  Return visit 6 months with fasting blood work prior to visit       Problem List Items Addressed This Visit        Respiratory    Obstructive sleep apnea       Cardiovascular and Mediastinum    Hypertension - Primary (Chronic)     Continue Elavil 20 mg daily, Apresoline 50 mg t i d  Hydrochlorothiazide 25 mg daily and Toprol- mg daily  Continue to monitor blood pressure home where he gets better readings  Thoracic aortic aneurysm without rupture [facility])     He follows up with cardiology for this  He is advised to contact them  We see no recent imaging            Other    Morbid obesity with BMI of 40 0-44 9, adult (HCC)     Weight loss recommended         Hyperlipidemia    Relevant Orders    Lipid panel    Comprehensive metabolic panel    Anemia    Gout     Continue colchicine as needed         Relevant Orders    Uric acid            Subjective:      Patient ID: Sofia Morgan is a 73 y o  male  HPI    The following portions of the patient's history were reviewed and updated as appropriate:   Past Medical History:  He has a past medical history of Anemia, Aortic aneurysm ([facility] [address] ), Elevated troponin, History of colonoscopy, History of esophagogastroduodenoscopy, Hypertension, Hypokalemia, Morbid obesity ([facility] [address] ), Renal calculi, and STEMI (ST elevation myocardial infarction) ([facility] [address] )  ,  _______________________________________________________________________  Medical Problems:  does not have any pertinent problems on file ,  _______________________________________________________________________  Past Surgical History:   has a past surgical history that includes Colon surgery; Appendectomy; ARTHROSCOPY KNEE (Left); Colostomy; Saturation biopsy of prostate; Rotator cuff repair (Left); and Total hip arthroplasty  ,  _______________________________________________________________________  Family History:  family history includes Breast cancer in his mother; COPD in his mother; Other in his father ,  _______________________________________________________________________  Social History:   reports that he has never smoked  He has never used smokeless tobacco  He reports current alcohol use  He reports that he does not use drugs  ,  _______________________________________________________________________  Allergies:  is allergic to lorazepam   _______________________________________________________________________  Current Outpatient Medications   Medication Sig Dispense Refill    colchicine (COLCRYS) 0 6 mg tablet One q 3 hours p r n  gout 30 tablet 5    enalapril (VASOTEC) 20 mg tablet take 1 tablet by mouth daily 90 tablet 5    ferrous sulfate 325 (65 Fe) mg tablet Take 325 mg by mouth daily with breakfast      finasteride (PROSCAR) 5 mg tablet Take 5 mg by mouth daily      hydrALAZINE (APRESOLINE) 50 mg tablet take 1 tablet by mouth every 8 hours 270 tablet 3    hydrochlorothiazide (HYDRODIURIL) 25 mg tablet take 1 tablet by mouth once daily 90 tablet 3    ibuprofen (MOTRIN) 400 mg tablet Take 200 mg by mouth every 6 (six) hours as needed for mild pain (PRN )      metoprolol succinate (TOPROL-XL) 100 mg 24 hr tablet take 1 tablet by mouth once daily 90 tablet 5    tamsulosin (FLOMAX) 0 4 mg Take 0 4 mg by mouth daily       No current facility-administered medications for this visit      _______________________________________________________________________  Review of Systems      Objective:  Vitals: 01/31/22 0840   BP: 148/86   Pulse: 76   Temp: 97 7 °F (36 5 °C)   SpO2: 100%   Weight: (!) 147 kg (324 lb)   Height: 6' 1" (1 854 m)     Body mass index is 42 75 kg/m²       Physical Exam

## 2022-02-01 ENCOUNTER — TELEPHONE (OUTPATIENT)
Dept: CARDIOLOGY CLINIC | Facility: CLINIC | Age: 72
End: 2022-02-01

## 2022-02-01 NOTE — TELEPHONE ENCOUNTER
Name of Caller: Patient     Call back Number: [phone]    Patient contacting office stated needs an echo  No order in the system

## 2022-02-01 NOTE — TELEPHONE ENCOUNTER
Patient last visit was virtual on 4/29/2020 and not currently scheduled for an appt  Can order be placed?

## 2022-02-17 ENCOUNTER — HOSPITAL ENCOUNTER (OUTPATIENT)
Dept: NON INVASIVE DIAGNOSTICS | Facility: CLINIC | Age: 72
Discharge: HOME | End: 2022-02-17
Payer: MEDICARE

## 2022-02-17 VITALS
WEIGHT: 315 LBS | HEIGHT: 73 IN | HEART RATE: 76 BPM | BODY MASS INDEX: 41.75 KG/M2 | DIASTOLIC BLOOD PRESSURE: 86 MMHG | SYSTOLIC BLOOD PRESSURE: 148 MMHG

## 2022-02-17 DIAGNOSIS — I71.2 THORACIC AORTIC ANEURYSM WITHOUT RUPTURE (HCC): ICD-10-CM

## 2022-02-17 LAB
AORTIC ROOT: 3.2 CM
AORTIC VALVE MEAN VELOCITY: 17.4 M/S
APICAL FOUR CHAMBER EJECTION FRACTION: 62 %
ASCENDING AORTA: 4 CM (ref 2.41–3.61)
AV LVOT MEAN GRADIENT: 2 MMHG
AV LVOT PEAK GRADIENT: 3 MMHG
AV MEAN GRADIENT: 13 MMHG
AV PEAK GRADIENT: 20 MMHG
AV VELOCITY RATIO: 0.42
DOP CALC AO PEAK VEL: 2.24 M/S
DOP CALC AO VTI: 49.98 CM
DOP CALC LVOT PEAK VEL VTI: 21.96 CM
DOP CALC LVOT PEAK VEL: 0.93 M/S
E WAVE DECELERATION TIME: 73 MS
FRACTIONAL SHORTENING: 33 % (ref 28–44)
INTERVENTRICULAR SEPTUM IN DIASTOLE (PARASTERNAL SHORT AXIS VIEW): 1.5 CM (ref 0.61–1.15)
INTERVENTRICULAR SEPTUM: 1.5 CM (ref 0.6–1.1)
LAAS-AP2: 32.6 CM2
LAAS-AP4: 22.8 CM2
LEFT ATRIUM AREA SYSTOLE SINGLE PLANE A4C: 28.6 CM2
LEFT ATRIUM SIZE: 5.1 CM
LEFT INTERNAL DIMENSION IN SYSTOLE: 3.8 CM (ref 18.65–28.3)
LEFT VENTRICULAR INTERNAL DIMENSION IN DIASTOLE: 5.7 CM (ref 32.96–49.16)
LEFT VENTRICULAR POSTERIOR WALL IN END DIASTOLE: 1.5 CM (ref 0.6–1.13)
LEFT VENTRICULAR STROKE VOLUME: 100 ML
LVSV (TEICH): 100 ML
MV E'TISSUE VEL-SEP: 8 CM/S
MV PEAK A VEL: 1.3 M/S
MV PEAK E VEL: 81 CM/S
MV STENOSIS PRESSURE HALF TIME: 0 MS
RIGHT ATRIUM AREA SYSTOLE A4C: 14.6 CM2
RIGHT VENTRICLE ID DIMENSION: 3.2 CM
SL CV LEFT ATRIUM LENGTH A2C: 7.1 CM
SL CV PED ECHO LEFT VENTRICLE DIASTOLIC VOLUME (MOD BIPLANE) 2D: 163 ML
SL CV PED ECHO LEFT VENTRICLE SYSTOLIC VOLUME (MOD BIPLANE) 2D: 62 ML
Z-SCORE OF ASCENDING AORTA: 3.29
Z-SCORE OF INTERVENTRICULAR SEPTUM IN END DIASTOLE: 4.56
Z-SCORE OF LEFT VENTRICULAR DIMENSION IN END DIASTOLE: -19.55
Z-SCORE OF LEFT VENTRICULAR DIMENSION IN END SYSTOLE: -14.22
Z-SCORE OF LEFT VENTRICULAR POSTERIOR WALL IN END DIASTOLE: 4.68

## 2022-02-17 PROCEDURE — C8929 TTE W OR WO FOL WCON,DOPPLER: HCPCS

## 2022-02-17 PROCEDURE — 93306 TTE W/DOPPLER COMPLETE: CPT | Performed by: INTERNAL MEDICINE

## 2022-02-17 RX ADMIN — PERFLUTREN 0.6 ML/MIN: 6.52 INJECTION, SUSPENSION INTRAVENOUS at 08:20

## 2022-03-03 ENCOUNTER — OFFICE VISIT (OUTPATIENT)
Dept: CARDIOLOGY CLINIC | Facility: CLINIC | Age: 72
End: 2022-03-03
Payer: MEDICARE

## 2022-03-03 VITALS
BODY MASS INDEX: 41.75 KG/M2 | HEART RATE: 89 BPM | WEIGHT: 315 LBS | HEIGHT: 73 IN | OXYGEN SATURATION: 96 % | SYSTOLIC BLOOD PRESSURE: 158 MMHG | DIASTOLIC BLOOD PRESSURE: 88 MMHG

## 2022-03-03 DIAGNOSIS — G47.33 OBSTRUCTIVE SLEEP APNEA: ICD-10-CM

## 2022-03-03 DIAGNOSIS — I35.0 NON-RHEUMATIC AORTIC STENOSIS: ICD-10-CM

## 2022-03-03 DIAGNOSIS — E66.01 MORBID OBESITY WITH BMI OF 40.0-44.9, ADULT (HCC): ICD-10-CM

## 2022-03-03 DIAGNOSIS — I51.7 MODERATE CONCENTRIC LEFT VENTRICULAR HYPERTROPHY: ICD-10-CM

## 2022-03-03 DIAGNOSIS — I71.2 THORACIC AORTIC ANEURYSM WITHOUT RUPTURE (HCC): ICD-10-CM

## 2022-03-03 DIAGNOSIS — I10 ESSENTIAL HYPERTENSION: Chronic | ICD-10-CM

## 2022-03-03 DIAGNOSIS — E78.2 MIXED HYPERLIPIDEMIA: Primary | ICD-10-CM

## 2022-03-03 PROCEDURE — 99214 OFFICE O/P EST MOD 30 MIN: CPT | Performed by: INTERNAL MEDICINE

## 2022-03-03 RX ORDER — AMOXICILLIN 500 MG/1
CAPSULE ORAL
COMMUNITY
Start: 2022-03-02 | End: 2022-03-03

## 2022-03-03 RX ORDER — HYDRALAZINE HYDROCHLORIDE 50 MG/1
75 TABLET, FILM COATED ORAL EVERY 8 HOURS
Qty: 270 TABLET | Refills: 3
Start: 2022-03-03 | End: 2022-03-04

## 2022-03-03 NOTE — PROGRESS NOTES
CARDIOLOGY OFFICE VISIT  [facility]  [facility], [facility], 0 [facility], [address], [address]  Tel: [phone]      NAME: Josie Tay  AGE: 73 y o  SEX: male  : 1950   MRN: [id number]      Chief Complaint:  Chief Complaint   Patient presents with    Follow-up     follow up after echo         History of Present Illness:   Patient comes for follow up  States he is doing well from cardiac stand point and denies chest pain / pressure, SOB, palpitations, lightheadedness, syncope, swelling feet, orthopnea, PND, claudication  HTN, LVH -  Has been hypertensive for many years  Taking medications regularly  Denies lightheadedness, headache, medication side effects  HLP -  Has had hyperlipidemia for many years  Taking statin regularly along with diet control  Denies myalgia  PCP closely monitoring the blood work  Ascending aortic aneurysm -  4 5 cm on CT scan done in 2018  Size similar to the CT scan from 2018   Echo (2022) 4 2 cm     Morbid Obesity - states he is trying to lose weight      SRAVAN -  States he uses CPAP regularly      Past Medical History:  Past Medical History:   Diagnosis Date    Anemia     Aortic aneurysm (HCC)     Elevated troponin     History of colonoscopy     13    History of esophagogastroduodenoscopy     13    Hypertension     Hypokalemia     Morbid obesity (HCC)     Renal calculi     STEMI (ST elevation myocardial infarction) ([facility] [address] )          Past Surgical History:  Past Surgical History:   Procedure Laterality Date    APPENDECTOMY      ARTHROSCOPY KNEE Left     COLON SURGERY      COLOSTOMY      ROTATOR CUFF REPAIR Left     SATURATION BIOPSY OF PROSTATE      TOTAL HIP ARTHROPLASTY      Last Assessed:5/8/15         Family History:  Family History   Problem Relation Age of Onset    COPD Mother     Breast cancer Mother         malignant neoplasm    Other Father thoracic aortic aneurysm         Social History:  Social History     Socioeconomic History    Marital status: /Civil Union     Spouse name: None    Number of children: None    Years of education: None    Highest education level: None   Occupational History    None   Tobacco Use    Smoking status: Never Smoker    Smokeless tobacco: Never Used   Vaping Use    Vaping Use: Never used   Substance and Sexual Activity    Alcohol use: Yes     Comment: social    Drug use: Never    Sexual activity: Not Currently   Other Topics Concern    None   Social History Narrative    None     Social Determinants of Health     Financial Resource Strain: Not on file   Food Insecurity: Not on file   Transportation Needs: Not on file   Physical Activity: Not on file   Stress: Not on file   Social Connections: Not on file   Intimate Partner Violence: Not on file   Housing Stability: Not on file         Active Problems:  Patient Active Problem List   Diagnosis    Hypertension    Morbid obesity with BMI of 40 0-44 9, adult ([facility] [address] )    Obstructive sleep apnea    Hyperlipidemia    Anemia    Moderate concentric left ventricular hypertrophy    Gout    [facility]         The following portions of the patient's history were reviewed and updated as appropriate: past medical history, past surgical history, past family history,  past social history, current medications, allergies and problem list       Review of Systems:  Constitutional: Denies fever, chills  Eyes: Denies eye redness, eye discharge  ENT: Denies hearing loss, sneezing, nasal discharge, sore throat   Respiratory: Denies cough, expectoration, shortness of breath  Cardiovascular: Denies chest pain, palpitations, lower extremity swelling  Gastrointestinal: Denies abdominal pain, nausea, vomiting, diarrhea  Genito-Urinary: Denies dysuria, incontinence  Musculoskeletal: Denies back pain, joint pain, muscle pain  Neurologic: Denies lightheadedness, syncope, headache, seizures  Endocrine: Denies polydipsia, temperature intolerance  Allergy and Immunology: Denies hives, insect bite sensitivity  Hematological and Lymphatic: Denies bleeding problems, swollen glands   Psychological: Denies depression, suicidal ideation, anxiety, panic  Dermatological: Denies pruritus, rash, skin lesion changes      Vitals:  Vitals:    03/03/22 0828   BP: 158/88   Pulse: 89   SpO2: 96%       Body mass index is 42 75 kg/m²  Weight (last 2 days)     Date/Time Weight    03/03/22 0828 147 (324)            Physical Examination:  General:  Morbidly obese  Patient is not in acute distress  Awake, alert, oriented in time, place and person  Responding to commands  Head: Normocephalic  Atraumatic  Eyes: Both pupils normal sized, round and reactive to light  Nonicteric  ENT: Normal external ear canals  Neck: Supple  JVP not raised  Trachea central  No thyromegaly  Lungs: Bilateral bronchovascular breath sounds with no crackles or rhonchi  Chest wall: No tenderness  Cardiovascular: RRR  S1 and S2 normal  No murmur, rub or gallop  Gastrointestinal: Abdomen soft, nontender  No guarding or rigidity  Liver and spleen not palpable  Bowel sounds present  Neurologic: Patient is awake, alert, oriented in time, place and person  Responding to commands  Moving all extremities  Integumentary:  No skin rash  Lymphatic: No cervical lymphadenopathy  Back: Symmetric   No CVA tenderness  Extremities: No clubbing, cyanosis or edema      Laboratory Results:  CBC with diff:   Lab Results   Component Value Date    WBC 5 67 12/28/2021    WBC 8 15 10/30/2015    RBC 5 13 12/28/2021    RBC 4 90 10/30/2015    HGB 14 8 12/28/2021    HGB 15 1 10/30/2015    HCT 45 8 12/28/2021    HCT 43 0 10/30/2015    MCV 89 12/28/2021    MCV 88 10/30/2015    MCH 28 8 12/28/2021    MCH 30 8 10/30/2015    RDW 13 4 12/28/2021    RDW 13 2 10/30/2015     12/28/2021     10/30/2015       CMP:  Lab Results Component Value Date    CREATININE 0 96 2021    CREATININE 0 89 10/30/2015    BUN 19 2021    BUN 17 10/30/2015     10/30/2015    K 3 9 2021    K 3 9 10/30/2015     2021     10/30/2015    CO2 32 2021    CO2 32 10/30/2015    GLUCOSE 100 10/30/2015    PROT 6 8 10/30/2015    ALKPHOS 62 2021    ALKPHOS 80 10/30/2015    ALT 34 2021    ALT 41 10/30/2015    AST 21 2021    AST 25 10/30/2015       Lab Results   Component Value Date    HGBA1C 5 0 2019       Lab Results   Component Value Date    TROPONINI 0 03 2018    TROPONINI 0 05 (H) 2018    TROPONINI 0 03 2018       Lipid Profile:   Lab Results   Component Value Date    CHOL 187 10/30/2015     Lab Results   Component Value Date    HDL 32 (L) 2021    HDL 40 2021    HDL 29 (L) 2020     Lab Results   Component Value Date    LDLCALC 114 (H) 2021    LDLCALC 126 (H) 2021    LDLCALC 112 (H) 2020     Lab Results   Component Value Date    TRIG 176 (H) 2021    TRIG 120 2021    TRIG 173 (H) 2020       Cardiac testing:   Results for orders placed during the hospital encounter of 19    Echo complete with contrast if indicated    Narrative  [place] [address], [address] [facility]  [phone]    Transthoracic Echocardiogram  2D, M-mode, and Color Doppler    Study date:  2019    Patient: Katja Junior  MR number: [id number]  Account number: [de-identified]  : 1950  Age: 81 years  Gender: Male  Status: Outpatient  Location: [facility]  Height: 73 in  Weight: 234 lb  BP: 156/ 86 mmHg    Indications: Thoracic aortic aneurysm      Diagnoses: I71 2 - Thoracic aortic aneurysm, without rupture    Sonographer:  Barroso RCS  Primary Physician:  Chyna Braden MD  Referring Physician:  Romeo Hurst MD  Group:  [facility]  Interpreting Physician:  Lilibeth Montano MD Edie    SUMMARY    LEFT VENTRICLE:  Ejection fraction was estimated to be 60 %  There were no regional wall motion abnormalities  Concentric hypertrophy was present  LEFT ATRIUM:  The atrium was mildly dilated  MITRAL VALVE:  There was mild annular calcification  AORTIC VALVE:  There was very mild stenosis by doppler  TRICUSPID VALVE:  There was mild regurgitation  AORTA:  The root exhibited mild dilatation  4 1 to 4 2 cm  HISTORY: PRIOR HISTORY: Thoracic aortic aneurysm  Moderate concentric LVH  Severe obesity  Medication-treated hyperlipidemia  Hypertension  PROCEDURE: The study was performed in the [address] [place] [address]  This was a routine study  The transthoracic approach was used  The study included complete 2D imaging, M-mode, and color Doppler  The heart rate was 86 bpm, at the  start of the study  Images were obtained from the parasternal, apical, subcostal, and suprasternal notch acoustic windows  Echocardiographic views were limited due to poor acoustic window availability, decreased penetration, and lung  interference  This was a technically difficult study  LEFT VENTRICLE: Size was normal  Ejection fraction was estimated to be 60 %  There were no regional wall motion abnormalities  Concentric hypertrophy was present  RIGHT VENTRICLE: The size was normal  Systolic function was normal  Wall thickness was normal     LEFT ATRIUM: The atrium was mildly dilated  RIGHT ATRIUM: Size was normal     MITRAL VALVE: There was mild annular calcification  Valve structure was normal  There was normal leaflet separation  DOPPLER: The transmitral velocity was within the normal range  There was no evidence for stenosis  There was no  regurgitation  AORTIC VALVE: The valve was not well visualized  DOPPLER: There was very mild stenosis by doppler  TRICUSPID VALVE: The valve structure was normal  There was normal leaflet separation   DOPPLER: The transtricuspid velocity was within the normal range  There was no evidence for stenosis  There was mild regurgitation  PULMONIC VALVE: Leaflets exhibited normal thickness, no calcification, and normal cuspal separation  DOPPLER: The transpulmonic velocity was within the normal range  There was no regurgitation  PERICARDIUM: There was no pericardial effusion  The pericardium was normal in appearance  AORTA: The root exhibited mild dilatation  4 1 to 4 2 cm  SYSTEM MEASUREMENT TABLES    Unspecified Scan Mode  Aortic valve Area; Continuity Equation by Velocity Time Integral; Systole;: 2 5 cm2  Cardiovascular Orifice Diameter; End Systole;: 22 4 mm  Gradient Pressure, Peak; Simplified Bernoulli; Antegrade Flow; Systole;: 12 6 mm[Hg]  Gradient pressure, average; Simplified Bernoulli; Antegrade Flow; Systole;: 7 1 mm[Hg]  Left atrial diameter; End Diastole;: 42 4 mm  Cardiac Output; Teichholz; Systole;: 9 42 L/min  Heart rate; Teichholz;: 85 /min  Interventricular Septum Diastolic Thickness; Teichholz; End Diastole;: 12 5 mm  Left Ventricle Internal End Diastolic Dimension; Teichholz;: 54 5 mm  Left Ventricle Internal Systolic Dimension; Teichholz; End Systole;: 29 5 mm  Left Ventricle Mass; Mass AVCube with Teichholz; End Diastole;: 289 g  Left Ventricle Posterior Wall Diastolic Thickness; Teichholz; End Diastole;: 12 8 mm  Left Ventricular Ejection Fraction; Teichholz;: 76 7 %  Left Ventricular End Diastolic Volume; Teichholz;: 144 4 ml  Left Ventricular End Systolic Volume; Teichholz;: 33 6 ml  Left Ventricular Fractional Shortening;: 45 9 %  Stroke volume;  Teichholz; Systole;: 110 8 ml  Mitral Valve Area; Area by Pressure Half-Time; Systole;: 3 61 cm2  Mitral Valve E to A Ratio; Systole;: 0 93  Pressure half time; Diastole;: 0 06 s  Maximum Tricuspid valve regurgitation pressure gradient; Regurgitant Flow; Systole;: 30 5 mm[Hg]    [address] Accredited Echocardiography Laboratory    Prepared and electronically signed by    John Foreman MD  Signed 16-Jul-2019 14:58:16      Medications:    Current Outpatient Medications:     colchicine (COLCRYS) 0 6 mg tablet, One q 3 hours p r n  gout, Disp: 30 tablet, Rfl: 5    enalapril (VASOTEC) 20 mg tablet, take 1 tablet by mouth daily, Disp: 90 tablet, Rfl: 5    ferrous sulfate 325 (65 Fe) mg tablet, Take 325 mg by mouth daily with breakfast, Disp: , Rfl:     finasteride (PROSCAR) 5 mg tablet, Take 5 mg by mouth daily, Disp: , Rfl:     hydrALAZINE (APRESOLINE) 50 mg tablet, take 1 tablet by mouth every 8 hours, Disp: 270 tablet, Rfl: 3    hydrochlorothiazide (HYDRODIURIL) 25 mg tablet, take 1 tablet by mouth once daily, Disp: 90 tablet, Rfl: 3    ibuprofen (MOTRIN) 400 mg tablet, Take 200 mg by mouth every 6 (six) hours as needed for mild pain (PRN ), Disp: , Rfl:     metoprolol succinate (TOPROL-XL) 100 mg 24 hr tablet, take 1 tablet by mouth once daily, Disp: 90 tablet, Rfl: 5    tamsulosin (FLOMAX) 0 4 mg, Take 0 4 mg by mouth daily, Disp: , Rfl:       Allergies: Allergies   Allergen Reactions    Lorazepam Other (See Comments)     very agitated  Psychosis         Assessment and Plan:  1  Essential hypertension  BP not at goal   Increase hydralazine to 75 mg t i d  Come in for a nursing visit in 2 weeks for BP recheck and further medication adjustment if needed    2  Mixed hyperlipidemia  Continue diet control    3  Thoracic aortic aneurysm without rupture (HCC)  Continue beta-blocker  Patient aware of needing to go to the ER if he has stabbing or tearing etc chest pain    4  Morbid obesity with BMI of 40 0-44 9, adult ([facility] [address] )  States he is trying to lose weight    5  Obstructive sleep apnea  Uses CPAP regularly    6  Moderate concentric left ventricular hypertrophy  We discussed this finding and the treatment for it    7  Non-rheumatic aortic stenosis  Stable for    To be followed up with serial echocardiograms at recommended intervals    Recommend aggressive risk factor modification and therapeutic lifestyle changes  Low-salt, low-calorie, low-fat, low-cholesterol diet with regular exercise and to optimize weight  I will defer the ordering and monitoring of necessity lab studies to you, but I am available and happy to review and manage any of the data at your request in the future  Discussed concepts of atherosclerosis, including signs and symptoms of cardiac disease  Previous studies were reviewed  Safety measures were reviewed  Questions were entertained and answered  Patient was advised to report any problems requiring medical attention  Follow-up with PCP and appropriate specialist and lab work as discussed  Return for follow up visit as scheduled or earlier, if needed  Thank you for allowing me to participate in the care and evaluation of your patient  Should you have any questions, please feel free to contact me        Pa Schulte MD  3/3/2022,9:25 AM

## 2022-03-04 DIAGNOSIS — I10 ESSENTIAL HYPERTENSION: Chronic | ICD-10-CM

## 2022-03-04 RX ORDER — HYDRALAZINE HYDROCHLORIDE 50 MG/1
TABLET, FILM COATED ORAL
Qty: 270 TABLET | Refills: 3 | Status: SHIPPED | OUTPATIENT
Start: 2022-03-04

## 2022-04-20 ENCOUNTER — APPOINTMENT (EMERGENCY)
Dept: CT IMAGING | Facility: HOSPITAL | Age: 72
End: 2022-04-20
Payer: MEDICARE

## 2022-04-20 ENCOUNTER — HOSPITAL ENCOUNTER (EMERGENCY)
Facility: HOSPITAL | Age: 72
Discharge: HOME | End: 2022-04-21
Attending: EMERGENCY MEDICINE | Admitting: EMERGENCY MEDICINE
Payer: MEDICARE

## 2022-04-20 DIAGNOSIS — B33.8 RSV (RESPIRATORY SYNCYTIAL VIRUS INFECTION): Primary | ICD-10-CM

## 2022-04-20 DIAGNOSIS — R42 DIZZINESS: ICD-10-CM

## 2022-04-20 LAB
ALBUMIN SERPL BCP-MCNC: 3.3 G/DL (ref 3.5–5)
ALP SERPL-CCNC: 70 U/L (ref 46–116)
ALT SERPL W P-5'-P-CCNC: 27 U/L (ref 12–78)
ANION GAP SERPL CALCULATED.3IONS-SCNC: 6 MMOL/L (ref 4–13)
APTT PPP: 30 SECONDS (ref 23–37)
AST SERPL W P-5'-P-CCNC: 20 U/L (ref 5–45)
BASOPHILS # BLD AUTO: 0.05 THOUSANDS/ΜL (ref 0–0.1)
BASOPHILS NFR BLD AUTO: 1 % (ref 0–1)
BILIRUB SERPL-MCNC: 0.45 MG/DL (ref 0.2–1)
BUN SERPL-MCNC: 18 MG/DL (ref 5–25)
CALCIUM ALBUM COR SERPL-MCNC: 8.9 MG/DL (ref 8.3–10.1)
CALCIUM SERPL-MCNC: 8.3 MG/DL (ref 8.3–10.1)
CARDIAC TROPONIN I PNL SERPL HS: 13 NG/L
CHLORIDE SERPL-SCNC: 106 MMOL/L (ref 100–108)
CO2 SERPL-SCNC: 31 MMOL/L (ref 21–32)
CREAT SERPL-MCNC: 1 MG/DL (ref 0.6–1.3)
EOSINOPHIL # BLD AUTO: 0.36 THOUSAND/ΜL (ref 0–0.61)
EOSINOPHIL NFR BLD AUTO: 6 % (ref 0–6)
ERYTHROCYTE [DISTWIDTH] IN BLOOD BY AUTOMATED COUNT: 13 % (ref 11.6–15.1)
FLUAV RNA RESP QL NAA+PROBE: NEGATIVE
FLUBV RNA RESP QL NAA+PROBE: NEGATIVE
GFR SERPL CREATININE-BSD FRML MDRD: 76 ML/MIN/1.73SQ M
GLUCOSE SERPL-MCNC: 94 MG/DL (ref 65–140)
HCT VFR BLD AUTO: 41.6 % (ref 36.5–49.3)
HGB BLD-MCNC: 13.9 G/DL (ref 12–17)
IMM GRANULOCYTES # BLD AUTO: 0.01 THOUSAND/UL (ref 0–0.2)
IMM GRANULOCYTES NFR BLD AUTO: 0 % (ref 0–2)
INR PPP: 1.14 (ref 0.84–1.19)
LACTATE SERPL-SCNC: 0.5 MMOL/L (ref 0.5–2)
LYMPHOCYTES # BLD AUTO: 0.91 THOUSANDS/ΜL (ref 0.6–4.47)
LYMPHOCYTES NFR BLD AUTO: 16 % (ref 14–44)
MCH RBC QN AUTO: 30.3 PG (ref 26.8–34.3)
MCHC RBC AUTO-ENTMCNC: 33.4 G/DL (ref 31.4–37.4)
MCV RBC AUTO: 91 FL (ref 82–98)
MONOCYTES # BLD AUTO: 0.92 THOUSAND/ΜL (ref 0.17–1.22)
MONOCYTES NFR BLD AUTO: 16 % (ref 4–12)
NEUTROPHILS # BLD AUTO: 3.37 THOUSANDS/ΜL (ref 1.85–7.62)
NEUTS SEG NFR BLD AUTO: 61 % (ref 43–75)
NRBC BLD AUTO-RTO: 0 /100 WBCS
NT-PROBNP SERPL-MCNC: 266 PG/ML
PLATELET # BLD AUTO: 162 THOUSANDS/UL (ref 149–390)
PMV BLD AUTO: 9.7 FL (ref 8.9–12.7)
POTASSIUM SERPL-SCNC: 4 MMOL/L (ref 3.5–5.3)
PROCALCITONIN SERPL-MCNC: <0.05 NG/ML
PROT SERPL-MCNC: 6.3 G/DL (ref 6.4–8.2)
PROTHROMBIN TIME: 14.2 SECONDS (ref 11.6–14.5)
RBC # BLD AUTO: 4.59 MILLION/UL (ref 3.88–5.62)
RSV RNA RESP QL NAA+PROBE: POSITIVE
SARS-COV-2 RNA RESP QL NAA+PROBE: NEGATIVE
SODIUM SERPL-SCNC: 143 MMOL/L (ref 136–145)
WBC # BLD AUTO: 5.62 THOUSAND/UL (ref 4.31–10.16)

## 2022-04-20 PROCEDURE — 84484 ASSAY OF TROPONIN QUANT: CPT | Performed by: EMERGENCY MEDICINE

## 2022-04-20 PROCEDURE — 83880 ASSAY OF NATRIURETIC PEPTIDE: CPT | Performed by: EMERGENCY MEDICINE

## 2022-04-20 PROCEDURE — 84145 PROCALCITONIN (PCT): CPT | Performed by: EMERGENCY MEDICINE

## 2022-04-20 PROCEDURE — 85025 COMPLETE CBC W/AUTO DIFF WBC: CPT | Performed by: EMERGENCY MEDICINE

## 2022-04-20 PROCEDURE — 93005 ELECTROCARDIOGRAM TRACING: CPT

## 2022-04-20 PROCEDURE — 99285 EMERGENCY DEPT VISIT HI MDM: CPT | Performed by: EMERGENCY MEDICINE

## 2022-04-20 PROCEDURE — 99284 EMERGENCY DEPT VISIT MOD MDM: CPT

## 2022-04-20 PROCEDURE — 36415 COLL VENOUS BLD VENIPUNCTURE: CPT | Performed by: EMERGENCY MEDICINE

## 2022-04-20 PROCEDURE — 0241U HB NFCT DS VIR RESP RNA 4 TRGT: CPT | Performed by: EMERGENCY MEDICINE

## 2022-04-20 PROCEDURE — 85610 PROTHROMBIN TIME: CPT | Performed by: EMERGENCY MEDICINE

## 2022-04-20 PROCEDURE — 80053 COMPREHEN METABOLIC PANEL: CPT | Performed by: EMERGENCY MEDICINE

## 2022-04-20 PROCEDURE — 85730 THROMBOPLASTIN TIME PARTIAL: CPT | Performed by: EMERGENCY MEDICINE

## 2022-04-20 PROCEDURE — 87040 BLOOD CULTURE FOR BACTERIA: CPT | Performed by: EMERGENCY MEDICINE

## 2022-04-20 PROCEDURE — 83605 ASSAY OF LACTIC ACID: CPT | Performed by: EMERGENCY MEDICINE

## 2022-04-21 ENCOUNTER — APPOINTMENT (EMERGENCY)
Dept: CT IMAGING | Facility: HOSPITAL | Age: 72
End: 2022-04-21
Payer: MEDICARE

## 2022-04-21 VITALS
DIASTOLIC BLOOD PRESSURE: 82 MMHG | TEMPERATURE: 98.4 F | RESPIRATION RATE: 20 BRPM | WEIGHT: 315 LBS | HEIGHT: 73 IN | OXYGEN SATURATION: 97 % | SYSTOLIC BLOOD PRESSURE: 156 MMHG | BODY MASS INDEX: 41.75 KG/M2 | HEART RATE: 91 BPM

## 2022-04-21 LAB
2HR DELTA HS TROPONIN: 7 NG/L
APAP SERPL-MCNC: <2 UG/ML (ref 10–20)
ATRIAL RATE: 84 BPM
BILIRUB UR QL STRIP: NEGATIVE
CARDIAC TROPONIN I PNL SERPL HS: 20 NG/L
CLARITY UR: CLEAR
COLOR UR: YELLOW
ETHANOL SERPL-MCNC: <3 MG/DL (ref 0–3)
GLUCOSE UR STRIP-MCNC: NEGATIVE MG/DL
HGB UR QL STRIP.AUTO: NEGATIVE
KETONES UR STRIP-MCNC: NEGATIVE MG/DL
LEUKOCYTE ESTERASE UR QL STRIP: NEGATIVE
NITRITE UR QL STRIP: NEGATIVE
P AXIS: 46 DEGREES
PH UR STRIP.AUTO: 6 [PH]
PR INTERVAL: 162 MS
PROT UR STRIP-MCNC: NEGATIVE MG/DL
QRS AXIS: -69 DEGREES
QRSD INTERVAL: 150 MS
QT INTERVAL: 390 MS
QTC INTERVAL: 460 MS
SALICYLATES SERPL-MCNC: <3 MG/DL (ref 3–20)
SP GR UR STRIP.AUTO: 1.01 (ref 1–1.03)
T WAVE AXIS: 79 DEGREES
UROBILINOGEN UR QL STRIP.AUTO: 0.2 E.U./DL
VENTRICULAR RATE: 84 BPM

## 2022-04-21 PROCEDURE — 84484 ASSAY OF TROPONIN QUANT: CPT | Performed by: EMERGENCY MEDICINE

## 2022-04-21 PROCEDURE — 70450 CT HEAD/BRAIN W/O DYE: CPT

## 2022-04-21 PROCEDURE — 36415 COLL VENOUS BLD VENIPUNCTURE: CPT | Performed by: EMERGENCY MEDICINE

## 2022-04-21 PROCEDURE — 81003 URINALYSIS AUTO W/O SCOPE: CPT | Performed by: EMERGENCY MEDICINE

## 2022-04-21 PROCEDURE — G1004 CDSM NDSC: HCPCS

## 2022-04-21 PROCEDURE — 80143 DRUG ASSAY ACETAMINOPHEN: CPT | Performed by: EMERGENCY MEDICINE

## 2022-04-21 PROCEDURE — 80179 DRUG ASSAY SALICYLATE: CPT | Performed by: EMERGENCY MEDICINE

## 2022-04-21 PROCEDURE — 93010 ELECTROCARDIOGRAM REPORT: CPT | Performed by: INTERNAL MEDICINE

## 2022-04-21 PROCEDURE — 71275 CT ANGIOGRAPHY CHEST: CPT

## 2022-04-21 PROCEDURE — 74174 CTA ABD&PLVS W/CONTRAST: CPT

## 2022-04-21 PROCEDURE — 82077 ASSAY SPEC XCP UR&BREATH IA: CPT | Performed by: EMERGENCY MEDICINE

## 2022-04-21 PROCEDURE — 96360 HYDRATION IV INFUSION INIT: CPT

## 2022-04-21 RX ADMIN — IOHEXOL 100 ML: 350 INJECTION, SOLUTION INTRAVENOUS at 00:31

## 2022-04-21 RX ADMIN — SODIUM CHLORIDE 500 ML: 0.9 INJECTION, SOLUTION INTRAVENOUS at 01:07

## 2022-04-21 NOTE — ED PROVIDER NOTES
History  Chief Complaint   Patient presents with    Cold Like Symptoms     cough cold congestion, took nyquil and then became dizzy      [de-identified] old male presents with multiple symptoms  Patient states symptoms started on Sunday with "felt like I had allergies " Patient affirms cough, postnasal drip, and stating "I feel like I can clear the phlegm "    Patient notes occasional exertional dyspnea stating, it "took time to catch my breath "    Patient states tonight he took some NyQuil prior to going to sleep and "a could not catch my breath "  Patient states he then became dizzy that he describes as both lightheadedness, vertigo, and difficulty with "focus "  Patient does state now "I'm feeling a little bit better "    Patient states he came in tonight is he states he had concerns due to his history of having a thoracic aneurysm and that his symptoms might be secondary to that  Patient denies any chest pain  Patient denies any dyspnea present though he does note the episodes previously  Patient notes nausea at the time that has improved but denies any vomiting  Patient denies any diarrhea  Patient notes his wife has similar though with mild symptoms  Impression and plan: multiple symptoms with a broad differential   Based on patient's history and physical, likely infectious in nature with patient's dizziness concerns for likely toxicologic etiology as patient did take NyQuil immediately prior to the onset of the symptoms  Patient's symptoms appear to be improving  Patient's NIH stroke scale is 0 and symptoms are improving  Based on history and examination, less likely posterior CVA and no indications for thrombolytics  Considering patient's history concerns, will obtain CT imaging of patient's chest to evaluate for thoracic aneurysm but less likely considering a chest pain or associated symptoms    Will monitor patient, reassess, re-evaluate      Dizziness  Quality:  Room spinning, lightheadedness and vertigo  Severity:  Moderate  Onset quality:  Sudden  Timing:  Intermittent  Progression:  Partially resolved  Relieved by:  Nothing  Worsened by:  Nothing  Associated symptoms: nausea and shortness of breath    Associated symptoms: no blood in stool, no chest pain, no diarrhea, no headaches, no hearing loss, no palpitations, no syncope, no tinnitus, no vision changes (patient notes difficulty with focus but denies diplopia or visual changes), no vomiting and no weakness        Prior to Admission Medications   Prescriptions Last Dose Informant Patient Reported?  Taking?   colchicine (COLCRYS) 0 6 mg tablet  Self No No   Sig: One q 3 hours p r n  gout   enalapril (VASOTEC) 20 mg tablet   No No   Sig: take 1 tablet by mouth daily   ferrous sulfate 325 (65 Fe) mg tablet  Self Yes No   Sig: Take 325 mg by mouth daily with breakfast   finasteride (PROSCAR) 5 mg tablet  Self Yes No   Sig: Take 5 mg by mouth daily   hydrALAZINE (APRESOLINE) 50 mg tablet   No No   Sig: take 1 tablet by mouth every 8 hours   hydrochlorothiazide (HYDRODIURIL) 25 mg tablet  Self No No   Sig: take 1 tablet by mouth once daily   ibuprofen (MOTRIN) 400 mg tablet  Self Yes No   Sig: Take 200 mg by mouth every 6 (six) hours as needed for mild pain (PRN )   metoprolol succinate (TOPROL-XL) 100 mg 24 hr tablet  Self No No   Sig: take 1 tablet by mouth once daily   tamsulosin (FLOMAX) 0 4 mg  Self Yes No   Sig: Take 0 4 mg by mouth daily      Facility-Administered Medications: None       Past Medical History:   Diagnosis Date    Anemia     Aortic aneurysm (HCC)     Elevated troponin     History of colonoscopy     6/5/13    History of esophagogastroduodenoscopy     6/5/13    Hypertension     Hypokalemia     Morbid obesity ([facility] [address] )     Renal calculi     STEMI (ST elevation myocardial infarction) ([facility] [address] )        Past Surgical History:   Procedure Laterality Date    APPENDECTOMY      ARTHROSCOPY KNEE Left     COLON SURGERY      COLOSTOMY      ROTATOR CUFF REPAIR Left     SATURATION BIOPSY OF PROSTATE      TOTAL HIP ARTHROPLASTY      Last Assessed:5/9/12       Family History   Problem Relation Age of Onset   Harrison Fortune COPD Mother     Breast cancer Mother         malignant neoplasm    Other Father         thoracic aortic aneurysm     I have reviewed and agree with the history as documented  E-Cigarette/Vaping    E-Cigarette Use Never User      E-Cigarette/Vaping Substances    Nicotine No     THC No     CBD No     Flavoring No     Other No     Unknown No      Social History     Tobacco Use    Smoking status: Never Smoker    Smokeless tobacco: Never Used   Vaping Use    Vaping Use: Never used   Substance Use Topics    Alcohol use: Yes     Comment: social    Drug use: Never       Review of Systems   HENT: Negative for hearing loss and tinnitus  Respiratory: Positive for shortness of breath  Cardiovascular: Negative for chest pain, palpitations and syncope  Gastrointestinal: Positive for nausea  Negative for blood in stool, diarrhea and vomiting  Neurological: Positive for dizziness  Negative for weakness and headaches  All other systems reviewed and are negative  Physical Exam  Physical Exam  Vitals reviewed  HENT:      Head: Atraumatic  Eyes:      General: No visual field deficit  Pupils: Pupils are equal, round, and reactive to light  Comments: No nystagmus with gaze fixation or at rest    Cardiovascular:      Rate and Rhythm: Normal rate  Abdominal:      General: There is no distension  Tenderness: There is no abdominal tenderness  There is no guarding or rebound  Musculoskeletal:         General: No deformity  Cervical back: Neck supple  Skin:     General: Skin is warm and dry  Neurological:      General: No focal deficit present  Mental Status: He is alert and oriented to person, place, and time  Cranial Nerves: No cranial nerve deficit, dysarthria or facial asymmetry  Sensory: No sensory deficit  Motor: No weakness or abnormal muscle tone  Coordination: Coordination normal  Finger-Nose-Finger Test and Heel to Iraj Beams Test normal       Deep Tendon Reflexes: Reflexes normal       Comments: Answers questions appropriately  Identifies common objects appropriately  Reads clock without difficulty correctly  Psychiatric:         Mood and Affect: Mood normal          Vital Signs  ED Triage Vitals [04/20/22 2229]   Temperature Pulse Respirations Blood Pressure SpO2   98 4 °F (36 9 °C) 89 20 (!) 192/105 96 %      Temp Source Heart Rate Source Patient Position - Orthostatic VS BP Location FiO2 (%)   Oral -- -- -- --      Pain Score       --           Vitals:    04/20/22 2229 04/20/22 2245 04/21/22 0215   BP: (!) 192/105  156/82   Pulse: 89 81 91         Visual Acuity  Visual Acuity      Most Recent Value   L Pupil Size (mm) 3   R Pupil Size (mm) 3          ED Medications  Medications   iohexol (OMNIPAQUE) 350 MG/ML injection (MULTI-DOSE) 100 mL (100 mL Intravenous Given 4/21/22 0031)   sodium chloride 0 9 % bolus 500 mL (0 mL Intravenous Stopped 4/21/22 0228)       Diagnostic Studies  Results Reviewed     Procedure Component Value Units Date/Time    Ethanol [[id number]]  (Normal) Collected: 04/21/22 0110    Lab Status: Final result Specimen: Blood from Arm, Left Updated: 04/21/22 0140     Ethanol Lvl <3 mg/dL     HS Troponin I 2hr [[id number]]  (Normal) Collected: 04/21/22 0110    Lab Status: Final result Specimen: Blood from Arm, Left Updated: 04/21/22 0138     hs TnI 2hr 20 ng/L      Delta 2hr hsTnI 7 ng/L     Salicylate level [[id number]]  (Abnormal) Collected: 04/21/22 0110    Lab Status: Final result Specimen: Blood from Arm, Left Updated: 69/84/25 1100     Salicylate Lvl <3 mg/dL     Acetaminophen level-If concentration is detectable, please discuss with medical  on call   [[id number]]  (Abnormal) Collected: 04/21/22 0110    Lab Status: Final result Specimen: Blood from Arm, Left Updated: 04/21/22 0131     Acetaminophen Level <2 ug/mL     UA w Reflex to Microscopic w Reflex to Culture [[id number]] Collected: 04/21/22 0008    Lab Status: Final result Specimen: Urine, Clean Catch Updated: 04/21/22 0032     Color, UA Yellow     Clarity, UA Clear     Specific Gravity, UA 1 015     pH, UA 6 0     Leukocytes, UA Negative     Nitrite, UA Negative     Protein, UA Negative mg/dl      Glucose, UA Negative mg/dl      Ketones, UA Negative mg/dl      Urobilinogen, UA 0 2 E U /dl      Bilirubin, UA Negative     Blood, UA Negative    HS Troponin I 4hr [[id number]]     Lab Status: No result Specimen: Blood     COVID/FLU/RSV - 2 hour TAT [[id number]]  (Abnormal) Collected: 04/20/22 2307    Lab Status: Final result Specimen: Nares from Nasopharyngeal Swab Updated: 04/20/22 9529     SARS-CoV-2 Negative     INFLUENZA A PCR Negative     INFLUENZA B PCR Negative     RSV PCR Positive    Narrative:      FOR PEDIATRIC PATIENTS - copy/paste COVID Guidelines URL to browser: [email or web address]  [employer]    SARS-CoV-2 assay is a Nucleic Acid Amplification assay intended for the  qualitative detection of nucleic acid from SARS-CoV-2 in nasopharyngeal  swabs  Results are for the presumptive identification of SARS-CoV-2 RNA  Positive results are indicative of infection with SARS-CoV-2, the virus  causing COVID-19, but do not rule out bacterial infection or co-infection  with other viruses  Laboratories within the [place] and its  territories are required to report all positive results to the appropriate  public health authorities  Negative results do not preclude SARS-CoV-2  infection and should not be used as the sole basis for treatment or other  patient management decisions  Negative results must be combined with  clinical observations, patient history, and epidemiological information  This test has not been FDA cleared or approved      This test has been authorized by FDA under an Emergency Use Authorization  (EUA)  This test is only authorized for the duration of time the  declaration that circumstances exist justifying the authorization of the  emergency use of an in vitro diagnostic tests for detection of SARS-CoV-2  virus and/or diagnosis of COVID-19 infection under section 564(b)(1) of  the Act, 21 U  S C  531BBK-8(I)(7), unless the authorization is terminated  or revoked sooner  The test has been validated but independent review by FDA  and CLIA is pending  Test performed using [employer]: This RT-PCR assay targets N2,  a region unique to SARS-CoV-2  A conserved region in the E-gene was chosen  for pan-Sarbecovirus detection which includes SARS-CoV-2  HS Troponin 0hr (reflex protocol) [[id number]]  (Normal) Collected: 04/20/22 2307    Lab Status: Final result Specimen: Blood from Arm, Left Updated: 04/20/22 2343     hs TnI 0hr 13 ng/L     Procalcitonin [[id number]]  (Normal) Collected: 04/20/22 2307    Lab Status: Final result Specimen: Blood from Arm, Left Updated: 04/20/22 2341     Procalcitonin <0 05 ng/ml     Lactic acid [[id number]]  (Normal) Collected: 04/20/22 2307    Lab Status: Final result Specimen: Blood from Arm, Left Updated: 04/20/22 2340     LACTIC ACID 0 5 mmol/L     Narrative:      Result may be elevated if tourniquet was used during collection      Protime-INR [[id number]]  (Normal) Collected: 04/20/22 2307    Lab Status: Final result Specimen: Blood from Arm, Left Updated: 04/20/22 2339     Protime 14 2 seconds      INR 1 14    APTT [[id number]]  (Normal) Collected: 04/20/22 2307    Lab Status: Final result Specimen: Blood from Arm, Left Updated: 04/20/22 2339     PTT 30 seconds     NT-BNP PRO [[id number]]  (Abnormal) Collected: 04/20/22 2307    Lab Status: Final result Specimen: Blood from Arm, Left Updated: 04/20/22 2337     NT-proBNP 266 pg/mL     Comprehensive metabolic panel [[id number]]  (Abnormal) Collected: 04/20/22 5216 Lab Status: Final result Specimen: Blood from Arm, Left Updated: 04/20/22 2330     Sodium 143 mmol/L      Potassium 4 0 mmol/L      Chloride 106 mmol/L      CO2 31 mmol/L      ANION GAP 6 mmol/L      BUN 18 mg/dL      Creatinine 1 00 mg/dL      Glucose 94 mg/dL      Calcium 8 3 mg/dL      Corrected Calcium 8 9 mg/dL      AST 20 U/L      ALT 27 U/L      Alkaline Phosphatase 70 U/L      Total Protein 6 3 g/dL      Albumin 3 3 g/dL      Total Bilirubin 0 45 mg/dL      eGFR 76 ml/min/1 73sq m     Narrative:      National Kidney Disease Foundation guidelines for Chronic Kidney Disease (CKD):     Stage 1 with normal or high GFR (GFR > 90 mL/min/1 73 square meters)    Stage 2 Mild CKD (GFR = 60-89 mL/min/1 73 square meters)    Stage 3A Moderate CKD (GFR = 45-59 mL/min/1 73 square meters)    Stage 3B Moderate CKD (GFR = 30-44 mL/min/1 73 square meters)    Stage 4 Severe CKD (GFR = 15-29 mL/min/1 73 square meters)    Stage 5 End Stage CKD (GFR <15 mL/min/1 73 square meters)  Note: GFR calculation is accurate only with a steady state creatinine    CBC and differential [[id number]]  (Abnormal) Collected: 04/20/22 2307    Lab Status: Final result Specimen: Blood from Arm, Left Updated: 04/20/22 2317     WBC 5 62 Thousand/uL      RBC 4 59 Million/uL      Hemoglobin 13 9 g/dL      Hematocrit 41 6 %      MCV 91 fL      MCH 30 3 pg      MCHC 33 4 g/dL      RDW 13 0 %      MPV 9 7 fL      Platelets 281 Thousands/uL      nRBC 0 /100 WBCs      Neutrophils Relative 61 %      Immat GRANS % 0 %      Lymphocytes Relative 16 %      Monocytes Relative 16 %      Eosinophils Relative 6 %      Basophils Relative 1 %      Neutrophils Absolute 3 37 Thousands/µL      Immature Grans Absolute 0 01 Thousand/uL      Lymphocytes Absolute 0 91 Thousands/µL      Monocytes Absolute 0 92 Thousand/µL      Eosinophils Absolute 0 36 Thousand/µL      Basophils Absolute 0 05 Thousands/µL     Blood culture #2 [[id number]] Collected: 04/20/22 2307    Lab Status: In process Specimen: Blood from Arm, Left Updated: 04/20/22 2314    Blood culture #1 [[id number]] Collected: 04/20/22 2307    Lab Status: In process Specimen: Blood from Arm, Left Updated: 04/20/22 2314                 CTA dissection protocol chest/abdomen/pelvis   Final Result by Monty Matute MD (04/21 0052)      Stable 4 5 cm ascending thoracic aortic aneurysm  No evidence of dissection or intramural hematoma  No infiltrate or pleural effusion  No acute intra-abdominal abnormality  No free air or free fluid  Workstation performed: [id number]         CT head without contrast   Final Result by Monty Matute MD (04/21 0038)      No acute intracranial abnormality  Workstation performed: [id number]                    Procedures  Procedures         ED Course  ED Course as of 04/21/22 0254   Wed Apr 20, 2022   2333 EKG demonstrates normal sinus rhythm with no acute ST segment changes  There is a bifascicular block similar to the prior  Nonspecific findings that are similar  Intervals are normal including QTC of 460  QRS is 150, with the block  Thu Apr 21, 2022   0159 Patient's workup remarkable for positive RSV  Patient's CT imaging demonstrating stable thoracic aneurysm  Patient's delta troponin 7, minimally elevated  Discussed with on-call Cardiology who reviewed EKGs and does not see anything concerning  Discussed with patient and offered observation to the hospital for continued evaluation of monitoring  Patient continues to deny any chest pain and currently is asymptomatic  No chest pain during all the evaluation  After discussion risks and benefits, patient declined observation or repeat troponin  Patient is amenable for outpatient follow-up and return to the emergency room with any symptoms that I discussed him in detail  Discussed emphasized follow-up, symptomatic management, and return precautions in detail  MDM    Disposition  Final diagnoses:   RSV (respiratory syncytial virus infection)   Dizziness     Time reflects when diagnosis was documented in both MDM as applicable and the Disposition within this note     Time User Action Codes Description Comment    4/21/2022  1:35 AM Jacy Gonzales [B33 [id number]] RSV (respiratory syncytial virus infection)     4/21/2022  1:35 AM Jacy Earl Add [R42] Dizziness       ED Disposition     ED Disposition Condition Date/Time Comment    Discharge Stable Thu Apr 21, 2022  1:35 AM Helio Banda discharge to home/self care  Follow-up Information     Follow up With Specialties Details Why Contact Info Additional Information    Ever MD [facility] Schedule an appointment as soon as possible for a visit in 3 days Follow up and reassessment   [facility] Emergency Department Emergency Medicine Go to  If symptoms worsen [address] Emergency Department, [address] [place] [address], [id number]          Discharge Medication List as of 4/21/2022  1:59 AM      CONTINUE these medications which have NOT CHANGED    Details   colchicine (COLCRYS) 0 6 mg tablet One q 3 hours p r n  gout, Normal      enalapril (VASOTEC) 20 mg tablet take 1 tablet by mouth daily, Normal      ferrous sulfate 325 (65 Fe) mg tablet Take 325 mg by mouth daily with breakfast, Historical Med      finasteride (PROSCAR) 5 mg tablet Take 5 mg by mouth daily, Starting Wed 9/8/2021, Historical Med      hydrALAZINE (APRESOLINE) 50 mg tablet take 1 tablet by mouth every 8 hours, Normal      hydrochlorothiazide (HYDRODIURIL) 25 mg tablet take 1 tablet by mouth once daily, Normal      ibuprofen (MOTRIN) 400 mg tablet Take 200 mg by mouth every 6 (six) hours as needed for mild pain (PRN ), Historical Med      metoprolol succinate (TOPROL-XL) 100 mg 24 hr tablet take 1 tablet by mouth once daily, Normal      tamsulosin (FLOMAX) 0 4 mg Take 0 4 mg by mouth daily, Starting Wed 9/8/2021, Historical Med             No discharge procedures on file      PDMP Review     None          ED Provider  Electronically Signed by           Clinton Irwin MD  04/21/22 1493

## 2022-04-26 LAB
BACTERIA BLD CULT: NORMAL
BACTERIA BLD CULT: NORMAL

## 2022-06-28 DIAGNOSIS — I10 ESSENTIAL HYPERTENSION: Chronic | ICD-10-CM

## 2022-06-28 RX ORDER — METOPROLOL SUCCINATE 100 MG/1
TABLET, EXTENDED RELEASE ORAL
Qty: 90 TABLET | Refills: 5 | Status: SHIPPED | OUTPATIENT
Start: 2022-06-28

## 2022-07-25 DIAGNOSIS — M10.9 GOUT, UNSPECIFIED CAUSE, UNSPECIFIED CHRONICITY, UNSPECIFIED SITE: ICD-10-CM

## 2022-07-25 RX ORDER — COLCHICINE 0.6 MG/1
TABLET ORAL
Qty: 30 TABLET | Refills: 5 | Status: SHIPPED | OUTPATIENT
Start: 2022-07-25

## 2022-07-26 ENCOUNTER — APPOINTMENT (OUTPATIENT)
Dept: LAB | Facility: CLINIC | Age: 72
End: 2022-07-26
Payer: MEDICARE

## 2022-07-26 DIAGNOSIS — E78.2 MIXED HYPERLIPIDEMIA: ICD-10-CM

## 2022-07-26 DIAGNOSIS — M10.9 GOUT, UNSPECIFIED CAUSE, UNSPECIFIED CHRONICITY, UNSPECIFIED SITE: ICD-10-CM

## 2022-07-26 LAB
ALBUMIN SERPL BCP-MCNC: 3.4 G/DL (ref 3.5–5)
ALP SERPL-CCNC: 63 U/L (ref 46–116)
ALT SERPL W P-5'-P-CCNC: 31 U/L (ref 12–78)
ANION GAP SERPL CALCULATED.3IONS-SCNC: 4 MMOL/L (ref 4–13)
AST SERPL W P-5'-P-CCNC: 22 U/L (ref 5–45)
BILIRUB SERPL-MCNC: 0.9 MG/DL (ref 0.2–1)
BUN SERPL-MCNC: 22 MG/DL (ref 5–25)
CALCIUM ALBUM COR SERPL-MCNC: 8.9 MG/DL (ref 8.3–10.1)
CALCIUM SERPL-MCNC: 8.4 MG/DL (ref 8.3–10.1)
CHLORIDE SERPL-SCNC: 106 MMOL/L (ref 96–108)
CHOLEST SERPL-MCNC: 158 MG/DL
CO2 SERPL-SCNC: 30 MMOL/L (ref 21–32)
CREAT SERPL-MCNC: 0.9 MG/DL (ref 0.6–1.3)
GFR SERPL CREATININE-BSD FRML MDRD: 86 ML/MIN/1.73SQ M
GLUCOSE P FAST SERPL-MCNC: 101 MG/DL (ref 65–99)
HDLC SERPL-MCNC: 32 MG/DL
LDLC SERPL CALC-MCNC: 101 MG/DL (ref 0–100)
NONHDLC SERPL-MCNC: 126 MG/DL
POTASSIUM SERPL-SCNC: 4 MMOL/L (ref 3.5–5.3)
PROT SERPL-MCNC: 6.8 G/DL (ref 6.4–8.4)
SODIUM SERPL-SCNC: 140 MMOL/L (ref 135–147)
TRIGL SERPL-MCNC: 127 MG/DL
URATE SERPL-MCNC: 8.2 MG/DL (ref 3.5–8.5)

## 2022-07-26 PROCEDURE — 36415 COLL VENOUS BLD VENIPUNCTURE: CPT

## 2022-07-26 PROCEDURE — 80053 COMPREHEN METABOLIC PANEL: CPT

## 2022-07-26 PROCEDURE — 80061 LIPID PANEL: CPT

## 2022-07-26 PROCEDURE — 84550 ASSAY OF BLOOD/URIC ACID: CPT

## 2022-07-29 ENCOUNTER — RA CDI HCC (OUTPATIENT)
Dept: OTHER | Facility: HOSPITAL | Age: 72
End: 2022-07-29

## 2022-07-29 NOTE — PROGRESS NOTES
[facility] [address]  coding opportunities       Chart reviewed, no opportunity found: CHART REVIEWED, NO OPPORTUNITY FOUND        Patients Insurance     Medicare Insurance: Medicare

## 2022-08-04 ENCOUNTER — OFFICE VISIT (OUTPATIENT)
Dept: FAMILY MEDICINE CLINIC | Facility: CLINIC | Age: 72
End: 2022-08-04
Payer: MEDICARE

## 2022-08-04 VITALS
TEMPERATURE: 97.8 F | OXYGEN SATURATION: 97 % | HEART RATE: 96 BPM | SYSTOLIC BLOOD PRESSURE: 124 MMHG | DIASTOLIC BLOOD PRESSURE: 70 MMHG | HEIGHT: 73 IN | WEIGHT: 312 LBS | BODY MASS INDEX: 41.35 KG/M2

## 2022-08-04 DIAGNOSIS — G47.33 OBSTRUCTIVE SLEEP APNEA: ICD-10-CM

## 2022-08-04 DIAGNOSIS — I71.20 THORACIC AORTIC ANEURYSM WITHOUT RUPTURE: ICD-10-CM

## 2022-08-04 DIAGNOSIS — E66.01 MORBID OBESITY WITH BMI OF 40.0-44.9, ADULT (HCC): ICD-10-CM

## 2022-08-04 DIAGNOSIS — Z12.5 PROSTATE CANCER SCREENING: ICD-10-CM

## 2022-08-04 DIAGNOSIS — Z00.00 MEDICARE ANNUAL WELLNESS VISIT, SUBSEQUENT: Primary | ICD-10-CM

## 2022-08-04 DIAGNOSIS — E78.2 MIXED HYPERLIPIDEMIA: ICD-10-CM

## 2022-08-04 DIAGNOSIS — I10 PRIMARY HYPERTENSION: Chronic | ICD-10-CM

## 2022-08-04 DIAGNOSIS — M10.9 GOUT, UNSPECIFIED CAUSE, UNSPECIFIED CHRONICITY, UNSPECIFIED SITE: ICD-10-CM

## 2022-08-04 PROCEDURE — G0439 PPPS, SUBSEQ VISIT: HCPCS | Performed by: FAMILY MEDICINE

## 2022-08-04 PROCEDURE — 99214 OFFICE O/P EST MOD 30 MIN: CPT | Performed by: FAMILY MEDICINE

## 2022-08-04 NOTE — PROGRESS NOTES
Assessment and Plan:     Problem List Items Addressed This Visit        Respiratory    Obstructive sleep apnea       Cardiovascular and Mediastinum    Hypertension (Chronic)     Continue hydrochlorothiazide 25 mg, Toprol- mg and enalapril 20 mg daily  Also hydralazine 75 mg t i d  Thoracic aortic aneurysm without rupture ([facility] [address] )       Other    Morbid obesity with BMI of 40 0-44 9, adult (HCC)     Continue weight loss         Hyperlipidemia    Relevant Orders    CBC and differential    Comprehensive metabolic panel    Lipid panel    Gout     Continue colchicine as needed         Relevant Orders    Uric acid      Other Visit Diagnoses     Medicare annual wellness visit, subsequent    -  Primary    Prostate cancer screening        Relevant Orders    PSA, Total Screen           Preventive health issues were discussed with patient, and age appropriate screening tests were ordered as noted in patient's After Visit Summary  Personalized health advice and appropriate referrals for health education or preventive services given if needed, as noted in patient's After Visit Summary  History of Present Illness:     Patient presents for a Medicare Wellness Visit    Patient comes in for checkup  He is having gout attacks which are mild and have occurred about 3 times in the last year  He gets relief with colchicine  He is losing weight through regular walking and decreasing carbs in his diet     Patient Care Team:  Parveen Pierre MD as PCP - General  Nakul Roland MD     Review of Systems:     Review of Systems   Constitutional: Negative  Respiratory: Negative  Cardiovascular: Negative  Musculoskeletal: Positive for arthralgias          Problem List:     Patient Active Problem List   Diagnosis    Hypertension    Morbid obesity with BMI of 40 0-44 9, adult ([facility] [address] )    Obstructive sleep apnea    Hyperlipidemia    Anemia    Moderate concentric left ventricular hypertrophy    Gout    Thoracic aortic aneurysm without rupture [facility])      Past Medical and Surgical History:     Past Medical History:   Diagnosis Date    Anemia     Aortic aneurysm (HCC)     Elevated troponin     History of colonoscopy     6/5/13    History of esophagogastroduodenoscopy     6/5/13    Hypertension     Hypokalemia     Morbid obesity (HCC)     Renal calculi     STEMI (ST elevation myocardial infarction) ([facility] [address] )      Past Surgical History:   Procedure Laterality Date    APPENDECTOMY      ARTHROSCOPY KNEE Left     COLON SURGERY      COLOSTOMY      ROTATOR CUFF REPAIR Left     SATURATION BIOPSY OF PROSTATE      TOTAL HIP ARTHROPLASTY      Last Assessed:5/8/15      Family History:     Family History   Problem Relation Age of Onset   Victor M Paniagua COPD Mother    Victor M Paniagua Breast cancer Mother         malignant neoplasm    Other Father         thoracic aortic aneurysm      Social History:     Social History     Socioeconomic History    Marital status: /Civil Union     Spouse name: None    Number of children: None    Years of education: None    Highest education level: None   Occupational History    None   Tobacco Use    Smoking status: Never Smoker    Smokeless tobacco: Never Used   Vaping Use    Vaping Use: Never used   Substance and Sexual Activity    Alcohol use: Yes     Comment: social    Drug use: Never    Sexual activity: Not Currently   Other Topics Concern    None   Social History Narrative    None     Social Determinants of Health     Financial Resource Strain: Not on file   Food Insecurity: Not on file   Transportation Needs: Not on file   Physical Activity: Not on file   Stress: Not on file   Social Connections: Not on file   Intimate Partner Violence: Not on file   Housing Stability: Not on file      Medications and Allergies:     Current Outpatient Medications   Medication Sig Dispense Refill    colchicine (COLCRYS) 0 6 mg tablet take 1 tablet by mouth every 3 hours if needed for gout 30 tablet 5    enalapril (VASOTEC) 20 mg tablet take 1 tablet by mouth daily 90 tablet 5    ferrous sulfate 325 (65 Fe) mg tablet Take 325 mg by mouth daily with breakfast      finasteride (PROSCAR) 5 mg tablet Take 5 mg by mouth daily      hydrALAZINE (APRESOLINE) 50 mg tablet take 1 tablet by mouth every 8 hours (Patient taking differently: Take 75 mg by mouth 3 (three) times a day) 270 tablet 3    hydrochlorothiazide (HYDRODIURIL) 25 mg tablet take 1 tablet by mouth once daily (Patient taking differently: Take 75 mg by mouth 3 (three) times a day) 90 tablet 3    ibuprofen (MOTRIN) 400 mg tablet Take 200 mg by mouth every 6 (six) hours as needed for mild pain (PRN )      metoprolol succinate (TOPROL-XL) 100 mg 24 hr tablet take 1 tablet by mouth once daily 90 tablet 5    tamsulosin (FLOMAX) 0 4 mg Take 0 4 mg by mouth daily       No current facility-administered medications for this visit  Allergies   Allergen Reactions    Lorazepam Other (See Comments)     very agitated  Psychosis      Immunizations:     Immunization History   Administered Date(s) Administered    COVID-19 PFIZER VACCINE 0 3 ML IM 03/18/2021, 04/09/2021    INFLUENZA 10/31/2018    Influenza, high dose seasonal 0 7 mL 10/31/2018, 10/23/2019, 09/16/2020, 10/14/2021    Pneumococcal Conjugate 13-Valent 01/09/2019    Pneumococcal Polysaccharide PPV23 01/07/2021      Health Maintenance:         Topic Date Due    Colorectal Cancer Screening  09/12/2023    Hepatitis C Screening  Completed         Topic Date Due    COVID-19 Vaccine (3 - Booster for Hairston Peter series) 09/09/2021    Influenza Vaccine (1) 09/01/2022      Medicare Screening Tests and Risk Assessments:     Melinda Claros is here for his Subsequent Wellness visit  Last Medicare Wellness visit information reviewed, patient interviewed and updates made to the record today  Health Risk Assessment:   Patient rates overall health as good  Patient feels that their physical health rating is same   Patient is very satisfied with their life  Eyesight was rated as same  Hearing was rated as same  Patient feels that their emotional and mental health rating is same  Patients states they are never, rarely angry  Patient states they are never, rarely unusually tired/fatigued  Pain experienced in the last 7 days has been none  Patient states that he has experienced no weight loss or gain in last 6 months  Depression Screening:   PHQ-2 Score: 0      Fall Risk Screening: In the past year, patient has experienced: no history of falling in past year      Home Safety:  Patient does not have trouble with stairs inside or outside of their home  Patient has working smoke alarms and has working carbon monoxide detector  Home safety hazards include: none  Nutrition:   Current diet is Regular and No Added Salt  Medications:   Patient is currently taking over-the-counter supplements  OTC medications include: see medication list  Patient is able to manage medications  Activities of Daily Living (ADLs)/Instrumental Activities of Daily Living (IADLs):   Walk and transfer into and out of bed and chair?: Yes  Dress and groom yourself?: Yes    Bathe or shower yourself?: Yes    Feed yourself? Yes  Do your laundry/housekeeping?: Yes  Manage your money, pay your bills and track your expenses?: Yes  Make your own meals?: Yes    Do your own shopping?: Yes    Previous Hospitalizations:   Any hospitalizations or ED visits within the last 12 months?: Yes    How many hospitalizations have you had in the last year?: 1-2    Advance Care Planning:   Living will: Yes    Durable POA for healthcare:  Yes    Advanced directive: Yes    Advanced directive counseling given: Yes    Five wishes given: No    End of Life Decisions reviewed with patient: Yes    Provider agrees with end of life decisions: Yes      PREVENTIVE SCREENINGS      Cardiovascular Screening:    General: Screening Not Indicated and History Lipid Disorder      Diabetes Screening:     General: Screening Current      Colorectal Cancer Screening:     General: Screening Current      Prostate Cancer Screening:    General: Screening Current      Osteoporosis Screening:    General: Screening Not Indicated      Abdominal Aortic Aneurysm (AAA) Screening:    Risk factors include: age between 73-67 yo        General: Screening Not Indicated      Lung Cancer Screening:     General: Screening Not Indicated      Hepatitis C Screening:    General: Screening Current    Screening, Brief Intervention, and Referral to Treatment (SBIRT)    Screening  Typical number of drinks in a day: 0  Typical number of drinks in a week: 5  Interpretation: Low risk drinking behavior  AUDIT-C Screenin) How often did you have a drink containing alcohol in the past year? 2 to 4 times a month  3) How often did you have 6 or more drinks on one occasion in the past year? never    Single Item Drug Screening:  How often have you used an illegal drug (including marijuana) or a prescription medication for non-medical reasons in the past year? never    Single Item Drug Screen Score: 0  Interpretation: Negative screen for possible drug use disorder    Other Counseling Topics:   Car/seat belt/driving safety  No exam data present     Physical Exam:     /70 (BP Location: Left arm, Patient Position: Sitting, Cuff Size: Large)   Pulse 96   Temp 97 8 °F (36 6 °C)   Ht 6' 1" (1 854 m)   Wt (!) 142 kg (312 lb)   SpO2 97%   BMI 41 16 kg/m²     Physical Exam  Vitals and nursing note reviewed  Constitutional:       Appearance: He is well-developed  He is obese  HENT:      Head: Normocephalic and atraumatic  Eyes:      Conjunctiva/sclera: Conjunctivae normal    Cardiovascular:      Rate and Rhythm: Normal rate and regular rhythm  Heart sounds: Normal heart sounds  Pulmonary:      Effort: Pulmonary effort is normal       Breath sounds: Normal breath sounds  Abdominal:      Palpations: Abdomen is soft  Musculoskeletal:      Cervical back: Neck supple  Skin:     General: Skin is warm and dry  Neurological:      Mental Status: He is alert     Psychiatric:         Mood and Affect: Mood normal          Behavior: Behavior normal           Magnolia Diaz MD

## 2022-08-04 NOTE — ASSESSMENT & PLAN NOTE
Continue hydrochlorothiazide 25 mg, Toprol- mg and enalapril 20 mg daily    Also hydralazine 75 mg t i d

## 2022-08-04 NOTE — PATIENT INSTRUCTIONS
Medicare Preventive Visit Patient Instructions  Thank you for completing your Welcome to Medicare Visit or Medicare Annual Wellness Visit today  Your next wellness visit will be due in one year (8/5/2023)  The screening/preventive services that you may require over the next 5-10 years are detailed below  Some tests may not apply to you based off risk factors and/or age  Screening tests ordered at today's visit but not completed yet may show as past due  Also, please note that scanned in results may not display below  Preventive Screenings:  Service Recommendations Previous Testing/Comments   Colorectal Cancer Screening  · Colonoscopy    · Fecal Occult Blood Test (FOBT)/Fecal Immunochemical Test (FIT)  · Fecal DNA/Cologuard Test  · Flexible Sigmoidoscopy Age: 54-65 years old   Colonoscopy: every 10 years (May be performed more frequently if at higher risk)  OR  FOBT/FIT: every 1 year  OR  Cologuard: every 3 years  OR  Sigmoidoscopy: every 5 years  Screening may be recommended earlier than age 48 if at higher risk for colorectal cancer  Also, an individualized decision between you and your healthcare provider will decide whether screening between the ages of 74-80 would be appropriate   Colonoscopy: 09/12/2018  FOBT/FIT: Not on file  Cologuard: Not on file  Sigmoidoscopy: Not on file    Screening Current     Prostate Cancer Screening Individualized decision between patient and health care provider in men between ages of 53-78   Medicare will cover every 12 months beginning on the day after your 50th birthday PSA: 1 7 ng/mL     Screening Current     Hepatitis C Screening Once for adults born between 1945 and 1965  More frequently in patients at high risk for Hepatitis C Hep C Antibody: 06/12/2018    Screening Current   Diabetes Screening 1-2 times per year if you're at risk for diabetes or have pre-diabetes Fasting glucose: 101 mg/dL   A1C: 5 0 %    Screening Current   Cholesterol Screening Once every 5 years if you don't have a lipid disorder  May order more often based on risk factors  Lipid panel: 07/26/2022    Screening Not Indicated  History Lipid Disorder      Other Preventive Screenings Covered by Medicare:  1  Abdominal Aortic Aneurysm (AAA) Screening: covered once if your at risk  You're considered to be at risk if you have a family history of AAA or a male between the age of 73-68 who smoking at least 100 cigarettes in your lifetime  2  Lung Cancer Screening: covers low dose CT scan once per year if you meet all of the following conditions: (1) Age 50-69; (2) No signs or symptoms of lung cancer; (3) Current smoker or have quit smoking within the last 15 years; (4) You have a tobacco smoking history of at least 30 pack years (packs per day x number of years you smoked); (5) You get a written order from a healthcare provider  3  Glaucoma Screening: covered annually if you're considered high risk: (1) You have diabetes OR (2) Family history of glaucoma OR (3)  aged 48 and older OR (3)  American aged 72 and older  3  Osteoporosis Screening: covered every 2 years if you meet one of the following conditions: (1) Have a vertebral abnormality; (2) On glucocorticoid therapy for more than 3 months; (3) Have primary hyperparathyroidism; (4) On osteoporosis medications and need to assess response to drug therapy  5  HIV Screening: covered annually if you're between the age of 12-76  Also covered annually if you are younger than 13 and older than 72 with risk factors for HIV infection  For pregnant patients, it is covered up to 3 times per pregnancy      Immunizations:  Immunization Recommendations   Influenza Vaccine Annual influenza vaccination during flu season is recommended for all persons aged >= 6 months who do not have contraindications   Pneumococcal Vaccine (Prevnar and Pneumovax)  * Prevnar = PCV13  * Pneumovax = PPSV23 Adults 25-60 years old: 1-3 doses may be recommended based on certain risk factors  Adults 72 years old: Prevnar (PCV13) vaccine recommended followed by Pneumovax (PPSV23) vaccine  If already received PPSV23 since turning 65, then PCV13 recommended at least one year after PPSV23 dose  Hepatitis B Vaccine 3 dose series if at intermediate or high risk (ex: diabetes, end stage renal disease, liver disease)   Tetanus (Td) Vaccine - COST NOT COVERED BY MEDICARE PART B Following completion of primary series, a booster dose should be given every 10 years to maintain immunity against tetanus  Td may also be given as tetanus wound prophylaxis  Tdap Vaccine - COST NOT COVERED BY MEDICARE PART B Recommended at least once for all adults  For pregnant patients, recommended with each pregnancy  Shingles Vaccine (Shingrix) - COST NOT COVERED BY MEDICARE PART B  2 shot series recommended in those aged 48 and above     Health Maintenance Due:      Topic Date Due    Colorectal Cancer Screening  09/12/2023    Hepatitis C Screening  Completed     Immunizations Due:      Topic Date Due    COVID-19 Vaccine (3 - Booster for Pfizer series) 09/09/2021    Influenza Vaccine (1) 09/01/2022     Advance Directives   What are advance directives? Advance directives are legal documents that state your wishes and plans for medical care  These plans are made ahead of time in case you lose your ability to make decisions for yourself  Advance directives can apply to any medical decision, such as the treatments you want, and if you want to donate organs  What are the types of advance directives? There are many types of advance directives, and each state has rules about how to use them  You may choose a combination of any of the following:  · Living will: This is a written record of the treatment you want  You can also choose which treatments you do not want, which to limit, and which to stop at a certain time  This includes surgery, medicine, IV fluid, and tube feedings     · Durable power of  for [facility]): This is a written record that states who you want to make healthcare choices for you when you are unable to make them for yourself  This person, called a proxy, is usually a family member or a friend  You may choose more than 1 proxy  · Do not resuscitate (DNR) order:  A DNR order is used in case your heart stops beating or you stop breathing  It is a request not to have certain forms of treatment, such as CPR  A DNR order may be included in other types of advance directives  · Medical directive: This covers the care that you want if you are in a coma, near death, or unable to make decisions for yourself  You can list the treatments you want for each condition  Treatment may include pain medicine, surgery, blood transfusions, dialysis, IV or tube feedings, and a ventilator (breathing machine)  · Values history: This document has questions about your views, beliefs, and how you feel and think about life  This information can help others choose the care that you would choose  Why are advance directives important? An advance directive helps you control your care  Although spoken wishes may be used, it is better to have your wishes written down  Spoken wishes can be misunderstood, or not followed  Treatments may be given even if you do not want them  An advance directive may make it easier for your family to make difficult choices about your care  Weight Management   Why it is important to manage your weight:  Being overweight increases your risk of health conditions such as heart disease, high blood pressure, type 2 diabetes, and certain types of cancer  It can also increase your risk for osteoarthritis, sleep apnea, and other respiratory problems  Aim for a slow, steady weight loss  Even a small amount of weight loss can lower your risk of health problems  How to lose weight safely:  A safe and healthy way to lose weight is to eat fewer calories and get regular exercise   You can lose up about 1 pound a week by decreasing the number of calories you eat by 500 calories each day  Healthy meal plan for weight management:  A healthy meal plan includes a variety of foods, contains fewer calories, and helps you stay healthy  A healthy meal plan includes the following:  · Eat whole-grain foods more often  A healthy meal plan should contain fiber  Fiber is the part of grains, fruits, and vegetables that is not broken down by your body  Whole-grain foods are healthy and provide extra fiber in your diet  Some examples of whole-grain foods are whole-wheat breads and pastas, oatmeal, brown rice, and bulgur  · Eat a variety of vegetables every day  Include dark, leafy greens such as spinach, kale, quique greens, and mustard greens  Eat yellow and orange vegetables such as carrots, sweet potatoes, and winter squash  · Eat a variety of fruits every day  Choose fresh or canned fruit (canned in its own juice or light syrup) instead of juice  Fruit juice has very little or no fiber  · Eat low-fat dairy foods  Drink fat-free (skim) milk or 1% milk  Eat fat-free yogurt and low-fat cottage cheese  Try low-fat cheeses such as mozzarella and other reduced-fat cheeses  · Choose meat and other protein foods that are low in fat  Choose beans or other legumes such as split peas or lentils  Choose fish, skinless poultry (chicken or turkey), or lean cuts of red meat (beef or pork)  Before you cook meat or poultry, cut off any visible fat  · Use less fat and oil  Try baking foods instead of frying them  Add less fat, such as margarine, sour cream, regular salad dressing and mayonnaise to foods  Eat fewer high-fat foods  Some examples of high-fat foods include french fries, doughnuts, ice cream, and cakes  · Eat fewer sweets  Limit foods and drinks that are high in sugar  This includes candy, cookies, regular soda, and sweetened drinks  Exercise:  Exercise at least 30 minutes per day on most days of the week  Detail Level: Detailed Some examples of exercise include walking, biking, dancing, and swimming  You can also fit in more physical activity by taking the stairs instead of the elevator or parking farther away from stores  Ask your healthcare provider about the best exercise plan for you  © Copyright [employer] 2018 Information is for End User's use only and may not be sold, redistributed or otherwise used for commercial purposes  All illustrations and images included in CareNotes® are the copyrighted property of [employer]  or [facility] Preventive Visit Patient Instructions  Thank you for completing your Welcome to Medicare Visit or Medicare Annual Wellness Visit today  Your next wellness visit will be due in one year (8/5/2023)  The screening/preventive services that you may require over the next 5-10 years are detailed below  Some tests may not apply to you based off risk factors and/or age  Screening tests ordered at today's visit but not completed yet may show as past due  Also, please note that scanned in results may not display below  Preventive Screenings:  Service Recommendations Previous Testing/Comments   Colorectal Cancer Screening  · Colonoscopy    · Fecal Occult Blood Test (FOBT)/Fecal Immunochemical Test (FIT)  · Fecal DNA/Cologuard Test  · Flexible Sigmoidoscopy Age: 54-65 years old   Colonoscopy: every 10 years (May be performed more frequently if at higher risk)  OR  FOBT/FIT: every 1 year  OR  Cologuard: every 3 years  OR  Sigmoidoscopy: every 5 years  Screening may be recommended earlier than age 48 if at higher risk for colorectal cancer  Also, an individualized decision between you and your healthcare provider will decide whether screening between the ages of 74-80 would be appropriate   Colonoscopy: 09/12/2018  FOBT/FIT: Not on file  Cologuard: Not on file  Sigmoidoscopy: Not on file    Screening Current     Prostate Cancer Screening Individualized decision between patient and health care provider in men between ages of 53-78   Medicare will cover every 12 months beginning on the day after your 50th birthday PSA: 1 7 ng/mL     Screening Current     Hepatitis C Screening Once for adults born between 1945 and 1965  More frequently in patients at high risk for Hepatitis C Hep C Antibody: 06/12/2018    Screening Current   Diabetes Screening 1-2 times per year if you're at risk for diabetes or have pre-diabetes Fasting glucose: 101 mg/dL   A1C: 5 0 %    Screening Current   Cholesterol Screening Once every 5 years if you don't have a lipid disorder  May order more often based on risk factors  Lipid panel: 07/26/2022    Screening Not Indicated  History Lipid Disorder      Other Preventive Screenings Covered by Medicare:  6  Abdominal Aortic Aneurysm (AAA) Screening: covered once if your at risk  You're considered to be at risk if you have a family history of AAA or a male between the age of 73-68 who smoking at least 100 cigarettes in your lifetime  7  Lung Cancer Screening: covers low dose CT scan once per year if you meet all of the following conditions: (1) Age 50-69; (2) No signs or symptoms of lung cancer; (3) Current smoker or have quit smoking within the last 15 years; (4) You have a tobacco smoking history of at least 30 pack years (packs per day x number of years you smoked); (5) You get a written order from a healthcare provider  8  Glaucoma Screening: covered annually if you're considered high risk: (1) You have diabetes OR (2) Family history of glaucoma OR (3)  aged 48 and older OR (3)  American aged 72 and older  5  Osteoporosis Screening: covered every 2 years if you meet one of the following conditions: (1) Have a vertebral abnormality; (2) On glucocorticoid therapy for more than 3 months; (3) Have primary hyperparathyroidism; (4) On osteoporosis medications and need to assess response to drug therapy    10  HIV Screening: covered annually if you're between the age of 12-76  Also covered annually if you are younger than 13 and older than 72 with risk factors for HIV infection  For pregnant patients, it is covered up to 3 times per pregnancy  Immunizations:  Immunization Recommendations   Influenza Vaccine Annual influenza vaccination during flu season is recommended for all persons aged >= 6 months who do not have contraindications   Pneumococcal Vaccine (Prevnar and Pneumovax)  * Prevnar = PCV13  * Pneumovax = PPSV23 Adults 25-60 years old: 1-3 doses may be recommended based on certain risk factors  Adults 72 years old: Prevnar (PCV13) vaccine recommended followed by Pneumovax (PPSV23) vaccine  If already received PPSV23 since turning 65, then PCV13 recommended at least one year after PPSV23 dose  Hepatitis B Vaccine 3 dose series if at intermediate or high risk (ex: diabetes, end stage renal disease, liver disease)   Tetanus (Td) Vaccine - COST NOT COVERED BY MEDICARE PART B Following completion of primary series, a booster dose should be given every 10 years to maintain immunity against tetanus  Td may also be given as tetanus wound prophylaxis  Tdap Vaccine - COST NOT COVERED BY MEDICARE PART B Recommended at least once for all adults  For pregnant patients, recommended with each pregnancy  Shingles Vaccine (Shingrix) - COST NOT COVERED BY MEDICARE PART B  2 shot series recommended in those aged 48 and above     Health Maintenance Due:      Topic Date Due    Colorectal Cancer Screening  09/12/2023    Hepatitis C Screening  Completed     Immunizations Due:      Topic Date Due    COVID-19 Vaccine (3 - Booster for Pfizer series) 09/09/2021    Influenza Vaccine (1) 09/01/2022     Advance Directives   What are advance directives? Advance directives are legal documents that state your wishes and plans for medical care  These plans are made ahead of time in case you lose your ability to make decisions for yourself   Advance directives can apply to any medical decision, such as the treatments you want, and if you want to donate organs  What are the types of advance directives? There are many types of advance directives, and each state has rules about how to use them  You may choose a combination of any of the following:  · Living will: This is a written record of the treatment you want  You can also choose which treatments you do not want, which to limit, and which to stop at a certain time  This includes surgery, medicine, IV fluid, and tube feedings  · Durable power of  for healthcare [facility] [place]): This is a written record that states who you want to make healthcare choices for you when you are unable to make them for yourself  This person, called a proxy, is usually a family member or a friend  You may choose more than 1 proxy  · Do not resuscitate (DNR) order:  A DNR order is used in case your heart stops beating or you stop breathing  It is a request not to have certain forms of treatment, such as CPR  A DNR order may be included in other types of advance directives  · Medical directive: This covers the care that you want if you are in a coma, near death, or unable to make decisions for yourself  You can list the treatments you want for each condition  Treatment may include pain medicine, surgery, blood transfusions, dialysis, IV or tube feedings, and a ventilator (breathing machine)  · Values history: This document has questions about your views, beliefs, and how you feel and think about life  This information can help others choose the care that you would choose  Why are advance directives important? An advance directive helps you control your care  Although spoken wishes may be used, it is better to have your wishes written down  Spoken wishes can be misunderstood, or not followed  Treatments may be given even if you do not want them  An advance directive may make it easier for your family to make difficult choices about your care     Weight Management   Why it is important to manage your weight:  Being overweight increases your risk of health conditions such as heart disease, high blood pressure, type 2 diabetes, and certain types of cancer  It can also increase your risk for osteoarthritis, sleep apnea, and other respiratory problems  Aim for a slow, steady weight loss  Even a small amount of weight loss can lower your risk of health problems  How to lose weight safely:  A safe and healthy way to lose weight is to eat fewer calories and get regular exercise  You can lose up about 1 pound a week by decreasing the number of calories you eat by 500 calories each day  Healthy meal plan for weight management:  A healthy meal plan includes a variety of foods, contains fewer calories, and helps you stay healthy  A healthy meal plan includes the following:  · Eat whole-grain foods more often  A healthy meal plan should contain fiber  Fiber is the part of grains, fruits, and vegetables that is not broken down by your body  Whole-grain foods are healthy and provide extra fiber in your diet  Some examples of whole-grain foods are whole-wheat breads and pastas, oatmeal, brown rice, and bulgur  · Eat a variety of vegetables every day  Include dark, leafy greens such as spinach, kale, quique greens, and mustard greens  Eat yellow and orange vegetables such as carrots, sweet potatoes, and winter squash  · Eat a variety of fruits every day  Choose fresh or canned fruit (canned in its own juice or light syrup) instead of juice  Fruit juice has very little or no fiber  · Eat low-fat dairy foods  Drink fat-free (skim) milk or 1% milk  Eat fat-free yogurt and low-fat cottage cheese  Try low-fat cheeses such as mozzarella and other reduced-fat cheeses  · Choose meat and other protein foods that are low in fat  Choose beans or other legumes such as split peas or lentils   Choose fish, skinless poultry (chicken or turkey), or lean cuts of red meat (beef or pork)  Before you cook meat or poultry, cut off any visible fat  · Use less fat and oil  Try baking foods instead of frying them  Add less fat, such as margarine, sour cream, regular salad dressing and mayonnaise to foods  Eat fewer high-fat foods  Some examples of high-fat foods include french fries, doughnuts, ice cream, and cakes  · Eat fewer sweets  Limit foods and drinks that are high in sugar  This includes candy, cookies, regular soda, and sweetened drinks  Exercise:  Exercise at least 30 minutes per day on most days of the week  Some examples of exercise include walking, biking, dancing, and swimming  You can also fit in more physical activity by taking the stairs instead of the elevator or parking farther away from stores  Ask your healthcare provider about the best exercise plan for you  © Copyright [address] 2018 Information is for End User's use only and may not be sold, redistributed or otherwise used for commercial purposes   All illustrations and images included in CareNotes® are the copyrighted property of A D A M [employer]  or [address] [place] [address]

## 2022-11-08 DIAGNOSIS — I10 ESSENTIAL HYPERTENSION: Chronic | ICD-10-CM

## 2022-11-08 RX ORDER — HYDROCHLOROTHIAZIDE 25 MG/1
TABLET ORAL
Qty: 90 TABLET | Refills: 3 | Status: SHIPPED | OUTPATIENT
Start: 2022-11-08

## 2023-01-20 ENCOUNTER — OFFICE VISIT (OUTPATIENT)
Dept: CARDIOLOGY CLINIC | Facility: CLINIC | Age: 73
End: 2023-01-20

## 2023-01-20 VITALS
WEIGHT: 309 LBS | DIASTOLIC BLOOD PRESSURE: 88 MMHG | HEART RATE: 82 BPM | SYSTOLIC BLOOD PRESSURE: 132 MMHG | OXYGEN SATURATION: 99 % | BODY MASS INDEX: 40.95 KG/M2 | HEIGHT: 73 IN | RESPIRATION RATE: 16 BRPM

## 2023-01-20 DIAGNOSIS — I77.810 DILATED AORTIC ROOT (HCC): ICD-10-CM

## 2023-01-20 DIAGNOSIS — I51.7 MODERATE CONCENTRIC LEFT VENTRICULAR HYPERTROPHY: ICD-10-CM

## 2023-01-20 DIAGNOSIS — I10 ESSENTIAL HYPERTENSION: Primary | ICD-10-CM

## 2023-01-20 DIAGNOSIS — E78.2 MIXED HYPERLIPIDEMIA: ICD-10-CM

## 2023-01-20 DIAGNOSIS — I71.21 ANEURYSM OF ASCENDING AORTA WITHOUT RUPTURE: ICD-10-CM

## 2023-01-20 DIAGNOSIS — I35.0 NON-RHEUMATIC AORTIC STENOSIS: ICD-10-CM

## 2023-01-20 DIAGNOSIS — I51.89 DIASTOLIC DYSFUNCTION: ICD-10-CM

## 2023-01-20 DIAGNOSIS — G47.33 OBSTRUCTIVE SLEEP APNEA: ICD-10-CM

## 2023-01-20 DIAGNOSIS — E66.01 MORBID OBESITY WITH BMI OF 40.0-44.9, ADULT (HCC): ICD-10-CM

## 2023-01-20 NOTE — PROGRESS NOTES
CARDIOLOGY OFFICE VISIT  [facility]  [address], [place], [facility] Liliana Yarbrough  Tel: [phone]      NAME: Amanda Ayala  AGE: 81 y o  SEX: male  : 1950  MRN: [id number]      Chief Complaint:  Chief Complaint   Patient presents with   • Follow-up         History of Present Illness:   Patient comes for follow up  States he is doing well from cardiac stand point and denies chest pain / pressure, SOB, palpitations, lightheadedness, syncope, swelling feet, orthopnea, PND, claudication  Essential hypertension, LVH, DD -  Has been hypertensive for many years  Taking medications regularly  Denies lightheadedness, headache, medication side effects  Mixed hyperlipidemia -  Has had hyperlipidemia for many years  On diet control  PCP closely monitoring the blood work  Dilated aortic root, Ascending aortic aneurysm -  Aortic root 4 2 cm per echo in 2022  Ascending thoracic aorta stable at 4 5 cm on CT scans done in 2018, Oct 2018, 2022  Aortic stenosis -mild per echo 2022    Morbid Obesity -  Trying to lose weight      SRAVAN -states he uses CPAP regularly      Past Medical History:  Past Medical History:   Diagnosis Date   • Anemia    • Aortic aneurysm (HCC)    • Elevated troponin    • History of colonoscopy     13   • History of esophagogastroduodenoscopy     13   • Hypertension    • Hypokalemia    • Morbid obesity ([address] )    • Renal calculi    • STEMI (ST elevation myocardial infarction) ([address] )          Past Surgical History:  Past Surgical History:   Procedure Laterality Date   • APPENDECTOMY     • ARTHROSCOPY KNEE Left    • COLON SURGERY     • COLOSTOMY     • ROTATOR CUFF REPAIR Left    • SATURATION BIOPSY OF PROSTATE     • TOTAL HIP ARTHROPLASTY      Last Assessed:5/8/15         Family History:  Family History   Problem Relation Age of Onset   • COPD Mother    • Breast cancer Mother         malignant neoplasm   • Other Father         thoracic aortic aneurysm         Social History:  Social History     Socioeconomic History   • Marital status: /Civil Union     Spouse name: None   • Number of children: None   • Years of education: None   • Highest education level: None   Occupational History   • None   Tobacco Use   • Smoking status: Never   • Smokeless tobacco: Never   Vaping Use   • Vaping Use: Never used   Substance and Sexual Activity   • Alcohol use: Yes     Comment: social   • Drug use: Never   • Sexual activity: Not Currently     Partners: Female   Other Topics Concern   • None   Social History Narrative   • None     Social Determinants of Health     Financial Resource Strain: Not on file   Food Insecurity: Not on file   Transportation Needs: Not on file   Physical Activity: Not on file   Stress: Not on file   Social Connections: Not on file   Intimate Partner Violence: Not on file   Housing Stability: Not on file         Active Problems:  Patient Active Problem List   Diagnosis   • Hypertension   • Morbid obesity with BMI of 40 0-44 9, adult ([facility] [address] )   • Obstructive sleep apnea   • Hyperlipidemia   • Anemia   • Moderate concentric left ventricular hypertrophy   • Gout   • Thoracic aortic aneurysm without rupture         The following portions of the patient's history were reviewed and updated as appropriate: past medical history, past surgical history, past family history,  past social history, current medications, allergies and problem list       Review of Systems:  Constitutional: Denies fever, chills  Eyes: Denies eye redness, eye discharge  ENT: Denies hearing loss, sneezing, nasal discharge, sore throat   Respiratory: Denies cough, expectoration, shortness of breath  Cardiovascular: Denies chest pain, palpitations, lower extremity swelling  Gastrointestinal: Denies abdominal pain, nausea, vomiting, diarrhea  Genito-Urinary: Denies dysuria, incontinence  Musculoskeletal: Denies back pain, joint pain, muscle pain  Neurologic: Denies lightheadedness, syncope, headache, seizures  Endocrine: Denies polydipsia, temperature intolerance  Allergy and Immunology: Denies hives, insect bite sensitivity  Hematological and Lymphatic: Denies bleeding problems, swollen glands   Psychological: Denies depression, suicidal ideation, anxiety, panic  Dermatological: Denies pruritus, rash, skin lesion changes      Vitals:  Vitals:    01/20/23 0935   BP: 132/88   Pulse: 82   Resp: 16   SpO2: 99%       Body mass index is 40 77 kg/m²  Weight (last 2 days)     Date/Time Weight    01/20/23 0935 140 (309)            Physical Examination:  General: Patient is not in acute distress  Awake, alert, oriented in time, place and person  Responding to commands  Morbidly obese  Head: Normocephalic  Atraumatic  Eyes: Both pupils normal sized, round and reactive to light  Nonicteric  ENT: Normal external ear canals  Neck: Supple  JVP not raised  Trachea central  No thyromegaly  Lungs: Bilateral bronchovascular breath sounds with no crackles or rhonchi  Chest wall: No tenderness  Cardiovascular: RRR  S1 and S2 normal  No murmur, rub or gallop  Gastrointestinal: Abdomen soft, nontender  No guarding or rigidity  Liver and spleen not palpable  Bowel sounds present  Neurologic: Patient is awake, alert, oriented in time, place and person  Responding to commands  Moving all extremities  Integumentary:  No skin rash  Lymphatic: No cervical lymphadenopathy  Back: Symmetric   No CVA tenderness  Extremities: No clubbing, cyanosis or edema      Laboratory Results:  CBC with diff:   Lab Results   Component Value Date    WBC 5 62 04/20/2022    WBC 8 15 10/30/2015    RBC 4 59 04/20/2022    RBC 4 90 10/30/2015    HGB 13 9 04/20/2022    HGB 15 1 10/30/2015    HCT 41 6 04/20/2022    HCT 43 0 10/30/2015    MCV 91 04/20/2022    MCV 88 10/30/2015    MCH 30 3 04/20/2022    MCH 30 8 10/30/2015    RDW 13 0 04/20/2022    RDW 13 2 10/30/2015     04/20/2022  10/30/2015       CMP:  Lab Results   Component Value Date    CREATININE 0 90 2022    CREATININE 0 89 10/30/2015    BUN 22 2022    BUN 17 10/30/2015     10/30/2015    K 4 0 2022    K 3 9 10/30/2015     2022     10/30/2015    CO2 30 2022    CO2 32 10/30/2015    GLUCOSE 100 10/30/2015    PROT 6 8 10/30/2015    ALKPHOS 63 2022    ALKPHOS 80 10/30/2015    ALT 31 2022    ALT 41 10/30/2015    AST 22 2022    AST 25 10/30/2015       Lab Results   Component Value Date    HGBA1C 5 0 2019       Lab Results   Component Value Date    TROPONINI 0 03 2018    TROPONINI 0 05 (H) 2018    TROPONINI 0 03 2018       Cardiac testing:   Results for orders placed during the hospital encounter of 22    Echo complete w/ contrast if indicated    Interpretation Summary  •  Left Ventricle: Left ventricular cavity size is normal  Systolic function is normal (60%)  Wall motion cannot be accurately assessed  Diastolic function is mildly abnormal, consistent with grade I (abnormal) relaxation  Wall thickness is moderately increased  There is moderate concentric hypertrophy  •  Left Atrium: The atrium is mildly dilated  •  Aortic Valve: There is mild stenosis  •  Mitral Valve: There is mild annular calcification  •  Tricuspid Valve: There is trace regurgitation  •  Aorta: The aortic root is mildly dilated (4 2 cm)  No results found for this or any previous visit  No results found for this or any previous visit  No results found for this or any previous visit      Results for orders placed during the hospital encounter of 18    NM myocardial perfusion spect (rx stress and/or rest)    Narrative  [address] [place] [address]  [phone]    Rest/Stress Gated SPECT Myocardial Perfusion Imaging After Regadenoson    Patient: Gia Luo  MR number: [id number]  Account number: [de-identified]  : 1950  Age: 74 years  Gender: Male  Status: Outpatient  Location: [facility]  Height: 73 in  Weight: 319 lb  BP: 132/ 96 mmHg    Allergies: LORAZEPAM    Diagnosis: I21 4 - Non-ST elevation (NSTEMI) myocardial infarction, R06 09 - Other forms of dyspnea    Primary Physician:  Liss Mendoza MD  Interpreting Physician:  Herman Lockwood MD  Referring Physician:  Herman Lockwood MD  Technician:  Florentin De Leon BS, BA, AART(N)  Group:  [facility]  Other:  Bhanu Kat, MS, CCT    INDICATIONS: Detection of coronary artery disease  HISTORY: The patient is a 74year old  male  Chest pain status: no chest pain  Other symptoms: dyspnea  Coronary artery disease risk factors: dyslipidemia and hypertension  Cardiovascular history: prior myocardial infarction and  aortic valve disease  Co-morbidity: obesity  Thoracic aortic aneurysm, SRAVAN Medications: a beta blocker, an ACE inhibitor/ARB, and a diuretic  Previous test results: abnormal ECG  PHYSICAL EXAM: Baseline physical exam screening: normal     REST ECG: Normal sinus rhythm  PROCEDURE: The study was performed at [address] [place] [address]  A regadenoson infusion pharmacologic stress test was performed  Gated SPECT myocardial perfusion imaging was performed after stress and at rest  Systolic blood pressure  was 132 mmHg, at the start of the study  Diastolic blood pressure was 96 mmHg, at the start of the study  The heart rate was 55 bpm, at the start of the study  Regadenoson protocol:  HR bpm SBP mmHg DBP mmHg Symptoms Rhythm/conduct  Baseline 55 132 96 none sinus piter  Immediate 83 138 84 mild dyspnea NSR, no ectopy  1 min 80 -- -- none NSR, no ectopy  2 min 75 126 76 none NSR, no ectopy    STRESS SUMMARY: Duration of pharmacologic stress was 3 min  Maximal heart rate during stress was 83 bpm  The rate-pressure product for the peak heart rate and blood pressure was 12026   There was no chest pain during stress  The stress test  was terminated due to protocol completion  The stress ECG was normal  There were no stress arrhythmias or conduction abnormalities  ISOTOPE ADMINISTRATION:  Resting isotope administration Stress isotope administration  Agent Tetrofosmin Tetrofosmin  Dose 15 4 mCi 48 8 mCi  Date 05/02/2018 05/02/2018  Injection-image interval 30 min 45 min    The radiopharmaceutical was injected at the peak effect of pharmacologic stress  PERFUSION DEFECTS:  -  There was a moderate-sized, moderately severe, fixed myocardial perfusion defect of the entire inferior wall likely due to diaphragm attenuation  GATED SPECT:  The calculated left ventricular ejection fraction was 50 %  Left ventricular ejection fraction was within normal limits by visual estimate  There was no left ventricular regional abnormality  SUMMARY:  -  Stress results: There was no chest pain during stress  -  ECG conclusions: The stress ECG was normal   -  Perfusion imaging: There was a moderate-sized, moderately severe, fixed myocardial perfusion defect of the entire inferior wall likely due to diaphragm attenuation   -  Gated SPECT: The calculated left ventricular ejection fraction was 50 %  Left ventricular ejection fraction was within normal limits by visual estimate  There was no left ventricular regional abnormality  IMPRESSIONS: Normal study  There was image artifact, without diagnostic evidence for perfusion abnormality   Left ventricular systolic function was normal     Prepared and signed by    Erma Whitaker MD  Signed 05/03/2018 09:45:57      Medications:    Current Outpatient Medications:   •  colchicine (COLCRYS) 0 6 mg tablet, take 1 tablet by mouth every 3 hours if needed for gout, Disp: 30 tablet, Rfl: 5  •  enalapril (VASOTEC) 20 mg tablet, take 1 tablet by mouth daily, Disp: 90 tablet, Rfl: 5  •  ferrous sulfate 325 (65 Fe) mg tablet, Take 325 mg by mouth daily with breakfast, Disp: , Rfl:   • finasteride (PROSCAR) 5 mg tablet, Take 5 mg by mouth daily, Disp: , Rfl:   •  hydrALAZINE (APRESOLINE) 50 mg tablet, take 1 tablet by mouth every 8 hours (Patient taking differently: Take 75 mg by mouth 3 (three) times a day), Disp: 270 tablet, Rfl: 3  •  hydrochlorothiazide (HYDRODIURIL) 25 mg tablet, take 1 tablet by mouth once daily, Disp: 90 tablet, Rfl: 3  •  ibuprofen (MOTRIN) 400 mg tablet, Take 200 mg by mouth every 6 (six) hours as needed for mild pain (PRN ), Disp: , Rfl:   •  metoprolol succinate (TOPROL-XL) 100 mg 24 hr tablet, take 1 tablet by mouth once daily, Disp: 90 tablet, Rfl: 5  •  tamsulosin (FLOMAX) 0 4 mg, Take 0 4 mg by mouth daily, Disp: , Rfl:       Allergies: Allergies   Allergen Reactions   • Lorazepam Other (See Comments)     very agitated  Psychosis         Assessment and Plan:  1  Essential hypertension  Moderate concentric left ventricular hypertrophy  Diastolic dysfunction  BP stable  Continue current medications  Continue to monitor BP at home and call if abnormal    2  Mixed hyperlipidemia  Continue diet control  His PCP closely monitors his blood work    3  Dilated aortic root ([address] )  Aneurysm of ascending aorta without rupture  Continue beta-blocker   follow-up CT scan in 6-9 months    4  Morbid obesity with BMI of 40 0-44 9, adult (HCC)  Try to lose weight    5  Obstructive sleep apnea  Continue to use CPAP regularly    6  Non-rheumatic aortic stenosis  Mild  To be followed up with serial echocardiograms at recommended intervals    Recommend aggressive risk factor modification and therapeutic lifestyle changes  Low-salt, low-calorie, low-fat, low-cholesterol diet with regular exercise and to optimize weight  I will defer the ordering and monitoring of necessity lab studies to you, but I am available and happy to review and manage any of the data at your request in the future  Discussed concepts of atherosclerosis, including signs and symptoms of cardiac disease  Previous studies were reviewed  Safety measures were reviewed  Questions were entertained and answered  Patient was advised to report any problems requiring medical attention  Follow-up with PCP and appropriate specialist and lab work as discussed  Return for follow up visit as scheduled or earlier, if needed  Thank you for allowing me to participate in the care and evaluation of your patient  Should you have any questions, please feel free to contact me        Davy Draper MD  1/20/2023,10:11 AM

## 2023-02-06 DIAGNOSIS — I10 ESSENTIAL HYPERTENSION: Chronic | ICD-10-CM

## 2023-02-06 RX ORDER — HYDRALAZINE HYDROCHLORIDE 50 MG/1
TABLET, FILM COATED ORAL
Qty: 270 TABLET | Refills: 3 | Status: SHIPPED | OUTPATIENT
Start: 2023-02-06 | End: 2023-02-16

## 2023-02-07 ENCOUNTER — APPOINTMENT (OUTPATIENT)
Dept: LAB | Facility: CLINIC | Age: 73
End: 2023-02-07

## 2023-02-07 DIAGNOSIS — M10.9 GOUT, UNSPECIFIED CAUSE, UNSPECIFIED CHRONICITY, UNSPECIFIED SITE: ICD-10-CM

## 2023-02-07 DIAGNOSIS — E78.2 MIXED HYPERLIPIDEMIA: ICD-10-CM

## 2023-02-07 DIAGNOSIS — Z12.5 PROSTATE CANCER SCREENING: ICD-10-CM

## 2023-02-07 LAB
ALBUMIN SERPL BCP-MCNC: 3.4 G/DL (ref 3.5–5)
ALP SERPL-CCNC: 55 U/L (ref 46–116)
ALT SERPL W P-5'-P-CCNC: 30 U/L (ref 12–78)
ANION GAP SERPL CALCULATED.3IONS-SCNC: 5 MMOL/L (ref 4–13)
AST SERPL W P-5'-P-CCNC: 24 U/L (ref 5–45)
BASOPHILS # BLD AUTO: 0.04 THOUSANDS/ÂΜL (ref 0–0.1)
BASOPHILS NFR BLD AUTO: 1 % (ref 0–1)
BILIRUB SERPL-MCNC: 0.55 MG/DL (ref 0.2–1)
BUN SERPL-MCNC: 19 MG/DL (ref 5–25)
CALCIUM ALBUM COR SERPL-MCNC: 8.8 MG/DL (ref 8.3–10.1)
CALCIUM SERPL-MCNC: 8.3 MG/DL (ref 8.3–10.1)
CHLORIDE SERPL-SCNC: 106 MMOL/L (ref 96–108)
CHOLEST SERPL-MCNC: 160 MG/DL
CO2 SERPL-SCNC: 30 MMOL/L (ref 21–32)
CREAT SERPL-MCNC: 0.96 MG/DL (ref 0.6–1.3)
EOSINOPHIL # BLD AUTO: 0.33 THOUSAND/ÂΜL (ref 0–0.61)
EOSINOPHIL NFR BLD AUTO: 5 % (ref 0–6)
ERYTHROCYTE [DISTWIDTH] IN BLOOD BY AUTOMATED COUNT: 12.8 % (ref 11.6–15.1)
GFR SERPL CREATININE-BSD FRML MDRD: 79 ML/MIN/1.73SQ M
GLUCOSE P FAST SERPL-MCNC: 99 MG/DL (ref 65–99)
HCT VFR BLD AUTO: 44.2 % (ref 36.5–49.3)
HDLC SERPL-MCNC: 30 MG/DL
HGB BLD-MCNC: 14.6 G/DL (ref 12–17)
IMM GRANULOCYTES # BLD AUTO: 0.02 THOUSAND/UL (ref 0–0.2)
IMM GRANULOCYTES NFR BLD AUTO: 0 % (ref 0–2)
LDLC SERPL CALC-MCNC: 95 MG/DL (ref 0–100)
LYMPHOCYTES # BLD AUTO: 1.15 THOUSANDS/ÂΜL (ref 0.6–4.47)
LYMPHOCYTES NFR BLD AUTO: 18 % (ref 14–44)
MCH RBC QN AUTO: 30 PG (ref 26.8–34.3)
MCHC RBC AUTO-ENTMCNC: 33 G/DL (ref 31.4–37.4)
MCV RBC AUTO: 91 FL (ref 82–98)
MONOCYTES # BLD AUTO: 0.66 THOUSAND/ÂΜL (ref 0.17–1.22)
MONOCYTES NFR BLD AUTO: 10 % (ref 4–12)
NEUTROPHILS # BLD AUTO: 4.34 THOUSANDS/ÂΜL (ref 1.85–7.62)
NEUTS SEG NFR BLD AUTO: 66 % (ref 43–75)
NONHDLC SERPL-MCNC: 130 MG/DL
NRBC BLD AUTO-RTO: 0 /100 WBCS
PLATELET # BLD AUTO: 200 THOUSANDS/UL (ref 149–390)
PMV BLD AUTO: 10.4 FL (ref 8.9–12.7)
POTASSIUM SERPL-SCNC: 4.1 MMOL/L (ref 3.5–5.3)
PROT SERPL-MCNC: 6.6 G/DL (ref 6.4–8.4)
PSA SERPL-MCNC: 0.9 NG/ML (ref 0–4)
RBC # BLD AUTO: 4.86 MILLION/UL (ref 3.88–5.62)
SODIUM SERPL-SCNC: 141 MMOL/L (ref 135–147)
TRIGL SERPL-MCNC: 176 MG/DL
URATE SERPL-MCNC: 7.9 MG/DL (ref 3.5–8.5)
WBC # BLD AUTO: 6.54 THOUSAND/UL (ref 4.31–10.16)

## 2023-02-16 ENCOUNTER — OFFICE VISIT (OUTPATIENT)
Dept: FAMILY MEDICINE CLINIC | Facility: CLINIC | Age: 73
End: 2023-02-16

## 2023-02-16 VITALS
HEART RATE: 73 BPM | TEMPERATURE: 97.9 F | SYSTOLIC BLOOD PRESSURE: 126 MMHG | WEIGHT: 315 LBS | BODY MASS INDEX: 41.75 KG/M2 | OXYGEN SATURATION: 98 % | HEIGHT: 73 IN | DIASTOLIC BLOOD PRESSURE: 78 MMHG

## 2023-02-16 DIAGNOSIS — E66.01 MORBID OBESITY WITH BMI OF 40.0-44.9, ADULT (HCC): ICD-10-CM

## 2023-02-16 DIAGNOSIS — I10 PRIMARY HYPERTENSION: Primary | Chronic | ICD-10-CM

## 2023-02-16 DIAGNOSIS — G47.33 OBSTRUCTIVE SLEEP APNEA: ICD-10-CM

## 2023-02-16 DIAGNOSIS — N40.1 BENIGN PROSTATIC HYPERPLASIA WITH URINARY FREQUENCY: ICD-10-CM

## 2023-02-16 DIAGNOSIS — I35.0 NONRHEUMATIC AORTIC VALVE STENOSIS: ICD-10-CM

## 2023-02-16 DIAGNOSIS — I71.21 ANEURYSM OF ASCENDING AORTA WITHOUT RUPTURE: ICD-10-CM

## 2023-02-16 DIAGNOSIS — I10 ESSENTIAL HYPERTENSION: Chronic | ICD-10-CM

## 2023-02-16 DIAGNOSIS — R35.0 BENIGN PROSTATIC HYPERPLASIA WITH URINARY FREQUENCY: ICD-10-CM

## 2023-02-16 DIAGNOSIS — E78.2 MIXED HYPERLIPIDEMIA: ICD-10-CM

## 2023-02-16 DIAGNOSIS — M10.9 GOUT, UNSPECIFIED CAUSE, UNSPECIFIED CHRONICITY, UNSPECIFIED SITE: ICD-10-CM

## 2023-02-16 PROBLEM — D64.9 ANEMIA: Status: RESOLVED | Noted: 2018-06-28 | Resolved: 2023-02-16

## 2023-02-16 RX ORDER — HYDRALAZINE HYDROCHLORIDE 50 MG/1
75 TABLET, FILM COATED ORAL 3 TIMES DAILY
Qty: 405 TABLET | Refills: 3 | Status: SHIPPED | OUTPATIENT
Start: 2023-02-16

## 2023-02-16 NOTE — PROGRESS NOTES
Name: Rosemary Hay      : 1950      MRN: [id number]  Encounter Provider: Jude Rothman MD  Encounter Date: 2023   Encounter department: [address] [place] [address] [address] [id number]   Return visit in 6 months with fasting blood prior to visit  1  Primary hypertension  Assessment & Plan:  Hydralazine has been increased to 75 mg 3 times daily  Continue hydrochlorothiazide 25 mg, enalapril 20 mg and Toprol- mg daily      2  Mixed hyperlipidemia  -     Lipid panel; Future  -     Comprehensive metabolic panel; Future    3  Gout, unspecified cause, unspecified chronicity, unspecified site  Assessment & Plan:  Continue colchicine as needed  Has not had gout attack in several years  4  Obstructive sleep apnea    5  Aneurysm of ascending aorta without rupture    6  Nonrheumatic aortic valve stenosis    7  Morbid obesity with BMI of 40 0-44 9, adult ([facility] [address] )    8  Benign prostatic hyperplasia with urinary frequency  Assessment & Plan:  Continue finasteride and Flomax  Follow-up with urology      9  Essential hypertension  -     hydrALAZINE (APRESOLINE) 50 mg tablet; Take 1 5 tablets (75 mg total) by mouth 3 (three) times a day    BMI Counseling: Body mass index is 41 82 kg/m²  The BMI is above normal  Nutrition recommendations include decreasing portion sizes and moderation in carbohydrate intake  Exercise recommendations include exercising 3-5 times per week  No pharmacotherapy was ordered  Rationale for BMI follow-up plan is due to patient being overweight or obese  Depression Screening and Follow-up Plan: Patient was screened for depression during today's encounter  They screened negative with a PHQ-2 score of 0  Subjective      Patient comes in for checkup  He recently saw cardiology who follows his thoracic aortic aneurysm  Echocardiogram was done that showed him to have mild aortic stenosis  Review of Systems   Constitutional: Negative  Respiratory: Negative  Cardiovascular: Negative  Current Outpatient Medications on File Prior to Visit   Medication Sig   • colchicine (COLCRYS) 0 6 mg tablet take 1 tablet by mouth every 3 hours if needed for gout   • enalapril (VASOTEC) 20 mg tablet take 1 tablet by mouth daily   • ferrous sulfate 325 (65 Fe) mg tablet Take 325 mg by mouth daily with breakfast   • finasteride (PROSCAR) 5 mg tablet Take 5 mg by mouth daily   • hydrochlorothiazide (HYDRODIURIL) 25 mg tablet take 1 tablet by mouth once daily   • ibuprofen (MOTRIN) 400 mg tablet Take 200 mg by mouth every 6 (six) hours as needed for mild pain (PRN )   • metoprolol succinate (TOPROL-XL) 100 mg 24 hr tablet take 1 tablet by mouth once daily   • tamsulosin (FLOMAX) 0 4 mg Take 0 4 mg by mouth daily   • [DISCONTINUED] hydrALAZINE (APRESOLINE) 50 mg tablet take 1 tablet by mouth every 8 hours (Patient taking differently: 75 mg)       Objective     /78 (BP Location: Left arm, Patient Position: Sitting, Cuff Size: Large)   Pulse 73   Temp 97 9 °F (36 6 °C)   Ht 6' 1" (1 854 m)   Wt (!) 144 kg (317 lb)   SpO2 98%   BMI 41 82 kg/m²     Physical Exam  Constitutional:       Appearance: He is well-developed  He is obese  HENT:      Head: Normocephalic and atraumatic  Eyes:      Pupils: Pupils are equal, round, and reactive to light  Cardiovascular:      Rate and Rhythm: Normal rate and regular rhythm  Heart sounds: Normal heart sounds  Pulmonary:      Effort: Pulmonary effort is normal       Breath sounds: Normal breath sounds  Musculoskeletal:         General: Normal range of motion  Cervical back: Neck supple  Skin:     General: Skin is warm and dry  Neurological:      Mental Status: He is alert  Psychiatric:         Mood and Affect: Mood normal          Behavior: Behavior normal          Thought Content:  Thought content normal        Alyson Gold MD

## 2023-02-16 NOTE — ASSESSMENT & PLAN NOTE
Hydralazine has been increased to 75 mg 3 times daily    Continue hydrochlorothiazide 25 mg, enalapril 20 mg and Toprol- mg daily

## 2023-03-07 ENCOUNTER — APPOINTMENT (OUTPATIENT)
Dept: LAB | Facility: CLINIC | Age: 73
End: 2023-03-07

## 2023-03-07 DIAGNOSIS — E78.2 MIXED HYPERLIPIDEMIA: ICD-10-CM

## 2023-03-07 DIAGNOSIS — Z12.5 SPECIAL SCREENING FOR MALIGNANT NEOPLASM OF PROSTATE: ICD-10-CM

## 2023-03-07 LAB — PSA SERPL-MCNC: 1 NG/ML (ref 0–4)

## 2023-05-09 DIAGNOSIS — I10 ESSENTIAL HYPERTENSION: Chronic | ICD-10-CM

## 2023-05-09 RX ORDER — HYDRALAZINE HYDROCHLORIDE 50 MG/1
75 TABLET, FILM COATED ORAL 3 TIMES DAILY
Qty: 405 TABLET | Refills: 3 | Status: SHIPPED | OUTPATIENT
Start: 2023-05-09

## 2023-05-09 NOTE — TELEPHONE ENCOUNTER
Pt stopped into office -- states pharmacy informed him the script was cancelled  I called the pharmacy and they said they never received the new script  Please resend

## 2023-08-04 ENCOUNTER — APPOINTMENT (OUTPATIENT)
Age: 73
End: 2023-08-04
Payer: MEDICARE

## 2023-08-04 LAB
ALBUMIN SERPL BCP-MCNC: 3.4 G/DL (ref 3.5–5)
ALP SERPL-CCNC: 67 U/L (ref 46–116)
ALT SERPL W P-5'-P-CCNC: 30 U/L (ref 12–78)
ANION GAP SERPL CALCULATED.3IONS-SCNC: 3 MMOL/L
AST SERPL W P-5'-P-CCNC: 19 U/L (ref 5–45)
BILIRUB SERPL-MCNC: 0.62 MG/DL (ref 0.2–1)
BUN SERPL-MCNC: 18 MG/DL (ref 5–25)
CALCIUM ALBUM COR SERPL-MCNC: 9 MG/DL (ref 8.3–10.1)
CALCIUM SERPL-MCNC: 8.5 MG/DL (ref 8.3–10.1)
CHLORIDE SERPL-SCNC: 107 MMOL/L (ref 96–108)
CHOLEST SERPL-MCNC: 174 MG/DL
CO2 SERPL-SCNC: 30 MMOL/L (ref 21–32)
CREAT SERPL-MCNC: 0.93 MG/DL (ref 0.6–1.3)
GFR SERPL CREATININE-BSD FRML MDRD: 82 ML/MIN/1.73SQ M
GLUCOSE P FAST SERPL-MCNC: 99 MG/DL (ref 65–99)
HDLC SERPL-MCNC: 36 MG/DL
LDLC SERPL CALC-MCNC: 106 MG/DL (ref 0–100)
NONHDLC SERPL-MCNC: 138 MG/DL
POTASSIUM SERPL-SCNC: 4.1 MMOL/L (ref 3.5–5.3)
PROT SERPL-MCNC: 6.9 G/DL (ref 6.4–8.4)
SODIUM SERPL-SCNC: 140 MMOL/L (ref 135–147)
TRIGL SERPL-MCNC: 159 MG/DL

## 2023-08-09 ENCOUNTER — RA CDI HCC (OUTPATIENT)
Dept: OTHER | Facility: HOSPITAL | Age: 73
End: 2023-08-09

## 2023-08-09 NOTE — PROGRESS NOTES
[address] [facility] coding opportunities       Chart reviewed, no opportunity found: CHART REVIEWED, NO OPPORTUNITY FOUND        Patients Insurance     Medicare Insurance: Medicare

## 2023-08-15 ENCOUNTER — OFFICE VISIT (OUTPATIENT)
Dept: FAMILY MEDICINE CLINIC | Facility: CLINIC | Age: 73
End: 2023-08-15
Payer: MEDICARE

## 2023-08-15 VITALS
TEMPERATURE: 98 F | DIASTOLIC BLOOD PRESSURE: 86 MMHG | OXYGEN SATURATION: 96 % | SYSTOLIC BLOOD PRESSURE: 132 MMHG | WEIGHT: 315 LBS | HEIGHT: 73 IN | HEART RATE: 93 BPM | BODY MASS INDEX: 41.75 KG/M2

## 2023-08-15 DIAGNOSIS — E78.2 MIXED HYPERLIPIDEMIA: ICD-10-CM

## 2023-08-15 DIAGNOSIS — I35.0 NONRHEUMATIC AORTIC VALVE STENOSIS: ICD-10-CM

## 2023-08-15 DIAGNOSIS — Z00.00 MEDICARE ANNUAL WELLNESS VISIT, SUBSEQUENT: Primary | ICD-10-CM

## 2023-08-15 DIAGNOSIS — R35.0 BENIGN PROSTATIC HYPERPLASIA WITH URINARY FREQUENCY: ICD-10-CM

## 2023-08-15 DIAGNOSIS — M10.9 GOUT, UNSPECIFIED CAUSE, UNSPECIFIED CHRONICITY, UNSPECIFIED SITE: ICD-10-CM

## 2023-08-15 DIAGNOSIS — I10 PRIMARY HYPERTENSION: Chronic | ICD-10-CM

## 2023-08-15 DIAGNOSIS — I71.21 ANEURYSM OF ASCENDING AORTA WITHOUT RUPTURE (HCC): ICD-10-CM

## 2023-08-15 DIAGNOSIS — G47.33 OBSTRUCTIVE SLEEP APNEA: ICD-10-CM

## 2023-08-15 DIAGNOSIS — N40.1 BENIGN PROSTATIC HYPERPLASIA WITH URINARY FREQUENCY: ICD-10-CM

## 2023-08-15 DIAGNOSIS — E66.01 CLASS 3 SEVERE OBESITY DUE TO EXCESS CALORIES WITHOUT SERIOUS COMORBIDITY WITH BODY MASS INDEX (BMI) OF 40.0 TO 44.9 IN ADULT (HCC): ICD-10-CM

## 2023-08-15 PROBLEM — E66.813 CLASS 3 SEVERE OBESITY DUE TO EXCESS CALORIES WITHOUT SERIOUS COMORBIDITY WITH BODY MASS INDEX (BMI) OF 40.0 TO 44.9 IN ADULT (HCC): Status: ACTIVE | Noted: 2018-02-27

## 2023-08-15 PROCEDURE — 99214 OFFICE O/P EST MOD 30 MIN: CPT | Performed by: FAMILY MEDICINE

## 2023-08-15 PROCEDURE — G0439 PPPS, SUBSEQ VISIT: HCPCS | Performed by: FAMILY MEDICINE

## 2023-08-15 NOTE — PROGRESS NOTES
Assessment and Plan:   Return visit in 6 months with fasting blood prior to visit. Problem List Items Addressed This Visit        Respiratory    Obstructive sleep apnea       Cardiovascular and Mediastinum    Hypertension (Chronic)     Continue Toprol- mg and hydrochlorothiazide 25 mg daily also hydralazine 50 mg 3 times daily and Vasotec 20 mg daily         Thoracic aortic aneurysm without rupture ([address])     CT chest planned for next month. Nonrheumatic aortic valve stenosis       Other    Class 3 severe obesity due to excess calories without serious comorbidity with body mass index (BMI) of 40.0 to 44.9 in adult [facility])    Hyperlipidemia    Relevant Orders    CBC and differential    Comprehensive metabolic panel    Lipid panel    Gout     Colchicine 0.6 mg as needed. 1 minor case of gout since last visit         Relevant Orders    Uric acid    Benign prostatic hyperplasia with urinary frequency     Continue tamsulosin 0.4 mg and finasteride 5 mg daily        Other Visit Diagnoses     Medicare annual wellness visit, subsequent    -  Primary           Preventive health issues were discussed with patient, and age appropriate screening tests were ordered as noted in patient's After Visit Summary. Personalized health advice and appropriate referrals for health education or preventive services given if needed, as noted in patient's After Visit Summary. History of Present Illness:     Patient presents for a Medicare Wellness Visit    Patient comes in for checkup. He continues to follow with cardiology for thoracic aortic aneurysm and aortic stenosis. Patient Care Team:  Willian Rodriguez MD as PCP - General  Rustam Shipman MD     Review of Systems:     Review of Systems   Constitutional: Negative. Respiratory: Negative. Cardiovascular: Negative.          Problem List:     Patient Active Problem List   Diagnosis   • Hypertension   • Class 3 severe obesity due to excess calories without serious comorbidity with body mass index (BMI) of 40.0 to 44.9 in adult [facility])   • Obstructive sleep apnea   • Hyperlipidemia   • Moderate concentric left ventricular hypertrophy   • Gout   • Thoracic aortic aneurysm without rupture (HCC)   • Nonrheumatic aortic valve stenosis   • Benign prostatic hyperplasia with urinary frequency      Past Medical and Surgical History:     Past Medical History:   Diagnosis Date   • Anemia    • Aortic aneurysm (HCC)    • Elevated troponin    • History of colonoscopy     6/5/13   • History of esophagogastroduodenoscopy     6/5/13   • Hypertension    • Hypokalemia    • Morbid obesity ([address])    • Renal calculi    • STEMI (ST elevation myocardial infarction) ([address])      Past Surgical History:   Procedure Laterality Date   • APPENDECTOMY     • ARTHROSCOPY KNEE Left    • COLON SURGERY     • COLOSTOMY     • ROTATOR CUFF REPAIR Left    • SATURATION BIOPSY OF PROSTATE     • TOTAL HIP ARTHROPLASTY      Last Assessed:5/8/15      Family History:     Family History   Problem Relation Age of Onset   • COPD Mother    • Breast cancer Mother         malignant neoplasm   • Other Father         thoracic aortic aneurysm      Social History:     Social History     Socioeconomic History   • Marital status: /Civil Union     Spouse name: None   • Number of children: None   • Years of education: None   • Highest education level: None   Occupational History   • None   Tobacco Use   • Smoking status: Never   • Smokeless tobacco: Never   Vaping Use   • Vaping Use: Never used   Substance and Sexual Activity   • Alcohol use: Yes     Comment: social   • Drug use: Never   • Sexual activity: Not Currently     Partners: Female   Other Topics Concern   • None   Social History Narrative   • None     Social Determinants of Health     Financial Resource Strain: Low Risk  (8/8/2023)    Overall Financial Resource Strain (CARDIA)    • Difficulty of Paying Living Expenses: Not hard at all   Food Insecurity: Not on file Transportation Needs: No Transportation Needs (8/8/2023)    PRAPARE - Transportation    • Lack of Transportation (Medical): No    • Lack of Transportation (Non-Medical): No   Physical Activity: Not on file   Stress: Not on file   Social Connections: Not on file   Intimate Partner Violence: Not on file   Housing Stability: Not on file      Medications and Allergies:     Current Outpatient Medications   Medication Sig Dispense Refill   • colchicine (COLCRYS) 0.6 mg tablet take 1 tablet by mouth every 3 hours if needed for gout 30 tablet 5   • enalapril (VASOTEC) 20 mg tablet take 1 tablet by mouth once daily 90 tablet 5   • ferrous sulfate 325 (65 Fe) mg tablet Take 325 mg by mouth daily with breakfast     • finasteride (PROSCAR) 5 mg tablet Take 5 mg by mouth daily     • hydrALAZINE (APRESOLINE) 50 mg tablet Take 1.5 tablets (75 mg total) by mouth 3 (three) times a day 405 tablet 3   • hydrochlorothiazide (HYDRODIURIL) 25 mg tablet take 1 tablet by mouth once daily 90 tablet 3   • ibuprofen (MOTRIN) 400 mg tablet Take 200 mg by mouth every 6 (six) hours as needed for mild pain (PRN.)     • metoprolol succinate (TOPROL-XL) 100 mg 24 hr tablet take 1 tablet by mouth once daily 90 tablet 5   • tamsulosin (FLOMAX) 0.4 mg Take 0.4 mg by mouth daily       No current facility-administered medications for this visit.      Allergies   Allergen Reactions   • Lorazepam Other (See Comments)     very agitated  Psychosis      Immunizations:     Immunization History   Administered Date(s) Administered   • COVID-19 PFIZER VACCINE 0.3 ML IM 03/18/2021, 04/09/2021   • INFLUENZA 10/31/2018   • Influenza, high dose seasonal 0.7 mL 10/31/2018, 10/23/2019, 09/16/2020, 10/14/2021   • Pneumococcal Conjugate 13-Valent 01/09/2019   • Pneumococcal Polysaccharide PPV23 01/07/2021      Health Maintenance:         Topic Date Due   • Colorectal Cancer Screening  09/12/2023   • Hepatitis C Screening  Completed         Topic Date Due   • COVID-19 Vaccine (3 - Pfizer series) 06/04/2021   • Influenza Vaccine (1) 09/01/2023      Medicare Screening Tests and Risk Assessments:     Manisha Beckford is here for his Subsequent Wellness visit. Last Medicare Wellness visit information reviewed, patient interviewed and updates made to the record today. Health Risk Assessment:   Patient rates overall health as good. Patient feels that their physical health rating is same. Patient is very satisfied with their life. Eyesight was rated as same. Hearing was rated as same. Patient feels that their emotional and mental health rating is same. Patients states they are never, rarely angry. Patient states they are sometimes unusually tired/fatigued. Pain experienced in the last 7 days has been some. Patient's pain rating has been 3/10. Patient states that he has experienced no weight loss or gain in last 6 months. Fall Risk Screening: In the past year, patient has experienced: no history of falling in past year      Home Safety:  Patient does not have trouble with stairs inside or outside of their home. Patient has working smoke alarms and has no working carbon monoxide detector. Home safety hazards include: none. Nutrition:   Current diet is Regular. Medications:   Patient is currently taking over-the-counter supplements. OTC medications include: B12, Calcium, Magnesium, Vitamin C, Digestive Enzymes, Probiotic. Patient is able to manage medications. Activities of Daily Living (ADLs)/Instrumental Activities of Daily Living (IADLs):   Walk and transfer into and out of bed and chair?: Yes  Dress and groom yourself?: Yes    Bathe or shower yourself?: Yes    Feed yourself?  Yes  Do your laundry/housekeeping?: Yes  Manage your money, pay your bills and track your expenses?: Yes  Make your own meals?: Yes    Do your own shopping?: Yes    Durable Medical Equipment Suppliers  Do Not Use [employer]    Previous Hospitalizations:   Any hospitalizations or ED visits within the last 15 months?: No      Advance Care Planning:   Living will: Yes    Durable POA for healthcare: Yes    Advanced directive: Yes    Advanced directive counseling given: Yes    Five wishes given: No    End of Life Decisions reviewed with patient: Yes    Provider agrees with end of life decisions: Yes      PREVENTIVE SCREENINGS      Cardiovascular Screening:    General: Screening Not Indicated and History Lipid Disorder      Diabetes Screening:     General: Screening Current      Colorectal Cancer Screening:     General: Screening Current      Prostate Cancer Screening:    General: Screening Current      Osteoporosis Screening:    General: Screening Not Indicated      Abdominal Aortic Aneurysm (AAA) Screening:    Risk factors include: age between 70-77 yo        General: Screening Not Indicated      Lung Cancer Screening:     General: Screening Not Indicated      Hepatitis C Screening:    General: Screening Current    Screening, Brief Intervention, and Referral to Treatment (SBIRT)    Screening  Typical number of drinks in a day: 1  Typical number of drinks in a week: 5  Interpretation: Low risk drinking behavior. AUDIT-C Screenin) How often did you have a drink containing alcohol in the past year? 2 to 4 times a month  2) How many drinks did you have on a typical day when you were drinking in the past year? 3 to 4  3) How often did you have 6 or more drinks on one occasion in the past year? never    AUDIT-C Score: 2  Interpretation: Score 0-3 (male): Negative screen for alcohol misuse    Single Item Drug Screening:  How often have you used an illegal drug (including marijuana) or a prescription medication for non-medical reasons in the past year? never    Single Item Drug Screen Score: 0  Interpretation: Negative screen for possible drug use disorder    No results found.      Physical Exam:     /86 (BP Location: Left arm, Patient Position: Sitting, Cuff Size: Standard)   Pulse 93   Temp 98 °F (36.7 °C) (Tympanic)   Ht 6' 1" (1.854 m)   Wt (!) 143 kg (315 lb)   SpO2 96%   BMI 41.56 kg/m²     Physical Exam  Vitals and nursing note reviewed. Constitutional:       Appearance: He is well-developed. He is obese. HENT:      Head: Normocephalic and atraumatic. Eyes:      Conjunctiva/sclera: Conjunctivae normal.   Cardiovascular:      Rate and Rhythm: Normal rate and regular rhythm. Heart sounds: Murmur heard. Pulmonary:      Effort: Pulmonary effort is normal.      Breath sounds: Normal breath sounds. Musculoskeletal:         General: No swelling. Cervical back: Neck supple. Skin:     General: Skin is warm and dry. Capillary Refill: Capillary refill takes less than 2 seconds. Neurological:      Mental Status: He is alert.    Psychiatric:         Mood and Affect: Mood normal.         Behavior: Behavior normal.          Giselle Ye MD

## 2023-08-15 NOTE — ASSESSMENT & PLAN NOTE
CT chest planned for next month.
Colchicine 0.6 mg as needed.   1 minor case of gout since last visit
Continue Toprol- mg and hydrochlorothiazide 25 mg daily also hydralazine 50 mg 3 times daily and Vasotec 20 mg daily
Continue tamsulosin 0.4 mg and finasteride 5 mg daily
n/a

## 2023-08-15 NOTE — PATIENT INSTRUCTIONS
Medicare Preventive Visit Patient Instructions  Thank you for completing your Welcome to Medicare Visit or Medicare Annual Wellness Visit today. Your next wellness visit will be due in one year (8/15/2024). The screening/preventive services that you may require over the next 5-10 years are detailed below. Some tests may not apply to you based off risk factors and/or age. Screening tests ordered at today's visit but not completed yet may show as past due. Also, please note that scanned in results may not display below. Preventive Screenings:  Service Recommendations Previous Testing/Comments   Colorectal Cancer Screening  · Colonoscopy    · Fecal Occult Blood Test (FOBT)/Fecal Immunochemical Test (FIT)  · Fecal DNA/Cologuard Test  · Flexible Sigmoidoscopy Age: 43-73 years old   Colonoscopy: every 10 years (May be performed more frequently if at higher risk)  OR  FOBT/FIT: every 1 year  OR  Cologuard: every 3 years  OR  Sigmoidoscopy: every 5 years  Screening may be recommended earlier than age 39 if at higher risk for colorectal cancer. Also, an individualized decision between you and your healthcare provider will decide whether screening between the ages of 77-80 would be appropriate.  Colonoscopy: 09/12/2018  FOBT/FIT: Not on file  Cologuard: Not on file  Sigmoidoscopy: Not on file    Screening Current     Prostate Cancer Screening Individualized decision between patient and health care provider in men between ages of 53-66   Medicare will cover every 12 months beginning on the day after your 50th birthday PSA: 1.0 ng/mL     Screening Current     Hepatitis C Screening Once for adults born between 1945 and 1965  More frequently in patients at high risk for Hepatitis C Hep C Antibody: 06/12/2018    Screening Current   Diabetes Screening 1-2 times per year if you're at risk for diabetes or have pre-diabetes Fasting glucose: 99 mg/dL (8/4/2023)  A1C: 5.0 % (12/18/2019)  Screening Current   Cholesterol Screening Once every 5 years if you don't have a lipid disorder. May order more often based on risk factors. Lipid panel: 08/04/2023  Screening Not Indicated  History Lipid Disorder      Other Preventive Screenings Covered by Medicare:  1. Abdominal Aortic Aneurysm (AAA) Screening: covered once if your at risk. You're considered to be at risk if you have a family history of AAA or a male between the age of 70-76 who smoking at least 100 cigarettes in your lifetime. 2. Lung Cancer Screening: covers low dose CT scan once per year if you meet all of the following conditions: (1) Age 48-67; (2) No signs or symptoms of lung cancer; (3) Current smoker or have quit smoking within the last 15 years; (4) You have a tobacco smoking history of at least 20 pack years (packs per day x number of years you smoked); (5) You get a written order from a healthcare provider. 3. Glaucoma Screening: covered annually if you're considered high risk: (1) You have diabetes OR (2) Family history of glaucoma OR (3)  aged 48 and older OR (3)  American aged 72 and older  3. Osteoporosis Screening: covered every 2 years if you meet one of the following conditions: (1) Have a vertebral abnormality; (2) On glucocorticoid therapy for more than 3 months; (3) Have primary hyperparathyroidism; (4) On osteoporosis medications and need to assess response to drug therapy. 5. HIV Screening: covered annually if you're between the age of 14-79. Also covered annually if you are younger than 13 and older than 72 with risk factors for HIV infection. For pregnant patients, it is covered up to 3 times per pregnancy.     Immunizations:  Immunization Recommendations   Influenza Vaccine Annual influenza vaccination during flu season is recommended for all persons aged >= 6 months who do not have contraindications   Pneumococcal Vaccine   * Pneumococcal conjugate vaccine = PCV13 (Prevnar 13), PCV15 (Vaxneuvance), PCV20 (Prevnar 20)  * Pneumococcal polysaccharide vaccine = PPSV23 (Pneumovax) Adults 2364 years old: 1-3 doses may be recommended based on certain risk factors  Adults 72 years old: 1-2 doses may be recommended based off what pneumonia vaccine you previously received   Hepatitis B Vaccine 3 dose series if at intermediate or high risk (ex: diabetes, end stage renal disease, liver disease)   Tetanus (Td) Vaccine - COST NOT COVERED BY MEDICARE PART B Following completion of primary series, a booster dose should be given every 10 years to maintain immunity against tetanus. Td may also be given as tetanus wound prophylaxis. Tdap Vaccine - COST NOT COVERED BY MEDICARE PART B Recommended at least once for all adults. For pregnant patients, recommended with each pregnancy. Shingles Vaccine (Shingrix) - COST NOT COVERED BY MEDICARE PART B  2 shot series recommended in those aged 48 and above     Health Maintenance Due:      Topic Date Due   • Colorectal Cancer Screening  09/12/2023   • Hepatitis C Screening  Completed     Immunizations Due:      Topic Date Due   • COVID-19 Vaccine (3 - Pfizer series) 06/04/2021   • Influenza Vaccine (1) 09/01/2023     Advance Directives   What are advance directives? Advance directives are legal documents that state your wishes and plans for medical care. These plans are made ahead of time in case you lose your ability to make decisions for yourself. Advance directives can apply to any medical decision, such as the treatments you want, and if you want to donate organs. What are the types of advance directives? There are many types of advance directives, and each state has rules about how to use them. You may choose a combination of any of the following:  · Living will: This is a written record of the treatment you want. You can also choose which treatments you do not want, which to limit, and which to stop at a certain time. This includes surgery, medicine, IV fluid, and tube feedings.    · Durable power of  for [facility]): This is a written record that states who you want to make healthcare choices for you when you are unable to make them for yourself. This person, called a proxy, is usually a family member or a friend. You may choose more than 1 proxy. · Do not resuscitate (DNR) order:  A DNR order is used in case your heart stops beating or you stop breathing. It is a request not to have certain forms of treatment, such as CPR. A DNR order may be included in other types of advance directives. · Medical directive: This covers the care that you want if you are in a coma, near death, or unable to make decisions for yourself. You can list the treatments you want for each condition. Treatment may include pain medicine, surgery, blood transfusions, dialysis, IV or tube feedings, and a ventilator (breathing machine). · Values history: This document has questions about your views, beliefs, and how you feel and think about life. This information can help others choose the care that you would choose. Why are advance directives important? An advance directive helps you control your care. Although spoken wishes may be used, it is better to have your wishes written down. Spoken wishes can be misunderstood, or not followed. Treatments may be given even if you do not want them. An advance directive may make it easier for your family to make difficult choices about your care. Weight Management   Why it is important to manage your weight:  Being overweight increases your risk of health conditions such as heart disease, high blood pressure, type 2 diabetes, and certain types of cancer. It can also increase your risk for osteoarthritis, sleep apnea, and other respiratory problems. Aim for a slow, steady weight loss. Even a small amount of weight loss can lower your risk of health problems. How to lose weight safely:  A safe and healthy way to lose weight is to eat fewer calories and get regular exercise.  You can lose up about 1 pound a week by decreasing the number of calories you eat by 500 calories each day. Healthy meal plan for weight management:  A healthy meal plan includes a variety of foods, contains fewer calories, and helps you stay healthy. A healthy meal plan includes the following:  · Eat whole-grain foods more often. A healthy meal plan should contain fiber. Fiber is the part of grains, fruits, and vegetables that is not broken down by your body. Whole-grain foods are healthy and provide extra fiber in your diet. Some examples of whole-grain foods are whole-wheat breads and pastas, oatmeal, brown rice, and bulgur. · Eat a variety of vegetables every day. Include dark, leafy greens such as spinach, kale, quique greens, and mustard greens. Eat yellow and orange vegetables such as carrots, sweet potatoes, and winter squash. · Eat a variety of fruits every day. Choose fresh or canned fruit (canned in its own juice or light syrup) instead of juice. Fruit juice has very little or no fiber. · Eat low-fat dairy foods. Drink fat-free (skim) milk or 1% milk. Eat fat-free yogurt and low-fat cottage cheese. Try low-fat cheeses such as mozzarella and other reduced-fat cheeses. · Choose meat and other protein foods that are low in fat. Choose beans or other legumes such as split peas or lentils. Choose fish, skinless poultry (chicken or turkey), or lean cuts of red meat (beef or pork). Before you cook meat or poultry, cut off any visible fat. · Use less fat and oil. Try baking foods instead of frying them. Add less fat, such as margarine, sour cream, regular salad dressing and mayonnaise to foods. Eat fewer high-fat foods. Some examples of high-fat foods include french fries, doughnuts, ice cream, and cakes. · Eat fewer sweets. Limit foods and drinks that are high in sugar. This includes candy, cookies, regular soda, and sweetened drinks.   Exercise:  Exercise at least 30 minutes per day on most days of the week. Some examples of exercise include walking, biking, dancing, and swimming. You can also fit in more physical activity by taking the stairs instead of the elevator or parking farther away from stores. Ask your healthcare provider about the best exercise plan for you. © Copyright [employer] 2018 Information is for End User's use only and may not be sold, redistributed or otherwise used for commercial purposes.  All illustrations and images included in CareNotes® are the copyrighted property of [employer] Inc[employer] or  [address]

## 2023-09-03 DIAGNOSIS — M10.9 GOUT, UNSPECIFIED CAUSE, UNSPECIFIED CHRONICITY, UNSPECIFIED SITE: ICD-10-CM

## 2023-09-05 ENCOUNTER — TELEPHONE (OUTPATIENT)
Dept: FAMILY MEDICINE CLINIC | Facility: CLINIC | Age: 73
End: 2023-09-05

## 2023-09-05 RX ORDER — COLCHICINE 0.6 MG/1
TABLET ORAL
Qty: 30 TABLET | Refills: 0 | Status: SHIPPED | OUTPATIENT
Start: 2023-09-05

## 2023-09-05 NOTE — TELEPHONE ENCOUNTER
Notification letter received via usps from pt -  - pts pharmacy changed from [facility] to the [facility] in [address]. Letter scanned under media. Pharmacy updated.

## 2023-09-12 DIAGNOSIS — I10 ESSENTIAL HYPERTENSION: Chronic | ICD-10-CM

## 2023-09-12 RX ORDER — METOPROLOL SUCCINATE 100 MG/1
100 TABLET, EXTENDED RELEASE ORAL DAILY
Qty: 90 TABLET | Refills: 0 | Status: SHIPPED | OUTPATIENT
Start: 2023-09-12

## 2023-09-20 ENCOUNTER — HOSPITAL ENCOUNTER (OUTPATIENT)
Dept: CT IMAGING | Facility: CLINIC | Age: 73
Discharge: HOME | End: 2023-09-20
Payer: MEDICARE

## 2023-09-20 DIAGNOSIS — I77.810 DILATED AORTIC ROOT (HCC): ICD-10-CM

## 2023-09-20 DIAGNOSIS — I71.21 ANEURYSM OF ASCENDING AORTA WITHOUT RUPTURE (HCC): ICD-10-CM

## 2023-09-20 PROCEDURE — G1004 CDSM NDSC: HCPCS

## 2023-09-20 PROCEDURE — 71275 CT ANGIOGRAPHY CHEST: CPT

## 2023-09-20 RX ADMIN — IOHEXOL 100 ML: 350 INJECTION, SOLUTION INTRAVENOUS at 09:03

## 2023-09-25 ENCOUNTER — TELEPHONE (OUTPATIENT)
Dept: CARDIOLOGY CLINIC | Facility: CLINIC | Age: 73
End: 2023-09-25

## 2023-09-25 NOTE — TELEPHONE ENCOUNTER
Patient called & would like a call back with results from [facility] that was done on 9/20/23. Patient will be leaving for vacation for 10 days tommorow and he would like results before he leaves.          # [phone]

## 2023-09-25 NOTE — TELEPHONE ENCOUNTER
Sent message to [facility] lester CTA chest done but progress note still not signed. Asked them to finish so we may review results. Spoke with the pt advised we are waiting for results. Pt asking if he can travel by plane tomorrow. Please advise.

## 2023-09-27 ENCOUNTER — TELEPHONE (OUTPATIENT)
Dept: CARDIOLOGY CLINIC | Facility: CLINIC | Age: 73
End: 2023-09-27

## 2023-09-27 NOTE — TELEPHONE ENCOUNTER
----- Message from Carrillo Hernandez MD sent at 9/26/2023  6:16 PM EDT -----  Please inform the patient  Stable ascending aortic aneurysm.       ----- Message -----  From: Interface, Radiology Results In  Sent: 9/26/2023   9:24 AM EDT  To: Carrillo Hernandez MD

## 2023-10-14 DIAGNOSIS — N40.1 BENIGN PROSTATIC HYPERPLASIA WITH URINARY FREQUENCY: Primary | ICD-10-CM

## 2023-10-14 DIAGNOSIS — R35.0 BENIGN PROSTATIC HYPERPLASIA WITH URINARY FREQUENCY: Primary | ICD-10-CM

## 2023-10-16 ENCOUNTER — APPOINTMENT (OUTPATIENT)
Dept: CT IMAGING | Facility: HOSPITAL | Age: 73
DRG: 442 | End: 2023-10-16
Attending: RADIOLOGY
Payer: MEDICARE

## 2023-10-16 ENCOUNTER — APPOINTMENT (EMERGENCY)
Dept: RADIOLOGY | Facility: HOSPITAL | Age: 73
DRG: 442 | End: 2023-10-16
Payer: MEDICARE

## 2023-10-16 ENCOUNTER — APPOINTMENT (OUTPATIENT)
Dept: CT IMAGING | Facility: HOSPITAL | Age: 73
DRG: 442 | End: 2023-10-16
Payer: MEDICARE

## 2023-10-16 ENCOUNTER — HOSPITAL ENCOUNTER (INPATIENT)
Facility: HOSPITAL | Age: 73
LOS: 3 days | Discharge: HOME | DRG: 442 | End: 2023-10-20
Attending: EMERGENCY MEDICINE | Admitting: STUDENT IN AN ORGANIZED HEALTH CARE EDUCATION/TRAINING PROGRAM
Payer: MEDICARE

## 2023-10-16 ENCOUNTER — APPOINTMENT (EMERGENCY)
Dept: CT IMAGING | Facility: HOSPITAL | Age: 73
DRG: 442 | End: 2023-10-16
Payer: MEDICARE

## 2023-10-16 DIAGNOSIS — K75.0 LIVER ABSCESS: ICD-10-CM

## 2023-10-16 DIAGNOSIS — R50.9 FEVER: Primary | ICD-10-CM

## 2023-10-16 DIAGNOSIS — I95.9 HYPOTENSION: ICD-10-CM

## 2023-10-16 LAB
2HR DELTA HS TROPONIN: -1 NG/L
ABO GROUP BLD: NORMAL
ABO GROUP BLD: NORMAL
ALBUMIN SERPL BCP-MCNC: 3.1 G/DL (ref 3.5–5)
ALP SERPL-CCNC: 98 U/L (ref 34–104)
ALT SERPL W P-5'-P-CCNC: 67 U/L (ref 7–52)
ANION GAP SERPL CALCULATED.3IONS-SCNC: 9 MMOL/L
APTT PPP: 38 SECONDS (ref 23–37)
AST SERPL W P-5'-P-CCNC: 46 U/L (ref 13–39)
ATRIAL RATE: 92 BPM
B BURGDOR IGG SERPL QL IA: NEGATIVE
B BURGDOR IGG+IGM SER QL IA: POSITIVE
B BURGDOR IGM SERPL QL IA: NEGATIVE
BACTERIA UR QL AUTO: ABNORMAL /HPF
BASOPHILS # BLD AUTO: 0.02 THOUSANDS/ÂΜL (ref 0–0.1)
BASOPHILS NFR BLD AUTO: 0 % (ref 0–1)
BILIRUB SERPL-MCNC: 0.68 MG/DL (ref 0.2–1)
BILIRUB UR QL STRIP: NEGATIVE
BLD GP AB SCN SERPL QL: NEGATIVE
BNP SERPL-MCNC: 94 PG/ML (ref 0–100)
BUN SERPL-MCNC: 27 MG/DL (ref 5–25)
CALCIUM ALBUM COR SERPL-MCNC: 9.1 MG/DL (ref 8.3–10.1)
CALCIUM SERPL-MCNC: 8.4 MG/DL (ref 8.4–10.2)
CARDIAC TROPONIN I PNL SERPL HS: 15 NG/L
CARDIAC TROPONIN I PNL SERPL HS: 16 NG/L
CHLORIDE SERPL-SCNC: 96 MMOL/L (ref 96–108)
CLARITY UR: CLEAR
CO2 SERPL-SCNC: 30 MMOL/L (ref 21–32)
COLOR UR: ABNORMAL
CREAT SERPL-MCNC: 1.21 MG/DL (ref 0.6–1.3)
EOSINOPHIL # BLD AUTO: 0.03 THOUSAND/ÂΜL (ref 0–0.61)
EOSINOPHIL NFR BLD AUTO: 0 % (ref 0–6)
ERYTHROCYTE [DISTWIDTH] IN BLOOD BY AUTOMATED COUNT: 12.2 % (ref 11.6–15.1)
FLUAV RNA RESP QL NAA+PROBE: NEGATIVE
FLUBV RNA RESP QL NAA+PROBE: NEGATIVE
GFR SERPL CREATININE-BSD FRML MDRD: 59 ML/MIN/1.73SQ M
GLUCOSE SERPL-MCNC: 102 MG/DL (ref 65–140)
GLUCOSE SERPL-MCNC: 105 MG/DL (ref 65–140)
GLUCOSE UR STRIP-MCNC: NEGATIVE MG/DL
HCT VFR BLD AUTO: 38.1 % (ref 36.5–49.3)
HGB BLD-MCNC: 12.6 G/DL (ref 12–17)
HGB UR QL STRIP.AUTO: ABNORMAL
IMM GRANULOCYTES # BLD AUTO: 0.04 THOUSAND/UL (ref 0–0.2)
IMM GRANULOCYTES NFR BLD AUTO: 0 % (ref 0–2)
INR PPP: 1.46 (ref 0.84–1.19)
KETONES UR STRIP-MCNC: NEGATIVE MG/DL
LACTATE SERPL-SCNC: 0.9 MMOL/L (ref 0.5–2)
LEUKOCYTE ESTERASE UR QL STRIP: NEGATIVE
LIPASE SERPL-CCNC: 23 U/L (ref 11–82)
LYMPHOCYTES # BLD AUTO: 0.75 THOUSANDS/ÂΜL (ref 0.6–4.47)
LYMPHOCYTES NFR BLD AUTO: 7 % (ref 14–44)
MAGNESIUM SERPL-MCNC: 1.9 MG/DL (ref 1.9–2.7)
MCH RBC QN AUTO: 28.5 PG (ref 26.8–34.3)
MCHC RBC AUTO-ENTMCNC: 33.1 G/DL (ref 31.4–37.4)
MCV RBC AUTO: 86 FL (ref 82–98)
MONOCYTES # BLD AUTO: 1.06 THOUSAND/ÂΜL (ref 0.17–1.22)
MONOCYTES NFR BLD AUTO: 10 % (ref 4–12)
NEUTROPHILS # BLD AUTO: 8.95 THOUSANDS/ÂΜL (ref 1.85–7.62)
NEUTS SEG NFR BLD AUTO: 83 % (ref 43–75)
NITRITE UR QL STRIP: NEGATIVE
NON-SQ EPI CELLS URNS QL MICRO: ABNORMAL /HPF
NRBC BLD AUTO-RTO: 0 /100 WBCS
P AXIS: 25 DEGREES
PH UR STRIP.AUTO: 7 [PH]
PLATELET # BLD AUTO: 365 THOUSANDS/UL (ref 149–390)
PMV BLD AUTO: 10 FL (ref 8.9–12.7)
POTASSIUM SERPL-SCNC: 3.7 MMOL/L (ref 3.5–5.3)
PR INTERVAL: 138 MS
PROT SERPL-MCNC: 6.9 G/DL (ref 6.4–8.4)
PROT UR STRIP-MCNC: ABNORMAL MG/DL
PROTHROMBIN TIME: 18.4 SECONDS (ref 11.6–14.5)
QRS AXIS: -69 DEGREES
QRSD INTERVAL: 144 MS
QT INTERVAL: 392 MS
QTC INTERVAL: 484 MS
RBC # BLD AUTO: 4.42 MILLION/UL (ref 3.88–5.62)
RBC #/AREA URNS AUTO: ABNORMAL /HPF
RH BLD: POSITIVE
RH BLD: POSITIVE
RSV RNA RESP QL NAA+PROBE: NEGATIVE
SARS-COV-2 RNA RESP QL NAA+PROBE: NEGATIVE
SODIUM SERPL-SCNC: 135 MMOL/L (ref 135–147)
SP GR UR STRIP.AUTO: >=1.05 (ref 1–1.03)
SPECIMEN EXPIRATION DATE: NORMAL
T WAVE AXIS: 71 DEGREES
TSH SERPL DL<=0.05 MIU/L-ACNC: 2.88 UIU/ML (ref 0.45–4.5)
UROBILINOGEN UR STRIP-ACNC: <2 MG/DL
VENTRICULAR RATE: 92 BPM
WBC # BLD AUTO: 10.85 THOUSAND/UL (ref 4.31–10.16)
WBC #/AREA URNS AUTO: ABNORMAL /HPF

## 2023-10-16 PROCEDURE — 71275 CT ANGIOGRAPHY CHEST: CPT

## 2023-10-16 PROCEDURE — 96361 HYDRATE IV INFUSION ADD-ON: CPT

## 2023-10-16 PROCEDURE — 84484 ASSAY OF TROPONIN QUANT: CPT | Performed by: EMERGENCY MEDICINE

## 2023-10-16 PROCEDURE — 96365 THER/PROPH/DIAG IV INF INIT: CPT

## 2023-10-16 PROCEDURE — 87207 SMEAR SPECIAL STAIN: CPT | Performed by: EMERGENCY MEDICINE

## 2023-10-16 PROCEDURE — 86900 BLOOD TYPING SEROLOGIC ABO: CPT | Performed by: EMERGENCY MEDICINE

## 2023-10-16 PROCEDURE — 87468 ANAPLSMA PHGCYTOPHLM AMP PRB: CPT | Performed by: EMERGENCY MEDICINE

## 2023-10-16 PROCEDURE — 99285 EMERGENCY DEPT VISIT HI MDM: CPT | Performed by: EMERGENCY MEDICINE

## 2023-10-16 PROCEDURE — 83605 ASSAY OF LACTIC ACID: CPT | Performed by: EMERGENCY MEDICINE

## 2023-10-16 PROCEDURE — C1769 GUIDE WIRE: HCPCS

## 2023-10-16 PROCEDURE — 80053 COMPREHEN METABOLIC PANEL: CPT | Performed by: EMERGENCY MEDICINE

## 2023-10-16 PROCEDURE — 82948 REAGENT STRIP/BLOOD GLUCOSE: CPT

## 2023-10-16 PROCEDURE — 86618 LYME DISEASE ANTIBODY: CPT | Performed by: EMERGENCY MEDICINE

## 2023-10-16 PROCEDURE — 87070 CULTURE OTHR SPECIMN AEROBIC: CPT | Performed by: STUDENT IN AN ORGANIZED HEALTH CARE EDUCATION/TRAINING PROGRAM

## 2023-10-16 PROCEDURE — 99223 1ST HOSP IP/OBS HIGH 75: CPT | Performed by: STUDENT IN AN ORGANIZED HEALTH CARE EDUCATION/TRAINING PROGRAM

## 2023-10-16 PROCEDURE — 49405 IMAGE CATH FLUID COLXN VISC: CPT | Performed by: RADIOLOGY

## 2023-10-16 PROCEDURE — 93005 ELECTROCARDIOGRAM TRACING: CPT

## 2023-10-16 PROCEDURE — 74174 CTA ABD&PLVS W/CONTRAST: CPT

## 2023-10-16 PROCEDURE — 93010 ELECTROCARDIOGRAM REPORT: CPT | Performed by: INTERNAL MEDICINE

## 2023-10-16 PROCEDURE — 99285 EMERGENCY DEPT VISIT HI MDM: CPT

## 2023-10-16 PROCEDURE — 85730 THROMBOPLASTIN TIME PARTIAL: CPT | Performed by: EMERGENCY MEDICINE

## 2023-10-16 PROCEDURE — 0F9130Z DRAINAGE OF RIGHT LOBE LIVER WITH DRAINAGE DEVICE, PERCUTANEOUS APPROACH: ICD-10-PCS | Performed by: RADIOLOGY

## 2023-10-16 PROCEDURE — 85610 PROTHROMBIN TIME: CPT | Performed by: EMERGENCY MEDICINE

## 2023-10-16 PROCEDURE — 87205 SMEAR GRAM STAIN: CPT | Performed by: STUDENT IN AN ORGANIZED HEALTH CARE EDUCATION/TRAINING PROGRAM

## 2023-10-16 PROCEDURE — 71045 X-RAY EXAM CHEST 1 VIEW: CPT

## 2023-10-16 PROCEDURE — 86901 BLOOD TYPING SEROLOGIC RH(D): CPT | Performed by: EMERGENCY MEDICINE

## 2023-10-16 PROCEDURE — 81001 URINALYSIS AUTO W/SCOPE: CPT | Performed by: EMERGENCY MEDICINE

## 2023-10-16 PROCEDURE — 49406 IMAGE CATH FLUID PERI/RETRO: CPT

## 2023-10-16 PROCEDURE — 99152 MOD SED SAME PHYS/QHP 5/>YRS: CPT | Performed by: RADIOLOGY

## 2023-10-16 PROCEDURE — 83880 ASSAY OF NATRIURETIC PEPTIDE: CPT | Performed by: EMERGENCY MEDICINE

## 2023-10-16 PROCEDURE — 86617 LYME DISEASE ANTIBODY: CPT | Performed by: EMERGENCY MEDICINE

## 2023-10-16 PROCEDURE — C1729 CATH, DRAINAGE: HCPCS

## 2023-10-16 PROCEDURE — NC001 PR NO CHARGE

## 2023-10-16 PROCEDURE — 84443 ASSAY THYROID STIM HORMONE: CPT | Performed by: EMERGENCY MEDICINE

## 2023-10-16 PROCEDURE — 86850 RBC ANTIBODY SCREEN: CPT | Performed by: EMERGENCY MEDICINE

## 2023-10-16 PROCEDURE — 83690 ASSAY OF LIPASE: CPT | Performed by: EMERGENCY MEDICINE

## 2023-10-16 PROCEDURE — 87040 BLOOD CULTURE FOR BACTERIA: CPT | Performed by: EMERGENCY MEDICINE

## 2023-10-16 PROCEDURE — 99152 MOD SED SAME PHYS/QHP 5/>YRS: CPT

## 2023-10-16 PROCEDURE — 85025 COMPLETE CBC W/AUTO DIFF WBC: CPT | Performed by: EMERGENCY MEDICINE

## 2023-10-16 PROCEDURE — 36415 COLL VENOUS BLD VENIPUNCTURE: CPT | Performed by: EMERGENCY MEDICINE

## 2023-10-16 PROCEDURE — 83735 ASSAY OF MAGNESIUM: CPT | Performed by: EMERGENCY MEDICINE

## 2023-10-16 PROCEDURE — 0241U HB NFCT DS VIR RESP RNA 4 TRGT: CPT | Performed by: EMERGENCY MEDICINE

## 2023-10-16 PROCEDURE — G1004 CDSM NDSC: HCPCS

## 2023-10-16 PROCEDURE — 87077 CULTURE AEROBIC IDENTIFY: CPT | Performed by: STUDENT IN AN ORGANIZED HEALTH CARE EDUCATION/TRAINING PROGRAM

## 2023-10-16 RX ORDER — LIDOCAINE HYDROCHLORIDE 10 MG/ML
INJECTION, SOLUTION EPIDURAL; INFILTRATION; INTRACAUDAL; PERINEURAL AS NEEDED
Status: COMPLETED | OUTPATIENT
Start: 2023-10-16 | End: 2023-10-16

## 2023-10-16 RX ORDER — SODIUM CHLORIDE 9 MG/ML
125 INJECTION, SOLUTION INTRAVENOUS CONTINUOUS
Status: DISCONTINUED | OUTPATIENT
Start: 2023-10-16 | End: 2023-10-18

## 2023-10-16 RX ORDER — HYDRALAZINE HYDROCHLORIDE 25 MG/1
75 TABLET, FILM COATED ORAL 3 TIMES DAILY
Status: DISCONTINUED | OUTPATIENT
Start: 2023-10-16 | End: 2023-10-20 | Stop reason: HOSPADM

## 2023-10-16 RX ORDER — TAMSULOSIN HYDROCHLORIDE 0.4 MG/1
0.4 CAPSULE ORAL DAILY
Status: DISCONTINUED | OUTPATIENT
Start: 2023-10-17 | End: 2023-10-20 | Stop reason: HOSPADM

## 2023-10-16 RX ORDER — SODIUM CHLORIDE 9 MG/ML
10 INJECTION INTRAVENOUS DAILY
Qty: 300 ML | Refills: 0 | Status: SHIPPED | OUTPATIENT
Start: 2023-10-16 | End: 2024-01-14

## 2023-10-16 RX ORDER — OXYCODONE HYDROCHLORIDE 5 MG/1
5 TABLET ORAL EVERY 4 HOURS PRN
Status: DISCONTINUED | OUTPATIENT
Start: 2023-10-16 | End: 2023-10-20 | Stop reason: HOSPADM

## 2023-10-16 RX ORDER — FINASTERIDE 5 MG/1
5 TABLET, FILM COATED ORAL DAILY
Status: DISCONTINUED | OUTPATIENT
Start: 2023-10-17 | End: 2023-10-20 | Stop reason: HOSPADM

## 2023-10-16 RX ORDER — LISINOPRIL 20 MG/1
40 TABLET ORAL DAILY
Status: DISCONTINUED | OUTPATIENT
Start: 2023-10-17 | End: 2023-10-20 | Stop reason: HOSPADM

## 2023-10-16 RX ORDER — METOPROLOL SUCCINATE 100 MG/1
100 TABLET, EXTENDED RELEASE ORAL DAILY
Status: DISCONTINUED | OUTPATIENT
Start: 2023-10-17 | End: 2023-10-20 | Stop reason: HOSPADM

## 2023-10-16 RX ORDER — ONDANSETRON 2 MG/ML
4 INJECTION INTRAMUSCULAR; INTRAVENOUS EVERY 6 HOURS PRN
Status: DISCONTINUED | OUTPATIENT
Start: 2023-10-16 | End: 2023-10-20 | Stop reason: HOSPADM

## 2023-10-16 RX ORDER — CEFTRIAXONE 1 G/50ML
1000 INJECTION, SOLUTION INTRAVENOUS EVERY 24 HOURS
Status: DISCONTINUED | OUTPATIENT
Start: 2023-10-17 | End: 2023-10-20 | Stop reason: HOSPADM

## 2023-10-16 RX ORDER — FERROUS SULFATE 325(65) MG
325 TABLET ORAL EVERY OTHER DAY
Status: DISCONTINUED | OUTPATIENT
Start: 2023-10-16 | End: 2023-10-20 | Stop reason: HOSPADM

## 2023-10-16 RX ORDER — ACETAMINOPHEN 325 MG/1
650 TABLET ORAL EVERY 6 HOURS PRN
Status: DISCONTINUED | OUTPATIENT
Start: 2023-10-16 | End: 2023-10-20 | Stop reason: HOSPADM

## 2023-10-16 RX ORDER — METRONIDAZOLE 500 MG/1
500 TABLET ORAL EVERY 8 HOURS SCHEDULED
Status: DISCONTINUED | OUTPATIENT
Start: 2023-10-17 | End: 2023-10-20 | Stop reason: HOSPADM

## 2023-10-16 RX ORDER — TAMSULOSIN HYDROCHLORIDE 0.4 MG/1
0.4 CAPSULE ORAL DAILY
Qty: 90 CAPSULE | Refills: 5 | Status: SHIPPED | OUTPATIENT
Start: 2023-10-16

## 2023-10-16 RX ORDER — HYDROCHLOROTHIAZIDE 25 MG/1
25 TABLET ORAL DAILY
Status: DISCONTINUED | OUTPATIENT
Start: 2023-10-17 | End: 2023-10-20 | Stop reason: HOSPADM

## 2023-10-16 RX ORDER — CEFEPIME HYDROCHLORIDE 2 G/50ML
2000 INJECTION, SOLUTION INTRAVENOUS ONCE
Status: COMPLETED | OUTPATIENT
Start: 2023-10-16 | End: 2023-10-16

## 2023-10-16 RX ORDER — FENTANYL CITRATE 50 UG/ML
INJECTION, SOLUTION INTRAMUSCULAR; INTRAVENOUS AS NEEDED
Status: COMPLETED | OUTPATIENT
Start: 2023-10-16 | End: 2023-10-16

## 2023-10-16 RX ORDER — MIDAZOLAM HYDROCHLORIDE 2 MG/2ML
INJECTION, SOLUTION INTRAMUSCULAR; INTRAVENOUS AS NEEDED
Status: COMPLETED | OUTPATIENT
Start: 2023-10-16 | End: 2023-10-16

## 2023-10-16 RX ORDER — SODIUM CHLORIDE, SODIUM LACTATE, POTASSIUM CHLORIDE, CALCIUM CHLORIDE 600; 310; 30; 20 MG/100ML; MG/100ML; MG/100ML; MG/100ML
125 INJECTION, SOLUTION INTRAVENOUS CONTINUOUS
Status: DISCONTINUED | OUTPATIENT
Start: 2023-10-16 | End: 2023-10-20 | Stop reason: HOSPADM

## 2023-10-16 RX ADMIN — CEFEPIME HYDROCHLORIDE 2000 MG: 2 INJECTION, SOLUTION INTRAVENOUS at 11:15

## 2023-10-16 RX ADMIN — MORPHINE SULFATE 2 MG: 2 INJECTION, SOLUTION INTRAMUSCULAR; INTRAVENOUS at 16:50

## 2023-10-16 RX ADMIN — FENTANYL CITRATE 25 MCG: 50 INJECTION, SOLUTION INTRAMUSCULAR; INTRAVENOUS at 15:34

## 2023-10-16 RX ADMIN — SODIUM CHLORIDE 1000 ML: 0.9 INJECTION, SOLUTION INTRAVENOUS at 07:38

## 2023-10-16 RX ADMIN — SODIUM CHLORIDE, SODIUM LACTATE, POTASSIUM CHLORIDE, AND CALCIUM CHLORIDE 125 ML/HR: .6; .31; .03; .02 INJECTION, SOLUTION INTRAVENOUS at 18:39

## 2023-10-16 RX ADMIN — OXYCODONE HYDROCHLORIDE 5 MG: 5 TABLET ORAL at 20:25

## 2023-10-16 RX ADMIN — LIDOCAINE HYDROCHLORIDE 5 ML: 10 INJECTION, SOLUTION EPIDURAL; INFILTRATION; INTRACAUDAL; PERINEURAL at 15:22

## 2023-10-16 RX ADMIN — SODIUM CHLORIDE 125 ML/HR: 0.9 INJECTION, SOLUTION INTRAVENOUS at 17:01

## 2023-10-16 RX ADMIN — IOHEXOL 100 ML: 350 INJECTION, SOLUTION INTRAVENOUS at 08:42

## 2023-10-16 RX ADMIN — FENTANYL CITRATE 50 MCG: 50 INJECTION, SOLUTION INTRAMUSCULAR; INTRAVENOUS at 15:07

## 2023-10-16 RX ADMIN — FERROUS SULFATE TAB 325 MG (65 MG ELEMENTAL FE) 325 MG: 325 (65 FE) TAB at 16:54

## 2023-10-16 RX ADMIN — MIDAZOLAM HYDROCHLORIDE 1 MG: 1 INJECTION, SOLUTION INTRAMUSCULAR; INTRAVENOUS at 15:07

## 2023-10-16 NOTE — ED PROVIDER NOTES
History  Chief Complaint   Patient presents with    Dizziness     Pt arrived with multiple complaints, has not been feeling well for two weeks. Pt has flu like symptoms but also c/o dizziness, confusion and intermittent chest pain. Pt has a stable AAA       History    Chief Complaint  Patient presents with  · Dizziness      Pt arrived with multiple complaints, has not been feeling well for two weeks. Pt has flu like symptoms but also c/o dizziness, confusion and intermittent chest pain.  Pt has a stable AAA        History provided by:  Patient  Dizziness  Quality:  Lightheadedness  Severity:  Moderate  Onset quality:  Gradual  Duration:  1 day  Timing:  Intermittent  Progression:  Waxing and waning  Chronicity:  New  Context: standing up    Relieved by:  Lying down  Worsened by:  Sitting upright  Ineffective treatments:  None tried  Associated symptoms: nausea    Associated symptoms: no chest pain, no diarrhea, no headaches, no shortness of breath, no syncope, no vision changes and no vomiting    Nausea:     Severity:  Moderate    Onset quality:  Gradual    Duration:  1 month    Timing:  Constant    Progression:  Worsening  Risk factors: multiple medications    Risk factors: no anemia    Fever  Location:  101  Quality:  Daily  Severity:  Moderate  Onset quality:  Gradual  Duration:  1 month  Timing:  Intermittent  Progression:  Waxing and waning  Chronicity:  New  Context:  Has been having daily fevers for the past month, thought it was a virus and would pass but has persisted and lost all appetite, and now is getting LH with standing  Relieved by:  Nothing  Worsened by:  Time and standing  Ineffective treatments:  None tried  Associated symptoms: nausea    Associated symptoms: no chest pain, no diarrhea, no headaches, no shortness of breath and no vomiting          History provided by:  Patient  Dizziness  Quality:  Lightheadedness  Severity:  Moderate  Onset quality:  Gradual  Duration:  2 days  Timing: Constant  Progression:  Worsening  Chronicity:  New  Context: standing up    Relieved by:  Lying down  Worsened by:  Standing up  Ineffective treatments:  Fluids  Associated symptoms: no chest pain, no diarrhea, no headaches, no palpitations and no shortness of breath    Associated symptoms comment:  Fever      Prior to Admission Medications   Prescriptions Last Dose Informant Patient Reported?  Taking?   colchicine (COLCRYS) 0.6 mg tablet   No No   Sig: take 1 tablet by mouth every 3 hours if needed for gout   enalapril (VASOTEC) 20 mg tablet   No No   Sig: take 1 tablet by mouth once daily   ferrous sulfate 325 (65 Fe) mg tablet  Self Yes No   Sig: Take 325 mg by mouth every other day   finasteride (PROSCAR) 5 mg tablet  Self Yes No   Sig: Take 5 mg by mouth daily   hydrALAZINE (APRESOLINE) 50 mg tablet   No No   Sig: Take 1.5 tablets (75 mg total) by mouth 3 (three) times a day   hydrochlorothiazide (HYDRODIURIL) 25 mg tablet  Self No No   Sig: take 1 tablet by mouth once daily   ibuprofen (MOTRIN) 400 mg tablet  Self Yes No   Sig: Take 200 mg by mouth every 6 (six) hours as needed for mild pain (PRN.)   metoprolol succinate (TOPROL-XL) 100 mg 24 hr tablet   No No   Sig: Take 1 tablet (100 mg total) by mouth daily   tamsulosin (FLOMAX) 0.4 mg   No No   Sig: Take 1 capsule (0.4 mg total) by mouth daily      Facility-Administered Medications: None       Past Medical History:   Diagnosis Date    Anemia     Aortic aneurysm (HCC)     Elevated troponin     History of colonoscopy     6/5/13    History of esophagogastroduodenoscopy     6/5/13    Hypertension     Hypokalemia     Morbid obesity (HCC)     Renal calculi     STEMI (ST elevation myocardial infarction) [facility])        Past Surgical History:   Procedure Laterality Date    APPENDECTOMY      ARTHROSCOPY KNEE Left     COLON SURGERY      COLOSTOMY      IR DRAINAGE TUBE PLACEMENT  10/16/2023    ROTATOR CUFF REPAIR Left     SATURATION BIOPSY OF PROSTATE      TOTAL HIP ARTHROPLASTY      Last Assessed:5/8/15       Family History   Problem Relation Age of Onset    COPD Mother     Breast cancer Mother         malignant neoplasm    Other Father         thoracic aortic aneurysm     I have reviewed and agree with the history as documented. E-Cigarette/Vaping    E-Cigarette Use Never User      E-Cigarette/Vaping Substances    Nicotine No     THC No     CBD No     Flavoring No     Other No     Unknown No      Social History     Tobacco Use    Smoking status: Never    Smokeless tobacco: Never   Vaping Use    Vaping Use: Never used   Substance Use Topics    Alcohol use: Not Currently     Comment: social    Drug use: Never       Review of Systems   Constitutional:  Positive for appetite change. Respiratory:  Negative for shortness of breath. Cardiovascular:  Negative for chest pain and palpitations. Gastrointestinal:  Negative for diarrhea. Neurological:  Positive for dizziness. Negative for headaches. All other systems reviewed and are negative. Physical Exam  Physical Exam  Vitals and nursing note reviewed. Constitutional:       General: He is not in acute distress. Appearance: He is well-developed. He is ill-appearing. He is not diaphoretic. HENT:      Head: Normocephalic and atraumatic. Nose: Nose normal.      Mouth/Throat:      Mouth: Mucous membranes are dry. Eyes:      Extraocular Movements: Extraocular movements intact. Conjunctiva/sclera: Conjunctivae normal.   Cardiovascular:      Rate and Rhythm: Normal rate and regular rhythm. Heart sounds: Normal heart sounds. Pulmonary:      Effort: Pulmonary effort is normal. No respiratory distress. Breath sounds: Normal breath sounds. Abdominal:      General: There is no distension. Palpations: Abdomen is soft. Tenderness: There is no abdominal tenderness. Musculoskeletal:         General: No deformity. Normal range of motion. Cervical back: Neck supple.       Right lower leg: No edema. Left lower leg: No edema. Skin:     General: Skin is warm and dry. Neurological:      General: No focal deficit present. Mental Status: He is alert and oriented to person, place, and time. Cranial Nerves: No cranial nerve deficit.    Psychiatric:         Mood and Affect: Mood normal.         Vital Signs  ED Triage Vitals   Temperature Pulse Respirations Blood Pressure SpO2   10/16/23 0730 10/16/23 0652 10/16/23 0652 10/16/23 0652 10/16/23 0652   (!) 100.9 °F (38.3 °C) 87 20 (!) 85/54 94 %      Temp Source Heart Rate Source Patient Position - Orthostatic VS BP Location FiO2 (%)   10/16/23 0730 10/16/23 0652 10/16/23 0652 10/16/23 0652 --   Oral Monitor Sitting Right arm       Pain Score       10/16/23 0730       No Pain           Vitals:    10/17/23 2021 10/17/23 2021 10/17/23 2150 10/18/23 0739   BP: 126/74 126/74 127/75 116/86   Pulse: 82 92 87 101   Patient Position - Orthostatic VS:             Visual Acuity  Visual Acuity      Flowsheet Row Most Recent Value   L Pupil Size (mm) 3   R Pupil Size (mm) 3   L Pupil Shape Round   R Pupil Shape Round            ED Medications  Medications   lisinopril (ZESTRIL) tablet 40 mg (40 mg Oral Not Given 10/18/23 0913)   ferrous sulfate tablet 325 mg (325 mg Oral Given 10/18/23 0915)   finasteride (PROSCAR) tablet 5 mg (5 mg Oral Given 10/18/23 0915)   hydrALAZINE (APRESOLINE) tablet 75 mg (75 mg Oral Not Given 10/18/23 0913)   hydrochlorothiazide (HYDRODIURIL) tablet 25 mg (25 mg Oral Not Given 10/18/23 0913)   metoprolol succinate (TOPROL-XL) 24 hr tablet 100 mg (100 mg Oral Not Given 10/18/23 0913)   tamsulosin (FLOMAX) capsule 0.4 mg (0.4 mg Oral Given 10/18/23 0915)   lactated ringers infusion (125 mL/hr Intravenous New Bag 10/18/23 0326)   acetaminophen (TYLENOL) tablet 650 mg (has no administration in time range)   ondansetron (ZOFRAN) injection 4 mg (has no administration in time range)   cefTRIAXone (ROCEPHIN) IVPB (premix in dextrose) 1,000 mg 50 mL (1,000 mg Intravenous New Bag 10/18/23 0923)   metroNIDAZOLE (FLAGYL) tablet 500 mg (500 mg Oral Given 10/18/23 0535)   morphine injection 2 mg (2 mg Intravenous Given 10/17/23 0942)   oxyCODONE (ROXICODONE) IR tablet 5 mg (5 mg Oral Given 10/17/23 2019)   sodium chloride 0.9 % bolus 1,000 mL (0 mL Intravenous Stopped 10/16/23 1428)   cefepime (MAXIPIME) IVPB (premix in dextrose) 2,000 mg 50 mL (0 mg Intravenous Stopped 10/16/23 1145)   iohexol (OMNIPAQUE) 350 MG/ML injection (MULTI-DOSE) 100 mL (100 mL Intravenous Given 10/16/23 0842)   midazolam (VERSED) injection (1 mg Intravenous Given 10/16/23 1507)   fentanyl citrate (PF) 100 MCG/2ML (25 mcg Intravenous Given 10/16/23 1534)   lidocaine (PF) (XYLOCAINE-MPF) 1 % injection (5 mL Infiltration Given 10/16/23 1522)       Diagnostic Studies  Results Reviewed       Procedure Component Value Units Date/Time    Body fluid culture and Gram stain [[id number]] Collected: 10/16/23 1541    Lab Status: Preliminary result Specimen: Body Fluid from Abscess Updated: 10/18/23 0800     Body Fluid Culture, Sterile Culture results to follow. Gram Stain Result 1+ Polys      No bacteria seen    Blood culture #1 [[id number]] Collected: 10/16/23 0739    Lab Status: Preliminary result Specimen: Blood from Arm, Left Updated: 10/17/23 1301     Blood Culture No Growth at 24 hrs. Blood culture #2 [[id number]] Collected: 10/16/23 0748    Lab Status: Preliminary result Specimen: Blood from Arm, Left Updated: 10/17/23 1301     Blood Culture No Growth at 24 hrs. Blood Parasite smear [[id number]] Collected: 10/16/23 0748    Lab Status: Final result Specimen: Blood from Arm, Left Updated: 10/17/23 1204     % Parasitemia 0     Is Blood Parasite Present No    Path Slide Review [[id number]] Collected: 10/16/23 0748    Lab Status: Final result Specimen: Blood from Arm, Left Updated: 10/17/23 0403     Path Review No parasites identified. Evaluated by Awa Matthew M.D.   Interpretation performed at [facility], [address] [place] [address]    Lyme Total AB W Reflex to IGM/IGG [[id number]]  (Abnormal) Collected: 10/16/23 0739    Lab Status: Final result Specimen: Blood from Arm, Left Updated: 10/16/23 1447    Narrative: The following orders were created for panel order Lyme Total AB W Reflex to IGM/IGG. Procedure                               Abnormality         Status                     ---------                               -----------         ------                     Lyme Total AB W Reflex maribel Myrick [[id number]]  Abnormal            Final result                 Please view results for these tests on the individual orders.     Lyme Total AB W Reflex to IGM/IGG [[id number]]  (Abnormal) Collected: 10/16/23 0739    Lab Status: Final result Specimen: Blood from Arm, Left Updated: 10/16/23 1447     Lyme Total Antibodies Positive    Lyme IGM (Reflex Only-Do Not Order) [[id number]]  (Normal) Collected: 10/16/23 0739    Lab Status: Final result Specimen: Blood from Arm, Left Updated: 10/16/23 1447     Lyme IGM Negative    Lyme IGG (Reflex Only-Do Not Order) [[id number]]  (Normal) Collected: 10/16/23 0739    Lab Status: Final result Specimen: Blood from Arm, Left Updated: 10/16/23 1447     Lyme IGG Negative    Protime-INR [[id number]]  (Abnormal) Collected: 10/16/23 1258    Lab Status: Final result Specimen: Blood from Arm, Left Updated: 10/16/23 1342     Protime 18.4 seconds      INR 1.46    APTT [[id number]]  (Abnormal) Collected: 10/16/23 1258    Lab Status: Final result Specimen: Blood from Arm, Left Updated: 10/16/23 1342     PTT 38 seconds     Urine Microscopic [[id number]]  (Abnormal) Collected: 10/16/23 1117    Lab Status: Final result Specimen: Urine, Clean Catch Updated: 10/16/23 1336     RBC, UA 30-50 /hpf      WBC, UA 1-2 /hpf      Epithelial Cells Occasional /hpf      Bacteria, UA None Seen /hpf     UA w Reflex to Microscopic w Reflex to Culture [[id number]]  (Abnormal) Collected: 10/16/23 1117    Lab Status: Final result Specimen: Urine, Clean Catch Updated: 10/16/23 1215     Color, UA Light Yellow     Clarity, UA Clear     Specific Gravity, UA >=1.050     pH, UA 7.0     Leukocytes, UA Negative     Nitrite, UA Negative     Protein, UA Trace mg/dl      Glucose, UA Negative mg/dl      Ketones, UA Negative mg/dl      Urobilinogen, UA <2.0 mg/dl      Bilirubin, UA Negative     Occult Blood, UA Small    HS Troponin I 2hr [[id number]]  (Normal) Collected: 10/16/23 1117    Lab Status: Final result Specimen: Blood from Arm, Left Updated: 10/16/23 1154     hs TnI 2hr 15 ng/L      Delta 2hr hsTnI -1 ng/L     TSH, 3rd generation with Free T4 reflex [[id number]]  (Normal) Collected: 10/16/23 0739    Lab Status: Final result Specimen: Blood from Arm, Left Updated: 10/16/23 0845     TSH 3RD GENERATON 2.878 uIU/mL     FLU/RSV/COVID - if FLU/RSV clinically relevant [[id number]]  (Normal) Collected: 10/16/23 0739    Lab Status: Final result Specimen: Nares from Nose Updated: 10/16/23 0843     SARS-CoV-2 Negative     INFLUENZA A PCR Negative     INFLUENZA B PCR Negative     RSV PCR Negative    Narrative:      FOR PEDIATRIC PATIENTS - copy/paste COVID Guidelines URL to browser: [email or web address]    SARS-CoV-2 assay is a Nucleic Acid Amplification assay intended for the  qualitative detection of nucleic acid from SARS-CoV-2 in nasopharyngeal  swabs. Results are for the presumptive identification of SARS-CoV-2 RNA. Positive results are indicative of infection with SARS-CoV-2, the virus  causing COVID-19, but do not rule out bacterial infection or co-infection  with other viruses. Laboratories within the [facility] and its  territories are required to report all positive results to the appropriate  public health authorities.  Negative results do not preclude SARS-CoV-2  infection and should not be used as the sole basis for treatment or other  patient management decisions. Negative results must be combined with  clinical observations, patient history, and epidemiological information. This test has not been FDA cleared or approved. This test has been authorized by FDA under an Emergency Use Authorization  (EUA). This test is only authorized for the duration of time the  declaration that circumstances exist justifying the authorization of the  emergency use of an in vitro diagnostic tests for detection of SARS-CoV-2  virus and/or diagnosis of COVID-19 infection under section 564(b)(1) of  the Act, 21 U. S.C. 857DYV-8(D)(6), unless the authorization is terminated  or revoked sooner. The test has been validated but independent review by FDA  and CLIA is pending. Test performed using [employer]: This RT-PCR assay targets N2,  a region unique to SARS-CoV-2. A conserved region in the E-gene was chosen  for pan-Sarbecovirus detection which includes SARS-CoV-2. According to CMS-2020-01-R, this platform meets the definition of high-throughput technology. B-Type Natriuretic Peptide(BNP) [[id number]]  (Normal) Collected: 10/16/23 0739    Lab Status: Final result Specimen: Blood from Arm, Left Updated: 10/16/23 0834     BNP 94 pg/mL     HS Troponin 0hr (reflex protocol) [[id number]]  (Normal) Collected: 10/16/23 0739    Lab Status: Final result Specimen: Blood from Arm, Left Updated: 10/16/23 0831     hs TnI 0hr 16 ng/L     Lactic acid, plasma (w/reflex if result > 2.0) [[id number]]  (Normal) Collected: 10/16/23 0739    Lab Status: Final result Specimen: Blood from Arm, Left Updated: 10/16/23 8722     LACTIC ACID 0.9 mmol/L     Narrative:      Result may be elevated if tourniquet was used during collection.     Comprehensive metabolic panel [[id number]]  (Abnormal) Collected: 10/16/23 0739    Lab Status: Final result Specimen: Blood from Arm, Left Updated: 10/16/23 0827     Sodium 135 mmol/L      Potassium 3.7 mmol/L      Chloride 96 mmol/L      CO2 30 mmol/L      ANION GAP 9 mmol/L      BUN 27 mg/dL      Creatinine 1.21 mg/dL      Glucose 105 mg/dL      Calcium 8.4 mg/dL      Corrected Calcium 9.1 mg/dL      AST 46 U/L      ALT 67 U/L      Alkaline Phosphatase 98 U/L      Total Protein 6.9 g/dL      Albumin 3.1 g/dL      Total Bilirubin 0.68 mg/dL      eGFR 59 ml/min/1.73sq m     Narrative:      [facility] guidelines for Chronic Kidney Disease (CKD):     Stage 1 with normal or high GFR (GFR > 90 mL/min/1.73 square meters)    Stage 2 Mild CKD (GFR = 60-89 mL/min/1.73 square meters)    Stage 3A Moderate CKD (GFR = 45-59 mL/min/1.73 square meters)    Stage 3B Moderate CKD (GFR = 30-44 mL/min/1.73 square meters)    Stage 4 Severe CKD (GFR = 15-29 mL/min/1.73 square meters)    Stage 5 End Stage CKD (GFR <15 mL/min/1.73 square meters)  Note: GFR calculation is accurate only with a steady state creatinine    Magnesium [[id number]]  (Normal) Collected: 10/16/23 0739    Lab Status: Final result Specimen: Blood from Arm, Left Updated: 10/16/23 0827     Magnesium 1.9 mg/dL     Lipase [[id number]]  (Normal) Collected: 10/16/23 0739    Lab Status: Final result Specimen: Blood from Arm, Left Updated: 10/16/23 0827     Lipase 23 u/L     CBC and differential [[id number]]  (Abnormal) Collected: 10/16/23 0739    Lab Status: Final result Specimen: Blood from Arm, Left Updated: 10/16/23 0805     WBC 10.85 Thousand/uL      RBC 4.42 Million/uL      Hemoglobin 12.6 g/dL      Hematocrit 38.1 %      MCV 86 fL      MCH 28.5 pg      MCHC 33.1 g/dL      RDW 12.2 %      MPV 10.0 fL      Platelets 138 Thousands/uL      nRBC 0 /100 WBCs      Neutrophils Relative 83 %      Immat GRANS % 0 %      Lymphocytes Relative 7 %      Monocytes Relative 10 %      Eosinophils Relative 0 %      Basophils Relative 0 %      Neutrophils Absolute 8.95 Thousands/µL      Immature Grans Absolute 0.04 Thousand/uL      Lymphocytes Absolute 0.75 Thousands/µL      Monocytes Absolute 1.06 Thousand/µL Eosinophils Absolute 0.03 Thousand/µL      Basophils Absolute 0.02 Thousands/µL     Anaplasma Phagocytophilum, PCR [[id number]] Collected: 10/16/23 0739    Lab Status: In process Specimen: Blood from Arm, Left Updated: 10/16/23 0802    Fingerstick Glucose (POCT) [[id number]]  (Normal) Collected: 10/16/23 0657    Lab Status: Final result Updated: 10/16/23 0658     POC Glucose 102 mg/dl                    IR drainage tube placement   Final Result by Alina Rivas MD (10/17 1617)   Impression: Successful placement of a 10 [place] all-purpose drainage catheter into the hepatic fluid collection. 10 cc of purulent/bloody fluid was aspirated and a specimen sent to the laboratory as described. Workstation performed: [id number]         CTA dissection protocol chest abdomen pelvis w wo contrast   Final Result by Georgie Quintanilla MD (10/16 1204)      1. Large thick walled fluid containing lesion in the right hepatic lobe with adjacent satellite nodule. Given clinical history as well as relatively rapid development since 9/20/2023, this is favored to reflect an abscess although necrotic tumor can    have a similar appearance. Recommend confirmatory testing with either fluid/tissue sampling or MRI and follow-up to resolution. 2.  Portacaval lymphadenopathy which is likely related to the above. 3.  No acute aortic pathology. 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 I personally discussed this study with Heather Andres on 10/16/2023 11:52 AM.            Workstation performed: [id number]         XR chest 1 view portable   ED Interpretation by Heather Andres MD (81/44 [phone])   NAD      Final Result by Nadya Kang MD ([phone])      No acute cardiopulmonary disease.                Workstation performed: [id number]         IR drainage tube placement    (Results Pending)   IR drainage tube check/change/reposition/reinsertion/upsize    (Results Pending) Procedures  ECG 12 Lead Documentation Only    Date/Time: 10/16/2023 11:10 AM    Performed by: Allyson Palacios MD  Authorized by: Allyson Palacios MD    Indications / Diagnosis:  LH  ECG reviewed by me, the ED Provider: yes    Rate:     ECG rate:  92    ECG rate assessment: normal    Rhythm:     Rhythm: sinus rhythm    Conduction:     Conduction: abnormal      Abnormal conduction: bifascicular block             ED Course                            Initial Sepsis Screening       Row Name 10/16/23 1248                Is the patient's history suggestive of a new or worsening infection? Yes (Proceed)  -HS        Suspected source of infection acute abdominal infection  -HS        Indicate SIRS criteria Tachypnea > 20 resp per min  -HS        Are two or more of the above signs & symptoms of infection both present and new to the patient? --                  User Key  (r) = Recorded By, (t) = Taken By, (c) = Cosigned By      [facility] Name Provider Type    HS KIMBERLY Nelson Nurse Practitioner                    SBIRT 20yo+      Flowsheet Row Most Recent Value   Initial Alcohol Screen: US AUDIT-C     1. How often do you have a drink containing alcohol? 0 Filed at: 10/16/2023 0651   2. How many drinks containing alcohol do you have on a typical day you are drinking? 0 Filed at: 10/16/2023 0651   3a. Male UNDER 65: How often do you have five or more drinks on one occasion? 0 Filed at: 10/16/2023 0651   Audit-C Score 0 Filed at: 10/16/2023 9120   JIMENA: How many times in the past year have you. .. Used an illegal drug or used a prescription medication for non-medical reasons? Never Filed at: 10/16/2023 9535                      Medical Decision Making  Expand All Collapse All  History    Chief Complaint  Patient presents with  · Dizziness      Pt arrived with multiple complaints, has not been feeling well for two weeks. Pt has flu like symptoms but also c/o dizziness, confusion and intermittent chest pain. Pt has a stable AAA        History provided by:  Patient  Dizziness  Quality:  Lightheadedness  Severity:  Moderate  Onset quality:  Gradual  Duration:  1 day  Timing:  Intermittent  Progression:  Waxing and waning  Chronicity:  New  Context: standing up    Relieved by:  Lying down  Worsened by:  Sitting upright  Ineffective treatments:  None tried  Associated symptoms: nausea    Associated symptoms: no chest pain, no diarrhea, no headaches, no shortness of breath, no syncope, no vision changes and no vomiting    Nausea:     Severity:  Moderate    Onset quality:  Gradual    Duration:  1 month    Timing:  Constant    Progression:  Worsening  Risk factors: multiple medications    Risk factors: no anemia    Fever  Location:  101  Quality:  Daily  Severity:  Moderate  Onset quality:  Gradual  Duration:  1 month  Timing:  Intermittent  Progression:  Waxing and waning  Chronicity:  New  Context:  Has been having daily fevers for the past month, thought it was a virus and would pass but has persisted and lost all appetite, and now is getting LH with standing  Relieved by:  Nothing  Worsened by:  Time and standing  Ineffective treatments:  None tried  Associated symptoms: nausea    Associated symptoms: no chest pain, no diarrhea, no headaches, no shortness of breath and no vomiting            Prior to Admission Medications  Prescriptions Last Dose Informant Patient Reported?  Taking?  colchicine (COLCRYS) 0.6 mg tablet     No No  Sig: take 1 tablet by mouth every 3 hours if needed for gout  enalapril (VASOTEC) 20 mg tablet     No No  Sig: take 1 tablet by mouth once daily  ferrous sulfate 325 (65 Fe) mg tablet   Self Yes No  Sig: Take 325 mg by mouth daily with breakfast  finasteride (PROSCAR) 5 mg tablet   Self Yes No  Sig: Take 5 mg by mouth daily  hydrALAZINE (APRESOLINE) 50 mg tablet     No No  Sig: Take 1.5 tablets (75 mg total) by mouth 3 (three) times a day  hydrochlorothiazide (HYDRODIURIL) 25 mg tablet Self No No  Sig: take 1 tablet by mouth once daily  ibuprofen (MOTRIN) 400 mg tablet   Self Yes No  Sig: Take 200 mg by mouth every 6 (six) hours as needed for mild pain (PRN.)  metoprolol succinate (TOPROL-XL) 100 mg 24 hr tablet     No No  Sig: Take 1 tablet (100 mg total) by mouth daily  tamsulosin (FLOMAX) 0.4 mg     No No  Sig: Take 1 capsule (0.4 mg total) by mouth daily    Facility-Administered Medications: None          Medical History  Past Medical History:  Diagnosis Date  · Anemia    · Aortic aneurysm (HCC)    · Elevated troponin    · History of colonoscopy      6/5/13  · History of esophagogastroduodenoscopy      6/5/13  · Hypertension    · Hypokalemia    · Morbid obesity ([address])    · Renal calculi    · STEMI (ST elevation myocardial infarction) [facility])              Surgical History  Past Surgical History:  Procedure Laterality Date  · APPENDECTOMY      · ARTHROSCOPY KNEE Left    · COLON SURGERY      · COLOSTOMY      · ROTATOR CUFF REPAIR Left    · SATURATION BIOPSY OF PROSTATE      · TOTAL HIP ARTHROPLASTY        Last Assessed:5/8/15            Family History  Family History  Problem Relation Age of Onset  · COPD Mother    · Breast cancer Mother          malignant neoplasm  · Other Father          thoracic aortic aneurysm       I have reviewed and agree with the history as documented. E-Cigarette/Vaping  · E-Cigarette Use Never User         E-Cigarette/Vaping Substances  · Nicotine No    · THC No    · CBD No    · Flavoring No    · Other No    · Unknown No         Social History       Tobacco Use  · Smoking status: Never  · Smokeless tobacco: Never  Vaping Use  · Vaping Use: Never used  Substance Use Topics  · Alcohol use: Not Currently      Comment: social  · Drug use: Never        Review of Systems   Respiratory:  Negative for shortness of breath. Cardiovascular:  Negative for chest pain and syncope. Gastrointestinal:  Positive for nausea. Negative for diarrhea and vomiting.    Neurological: Positive for dizziness. Negative for headaches. All other systems reviewed and are negative. Physical Exam  Physical Exam  Vitals and nursing note reviewed. Constitutional:       General: He is not in acute distress. Appearance: He is well-developed. He is ill-appearing. He is not diaphoretic. HENT:      Head: Normocephalic and atraumatic. Nose: Nose normal.      Mouth/Throat:      Mouth: Mucous membranes are dry. Eyes:      Extraocular Movements: Extraocular movements intact. Conjunctiva/sclera: Conjunctivae normal.   Cardiovascular:      Rate and Rhythm: Normal rate and regular rhythm. Heart sounds: Normal heart sounds. Pulmonary:      Effort: Pulmonary effort is normal. No respiratory distress. Breath sounds: Normal breath sounds. Abdominal:      General: There is no distension. Palpations: Abdomen is soft. There is no mass. Tenderness: There is no abdominal tenderness. Hernia: No hernia is present. Musculoskeletal:         General: No deformity. Normal range of motion. Cervical back: Neck supple. Right lower leg: No edema. Left lower leg: No edema. Skin:     General: Skin is warm and dry. Neurological:      General: No focal deficit present. Mental Status: He is alert and oriented to person, place, and time. Cranial Nerves: No cranial nerve deficit.    Psychiatric:         Mood and Affect: Mood normal.            Vital Signs    ED Triage Vitals  Temperature Pulse Respirations Blood Pressure SpO2  10/16/23 0730 10/16/23 0652 10/16/23 0652 10/16/23 0652 10/16/23 0652  (!) 100.9 °F (38.3 °C) 87 20 (!) 85/54 94 %     Temp Source Heart Rate Source Patient Position - Orthostatic VS BP Location FiO2 (%)  10/16/23 0730 10/16/23 0652 10/16/23 0652 10/16/23 0652 --  Oral Monitor Sitting Right arm       Pain Score          10/16/23 0730          No Pain                  Vitals  Vitals:    10/16/23 0652 10/16/23 0700 10/16/23 0730 10/16/23 0900  BP: (!) 85/54 100/52 103/60 100/59  Pulse: 87 90 84 80  Patient Position - Orthostatic VS: Sitting Lying Lying Lying             Visual Acuity        ED Medications    Medications  cefepime (MAXIPIME) IVPB (premix in dextrose) 2,000 mg 50 mL (has no administration in time range)  sodium chloride 0.9 % bolus 1,000 mL (1,000 mL Intravenous New Bag 10/16/23 0738)  iohexol (OMNIPAQUE) 350 MG/ML injection (MULTI-DOSE) 100 mL (100 mL Intravenous Given 10/16/23 0842)        Diagnostic Studies    Results Reviewed          Procedure Component Value Units Date/Time    TSH, 3rd generation with Free T4 reflex [[id number]]  (Normal) Collected: 10/16/23 0739    Lab Status: Final result Specimen: Blood from Arm, Left Updated: 10/16/23 0845      TSH 3RD GENERATON 2.878 uIU/mL      FLU/RSV/COVID - if FLU/RSV clinically relevant [[id number]]  (Normal) Collected: 10/16/23 0739    Lab Status: Final result Specimen: Nares from Nose Updated: 10/16/23 0843      SARS-CoV-2 Negative      INFLUENZA A PCR Negative      INFLUENZA B PCR Negative      RSV PCR Negative    Narrative:      FOR PEDIATRIC PATIENTS - copy/paste COVID Guidelines URL to browser: [email or web address]     SARS-CoV-2 assay is a Nucleic Acid Amplification assay intended for the  qualitative detection of nucleic acid from SARS-CoV-2 in nasopharyngeal  swabs. Results are for the presumptive identification of SARS-CoV-2 RNA. Positive results are indicative of infection with SARS-CoV-2, the virus  causing COVID-19, but do not rule out bacterial infection or co-infection  with other viruses. Laboratories within the [facility] and its  territories are required to report all positive results to the appropriate  public health authorities. Negative results do not preclude SARS-CoV-2  infection and should not be used as the sole basis for treatment or other  patient management decisions.  Negative results must be combined with  clinical observations, patient history, and epidemiological information. This test has not been FDA cleared or approved. This test has been authorized by FDA under an Emergency Use Authorization  (EUA). This test is only authorized for the duration of time the  declaration that circumstances exist justifying the authorization of the  emergency use of an in vitro diagnostic tests for detection of SARS-CoV-2  virus and/or diagnosis of COVID-19 infection under section 564(b)(1) of  the Act, 21 U. S.C. 544TYJ-4(N)(3), unless the authorization is terminated  or revoked sooner. The test has been validated but independent review by FDA  and CLIA is pending. Test performed using [employer] GeneXpert: This RT-PCR assay targets N2,  a region unique to SARS-CoV-2. A conserved region in the E-gene was chosen  for pan-Sarbecovirus detection which includes SARS-CoV-2. According to CMS-2020-01-R, this platform meets the definition of high-throughput technology. B-Type Natriuretic Peptide(BNP) [[id number]]  (Normal) Collected: 10/16/23 0739    Lab Status: Final result Specimen: Blood from Arm, Left Updated: 10/16/23 0834      BNP 94 pg/mL      HS Troponin 0hr (reflex protocol) [[id number]]  (Normal) Collected: 10/16/23 0739    Lab Status: Final result Specimen: Blood from Arm, Left Updated: 10/16/23 0831      hs TnI 0hr 16 ng/L      HS Troponin I 4hr [[id number]]      Lab Status: No result Specimen: Blood      HS Troponin I 2hr [[id number]]      Lab Status: No result Specimen: Blood      Lactic acid, plasma (w/reflex if result > 2.0) [[id number]]  (Normal) Collected: 10/16/23 0739    Lab Status: Final result Specimen: Blood from Arm, Left Updated: 10/16/23 0394      LACTIC ACID 0.9 mmol/L      Narrative:      Result may be elevated if tourniquet was used during collection.     Comprehensive metabolic panel [[id number]]  (Abnormal) Collected: 10/16/23 0739    Lab Status: Final result Specimen: Blood from Arm, Left Updated: 10/16/23 0827      Sodium 135 mmol/L        Potassium 3.7 mmol/L        Chloride 96 mmol/L        CO2 30 mmol/L        ANION GAP 9 mmol/L        BUN 27 mg/dL        Creatinine 1.21 mg/dL        Glucose 105 mg/dL        Calcium 8.4 mg/dL        Corrected Calcium 9.1 mg/dL        AST 46 U/L        ALT 67 U/L        Alkaline Phosphatase 98 U/L        Total Protein 6.9 g/dL        Albumin 3.1 g/dL        Total Bilirubin 0.68 mg/dL        eGFR 59 ml/min/1.73sq m      Narrative:      [place] guidelines for Chronic Kidney Disease (CKD):   ·  Stage 1 with normal or high GFR (GFR > 90 mL/min/1.73 square meters)  ·  Stage 2 Mild CKD (GFR = 60-89 mL/min/1.73 square meters)  ·  Stage 3A Moderate CKD (GFR = 45-59 mL/min/1.73 square meters)  ·  Stage 3B Moderate CKD (GFR = 30-44 mL/min/1.73 square meters)  ·  Stage 4 Severe CKD (GFR = 15-29 mL/min/1.73 square meters)  ·  Stage 5 End Stage CKD (GFR <15 mL/min/1.73 square meters)  Note: GFR calculation is accurate only with a steady state creatinine    Magnesium [[id number]]  (Normal) Collected: 10/16/23 0739    Lab Status: Final result Specimen: Blood from Arm, Left Updated: 10/16/23 0827      Magnesium 1.9 mg/dL      Lipase [[id number]]  (Normal) Collected: 10/16/23 0739    Lab Status: Final result Specimen: Blood from Arm, Left Updated: 10/16/23 0827      Lipase 23 u/L      CBC and differential [[id number]]  (Abnormal) Collected: 10/16/23 0739    Lab Status: Final result Specimen: Blood from Arm, Left Updated: 10/16/23 0805      WBC 10.85 Thousand/uL        RBC 4.42 Million/uL        Hemoglobin 12.6 g/dL        Hematocrit 38.1 %        MCV 86 fL        MCH 28.5 pg        MCHC 33.1 g/dL        RDW 12.2 %        MPV 10.0 fL        Platelets 068 Thousands/uL        nRBC 0 /100 WBCs        Neutrophils Relative 83 %        Immat GRANS % 0 %        Lymphocytes Relative 7 %        Monocytes Relative 10 %        Eosinophils Relative 0 % Basophils Relative 0 %        Neutrophils Absolute 8.95 Thousands/µL        Immature Grans Absolute 0.04 Thousand/uL        Lymphocytes Absolute 0.75 Thousands/µL        Monocytes Absolute 1.06 Thousand/µL        Eosinophils Absolute 0.03 Thousand/µL        Basophils Absolute 0.02 Thousands/µL      Lyme Total AB W Reflex to IGM/IGG [[id number]] Collected: 10/16/23 0739    Lab Status: In process Specimen: Blood from Arm, Left Updated: 10/16/23 0802    Narrative: The following orders were created for panel order Lyme Total AB W Reflex to IGM/IGG. Procedure                               Abnormality         Status                     ---------                               -----------         ------                     Lyme Total AB W Reflex t. Pablito Lute Pablito Lute [[id number]]                      In process                    Please view results for these tests on the individual orders. Lyme Total AB W Reflex to IGM/IGG [[id number]] Collected: 10/16/23 0739    Lab Status: In process Specimen: Blood from Arm, Left Updated: 10/16/23 0802    Blood Parasite smear [[id number]] Collected: 10/16/23 0748    Lab Status: In process Specimen: Blood from Arm, Left Updated: 10/16/23 0802    Anaplasma Phagocytophilum, PCR [[id number]] Collected: 10/16/23 0739    Lab Status: In process Specimen: Blood from Arm, Left Updated: 10/16/23 0802    Blood culture #2 [[id number]] Collected: 10/16/23 0748    Lab Status: In process Specimen: Blood from Arm, Left Updated: 10/16/23 0801    Blood culture #1 [[id number]] Collected: 10/16/23 0739    Lab Status:  In process Specimen: Blood from Arm, Left Updated: 10/16/23 0801    UA w Reflex to Microscopic w Reflex to Culture [[id number]]      Lab Status: No result Specimen: Urine      Fingerstick Glucose (POCT) [[id number]]  (Normal) Collected: 10/16/23 0657    Lab Status: Final result Updated: 10/16/23 0658      POC Glucose 102 mg/dl                           XR chest 1 view portable  ED Interpretation by Lucretia Nery Starr MD (16/01 1675)  NAD     Final Result by Madeline Che MD (13/80 2001)     No acute cardiopulmonary disease. Workstation performed: [id number]        CTA dissection protocol chest abdomen pelvis w wo contrast    (Results Pending)               Procedures  ECG 12 Lead Documentation Only     Date/Time: 10/16/2023 9:29 AM     Performed by: Elsie Hurd MD  Authorized by: lEsie Hurd MD    Indications / Diagnosis:  LH  ECG reviewed by me, the ED Provider: yes    Patient location:  ED  Rate:     ECG rate:  92    ECG rate assessment: normal    Rhythm:     Rhythm: sinus rhythm    Conduction:     Conduction: abnormal      Abnormal conduction: bifascicular block                ED Course                                           SBIRT 22yo+        Flowsheet Row Most Recent Value  Initial Alcohol Screen: US AUDIT-C     1. How often do you have a drink containing alcohol? 0 Filed at: 10/16/2023 0651  2. How many drinks containing alcohol do you have on a typical day you are drinking? 0 Filed at: 10/16/2023 0651  3a. Male UNDER 65: How often do you have five or more drinks on one occasion? 0 Filed at: 10/16/2023 0651  Audit-C Score 0 Filed at: 10/16/2023 4890  JIMENA: How many times in the past year have you. .. Used an illegal drug or used a prescription medication for non-medical reasons? Never Filed at: 10/16/2023 2761                               Medical Decision Making  81-year-old male is coming in with complaint of daily fevers for the past 1 month, for started with a fever and then a loss of appetite, thought it was a virus so did not seek any care. Has been having grapefruit and drinking protein shakes. Had some pain what he thought was in his ribs. He has not had any cough vomiting or diarrhea. Has just had some nausea and an aversion to eating.   But now in the past week he has grown more fatigued the fever has continued he gets a fever in the afternoon and chills and then the night he spikes more for fever and gets night sweats and his Tmax has been 101. But now in the past few days he has been getting lightheaded with standing and today was the worst and he is still having persistent fevers so he realized he needed to come in for evaluation. Amount and/or Complexity of Data Reviewed  Labs: ordered. Radiology: ordered and independent interpretation performed. ECG/medicine tests: ordered and independent interpretation performed. Risk  Prescription drug management. Decision regarding hospitalization. Disposition  Final diagnoses:   Fever   Hypotension   Liver abscess     Time reflects when diagnosis was documented in both MDM as applicable and the Disposition within this note       Time User Action Codes Description Comment    10/16/2023 10:03 AM [facility], [address] [place] [address] Road [R50.9] Fever     10/16/2023 10:04 AM [facility], [address] [place] [address] Road [I95.9] Hypotension     10/16/2023 12:00 PM Marilu Lyler Add [K75.0] Liver abscess           ED Disposition       ED Disposition   Admit    Condition   Stable    Date/Time   Mon Oct 16, 2023 1004    Comment                  Follow-up Information    None         Current Discharge Medication List        START taking these medications    Details   sodium chloride, PF, 0.9 % 10 mL by Intracatheter route daily Intracatheter flushing daily.  May substitute prefilled syringe with normal saline 10 mL vials, 10 mL syringes, and 18 g blunt needles  Qty: 300 mL, Refills: 0    Associated Diagnoses: Liver abscess      tamsulosin (FLOMAX) 0.4 mg Take 1 capsule (0.4 mg total) by mouth daily  Qty: 90 capsule, Refills: 5    Associated Diagnoses: Benign prostatic hyperplasia with urinary frequency           CONTINUE these medications which have NOT CHANGED    Details   colchicine (COLCRYS) 0.6 mg tablet take 1 tablet by mouth every 3 hours if needed for gout  Qty: 30 tablet, Refills: 0    Associated Diagnoses: Gout, unspecified cause, unspecified chronicity, unspecified site      enalapril (VASOTEC) 20 mg tablet take 1 tablet by mouth once daily  Qty: 90 tablet, Refills: 5    Associated Diagnoses: Essential hypertension      ferrous sulfate 325 (65 Fe) mg tablet Take 325 mg by mouth every other day      finasteride (PROSCAR) 5 mg tablet Take 5 mg by mouth daily      hydrALAZINE (APRESOLINE) 50 mg tablet Take 1.5 tablets (75 mg total) by mouth 3 (three) times a day  Qty: 405 tablet, Refills: 3    Associated Diagnoses: Essential hypertension      hydrochlorothiazide (HYDRODIURIL) 25 mg tablet take 1 tablet by mouth once daily  Qty: 90 tablet, Refills: 3    Associated Diagnoses: Essential hypertension      ibuprofen (MOTRIN) 400 mg tablet Take 200 mg by mouth every 6 (six) hours as needed for mild pain (PRN.)      metoprolol succinate (TOPROL-XL) 100 mg 24 hr tablet Take 1 tablet (100 mg total) by mouth daily  Qty: 90 tablet, Refills: 0    Associated Diagnoses: Essential hypertension             Outpatient Discharge Orders   IR drainage tube check/change/reposition/reinsertion/upsize   Standing Status: Future Standing Exp.  Date: 10/16/27       PDMP Review       None            ED Provider  Electronically Signed by             West Tadeo MD  10/18/23 91 [phone]

## 2023-10-16 NOTE — TELEMEDICINE
e-Consult (IPC)  - Interventional Radiology  Aristeo Falcon 72 y.o. male MRN: [id number]  Unit/Bed#: ED 24 Encounter: [id number]          Interventional Radiology has been consulted to evaluate Aristeo Falcon    We were consulted by Emergency Medicine concerning this patient with possible liver abscess. Inpatient Consult to IR  Consult performed by: [facility]  Consult ordered by: Yoav Mccullough MD        10/16/23    Assessment/Recommendation:   Noel Schmid, 71y male with a pmh of HTN, SRAVAN, Obesity, HLD, Gout, BPH and thoracic aortic aneurysm. Patient presented to the ER with c/o dizziness, flu like symptoms x two weeks. Patient febrile and hypotensive: 100.9, 98/59. Patient had a CT C/A/P that showed -     There is a large thick walled loculated fluid containing structure in in segment 8 of the liver measuring approximately 9 x 8.9 x 8.3 cm with additional 2.2 cm hypoenhancing focus immediately posterior to it (series 2 image 90). Stable 1.6 cm right hepatic cyst. No biliary ductal dilation. Given clinical history as well as relatively rapid development since 9/20/2023, this is favored to reflect an abscess although necrotic tumor can have a similar appearance. Recommend confirmatory testing with either fluid/tissue sampling or MRI and follow-up to resolution. Will order stat INR. Patient currently NPO, last ate yesterday, had water with meds this morning around 0500. Will plan for CT guided liver drain possible aspiration today - pending INR <1.5.             21-30 minutes, >50% of the total time devoted to medical consultative verbal/EMR discussion between providers. Written report will be generated in the EMR. Thank you for allowing Interventional Radiology to participate in the care of Aristeo Falcon. Please don't hesitate to call or TigerText us with any questions.      [facility]

## 2023-10-16 NOTE — ED NOTES
Patient ambulatory to bathroom with steady gait, patient denies any dizziness at this time     Any Gomez RN  10/16/23 3764

## 2023-10-16 NOTE — BRIEF OP NOTE (RAD/CATH)
INTERVENTIONAL RADIOLOGY PROCEDURE NOTE    Date: 10/16/2023    Procedure:   Procedure Summary       Date:  Room / Location:     Anesthesia Start:  Anesthesia Stop:     Procedure:  Diagnosis:     Scheduled Providers:  Responsible Provider:     Anesthesia Type: Not recorded ASA Status: Not recorded            Preoperative diagnosis:   1. Fever    2. Hypotension    3. Liver abscess         Postoperative diagnosis: Same. Surgeon: Sol Hancock MD     Assistant: None. No qualified resident was available. Blood loss: Minimal    Specimens: 10 cc purulent fluid     Findings: 8 F DM drain placed into central liver abscess. Complications: None immediate.     Anesthesia: conscious sedation

## 2023-10-16 NOTE — H&P
[address]  H&P  Name: Maximilian Price 72 y.o. male I MRN: [id number]  Unit/Bed#: ED 24 I Date of Admission: 10/16/2023   Date of Service: 10/16/2023 I Hospital Day: 0      Assessment/Plan   * Liver abscess  Assessment & Plan  Patient with generalized complaints of flu like symptoms  CT imaging with evidence of liver abscess does not meet sirs criteria   Patient received cefepime in ED   Dw IR tentative drain placement today keep NPO for now  Will await drain placement and start cefazolin and flagyl  IVF for hydration  Tylenol for fever  Cbc, procal in am   Trend blood cultures     Hyperlipidemia  Assessment & Plan  LFT elevated   Will hold statin for now  Continue to trend for improvement with intervention     Class 3 severe obesity due to excess calories without serious comorbidity with body mass index (BMI) of 40.0 to 44.9 in [facility])  Assessment & Plan  Life style modifications discussed     Hypertension  Assessment & Plan  Known history  Continue enalapril, hydralazine, HCTZ, and toprol with parameters  Monitor vital signs         VTE Pharmacologic Prophylaxis: VTE Score: 5 High Risk (Score >/= 5) - Pharmacological DVT Prophylaxis Contraindicated. Sequential Compression Devices Ordered. Code Status: Prior   Discussion with family:  none currently at bedside . Anticipated Length of Stay: Patient will be admitted on an observation basis with an anticipated length of stay of less than 2 midnights secondary to liver abscess with need for drain. Total Time Spent on Date of Encounter in care of patient: 40 mins. This time was spent on one or more of the following: performing physical exam; counseling and coordination of care; obtaining or reviewing history; documenting in the medical record; reviewing/ordering tests, medications or procedures; communicating with other healthcare professionals and discussing with patient's family/caregivers.     Chief Complaint: lightheadedness and generally not feeling well    History of Present Illness:  Maurisio Greenbush is a 72 y.o. male with a PMH of HTN, HLD, sleep apnea, colon resection 80' who presents with lightheadedness loss of appetite and not feeling well off and on for last several weeks. Patient reports he has not been feeling well on and off for last several weeks. Main complaint recently is loss of appetite and lightheadedness. Patient then reported starting with fever and chills. Symptoms started 2-3 weeks ago went away he left for a 10 day vacation where he was fine upon returning he started with fever chills loss of appetite for the past week. Patient denies abdominal pain, vomiting, nausea, diarrhea or constipation. Patient in [address] [place] [address] discussion reports a history of colon resection after a colon rupture in 1996 where he spent 118 days in [facility] but has had no problems since that time. He does endorse last BM was Friday but was normal.      Review of Systems:  Review of Systems   Constitutional:  Positive for activity change, appetite change, chills, fatigue and fever. HENT: Negative. Respiratory: Negative. Negative for cough. Cardiovascular:  Negative for chest pain and leg swelling. Gastrointestinal:  Negative for abdominal distention, abdominal pain, constipation, diarrhea, nausea and vomiting. Genitourinary: Negative. Musculoskeletal: Negative. Neurological:  Positive for weakness and light-headedness. Psychiatric/Behavioral: Negative.          Past Medical and Surgical History:   Past Medical History:   Diagnosis Date    Anemia     Aortic aneurysm (HCC)     Elevated troponin     History of colonoscopy     6/5/13    History of esophagogastroduodenoscopy     6/5/13    Hypertension     Hypokalemia     Morbid obesity ([address])     Renal calculi     STEMI (ST elevation myocardial infarction) [facility])        Past Surgical History:   Procedure Laterality Date    APPENDECTOMY      ARTHROSCOPY KNEE Left     COLON SURGERY COLOSTOMY      ROTATOR CUFF REPAIR Left     SATURATION BIOPSY OF PROSTATE      TOTAL HIP ARTHROPLASTY      Last Assessed:5/8/15       Meds/Allergies:  Prior to Admission medications    Medication Sig Start Date End Date Taking? Authorizing Provider   colchicine (COLCRYS) 0.6 mg tablet take 1 tablet by mouth every 3 hours if needed for gout 9/5/23   Greg John MD   enalapril (VASOTEC) 20 mg tablet take 1 tablet by mouth once daily 4/19/23   Greg John MD   ferrous sulfate 325 (65 Fe) mg tablet Take 325 mg by mouth every other day    Historical Provider, MD   finasteride (PROSCAR) 5 mg tablet Take 5 mg by mouth daily 9/8/21   Historical Provider, MD   hydrALAZINE (APRESOLINE) 50 mg tablet Take 1.5 tablets (75 mg total) by mouth 3 (three) times a day 5/9/23   Greg John MD   hydrochlorothiazide (HYDRODIURIL) 25 mg tablet take 1 tablet by mouth once daily 11/8/22   Greg John MD   ibuprofen (MOTRIN) 400 mg tablet Take 200 mg by mouth every 6 (six) hours as needed for mild pain (PRN.)    Historical Provider, MD   metoprolol succinate (TOPROL-XL) 100 mg 24 hr tablet Take 1 tablet (100 mg total) by mouth daily 9/12/23   Greg John MD   tamsulosin [facility]) 0.4 mg Take 1 capsule (0.4 mg total) by mouth daily 10/16/23   Greg John MD     I have reviewed home medications with patient personally. Allergies:    Allergies   Allergen Reactions    Lorazepam Other (See Comments)     very agitated  Psychosis       Social History:  Marital Status: /Civil Union   Occupation:    Patient Pre-hospital Living Situation: Home  Patient Pre-hospital Level of Mobility: walks  Patient Pre-hospital Diet Restrictions:    Substance Use History:   Social History     Substance and Sexual Activity   Alcohol Use Not Currently    Comment: social     Social History     Tobacco Use   Smoking Status Never   Smokeless Tobacco Never     Social History     Substance and Sexual Activity   Drug Use Never       Family History:  Family History   Problem Relation Age of Onset    COPD Mother     Breast cancer Mother         malignant neoplasm    Other Father         thoracic aortic aneurysm       Physical Exam:     Vitals:   Blood Pressure: 112/63 (10/16/23 1230)  Pulse: 81 (10/16/23 1230)  Temperature: (!) 100.9 °F (38.3 °C) (10/16/23 0730)  Temp Source: Oral (10/16/23 0730)  Respirations: 19 (10/16/23 1230)  Height: 6' 1" (185.4 cm) (10/16/23 1300)  Weight - Scale: (!) 143 kg (315 lb 4.1 oz) (10/16/23 1300)  SpO2: 95 % (10/16/23 1230)    Physical Exam  Vitals and nursing note reviewed. Constitutional:       General: He is not in acute distress. Appearance: He is well-developed. He is obese. HENT:      Head: Normocephalic and atraumatic. Eyes:      Conjunctiva/sclera: Conjunctivae normal.   Cardiovascular:      Rate and Rhythm: Normal rate and regular rhythm. Heart sounds: No murmur heard. Pulmonary:      Effort: Pulmonary effort is normal. No respiratory distress. Breath sounds: Normal breath sounds. Abdominal:      General: There is distension. Palpations: Abdomen is soft. Tenderness: There is no abdominal tenderness. There is no guarding. Musculoskeletal:         General: No swelling. Cervical back: Neck supple. Skin:     General: Skin is warm and dry. Capillary Refill: Capillary refill takes less than 2 seconds. Neurological:      Mental Status: He is alert and oriented to person, place, and time.    Psychiatric:         Mood and Affect: Mood normal.           Additional Data:     Lab Results:  Results from last 7 days   Lab Units 10/16/23  0739   WBC Thousand/uL 10.85*   HEMOGLOBIN g/dL 12.6   HEMATOCRIT % 38.1   PLATELETS Thousands/uL 365   NEUTROS PCT % 83*   LYMPHS PCT % 7*   MONOS PCT % 10   EOS PCT % 0     Results from last 7 days   Lab Units 10/16/23  0739   SODIUM mmol/L 135   POTASSIUM mmol/L 3.7   CHLORIDE mmol/L 96   CO2 mmol/L 30   BUN mg/dL 27*   CREATININE mg/dL 1.21   ANION GAP mmol/L 9   CALCIUM mg/dL 8.4   ALBUMIN g/dL 3.1*   TOTAL BILIRUBIN mg/dL 0.68   ALK PHOS U/L 98   ALT U/L 67*   AST U/L 46*   GLUCOSE RANDOM mg/dL 105     Results from last 7 days   Lab Units 10/16/23  1258   INR  1.46*     Results from last 7 days   Lab Units 10/16/23  0657   POC GLUCOSE mg/dl 102         Results from last 7 days   Lab Units 10/16/23  0739   LACTIC ACID mmol/L 0.9       Lines/Drains:  Invasive Devices       Peripheral Intravenous Line  Duration             Peripheral IV 10/16/23 Left Antecubital <1 day                        Imaging: Reviewed radiology reports from this admission including: abdominal/pelvic CT  CTA dissection protocol chest abdomen pelvis w wo contrast   Final Result by Dewayne Lino MD (10/16 9508)      1. Large thick walled fluid containing lesion in the right hepatic lobe with adjacent satellite nodule. Given clinical history as well as relatively rapid development since 9/20/2023, this is favored to reflect an abscess although necrotic tumor can    have a similar appearance. Recommend confirmatory testing with either fluid/tissue sampling or MRI and follow-up to resolution. 2.  Portacaval lymphadenopathy which is likely related to the above. 3.  No acute aortic pathology. 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 I personally discussed this study with Tong Llanos on 10/16/2023 11:52 AM.            Workstation performed: [id number]         XR chest 1 view portable   ED Interpretation by Tong Llanos MD (41/71 3205)   NAD      Final Result by Remigio Burroughs MD (18/41 6147)      No acute cardiopulmonary disease. Workstation performed: [id number]         CT guided perc drainage catheter placeme    (Results Pending)       EKG and Other Studies Reviewed on Admission:   EKG: NSR. HR 92.    ** Please Note: This note has been constructed using a voice recognition system.  **

## 2023-10-16 NOTE — ED NOTES
SBAR sent at this time to charge for MS3. Patient waiting for bloodwork result prior to going for IR procedure and then will go upstairs to room.      Julianna Stover RN  10/16/23 5161

## 2023-10-16 NOTE — ASSESSMENT & PLAN NOTE
Known history  Continue enalapril, hydralazine, HCTZ, and toprol with parameters  Monitor vital signs

## 2023-10-16 NOTE — ASSESSMENT & PLAN NOTE
Patient with generalized complaints of flu like symptoms  CT imaging with evidence of liver abscess does not meet sirs criteria   Patient received cefepime in ED   Dw IR tentative drain placement today keep NPO for now  Will await drain placement and start cefazolin and flagyl  IVF for hydration  Tylenol for fever  Cbc, procal in am   Trend blood cultures

## 2023-10-17 LAB
% PARASITEMIA: 0
ALBUMIN SERPL BCP-MCNC: 3.1 G/DL (ref 3.5–5)
ALP SERPL-CCNC: 96 U/L (ref 34–104)
ALT SERPL W P-5'-P-CCNC: 74 U/L (ref 7–52)
ANION GAP SERPL CALCULATED.3IONS-SCNC: 8 MMOL/L
AST SERPL W P-5'-P-CCNC: 51 U/L (ref 13–39)
BILIRUB SERPL-MCNC: 0.65 MG/DL (ref 0.2–1)
BUN SERPL-MCNC: 27 MG/DL (ref 5–25)
CALCIUM ALBUM COR SERPL-MCNC: 9.1 MG/DL (ref 8.3–10.1)
CALCIUM SERPL-MCNC: 8.4 MG/DL (ref 8.4–10.2)
CHLORIDE SERPL-SCNC: 97 MMOL/L (ref 96–108)
CO2 SERPL-SCNC: 30 MMOL/L (ref 21–32)
CREAT SERPL-MCNC: 1.03 MG/DL (ref 0.6–1.3)
ERYTHROCYTE [DISTWIDTH] IN BLOOD BY AUTOMATED COUNT: 12.2 % (ref 11.6–15.1)
GFR SERPL CREATININE-BSD FRML MDRD: 72 ML/MIN/1.73SQ M
GLUCOSE P FAST SERPL-MCNC: 103 MG/DL (ref 65–99)
GLUCOSE SERPL-MCNC: 103 MG/DL (ref 65–140)
HCT VFR BLD AUTO: 34.3 % (ref 36.5–49.3)
HGB BLD-MCNC: 11.3 G/DL (ref 12–17)
MCH RBC QN AUTO: 28.7 PG (ref 26.8–34.3)
MCHC RBC AUTO-ENTMCNC: 32.9 G/DL (ref 31.4–37.4)
MCV RBC AUTO: 87 FL (ref 82–98)
PARASITE BLD: NO
PATHOLOGIST INTERPRETATION: NORMAL
PLATELET # BLD AUTO: 279 THOUSANDS/UL (ref 149–390)
PMV BLD AUTO: 9.3 FL (ref 8.9–12.7)
POTASSIUM SERPL-SCNC: 3.8 MMOL/L (ref 3.5–5.3)
PROT SERPL-MCNC: 6.8 G/DL (ref 6.4–8.4)
RBC # BLD AUTO: 3.94 MILLION/UL (ref 3.88–5.62)
SODIUM SERPL-SCNC: 135 MMOL/L (ref 135–147)
WBC # BLD AUTO: 8.42 THOUSAND/UL (ref 4.31–10.16)

## 2023-10-17 PROCEDURE — 80053 COMPREHEN METABOLIC PANEL: CPT | Performed by: NURSE PRACTITIONER

## 2023-10-17 PROCEDURE — 99232 SBSQ HOSP IP/OBS MODERATE 35: CPT

## 2023-10-17 PROCEDURE — 85027 COMPLETE CBC AUTOMATED: CPT | Performed by: NURSE PRACTITIONER

## 2023-10-17 RX ADMIN — METRONIDAZOLE 500 MG: 500 TABLET ORAL at 22:09

## 2023-10-17 RX ADMIN — MORPHINE SULFATE 2 MG: 2 INJECTION, SOLUTION INTRAMUSCULAR; INTRAVENOUS at 09:42

## 2023-10-17 RX ADMIN — METRONIDAZOLE 500 MG: 500 TABLET ORAL at 05:42

## 2023-10-17 RX ADMIN — METRONIDAZOLE 500 MG: 500 TABLET ORAL at 14:00

## 2023-10-17 RX ADMIN — OXYCODONE HYDROCHLORIDE 5 MG: 5 TABLET ORAL at 12:49

## 2023-10-17 RX ADMIN — OXYCODONE HYDROCHLORIDE 5 MG: 5 TABLET ORAL at 20:19

## 2023-10-17 RX ADMIN — CEFTRIAXONE 1000 MG: 1 INJECTION, SOLUTION INTRAVENOUS at 09:07

## 2023-10-17 RX ADMIN — SODIUM CHLORIDE, SODIUM LACTATE, POTASSIUM CHLORIDE, AND CALCIUM CHLORIDE 125 ML/HR: .6; .31; .03; .02 INJECTION, SOLUTION INTRAVENOUS at 16:23

## 2023-10-17 RX ADMIN — TAMSULOSIN HYDROCHLORIDE 0.4 MG: 0.4 CAPSULE ORAL at 09:05

## 2023-10-17 RX ADMIN — MORPHINE SULFATE 2 MG: 2 INJECTION, SOLUTION INTRAMUSCULAR; INTRAVENOUS at 03:04

## 2023-10-17 RX ADMIN — FINASTERIDE 5 MG: 5 TABLET, FILM COATED ORAL at 09:05

## 2023-10-17 NOTE — PROGRESS NOTES
[address]  Progress Note  Name: Yoli Whitaker I  MRN: [id number]  Unit/Bed#: -01 I Date of Admission: 10/16/2023   Date of Service: 10/17/2023 I Hospital Day: 0    Assessment/Plan   * Liver abscess  Assessment & Plan  Patient with generalized complaints of flu like symptoms  CT imaging with evidence of liver abscess does not meet sirs criteria   Patient received cefepime in ED   Drain placed yesterday, body culture at this time showed no bacteria, awaiting parasite smear, Lyme antibody is positive however IgG and IgM negative, anaplasmosis pending  Will await drain placement and start cefazolin and flagyl  IVF for hydration  Tylenol for fever  Cbc, procal in am   Blood culture showed no growth after 24 hours    Hyperlipidemia  Assessment & Plan  LFT elevated   Will hold statin for now  Continue to trend for improvement with intervention     Class 3 severe obesity due to excess calories without serious comorbidity with body mass index (BMI) of 40.0 to 44.9 in adult [facility])  Assessment & Plan  Life style modifications discussed     Hypertension  Assessment & Plan  Known history  Continue enalapril, hydralazine, HCTZ, and toprol with parameters  Monitor vital signs           VTE Pharmacologic Prophylaxis: VTE Score: 5 High Risk (Score >/= 5) - Pharmacological DVT Prophylaxis Contraindicated. Sequential Compression Devices Ordered. Patient Centered Rounds: I performed bedside rounds with nursing staff today. Discussions with Specialists or Other Care Team Provider: CM    Education and Discussions with Family / Patient: Updated  (wife) at bedside. Total Time Spent on Date of Encounter in care of patient: 35 mins.  This time was spent on one or more of the following: performing physical exam; counseling and coordination of care; obtaining or reviewing history; documenting in the medical record; reviewing/ordering tests, medications or procedures; communicating with other healthcare professionals and discussing with patient's family/caregivers. Current Length of Stay: 0 day(s)  Current Patient Status: Inpatient   Certification Statement: The patient will continue to require additional inpatient hospital stay due to continued evaluation and treatment in the setting of a liver abscess including IV antibiotics and pending cultures  Discharge Plan: Anticipate discharge in 24-48 hrs to home. Code Status: Level 1 - Full Code    Subjective:   Patient reports still having some significant abdominal pain and pressure however denies any chest pain/palpitations, lightheadedness, nausea, shortness of breath, or chills. Objective:     Vitals:   Temp (24hrs), Av.4 °F (36.9 °C), Min:97.6 °F (36.4 °C), Max:100.2 °F (37.9 °C)    Temp:  [97.6 °F (36.4 °C)-100.2 °F (37.9 °C)] 98.2 °F (36.8 °C)  HR:  [82-91] 82  BP: (109-120)/(73-76) 120/73  SpO2:  [91 %-96 %] 95 %  Body mass index is 41.59 kg/m². Input and Output Summary (last 24 hours): Intake/Output Summary (Last 24 hours) at 10/17/2023 1558  Last data filed at 10/17/2023 1201  Gross per 24 hour   Intake 970 ml   Output 410 ml   Net 560 ml       Physical Exam:   Physical Exam  Vitals and nursing note reviewed. Constitutional:       Appearance: He is normal weight. HENT:      Head: Normocephalic. Nose: Nose normal.      Mouth/Throat:      Mouth: Mucous membranes are moist.      Pharynx: Oropharynx is clear. Eyes:      General: No scleral icterus. Conjunctiva/sclera: Conjunctivae normal.      Pupils: Pupils are equal, round, and reactive to light. Cardiovascular:      Rate and Rhythm: Normal rate and regular rhythm. Heart sounds: No murmur heard. No friction rub. No gallop. Pulmonary:      Effort: Pulmonary effort is normal. No respiratory distress. Breath sounds: Normal breath sounds. No stridor. No wheezing, rhonchi or rales. Abdominal:      General: Abdomen is flat. There is no distension. Palpations: Abdomen is soft. Tenderness: There is abdominal tenderness (Right upper quadrant). Comments: TIMO drain continues to have purulent drainage   Musculoskeletal:         General: Normal range of motion. Cervical back: Normal range of motion and neck supple. Right lower leg: No edema. Left lower leg: No edema. Lymphadenopathy:      Cervical: No cervical adenopathy. Skin:     General: Skin is warm. Coloration: Skin is not jaundiced or pale. Findings: No bruising, erythema or lesion. Neurological:      General: No focal deficit present. Mental Status: He is alert and oriented to person, place, and time. Mental status is at baseline. Cranial Nerves: No cranial nerve deficit. Motor: No weakness. Psychiatric:         Mood and Affect: Mood normal.         Behavior: Behavior normal.         Thought Content:  Thought content normal.          Additional Data:     Labs:  Results from last 7 days   Lab Units 10/17/23  0609 10/16/23  0739   WBC Thousand/uL 8.42 10.85*   HEMOGLOBIN g/dL 11.3* 12.6   HEMATOCRIT % 34.3* 38.1   PLATELETS Thousands/uL 279 365   NEUTROS PCT %  --  83*   LYMPHS PCT %  --  7*   MONOS PCT %  --  10   EOS PCT %  --  0     Results from last 7 days   Lab Units 10/17/23  0609   SODIUM mmol/L 135   POTASSIUM mmol/L 3.8   CHLORIDE mmol/L 97   CO2 mmol/L 30   BUN mg/dL 27*   CREATININE mg/dL 1.03   ANION GAP mmol/L 8   CALCIUM mg/dL 8.4   ALBUMIN g/dL 3.1*   TOTAL BILIRUBIN mg/dL 0.65   ALK PHOS U/L 96   ALT U/L 74*   AST U/L 51*   GLUCOSE RANDOM mg/dL 103     Results from last 7 days   Lab Units 10/16/23  1258   INR  1.46*     Results from last 7 days   Lab Units 10/16/23  0657   POC GLUCOSE mg/dl 102         Results from last 7 days   Lab Units 10/16/23  0739   LACTIC ACID mmol/L 0.9       Lines/Drains:  Invasive Devices       Peripheral Intravenous Line  Duration             Peripheral IV 10/17/23 Left Hand <1 day              Drain  Duration Abscess Drain Abdomen 1 day                          Imaging: Reviewed radiology reports from this admission including: abdominal/pelvic CT    Recent Cultures (last 7 days):   Results from last 7 days   Lab Units 10/16/23  1541 10/16/23  0748 10/16/23  0739   BLOOD CULTURE   --  No Growth at 24 hrs. No Growth at 24 hrs. GRAM STAIN RESULT  1+ Polys  No bacteria seen  --   --    BODY FLUID CULTURE, STERILE  No growth  --   --        Last 24 Hours Medication List:   Current Facility-Administered Medications   Medication Dose Route Frequency Provider Last Rate    acetaminophen  650 mg Oral Q6H PRN KIMBERLY Shrestha      cefTRIAXone  1,000 mg Intravenous Q24H KIMBERLY Shrestha 1,000 mg (10/17/23 0907)    ferrous sulfate  325 mg Oral Every Other Day KIMBERLY Shrestha      finasteride  5 mg Oral Daily KIMBERLY Shrestha      hydrALAZINE  75 mg Oral TID KIMBERLY Shrestha      hydrochlorothiazide  25 mg Oral Daily KIMBERLY Shrestha      lactated ringers  125 mL/hr Intravenous Continuous KIMBERLY Shrestha 125 mL/hr (10/16/23 1839)    lisinopril  40 mg Oral Daily KIMBERLY Shrestha      metoprolol succinate  100 mg Oral Daily KIMBERLY Shrestha      metroNIDAZOLE  500 mg Oral Q8H [address] KIMBERLY Shrestha      morphine injection  2 mg Intravenous Q4H PRN KIMBERLY Shrestha      ondansetron  4 mg Intravenous Q6H PRN KIMBERLY Shrestha      oxyCODONE  5 mg Oral Q4H PRN KIMBERLY Shrestha      sodium chloride  125 mL/hr Intravenous Continuous Tong Llanos MD Stopped (10/16/23 1830)    tamsulosin  0.4 mg Oral Daily KIMBERLY Shrestha          Today, Patient Was Seen By: Komal Estes PA-C    **Please Note: This note may have been constructed using a voice recognition system. **

## 2023-10-17 NOTE — CASE MANAGEMENT
Case Management Assessment & Discharge Planning Note    Patient name Nerissa Finder  Location /-01 MRN [id number]  : 1950 Date 10/17/2023       Current Admission Date: 10/16/2023  Current Admission Diagnosis:Liver abscess   Patient Active Problem List    Diagnosis Date Noted    Liver abscess 10/16/2023    Nonrheumatic aortic valve stenosis 2023    Benign prostatic hyperplasia with urinary frequency 2023    Thoracic aortic aneurysm without rupture ([address]) 2021    Gout 2018    Moderate concentric left ventricular hypertrophy 10/12/2018    Hypertension 2018    Class 3 severe obesity due to excess calories without serious comorbidity with body mass index (BMI) of 40.0 to 44.9 in adult [facility]) 2018    Obstructive sleep apnea 2018    Hyperlipidemia 10/27/2016      LOS (days): 0  Geometric Mean LOS (GMLOS) (days):   Days to GMLOS:     OBJECTIVE:              Current admission status: Inpatient       Preferred Pharmacy:   [facility], [address] [address]  Phone: [phone] Fax: [phone]    Primary Care Provider: Khushi Ken MD    Primary Insurance: MEDICARE  Secondary Insurance: [facility]    ASSESSMENT:  [address] [facility], [address] [place] [address] Representative - Spouse   Primary Phone: [phone] (Mobile)  Home Phone: [phone]                 Advance Directives  Does patient have a [address]?: No  Was patient offered paperwork?: Yes (has all the paperwork)  Does patient currently have a Health Care decision maker?: Yes, please see Health Care Proxy section  Does patient have Advance Directives?: No  Was patient offered paperwork?: Yes         Readmission Root Cause  30 Day Readmission: No    Patient Information  Admitted from[de-identified] Home  Mental Status: Alert  During Assessment patient was accompanied by: Spouse  Assessment information provided by[de-identified] Spouse, Patient  Primary Caregiver: Self  Support Systems: Spouse/significant other  [place] of Residence: [address] [place] [address] do you live in?: [facility] entry access options.  Select all that apply.: Stairs  Number of steps to enter home.: 3  Do the steps have railings?: No  Type of Current Residence: Bi-level  Upon entering residence, is there a bedroom on the main floor (no further steps)?: Yes  Upon entering residence, is there a bathroom on the main floor (no further steps)?: Yes  In the last 12 months, was there a time when you were not able to pay the mortgage or rent on time?: No  In the last 12 months, how many places have you lived?: 1  In the last 12 months, was there a time when you did not have a steady place to sleep or slept in a shelter (including now)?: No  Homeless/housing insecurity resource given?: N/A  Living Arrangements: Lives w/ Spouse/significant other  Is patient a ?: No    Activities of Daily Living Prior to Admission  Functional Status: Independent  Completes ADLs independently?: Yes  Ambulates independently?: Yes  Does patient use assisted devices?: No  Does patient currently own DME?: Yes  What DME does the patient currently own?: Bedside Commode, Walker, Straight Cane  Does patient have a history of Outpatient Therapy (PT/OT)?: Yes  Does the patient have a history of Short-Term Rehab?: No  Does patient have a history of HHC?: Yes  Does patient currently have [facility]?: No         Patient Information Continued  Income Source: Unemployed  Does patient have prescription coverage?: Yes  Within the past 12 months, you worried that your food would run out before you got the money to buy more.: Never true  Within the past 12 months, the food you bought just didn't last and you didn't have money to get more.: Never true  Food insecurity resource given?: N/A  Does patient receive dialysis treatments?: No  Does patient have a history of substance abuse?: No  Does patient have a history of Mental Health Diagnosis?: No         Means of Transportation  Means of Transport to Appts[de-identified] Drives Self  In the past 12 months, has lack of transportation kept you from medical appointments or from getting medications?: No  In the past 12 months, has lack of transportation kept you from meetings, work, or from getting things needed for daily living?: No  Was application for public transport provided?: N/A        DISCHARGE DETAILS:    Discharge planning discussed with[de-identified] patient and spouse  Freedom of Choice: Yes  Comments - Freedom of Choice: No DC needs anticipated  CM contacted family/caregiver?: Yes  Were Treatment Team discharge recommendations reviewed with patient/caregiver?: Yes  Did patient/caregiver verbalize understanding of patient care needs?: Yes  Were patient/caregiver advised of the risks associated with not following Treatment Team discharge recommendations?: Yes    Contacts  Patient Contacts: wife  Relationship to Patient[de-identified] Family  Contact Method:  In Person  Reason/Outcome: Continuity of [address]         Is the patient interested in [address] at discharge?: No    DME Referral Provided  Referral made for DME?: No    Other Referral/Resources/Interventions Provided:  Referral Comments: no DC needs anticipated         Treatment Team Recommendation: Home  Discharge Destination Plan[de-identified] Home  Transport at Discharge : Automobile, Family

## 2023-10-17 NOTE — PLAN OF CARE

## 2023-10-17 NOTE — PLAN OF CARE
Problem: Potential for Falls  Goal: Patient will remain free of falls  Description: INTERVENTIONS:  - Educate patient/family on patient safety including physical limitations  - Instruct patient to call for assistance with activity   - Consult OT/PT to assist with strengthening/mobility   - Keep Call bell within reach  - Keep bed low and locked with side rails adjusted as appropriate  - Keep care items and personal belongings within reach  - Initiate and maintain comfort rounds  - Make Fall Risk Sign visible to staff  - Apply yellow socks and bracelet for high fall risk patients  - Consider moving patient to room near nurses station  Outcome: Progressing Abdomen soft, nontender, nondistended, bowel sounds present in all 4 quadrants.

## 2023-10-17 NOTE — ASSESSMENT & PLAN NOTE
Patient with generalized complaints of flu like symptoms  CT imaging with evidence of liver abscess does not meet sirs criteria   Patient received cefepime in ED   Drain placed yesterday, body culture at this time showed no bacteria, awaiting parasite smear, Lyme antibody is positive however IgG and IgM negative, anaplasmosis pending  Will await drain placement and start cefazolin and flagyl  IVF for hydration  Tylenol for fever  Cbc, procal in am   Blood culture showed no growth after 24 hours

## 2023-10-18 LAB
ALBUMIN SERPL BCP-MCNC: 3.3 G/DL (ref 3.5–5)
ALP SERPL-CCNC: 94 U/L (ref 34–104)
ALT SERPL W P-5'-P-CCNC: 81 U/L (ref 7–52)
ANION GAP SERPL CALCULATED.3IONS-SCNC: 7 MMOL/L
AST SERPL W P-5'-P-CCNC: 57 U/L (ref 13–39)
BASOPHILS # BLD AUTO: 0.05 THOUSANDS/ÂΜL (ref 0–0.1)
BASOPHILS NFR BLD AUTO: 1 % (ref 0–1)
BILIRUB SERPL-MCNC: 0.52 MG/DL (ref 0.2–1)
BUN SERPL-MCNC: 21 MG/DL (ref 5–25)
CALCIUM ALBUM COR SERPL-MCNC: 9.6 MG/DL (ref 8.3–10.1)
CALCIUM SERPL-MCNC: 9 MG/DL (ref 8.4–10.2)
CHLORIDE SERPL-SCNC: 98 MMOL/L (ref 96–108)
CO2 SERPL-SCNC: 32 MMOL/L (ref 21–32)
CREAT SERPL-MCNC: 0.93 MG/DL (ref 0.6–1.3)
EOSINOPHIL # BLD AUTO: 0.13 THOUSAND/ÂΜL (ref 0–0.61)
EOSINOPHIL NFR BLD AUTO: 2 % (ref 0–6)
ERYTHROCYTE [DISTWIDTH] IN BLOOD BY AUTOMATED COUNT: 12.1 % (ref 11.6–15.1)
GFR SERPL CREATININE-BSD FRML MDRD: 82 ML/MIN/1.73SQ M
GLUCOSE SERPL-MCNC: 100 MG/DL (ref 65–140)
HCT VFR BLD AUTO: 37.7 % (ref 36.5–49.3)
HGB BLD-MCNC: 12.2 G/DL (ref 12–17)
IMM GRANULOCYTES # BLD AUTO: 0.01 THOUSAND/UL (ref 0–0.2)
IMM GRANULOCYTES NFR BLD AUTO: 0 % (ref 0–2)
LYMPHOCYTES # BLD AUTO: 0.87 THOUSANDS/ÂΜL (ref 0.6–4.47)
LYMPHOCYTES NFR BLD AUTO: 13 % (ref 14–44)
MCH RBC QN AUTO: 28.4 PG (ref 26.8–34.3)
MCHC RBC AUTO-ENTMCNC: 32.4 G/DL (ref 31.4–37.4)
MCV RBC AUTO: 88 FL (ref 82–98)
MONOCYTES # BLD AUTO: 0.74 THOUSAND/ÂΜL (ref 0.17–1.22)
MONOCYTES NFR BLD AUTO: 11 % (ref 4–12)
NEUTROPHILS # BLD AUTO: 4.74 THOUSANDS/ÂΜL (ref 1.85–7.62)
NEUTS SEG NFR BLD AUTO: 73 % (ref 43–75)
NRBC BLD AUTO-RTO: 0 /100 WBCS
PLATELET # BLD AUTO: 285 THOUSANDS/UL (ref 149–390)
PMV BLD AUTO: 9.1 FL (ref 8.9–12.7)
POTASSIUM SERPL-SCNC: 4.1 MMOL/L (ref 3.5–5.3)
PROT SERPL-MCNC: 7.4 G/DL (ref 6.4–8.4)
RBC # BLD AUTO: 4.29 MILLION/UL (ref 3.88–5.62)
SODIUM SERPL-SCNC: 137 MMOL/L (ref 135–147)
WBC # BLD AUTO: 6.54 THOUSAND/UL (ref 4.31–10.16)

## 2023-10-18 PROCEDURE — 85025 COMPLETE CBC W/AUTO DIFF WBC: CPT

## 2023-10-18 PROCEDURE — 99232 SBSQ HOSP IP/OBS MODERATE 35: CPT

## 2023-10-18 PROCEDURE — 80053 COMPREHEN METABOLIC PANEL: CPT

## 2023-10-18 RX ORDER — ENOXAPARIN SODIUM 100 MG/ML
40 INJECTION SUBCUTANEOUS EVERY 12 HOURS SCHEDULED
Status: DISCONTINUED | OUTPATIENT
Start: 2023-10-18 | End: 2023-10-20 | Stop reason: HOSPADM

## 2023-10-18 RX ORDER — ENOXAPARIN SODIUM 100 MG/ML
40 INJECTION SUBCUTANEOUS
Status: DISCONTINUED | OUTPATIENT
Start: 2023-10-18 | End: 2023-10-18

## 2023-10-18 RX ADMIN — TAMSULOSIN HYDROCHLORIDE 0.4 MG: 0.4 CAPSULE ORAL at 09:15

## 2023-10-18 RX ADMIN — CEFTRIAXONE 1000 MG: 1 INJECTION, SOLUTION INTRAVENOUS at 09:23

## 2023-10-18 RX ADMIN — SODIUM CHLORIDE, SODIUM LACTATE, POTASSIUM CHLORIDE, AND CALCIUM CHLORIDE 125 ML/HR: .6; .31; .03; .02 INJECTION, SOLUTION INTRAVENOUS at 22:27

## 2023-10-18 RX ADMIN — METRONIDAZOLE 500 MG: 500 TABLET ORAL at 22:00

## 2023-10-18 RX ADMIN — SODIUM CHLORIDE, SODIUM LACTATE, POTASSIUM CHLORIDE, AND CALCIUM CHLORIDE 125 ML/HR: .6; .31; .03; .02 INJECTION, SOLUTION INTRAVENOUS at 12:28

## 2023-10-18 RX ADMIN — METRONIDAZOLE 500 MG: 500 TABLET ORAL at 14:25

## 2023-10-18 RX ADMIN — ENOXAPARIN SODIUM 40 MG: 40 INJECTION SUBCUTANEOUS at 14:25

## 2023-10-18 RX ADMIN — FINASTERIDE 5 MG: 5 TABLET, FILM COATED ORAL at 09:15

## 2023-10-18 RX ADMIN — SODIUM CHLORIDE, SODIUM LACTATE, POTASSIUM CHLORIDE, AND CALCIUM CHLORIDE 125 ML/HR: .6; .31; .03; .02 INJECTION, SOLUTION INTRAVENOUS at 03:26

## 2023-10-18 RX ADMIN — METRONIDAZOLE 500 MG: 500 TABLET ORAL at 05:35

## 2023-10-18 RX ADMIN — HYDRALAZINE HYDROCHLORIDE 75 MG: 25 TABLET ORAL at 22:00

## 2023-10-18 RX ADMIN — ENOXAPARIN SODIUM 40 MG: 40 INJECTION SUBCUTANEOUS at 22:00

## 2023-10-18 RX ADMIN — FERROUS SULFATE TAB 325 MG (65 MG ELEMENTAL FE) 325 MG: 325 (65 FE) TAB at 09:15

## 2023-10-18 RX ADMIN — HYDRALAZINE HYDROCHLORIDE 75 MG: 25 TABLET ORAL at 17:49

## 2023-10-18 RX ADMIN — OXYCODONE HYDROCHLORIDE 5 MG: 5 TABLET ORAL at 18:25

## 2023-10-18 NOTE — PLAN OF CARE
Problem: Potential for Falls  Goal: Patient will remain free of falls  Description: INTERVENTIONS:  - Educate patient/family on patient safety including physical limitations  - Instruct patient to call for assistance with activity   - Consult OT/PT to assist with strengthening/mobility   - Keep Call bell within reach  - Keep bed low and locked with side rails adjusted as appropriate  - Keep care items and personal belongings within reach  - Initiate and maintain comfort rounds  - Make Fall Risk Sign visible to staff  - Apply yellow socks and bracelet for high fall risk patients  - Consider moving patient to room near nurses station  Outcome: Progressing     Problem: MOBILITY - ADULT  Goal: Maintain or return to baseline ADL function  Description: INTERVENTIONS:  -  Assess patient's ability to carry out ADLs; assess patient's baseline for ADL function and identify physical deficits which impact ability to perform ADLs (bathing, care of mouth/teeth, toileting, grooming, dressing, etc.)  - Assess/evaluate cause of self-care deficits   - Assess range of motion  - Assess patient's mobility; develop plan if impaired  - Assess patient's need for assistive devices and provide as appropriate  - Encourage maximum independence but intervene and supervise when necessary  - Involve family in performance of ADLs  - Assess for home care needs following discharge   - Consider OT consult to assist with ADL evaluation and planning for discharge  - Provide patient education as appropriate  Outcome: Progressing  Goal: Maintains/Returns to pre admission functional level  Description: INTERVENTIONS:  - Perform BMAT or MOVE assessment daily.   - Set and communicate daily mobility goal to care team and patient/family/caregiver.    - Collaborate with rehabilitation services on mobility goals if consulted  - Out of bed for toileting  - Record patient progress and toleration of activity level   Outcome: Progressing     Problem: PAIN - ADULT  Goal: Verbalizes/displays adequate comfort level or baseline comfort level  Description: Interventions:  - Encourage patient to monitor pain and request assistance  - Assess pain using appropriate pain scale  - Administer analgesics based on type and severity of pain and evaluate response  - Implement non-pharmacological measures as appropriate and evaluate response  - Consider cultural and social influences on pain and pain management  - Notify physician/advanced practitioner if interventions unsuccessful or patient reports new pain  Outcome: Progressing     Problem: INFECTION - ADULT  Goal: Absence or prevention of progression during hospitalization  Description: INTERVENTIONS:  - Assess and monitor for signs and symptoms of infection  - Monitor lab/diagnostic results  - Monitor all insertion sites, i.e. indwelling lines, tubes, and drains  - Monitor endotracheal if appropriate and nasal secretions for changes in amount and color  - [place] appropriate cooling/warming therapies per order  - Administer medications as ordered  - Instruct and encourage patient and family to use good hand hygiene technique  - Identify and instruct in appropriate isolation precautions for identified infection/condition  Outcome: Progressing  Goal: Absence of fever/infection during neutropenic period  Description: INTERVENTIONS:  - Monitor WBC    Outcome: Progressing     Problem: SAFETY ADULT  Goal: Patient will remain free of falls  Description: INTERVENTIONS:  - Educate patient/family on patient safety including physical limitations  - Instruct patient to call for assistance with activity   - Consult OT/PT to assist with strengthening/mobility   - Keep Call bell within reach  - Keep bed low and locked with side rails adjusted as appropriate  - Keep care items and personal belongings within reach  - Initiate and maintain comfort rounds  - Make Fall Risk Sign visible to staff  - Apply yellow socks and bracelet for high fall risk patients  - Consider moving patient to room near nurses station  Outcome: Progressing  Goal: Maintain or return to baseline ADL function  Description: INTERVENTIONS:  -  Assess patient's ability to carry out ADLs; assess patient's baseline for ADL function and identify physical deficits which impact ability to perform ADLs (bathing, care of mouth/teeth, toileting, grooming, dressing, etc.)  - Assess/evaluate cause of self-care deficits   - Assess range of motion  - Assess patient's mobility; develop plan if impaired  - Assess patient's need for assistive devices and provide as appropriate  - Encourage maximum independence but intervene and supervise when necessary  - Involve family in performance of ADLs  - Assess for home care needs following discharge   - Consider OT consult to assist with ADL evaluation and planning for discharge  - Provide patient education as appropriate  Outcome: Progressing  Goal: Maintains/Returns to pre admission functional level  Description: INTERVENTIONS:  - Perform BMAT or MOVE assessment daily.   - Set and communicate daily mobility goal to care team and patient/family/caregiver.    - Collaborate with rehabilitation services on mobility goals if consulted  - Out of bed for toileting  - Record patient progress and toleration of activity level   Outcome: Progressing     Problem: DISCHARGE PLANNING  Goal: Discharge to home or other facility with appropriate resources  Description: INTERVENTIONS:  - Identify barriers to discharge w/patient and caregiver  - Arrange for needed discharge resources and transportation as appropriate  - Identify discharge learning needs (meds, wound care, etc.)  - Arrange for interpretive services to assist at discharge as needed  - Refer to Case Management Department for coordinating discharge planning if the patient needs post-hospital services based on physician/advanced practitioner order or complex needs related to functional status, cognitive ability, or social support system  Outcome: Progressing     Problem: Knowledge Deficit  Goal: Patient/family/caregiver demonstrates understanding of disease process, treatment plan, medications, and discharge instructions  Description: Complete learning assessment and assess knowledge base.   Interventions:  - Provide teaching at level of understanding  - Provide teaching via preferred learning methods  Outcome: Progressing

## 2023-10-18 NOTE — ASSESSMENT & PLAN NOTE
Patient with generalized complaints of flu like symptoms  CT imaging with evidence of liver abscess does not meet sirs criteria   Patient received cefepime in ED   Drain placed 10/16, body fluid culture showed Streptococcus anginosus  , Gram stain no growth, awaiting parasite smear, Lyme antibody is positive however IgG and IgM negative, anaplasmosis pending  Will await drain placement and start cefazolin and flagyl  IVF for hydration  Tylenol for fever  Cbc, procal in am   Blood culture showed no growth after 48 hours  Spoke with infectious disease who determined ceftriaxone and Flagyl should be appropriate, continue to evaluate for source of infection, will need nonurgent outpatient colonoscopy to determine possible source, recommending continuing antibiotic treatment with third GEN cephalosporin/Flagyl combination or Augmentin 10 days post drainage at time of DC

## 2023-10-18 NOTE — ASSESSMENT & PLAN NOTE
LFT elevated, AST 57, ALT 81  Will hold statin for now  Continue to trend for improvement with intervention

## 2023-10-18 NOTE — ASSESSMENT & PLAN NOTE
Patient with generalized complaints of flu like symptoms  CT imaging with evidence of liver abscess does not meet sirs criteria   Drain placed 10/16, body fluid culture showed Streptococcus anginosus, Gram stain no growth, awaiting parasite smear, Lyme antibody is positive however IgG and IgM negative, anaplasmosis pending  Will await drain placement and start cefazolin and flagyl  Tylenol for fever  Blood culture showed no growth after 48 hours  Spoke with infectious disease who determined ceftriaxone and Flagyl should be appropriate, continue to evaluate for source of infection, will need nonurgent outpatient colonoscopy to determine possible source, recommending continuing antibiotic treatment with third GEN cephalosporin/Flagyl combination or Augmentin 10 days post drainage at time of DC

## 2023-10-18 NOTE — PROGRESS NOTES
[address]  Progress Note  Name: Hayley Machado I  MRN: [id number]  Unit/Bed#: -01 I Date of Admission: 10/16/2023   Date of Service: 10/18/2023 I Hospital Day: 1    Assessment/Plan   * Liver abscess  Assessment & Plan  Patient with generalized complaints of flu like symptoms  CT imaging with evidence of liver abscess does not meet sirs criteria   Patient received cefepime in ED   Drain placed 10/16, body fluid culture showed Streptococcus anginosus  , Gram stain no growth, awaiting parasite smear, Lyme antibody is positive however IgG and IgM negative, anaplasmosis pending  Will await drain placement and start cefazolin and flagyl  IVF for hydration  Tylenol for fever  Cbc, procal in am   Blood culture showed no growth after 48 hours  Spoke with infectious disease who determined ceftriaxone and Flagyl should be appropriate, continue to evaluate for source of infection, will need nonurgent outpatient colonoscopy to determine possible source, recommending continuing antibiotic treatment with third GEN cephalosporin/Flagyl combination or Augmentin 10 days post drainage at time of DC    Hyperlipidemia  Assessment & Plan  LFT elevated, AST 57, ALT 81  Will hold statin for now  Continue to trend for improvement with intervention     Class 3 severe obesity due to excess calories without serious comorbidity with body mass index (BMI) of 40.0 to 44.9 in adult [facility])  Assessment & Plan  Life style modifications discussed     Hypertension  Assessment & Plan  Known history  Continue enalapril, hydralazine, HCTZ, and toprol with parameters  Monitor vital signs           VTE Pharmacologic Prophylaxis: VTE Score: 5 High Risk (Score >/= 5) - Pharmacological DVT Prophylaxis Ordered: enoxaparin (Lovenox). Sequential Compression Devices Ordered. Patient Centered Rounds: I performed bedside rounds with nursing staff today.   Discussions with Specialists or Other Care Team Provider: CM, infectious disease    Education and Discussions with Family / Patient: Updated  (wife) at bedside. Total Time Spent on Date of Encounter in care of patient: 35 mins. This time was spent on one or more of the following: performing physical exam; counseling and coordination of care; obtaining or reviewing history; documenting in the medical record; reviewing/ordering tests, medications or procedures; communicating with other healthcare professionals and discussing with patient's family/caregivers. Current Length of Stay: 1 day(s)  Current Patient Status: Inpatient   Certification Statement: The patient will continue to require additional inpatient hospital stay due to PT/OT recommendations, continued ongoing care of liver abscess including IV antibiotics  Discharge Plan: Anticipate discharge in 24-48 hrs to discharge location to be determined pending rehab evaluations. Code Status: Level 1 - Full Code    Subjective:   Patient reports having ongoing abdominal pain and significant weakness. The patient denies shortness of breath, chest pain/pressure, palpitations, numbness, nausea, or chills. Objective:     Vitals:   Temp (24hrs), Av.7 °F (37.1 °C), Min:98 °F (36.7 °C), Max:99.8 °F (37.7 °C)    Temp:  [98 °F (36.7 °C)-99.8 °F (37.7 °C)] 98 °F (36.7 °C)  HR:  [] 89  Resp:  [16] 16  BP: (116-146)/(74-89) 146/89  SpO2:  [93 %-97 %] 96 %  Body mass index is 41.59 kg/m². Input and Output Summary (last 24 hours): Intake/Output Summary (Last 24 hours) at 10/18/2023 1606  Last data filed at 10/18/2023 1433  Gross per 24 hour   Intake 1820 ml   Output 895 ml   Net 925 ml       Physical Exam:   Physical Exam  Vitals and nursing note reviewed. Constitutional:       Appearance: He is obese. HENT:      Head: Normocephalic. Nose: Nose normal.      Mouth/Throat:      Mouth: Mucous membranes are moist.      Pharynx: Oropharynx is clear. Eyes:      General: No scleral icterus. Conjunctiva/sclera: Conjunctivae normal.      Pupils: Pupils are equal, round, and reactive to light. Cardiovascular:      Rate and Rhythm: Normal rate and regular rhythm. Heart sounds: No murmur heard. No friction rub. No gallop. Pulmonary:      Effort: Pulmonary effort is normal. No respiratory distress. Breath sounds: Normal breath sounds. No stridor. No wheezing, rhonchi or rales. Abdominal:      General: Abdomen is flat. There is no distension. Palpations: Abdomen is soft. Tenderness: There is abdominal tenderness. Comments: TIMO drain continues to have purulent discharge   Musculoskeletal:         General: Normal range of motion. Cervical back: Normal range of motion and neck supple. Right lower leg: No edema. Left lower leg: No edema. Lymphadenopathy:      Cervical: No cervical adenopathy. Skin:     General: Skin is warm. Coloration: Skin is not jaundiced or pale. Findings: No bruising, erythema or lesion. Neurological:      General: No focal deficit present. Mental Status: He is alert and oriented to person, place, and time. Mental status is at baseline. Cranial Nerves: No cranial nerve deficit. Motor: No weakness. Psychiatric:         Mood and Affect: Mood normal.         Behavior: Behavior normal.         Thought Content:  Thought content normal.          Additional Data:     Labs:  Results from last 7 days   Lab Units 10/18/23  0548   WBC Thousand/uL 6.54   HEMOGLOBIN g/dL 12.2   HEMATOCRIT % 37.7   PLATELETS Thousands/uL 285   NEUTROS PCT % 73   LYMPHS PCT % 13*   MONOS PCT % 11   EOS PCT % 2     Results from last 7 days   Lab Units 10/18/23  0548   SODIUM mmol/L 137   POTASSIUM mmol/L 4.1   CHLORIDE mmol/L 98   CO2 mmol/L 32   BUN mg/dL 21   CREATININE mg/dL 0.93   ANION GAP mmol/L 7   CALCIUM mg/dL 9.0   ALBUMIN g/dL 3.3*   TOTAL BILIRUBIN mg/dL 0.52   ALK PHOS U/L 94   ALT U/L 81*   AST U/L 57*   GLUCOSE RANDOM mg/dL 100 Results from last 7 days   Lab Units 10/16/23  1258   INR  1.46*     Results from last 7 days   Lab Units 10/16/23  0657   POC GLUCOSE mg/dl 102         Results from last 7 days   Lab Units 10/16/23  0739   LACTIC ACID mmol/L 0.9       Lines/Drains:  Invasive Devices       Peripheral Intravenous Line  Duration             Peripheral IV 10/18/23 Dorsal (posterior); Left Hand <1 day              Drain  Duration             Abscess Drain Abdomen 2 days                          Imaging: No pertinent imaging reviewed. Recent Cultures (last 7 days):   Results from last 7 days   Lab Units 10/16/23  1541 10/16/23  0748 10/16/23  0739   BLOOD CULTURE   --  No Growth at 48 hrs. No Growth at 48 hrs.    GRAM STAIN RESULT  1+ Polys  No bacteria seen  --   --    BODY FLUID CULTURE, STERILE  1+ Growth of Streptococcus anginosus*  --   --        Last 24 Hours Medication List:   Current Facility-Administered Medications   Medication Dose Route Frequency Provider Last Rate    acetaminophen  650 mg Oral Q6H PRN KIMBERLY Galindo      cefTRIAXone  1,000 mg Intravenous Q24H KIMBERLY Galindo 1,000 mg (10/18/23 0923)    enoxaparin  40 mg Subcutaneous Q12H [facility]      ferrous sulfate  325 mg Oral Every Other Day KIMBERLY Galindo      finasteride  5 mg Oral Daily KIMBERLY Galindo      hydrALAZINE  75 mg Oral TID KIMBERLY Galindo      hydrochlorothiazide  25 mg Oral Daily KIMBERLY Galindo      lactated ringers  125 mL/hr Intravenous Continuous KIMBERLY Galindo 125 mL/hr (10/18/23 1228)    lisinopril  40 mg Oral Daily KIMBERLY Galindo      metoprolol succinate  100 mg Oral Daily KIMBERLY Galindo      metroNIDAZOLE  500 mg Oral Q8H [facility] KIMBERLY Galindo      morphine injection  2 mg Intravenous Q4H PRN KIMBERLY Galindo      ondansetron  4 mg Intravenous Q6H PRN KIMBERLY Galindo      oxyCODONE  5 mg Oral Q4H PRN KIMBERLY Galindo      tamsulosin  0.4 mg Oral Daily KIMBERLY De Santiago          Today, Patient Was Seen By: Bettina Estes PA-C    **Please Note: This note may have been constructed using a voice recognition system. **

## 2023-10-18 NOTE — PROGRESS NOTES
[address]  Progress Note  Name: Beth Isaacs I  MRN: [id number]  Unit/Bed#: -01 I Date of Admission: 10/16/2023   Date of Service: 10/18/2023 I Hospital Day: 1    Assessment/Plan   * Liver abscess  Assessment & Plan  Patient with generalized complaints of flu like symptoms  CT imaging with evidence of liver abscess does not meet sirs criteria   Patient received cefepime in ED   Drain placed 10/16, body fluid culture showed Streptococcus anginosus  , Gram stain no growth, awaiting parasite smear, Lyme antibody is positive however IgG and IgM negative, anaplasmosis pending  Will await drain placement and start cefazolin and flagyl  IVF for hydration  Tylenol for fever  Cbc, procal in am   Blood culture showed no growth after 48 hours    Hyperlipidemia  Assessment & Plan  LFT elevated, AST 57, ALT 81  Will hold statin for now  Continue to trend for improvement with intervention     Class 3 severe obesity due to excess calories without serious comorbidity with body mass index (BMI) of 40.0 to 44.9 in adult [facility])  Assessment & Plan  Life style modifications discussed     Hypertension  Assessment & Plan  Known history  Continue enalapril, hydralazine, HCTZ, and toprol with parameters  Monitor vital signs           VTE Pharmacologic Prophylaxis: VTE Score: 5 High Risk (Score >/= 5) - Pharmacological DVT Prophylaxis Ordered: enoxaparin (Lovenox). Sequential Compression Devices Ordered. Patient Centered Rounds: I performed bedside rounds with nursing staff today. Discussions with Specialists or Other Care Team Provider: CM, infectious disease    Education and Discussions with Family / Patient: Updated  (wife) at bedside. Total Time Spent on Date of Encounter in care of patient: 35 mins.  This time was spent on one or more of the following: performing physical exam; counseling and coordination of care; obtaining or reviewing history; documenting in the medical record; reviewing/ordering tests, medications or procedures; communicating with other healthcare professionals and discussing with patient's family/caregivers. Current Length of Stay: 1 day(s)  Current Patient Status: Inpatient   Certification Statement: The patient will continue to require additional inpatient hospital stay due to PT/OT recommendations, continued ongoing care of liver abscess including IV antibiotics  Discharge Plan: Anticipate discharge in 24-48 hrs to discharge location to be determined pending rehab evaluations. Code Status: Level 1 - Full Code    Subjective:   Patient reports having ongoing abdominal pain and significant weakness. The patient denies shortness of breath, chest pain/pressure, palpitations, numbness, nausea, or chills. Objective:     Vitals:   Temp (24hrs), Av.8 °F (37.1 °C), Min:98.2 °F (36.8 °C), Max:99.8 °F (37.7 °C)    Temp:  [98.2 °F (36.8 °C)-99.8 °F (37.7 °C)] 98.3 °F (36.8 °C)  HR:  [] 101  Resp:  [16] 16  BP: (116-127)/(73-86) 116/86  SpO2:  [93 %-97 %] 94 %  Body mass index is 41.59 kg/m². Input and Output Summary (last 24 hours): Intake/Output Summary (Last 24 hours) at 10/18/2023 1342  Last data filed at 10/18/2023 0915  Gross per 24 hour   Intake 1820 ml   Output 875 ml   Net 945 ml       Physical Exam:   Physical Exam  Vitals and nursing note reviewed. Constitutional:       Appearance: He is obese. HENT:      Head: Normocephalic. Nose: Nose normal.      Mouth/Throat:      Mouth: Mucous membranes are moist.      Pharynx: Oropharynx is clear. Eyes:      General: No scleral icterus. Conjunctiva/sclera: Conjunctivae normal.      Pupils: Pupils are equal, round, and reactive to light. Cardiovascular:      Rate and Rhythm: Normal rate and regular rhythm. Heart sounds: No murmur heard. No friction rub. No gallop. Pulmonary:      Effort: Pulmonary effort is normal. No respiratory distress.       Breath sounds: Normal breath sounds. No stridor. No wheezing, rhonchi or rales. Abdominal:      General: Abdomen is flat. There is no distension. Palpations: Abdomen is soft. Tenderness: There is abdominal tenderness. Comments: TIMO drain continues to have purulent discharge   Musculoskeletal:         General: Normal range of motion. Cervical back: Normal range of motion and neck supple. Right lower leg: No edema. Left lower leg: No edema. Lymphadenopathy:      Cervical: No cervical adenopathy. Skin:     General: Skin is warm. Coloration: Skin is not jaundiced or pale. Findings: No bruising, erythema or lesion. Neurological:      General: No focal deficit present. Mental Status: He is alert and oriented to person, place, and time. Mental status is at baseline. Cranial Nerves: No cranial nerve deficit. Motor: No weakness. Psychiatric:         Mood and Affect: Mood normal.         Behavior: Behavior normal.         Thought Content:  Thought content normal.          Additional Data:     Labs:  Results from last 7 days   Lab Units 10/18/23  0548   WBC Thousand/uL 6.54   HEMOGLOBIN g/dL 12.2   HEMATOCRIT % 37.7   PLATELETS Thousands/uL 285   NEUTROS PCT % 73   LYMPHS PCT % 13*   MONOS PCT % 11   EOS PCT % 2     Results from last 7 days   Lab Units 10/18/23  0548   SODIUM mmol/L 137   POTASSIUM mmol/L 4.1   CHLORIDE mmol/L 98   CO2 mmol/L 32   BUN mg/dL 21   CREATININE mg/dL 0.93   ANION GAP mmol/L 7   CALCIUM mg/dL 9.0   ALBUMIN g/dL 3.3*   TOTAL BILIRUBIN mg/dL 0.52   ALK PHOS U/L 94   ALT U/L 81*   AST U/L 57*   GLUCOSE RANDOM mg/dL 100     Results from last 7 days   Lab Units 10/16/23  1258   INR  1.46*     Results from last 7 days   Lab Units 10/16/23  0657   POC GLUCOSE mg/dl 102         Results from last 7 days   Lab Units 10/16/23  0739   LACTIC ACID mmol/L 0.9       Lines/Drains:  Invasive Devices       Peripheral Intravenous Line  Duration             Peripheral IV 10/18/23 Dorsal (posterior); Left Hand <1 day              Drain  Duration             Abscess Drain Abdomen 1 day                          Imaging: No pertinent imaging reviewed. Recent Cultures (last 7 days):   Results from last 7 days   Lab Units 10/16/23  1541 10/16/23  0748 10/16/23  0739   BLOOD CULTURE   --  No Growth at 48 hrs. No Growth at 48 hrs. GRAM STAIN RESULT  1+ Polys  No bacteria seen  --   --    BODY FLUID CULTURE, STERILE  1+ Growth of Streptococcus anginosus*  --   --        Last 24 Hours Medication List:   Current Facility-Administered Medications   Medication Dose Route Frequency Provider Last Rate    acetaminophen  650 mg Oral Q6H PRN Pearla Sours, CRNP      cefTRIAXone  1,000 mg Intravenous Q24H Pearla Sours, CRNP 1,000 mg (10/18/23 0923)    enoxaparin  40 mg Subcutaneous Q24H [facility] Ramesh Estes PA-C      ferrous sulfate  325 mg Oral Every Other Day Pearla Sours, CRNP      finasteride  5 mg Oral Daily Pearla Sours, CRNP      hydrALAZINE  75 mg Oral TID Pearla Sours, CRNP      hydrochlorothiazide  25 mg Oral Daily Pearla Sours, CRNP      lactated ringers  125 mL/hr Intravenous Continuous Pearla Sours, CRNP 125 mL/hr (10/18/23 1228)    lisinopril  40 mg Oral Daily Pearla Sours, CRNP      metoprolol succinate  100 mg Oral Daily Pearla Sours, CRNP      metroNIDAZOLE  500 mg Oral Q8H [facility] Pearla Sours, CRNP      morphine injection  2 mg Intravenous Q4H PRN Pearla Sours, CRNP      ondansetron  4 mg Intravenous Q6H PRN Pearla Sours, CRNP      oxyCODONE  5 mg Oral Q4H PRN Pearla Sours, CRNP      tamsulosin  0.4 mg Oral Daily Pearla Sours, CRNP          Today, Patient Was Seen By: Ramesh Estes PA-C    **Please Note: This note may have been constructed using a voice recognition system. **

## 2023-10-18 NOTE — NURSING NOTE
Per patient morning blood pressure medications are being held due to soft blood pressures. Slim aware.  Will resume once blood pressures are back to baseline

## 2023-10-18 NOTE — PLAN OF CARE
Problem: PAIN - ADULT  Goal: Verbalizes/displays adequate comfort level or baseline comfort level  Description: Interventions:  - Encourage patient to monitor pain and request assistance  - Assess pain using appropriate pain scale  - Administer analgesics based on type and severity of pain and evaluate response  - Implement non-pharmacological measures as appropriate and evaluate response  - Consider cultural and social influences on pain and pain management  - Notify physician/advanced practitioner if interventions unsuccessful or patient reports new pain  Outcome: Progressing     Problem: INFECTION - ADULT  Goal: Absence or prevention of progression during hospitalization  Description: INTERVENTIONS:  - Assess and monitor for signs and symptoms of infection  - Monitor lab/diagnostic results  - Monitor all insertion sites, i.e. indwelling lines, tubes, and drains  - Monitor endotracheal if appropriate and nasal secretions for changes in amount and color  - [place] appropriate cooling/warming therapies per order  - Administer medications as ordered  - Instruct and encourage patient and family to use good hand hygiene technique  - Identify and instruct in appropriate isolation precautions for identified infection/condition  Outcome: Progressing     Problem: Knowledge Deficit  Goal: Patient/family/caregiver demonstrates understanding of disease process, treatment plan, medications, and discharge instructions  Description: Complete learning assessment and assess knowledge base.   Interventions:  - Provide teaching at level of understanding  - Provide teaching via preferred learning methods  Outcome: Progressing

## 2023-10-19 LAB
ALBUMIN SERPL BCP-MCNC: 2.8 G/DL (ref 3.5–5)
ALP SERPL-CCNC: 80 U/L (ref 34–104)
ALT SERPL W P-5'-P-CCNC: 51 U/L (ref 7–52)
ANION GAP SERPL CALCULATED.3IONS-SCNC: 7 MMOL/L
AST SERPL W P-5'-P-CCNC: 28 U/L (ref 13–39)
BACTERIA SPEC BFLD CULT: ABNORMAL
BILIRUB SERPL-MCNC: 0.39 MG/DL (ref 0.2–1)
BUN SERPL-MCNC: 13 MG/DL (ref 5–25)
CALCIUM ALBUM COR SERPL-MCNC: 9.3 MG/DL (ref 8.3–10.1)
CALCIUM SERPL-MCNC: 8.3 MG/DL (ref 8.4–10.2)
CHLORIDE SERPL-SCNC: 102 MMOL/L (ref 96–108)
CO2 SERPL-SCNC: 29 MMOL/L (ref 21–32)
CREAT SERPL-MCNC: 0.72 MG/DL (ref 0.6–1.3)
ERYTHROCYTE [DISTWIDTH] IN BLOOD BY AUTOMATED COUNT: 12.1 % (ref 11.6–15.1)
GFR SERPL CREATININE-BSD FRML MDRD: 93 ML/MIN/1.73SQ M
GLUCOSE SERPL-MCNC: 100 MG/DL (ref 65–140)
GRAM STN SPEC: ABNORMAL
GRAM STN SPEC: ABNORMAL
HCT VFR BLD AUTO: 30 % (ref 36.5–49.3)
HGB BLD-MCNC: 9.8 G/DL (ref 12–17)
MCH RBC QN AUTO: 28.6 PG (ref 26.8–34.3)
MCHC RBC AUTO-ENTMCNC: 32.7 G/DL (ref 31.4–37.4)
MCV RBC AUTO: 88 FL (ref 82–98)
PLATELET # BLD AUTO: 227 THOUSANDS/UL (ref 149–390)
PMV BLD AUTO: 8.8 FL (ref 8.9–12.7)
POTASSIUM SERPL-SCNC: 3.9 MMOL/L (ref 3.5–5.3)
PROT SERPL-MCNC: 6.1 G/DL (ref 6.4–8.4)
RBC # BLD AUTO: 3.43 MILLION/UL (ref 3.88–5.62)
SODIUM SERPL-SCNC: 138 MMOL/L (ref 135–147)
WBC # BLD AUTO: 4.35 THOUSAND/UL (ref 4.31–10.16)

## 2023-10-19 PROCEDURE — 99232 SBSQ HOSP IP/OBS MODERATE 35: CPT | Performed by: NURSE PRACTITIONER

## 2023-10-19 PROCEDURE — 85027 COMPLETE CBC AUTOMATED: CPT

## 2023-10-19 PROCEDURE — 80053 COMPREHEN METABOLIC PANEL: CPT

## 2023-10-19 RX ADMIN — METRONIDAZOLE 500 MG: 500 TABLET ORAL at 13:55

## 2023-10-19 RX ADMIN — HYDRALAZINE HYDROCHLORIDE 75 MG: 25 TABLET ORAL at 08:42

## 2023-10-19 RX ADMIN — FINASTERIDE 5 MG: 5 TABLET, FILM COATED ORAL at 08:42

## 2023-10-19 RX ADMIN — ENOXAPARIN SODIUM 40 MG: 40 INJECTION SUBCUTANEOUS at 08:41

## 2023-10-19 RX ADMIN — CEFTRIAXONE 1000 MG: 1 INJECTION, SOLUTION INTRAVENOUS at 08:41

## 2023-10-19 RX ADMIN — TAMSULOSIN HYDROCHLORIDE 0.4 MG: 0.4 CAPSULE ORAL at 08:42

## 2023-10-19 RX ADMIN — METRONIDAZOLE 500 MG: 500 TABLET ORAL at 21:31

## 2023-10-19 RX ADMIN — HYDROCHLOROTHIAZIDE 25 MG: 25 TABLET ORAL at 08:42

## 2023-10-19 RX ADMIN — HYDRALAZINE HYDROCHLORIDE 75 MG: 25 TABLET ORAL at 21:31

## 2023-10-19 RX ADMIN — SODIUM CHLORIDE, SODIUM LACTATE, POTASSIUM CHLORIDE, AND CALCIUM CHLORIDE 125 ML/HR: .6; .31; .03; .02 INJECTION, SOLUTION INTRAVENOUS at 06:13

## 2023-10-19 RX ADMIN — METRONIDAZOLE 500 MG: 500 TABLET ORAL at 06:13

## 2023-10-19 RX ADMIN — METOPROLOL SUCCINATE 100 MG: 100 TABLET, EXTENDED RELEASE ORAL at 08:42

## 2023-10-19 RX ADMIN — LISINOPRIL 40 MG: 20 TABLET ORAL at 08:42

## 2023-10-19 RX ADMIN — ENOXAPARIN SODIUM 40 MG: 40 INJECTION SUBCUTANEOUS at 21:31

## 2023-10-19 RX ADMIN — OXYCODONE HYDROCHLORIDE 5 MG: 5 TABLET ORAL at 19:21

## 2023-10-19 RX ADMIN — HYDRALAZINE HYDROCHLORIDE 75 MG: 25 TABLET ORAL at 17:19

## 2023-10-19 NOTE — DISCHARGE INSTRUCTIONS
[facility] Interventional Radiology    DISCHARGE INSTRUCTIONS    Drainage Tube Placement    A drainage tube is a small tube that was placed through your skin and into a fluid collection in your body to allow it to drain out of your body. The reason for placing this tube is because there is a fluid collection in your body that does not belong there and/or is infected. You will most likely have this tube temporarily for days or weeks until the fluid collection has resolved. You may be scheduled to return to Interventional Radiology in the next 1-2 weeks to have your tube checked. Once the fluid collection has resolved, the tube will be removed. In certain circumstances, you may need to go to surgery to correct the cause of the fluid collection. In those cases, the tube will stay in until your surgery. IF YOU RECEIVED IV SEDATION FOR YOUR PROCEDURE, FOR THE NEXT 24 HOURS YOU SHOULD NOT:    · Drive, use machinery, or drink alcohol    · Lift heavy objects or do any strenuous activity    · Be responsible for the care of other people or sign legal documents    IF YOU HAVE ANY OF THESE SYMPTOMS AFTER YOUR PROCEDURE, SEEK IMMEDIATE CARE OR CALL 911:    · The skin anywhere on your body is itchy, swollen, or you have a rash. · You have blurred vision, a severe headache, dizziness, or feel confused, light-headed, or faint. · Your heart is beating faster than usual or is jumping or fluttering. · You have sudden trouble breathing that is getting worse over time or you cannot be woken from sleep. · You have nausea or vomiting for more than 8 hours after the procedure. HOME CARE INSTRUCTIONS:    · You may resume your normal diet. Small sips of flat soda will help with mild nausea. · Expect to take it easy for the rest of the day. · Protect the area where you received your drainage catheter. · You may feel some mild pain or discomfort in the area, but this should stop in a day or two.     FLUSHING YOUR TUBE:    · Some patients will be instructed to flush the tubing with normal saline (usually either 3, 5, or 10 ml) once, twice, or three times a day. Flushing is important, as it helps to prevent clogging of the tube. · The volume and frequency will be given to you separately in your discharge instructions. · Twist off the drainage bag and clean both the drainage bag connection and the catheter connection with alcohol. Twist the saline syringe into the drainage tube and flush slowly. Remember to flush into the catheter entering your body and not into the tube going to the bag. Do not pull back fluid into the syringe. By pulling the solution back into the syringe, you may pull the debris back into the catheter. Remove the syringe and twist the drainage bag tube back into the nephrostomy tube. · Some patients will not require flushing at all. · Sometimes, the physician may want the tube to be closed off with a cap. These capped tubes may still need flushing. DRAINAGE BAGS OR BULBS:    · You should have 2 drainage bags or suction bulbs available for each drainage catheter; one bag or suction bulb for each tube should be always clean and ready for use. · Wash drainage bag(s) every few days with a mixture of 1-part white vinegar to 3 parts water. Rinse bag(s) with the solution and allow to air dry thoroughly. You may also use a small amount of dish soap. · If you have a bulb instead of a bag, there is no need to rinse the inside of the bulb. CARING FOR YOUR TUBE:    · The incision and tube exit sites must be properly cared for to avoid the risk of infection or dislodging the catheter. · A dressing was placed over your drainage tube(s). You must keep it clean and dry. · Replace your dressing every 2-3 days or when it becomes dirty or wet. When removing your dressing, do this gently, without pulling the tube out. Hold the skin beside the tube with one hand.  With the other hand, gently remove the sticky tape by pulling in the same direction as hair growth. Look for signs of infection, such as redness and swelling. · After 2 weeks you may stop replacing your dressing and leave your tube site open to air. · You must keep the skin surrounding the tube, and the tube itself as clean as possible. First, wash your hands with soap and water. You will need to clean the area twice. Clean gently with a gauze bandage with mild soap and warm water. Wipe the skin in circles, moving away from the entry site. You may repeat this step of the process with soap and warm water until the area is clean, each time using clean, new gauze. Then for step 2, use clean, wet gauze just with water to wipe away the soap. Gently pat dry. · You may shower 48 hours after tube placement but direct the water away from the drainage tube. · When you shower, you may remove your dressing. If you leave it on, you should cover your drainage tube site with clear wrap, then, remove the wrap after your shower. · If your bandage should get wet, remove it immediately and pat the exit site dry with a clean piece of gauze. You may then cover the tube exit site with a clean dressing such as gauze and tape, making sure not to kink it. · Allow the exit site to dry fully after cleaning and disinfecting before applying a clean bandage. You may use Neosporin, Bacitracin, or other antibiotic ointment around your skin site if it becomes red. · Wear loose, comfortable clothing that will not pull or kink the tube. · Do not allow the drainage bag or bulb to hang freely. This can pull and dislodge the tube. · Be always aware of the catheter (especially when changing clothes) so it doesn’t become dislodged. · Empty the bag or bulb often to keep the weight from pulling on the tube. We suggest emptying it when it is ½ to ? full and before you go to bed. · Do not use scissors or sharp objects around your catheter.     · Measure and record the amount of drainage in the bag or bulb if instructed to do so, on the attached sheet. · To prevent infection or dislodging your drainage catheter you should never immerse your drainage catheter in water, which means no bathtub, swimming pool or hot tub use. · Keep the tube taped to your skin and connected to a drainage bag with the bag placed below the level of skin entry site. This helps prevent fluid from backing up into your body. You may wear a small drainage bag strapped to your leg to let you move around more easily. Place the tubing and bag over your thigh rather than under it when you are sitting down. If you have a TIMO bulb, you may place it above or below your skin entry site. · If you have a home health nurse, they may alter your tube care depending on your specific needs. WHEN TO CALL THE INTERVENTIONAL RADIOLOGIST (Hospice Patients Should Call Your Hospice Physician):    · You have uncontrolled bleeding or oozing from any incision point. · The skin around the tube exit site is painful, red, tender, swollen, and warm or there is yellow, brown, or foul-smelling discharge coming out of the exit site. · Your body temperature is ? 100. 4? F (38? C) or chills, shaking or night sweats. · Your tube(s) becomes partially dislodged, cracked, has a hole in it or falls out. · You develop severe pain in the part of your body where you tube is draining. · You notice leakage of fluid around your tube(s). · Your tube(s) becomes difficult to flush (if instructed to flush). · You develop pain when flushing the tube(s) (if instructed to flush). · You notice leakage when flushing the tube(s) or any leakage at all from around the tube(s). · If the tube is putting out less than 10 mL of drainage per day for 3 days in a row. · If your tube is connected to TIMO bulb suction rather than to bag drainage and the bulb will not stay compressed and fills up with air.     FOLLOW-UP APPOINTMENTS:    · Keep your follow-up appointments. · We will need to return every 2-4 weeks to have your tube checked and possibly changed, repositioned or removed. · Your next appointment is scheduled on: DATE: __________________________    IF YOU HAVE ANY CONCERNS OR PROBLEMS RELATED TO YOUR PROCEDURE OR CARE, PLEASE CONTACT INTERVENTIONAL RADIOLOGY AT: [phone]    We are open Mon- Fri 8am to 4:30pm. If you have an URGENT question or problem that cannot wait until regular business hours, please call the Hospital  at [phone] and ask them to page the Interventional Radiologist on call. If you think you need immediate attention and have difficulty reaching us, please go to the nearest emergency room. FOR MORE INFORMATION:    For more information on your procedure/operation, you may look up information on the web sites below or contact the following organizations:    Websites 1. www. [email or web address]    On the right side of the top page bar click on:    · “More Info”, then in the drop-down menu, click on “site index A-Z”, then click on:    · “P”, then look for and click on “Percutaneous Abscess Drainage” 2. [email or web address]    Daily Drain Output Log    Date Day Daily Output (ml) Drain #1 Daily Output (ml) Drain #2 Daily Output (

## 2023-10-19 NOTE — PROGRESS NOTES
[address]  Progress Note  Name: Nerissa Laguerre I  MRN: [id number]  Unit/Bed#: -01 I Date of Admission: 10/16/2023   Date of Service: 10/19/2023 I Hospital Day: 2    Assessment/Plan   * Liver abscess  Assessment & Plan  Patient with generalized complaints of flu like symptoms  CT imaging with evidence of liver abscess does not meet sirs criteria   Patient received cefepime in ED   Drain placed 10/16, body fluid culture showed Streptococcus anginosus  , Gram stain no growth, awaiting parasite smear, Lyme antibody is positive however IgG and IgM negative, anaplasmosis pending  Will await drain placement and start cefazolin and flagyl  IVF for hydration  Tylenol for fever  Cbc, procal in am   Blood culture showed no growth after 48 hours  Spoke with infectious disease who determined ceftriaxone and Flagyl should be appropriate, continue to evaluate for source of infection, will need nonurgent outpatient colonoscopy to determine possible source, recommending continuing antibiotic treatment with third GEN cephalosporin/Flagyl combination or Augmentin 10 days post drainage at time of DC    Hyperlipidemia  Assessment & Plan  LFT elevated, AST 57, ALT 81  Will hold statin for now  Continue to trend for improvement with intervention     Class 3 severe obesity due to excess calories without serious comorbidity with body mass index (BMI) of 40.0 to 44.9 in adult [facility])  Assessment & Plan  Life style modifications discussed     Hypertension  Assessment & Plan  Known history  Continue enalapril, hydralazine, HCTZ, and toprol with parameters  Monitor vital signs         VTE Pharmacologic Prophylaxis: VTE Score: 5 High Risk (Score >/= 5) - Pharmacological DVT Prophylaxis Ordered: enoxaparin (Lovenox). Sequential Compression Devices Ordered. Patient Centered Rounds: I performed bedside rounds with nursing staff today.   Discussions with Specialists or Other Care Team Provider: Reviewed previous providers notes    Education and Discussions with Family / Patient: Discussed with patient and wife at bedside    Total Time Spent on Date of Encounter in care of patient: 35 mins. This time was spent on one or more of the following: performing physical exam; counseling and coordination of care; obtaining or reviewing history; documenting in the medical record; reviewing/ordering tests, medications or procedures; communicating with other healthcare professionals and discussing with patient's family/caregivers. Current Length of Stay: 2 day(s)  Current Patient Status: Inpatient   Certification Statement: The patient will continue to require additional inpatient hospital stay due to pain control and diet toleration  Discharge Plan: Anticipate discharge tomorrow to home. Code Status: Level 1 - Full Code    Subjective:   Patient is doing well reports that his abdominal pain is improving but he is still reporting nausea and significant food aversion and poor appetite encouragement provided    Objective:     Vitals:   Temp (24hrs), Av.6 °F (37 °C), Min:97.7 °F (36.5 °C), Max:99.5 °F (37.5 °C)    Temp:  [97.7 °F (36.5 °C)-99.5 °F (37.5 °C)] 97.7 °F (36.5 °C)  HR:  [] 96  Resp:  [17-20] 17  BP: (122-146)/(86-95) 141/95  SpO2:  [94 %-96 %] 94 %  Body mass index is 41.59 kg/m². Input and Output Summary (last 24 hours): Intake/Output Summary (Last 24 hours) at 10/19/2023 1108  Last data filed at 10/19/2023 0900  Gross per 24 hour   Intake 3827.92 ml   Output 840 ml   Net 2987.92 ml       Physical Exam:   Physical Exam  Vitals reviewed. Constitutional:       General: He is not in acute distress. Appearance: He is obese. He is not ill-appearing. Cardiovascular:      Rate and Rhythm: Normal rate. Pulmonary:      Effort: No respiratory distress. Abdominal:      Tenderness: There is abdominal tenderness. Skin:     General: Skin is warm. Neurological:      Mental Status: He is alert.  Mental status is at baseline. Psychiatric:         Mood and Affect: Mood normal.       Additional Data:     Labs:  Results from last 7 days   Lab Units 10/19/23  0644 10/18/23  0548   WBC Thousand/uL 4.35 6.54   HEMOGLOBIN g/dL 9.8* 12.2   HEMATOCRIT % 30.0* 37.7   PLATELETS Thousands/uL 227 285   NEUTROS PCT %  --  73   LYMPHS PCT %  --  13*   MONOS PCT %  --  11   EOS PCT %  --  2     Results from last 7 days   Lab Units 10/19/23  0644   SODIUM mmol/L 138   POTASSIUM mmol/L 3.9   CHLORIDE mmol/L 102   CO2 mmol/L 29   BUN mg/dL 13   CREATININE mg/dL 0.72   ANION GAP mmol/L 7   CALCIUM mg/dL 8.3*   ALBUMIN g/dL 2.8*   TOTAL BILIRUBIN mg/dL 0.39   ALK PHOS U/L 80   ALT U/L 51   AST U/L 28   GLUCOSE RANDOM mg/dL 100     Results from last 7 days   Lab Units 10/16/23  1258   INR  1.46*     Results from last 7 days   Lab Units 10/16/23  0657   POC GLUCOSE mg/dl 102         Results from last 7 days   Lab Units 10/16/23  0739   LACTIC ACID mmol/L 0.9       Lines/Drains:  Invasive Devices       Peripheral Intravenous Line  Duration             Peripheral IV 10/18/23 Dorsal (posterior); Left Hand 1 day              Drain  Duration             Abscess Drain Abdomen 2 days                      Imaging: No pertinent imaging reviewed. Recent Cultures (last 7 days):   Results from last 7 days   Lab Units 10/16/23  1541 10/16/23  0748 10/16/23  0739   BLOOD CULTURE   --  No Growth at 48 hrs. No Growth at 48 hrs.    GRAM STAIN RESULT  1+ Polys  No bacteria seen  --   --    BODY FLUID CULTURE, STERILE  1+ Growth of Streptococcus anginosus*  --   --        Last 24 Hours Medication List:   Current Facility-Administered Medications   Medication Dose Route Frequency Provider Last Rate    acetaminophen  650 mg Oral Q6H PRN KIMBERLY Zhu      cefTRIAXone  1,000 mg Intravenous Q24H KIMBERLY Zhu 1,000 mg (10/19/23 0841)    enoxaparin  40 [facility]12H [facility]      ferrous sulfate  325 mg Oral Every Other Day Dewayne [place], CRNP      finasteride  5 mg Oral Daily Dewayne [place], CRNP      hydrALAZINE  75 mg Oral TID Dewayne [place], CRNP      hydrochlorothiazide  25 mg Oral Daily Dewayne [place], CRNP      lactated ringers  125 mL/hr Intravenous Continuous Dewayne [place], CRNP 125 mL/hr (10/19/23 6965)    lisinopril  40 mg Oral Daily Dewayne [place], CRNP      metoprolol succinate  100 mg Oral Daily Dewayne [place], CRNP      metroNIDAZOLE  500 mg Oral Q8H [address] Dewayne [place], CRNP      morphine injection  2 mg Intravenous Q4H PRN Dewayne [place], CRNP      ondansetron  4 mg Intravenous Q6H PRN Dewayne [place], CRNP      oxyCODONE  5 mg Oral Q4H PRN Dewayne [place], CRNP      tamsulosin  0.4 mg Oral Daily Dewayne [place], CRNP          Today, Patient Was Seen By: KIMBERLY Cruz    **Please Note: This note may have been constructed using a voice recognition system. **

## 2023-10-19 NOTE — PLAN OF CARE
Problem: Potential for Falls  Goal: Patient will remain free of falls  Description: INTERVENTIONS:  - Educate patient/family on patient safety including physical limitations  - Instruct patient to call for assistance with activity   - Consult OT/PT to assist with strengthening/mobility   - Keep Call bell within reach  - Keep bed low and locked with side rails adjusted as appropriate  - Keep care items and personal belongings within reach  - Initiate and maintain comfort rounds  - Make Fall Risk Sign visible to staff  - Apply yellow socks and bracelet for high fall risk patients  - Consider moving patient to room near nurses station  Outcome: Progressing     Problem: MOBILITY - ADULT  Goal: Maintain or return to baseline ADL function  Description: INTERVENTIONS:  -  Assess patient's ability to carry out ADLs; assess patient's baseline for ADL function and identify physical deficits which impact ability to perform ADLs (bathing, care of mouth/teeth, toileting, grooming, dressing, etc.)  - Assess/evaluate cause of self-care deficits   - Assess range of motion  - Assess patient's mobility; develop plan if impaired  - Assess patient's need for assistive devices and provide as appropriate  - Encourage maximum independence but intervene and supervise when necessary  - Involve family in performance of ADLs  - Assess for home care needs following discharge   - Consider OT consult to assist with ADL evaluation and planning for discharge  - Provide patient education as appropriate  Outcome: Progressing  Goal: Maintains/Returns to pre admission functional level  Description: INTERVENTIONS:  - Perform BMAT or MOVE assessment daily.   - Set and communicate daily mobility goal to care team and patient/family/caregiver.    - Collaborate with rehabilitation services on mobility goals if consulted  - Out of bed for toileting  - Record patient progress and toleration of activity level   Outcome: Progressing     Problem: PAIN - ADULT  Goal: Verbalizes/displays adequate comfort level or baseline comfort level  Description: Interventions:  - Encourage patient to monitor pain and request assistance  - Assess pain using appropriate pain scale  - Administer analgesics based on type and severity of pain and evaluate response  - Implement non-pharmacological measures as appropriate and evaluate response  - Consider cultural and social influences on pain and pain management  - Notify physician/advanced practitioner if interventions unsuccessful or patient reports new pain  Outcome: Progressing     Problem: INFECTION - ADULT  Goal: Absence or prevention of progression during hospitalization  Description: INTERVENTIONS:  - Assess and monitor for signs and symptoms of infection  - Monitor lab/diagnostic results  - Monitor all insertion sites, i.e. indwelling lines, tubes, and drains  - Monitor endotracheal if appropriate and nasal secretions for changes in amount and color  - [place] appropriate cooling/warming therapies per order  - Administer medications as ordered  - Instruct and encourage patient and family to use good hand hygiene technique  - Identify and instruct in appropriate isolation precautions for identified infection/condition  Outcome: Progressing  Goal: Absence of fever/infection during neutropenic period  Description: INTERVENTIONS:  - Monitor WBC    Outcome: Progressing     Problem: SAFETY ADULT  Goal: Patient will remain free of falls  Description: INTERVENTIONS:  - Educate patient/family on patient safety including physical limitations  - Instruct patient to call for assistance with activity   - Consult OT/PT to assist with strengthening/mobility   - Keep Call bell within reach  - Keep bed low and locked with side rails adjusted as appropriate  - Keep care items and personal belongings within reach  - Initiate and maintain comfort rounds  - Apply yellow socks and bracelet for high fall risk patients  - Consider moving patient to room near nurses station  Outcome: Progressing  Goal: Maintain or return to baseline ADL function  Description: INTERVENTIONS:  -  Assess patient's ability to carry out ADLs; assess patient's baseline for ADL function and identify physical deficits which impact ability to perform ADLs (bathing, care of mouth/teeth, toileting, grooming, dressing, etc.)  - Assess/evaluate cause of self-care deficits   - Assess range of motion  - Assess patient's mobility; develop plan if impaired  - Assess patient's need for assistive devices and provide as appropriate  - Encourage maximum independence but intervene and supervise when necessary  - Involve family in performance of ADLs  - Assess for home care needs following discharge   - Consider OT consult to assist with ADL evaluation and planning for discharge  - Provide patient education as appropriate  Outcome: Progressing  Goal: Maintains/Returns to pre admission functional level  Description: INTERVENTIONS:  - Perform BMAT or MOVE assessment daily.   - Set and communicate daily mobility goal to care team and patient/family/caregiver.    - Collaborate with rehabilitation services on mobility goals if consulted  - Out of bed for toileting  - Record patient progress and toleration of activity level   Outcome: Progressing     Problem: DISCHARGE PLANNING  Goal: Discharge to home or other facility with appropriate resources  Description: INTERVENTIONS:  - Identify barriers to discharge w/patient and caregiver  - Arrange for needed discharge resources and transportation as appropriate  - Identify discharge learning needs (meds, wound care, etc.)  - Arrange for interpretive services to assist at discharge as needed  - Refer to Case Management Department for coordinating discharge planning if the patient needs post-hospital services based on physician/advanced practitioner order or complex needs related to functional status, cognitive ability, or social support system  Outcome: Progressing     Problem: Knowledge Deficit  Goal: Patient/family/caregiver demonstrates understanding of disease process, treatment plan, medications, and discharge instructions  Description: Complete learning assessment and assess knowledge base.   Interventions:  - Provide teaching at level of understanding  - Provide teaching via preferred learning methods  Outcome: Progressing

## 2023-10-19 NOTE — PLAN OF CARE
Problem: PAIN - ADULT  Goal: Verbalizes/displays adequate comfort level or baseline comfort level  Description: Interventions:  - Encourage patient to monitor pain and request assistance  - Assess pain using appropriate pain scale  - Administer analgesics based on type and severity of pain and evaluate response  - Implement non-pharmacological measures as appropriate and evaluate response  - Consider cultural and social influences on pain and pain management  - Notify physician/advanced practitioner if interventions unsuccessful or patient reports new pain  Outcome: Progressing     Problem: INFECTION - ADULT  Goal: Absence or prevention of progression during hospitalization  Description: INTERVENTIONS:  - Assess and monitor for signs and symptoms of infection  - Monitor lab/diagnostic results  - Monitor all insertion sites, i.e. indwelling lines, tubes, and drains  - Monitor endotracheal if appropriate and nasal secretions for changes in amount and color  - [place] appropriate cooling/warming therapies per order  - Administer medications as ordered  - Instruct and encourage patient and family to use good hand hygiene technique  - Identify and instruct in appropriate isolation precautions for identified infection/condition  Outcome: Progressing     Problem: Knowledge Deficit  Goal: Patient/family/caregiver demonstrates understanding of disease process, treatment plan, medications, and discharge instructions  Description: Complete learning assessment and assess knowledge base.   Interventions:  - Provide teaching at level of understanding  - Provide teaching via preferred learning methods  Outcome: Progressing

## 2023-10-20 ENCOUNTER — TRANSITIONAL CARE MANAGEMENT (OUTPATIENT)
Dept: FAMILY MEDICINE CLINIC | Facility: CLINIC | Age: 73
End: 2023-10-20

## 2023-10-20 VITALS
DIASTOLIC BLOOD PRESSURE: 93 MMHG | BODY MASS INDEX: 41.75 KG/M2 | WEIGHT: 315 LBS | SYSTOLIC BLOOD PRESSURE: 154 MMHG | HEIGHT: 73 IN | TEMPERATURE: 98.3 F | RESPIRATION RATE: 17 BRPM | OXYGEN SATURATION: 94 % | HEART RATE: 79 BPM

## 2023-10-20 PROCEDURE — 97161 PT EVAL LOW COMPLEX 20 MIN: CPT

## 2023-10-20 PROCEDURE — 99239 HOSP IP/OBS DSCHRG MGMT >30: CPT | Performed by: NURSE PRACTITIONER

## 2023-10-20 RX ORDER — ONDANSETRON 4 MG/1
4 TABLET, FILM COATED ORAL EVERY 8 HOURS PRN
Qty: 20 TABLET | Refills: 0 | Status: SHIPPED | OUTPATIENT
Start: 2023-10-20 | End: 2023-10-26 | Stop reason: ALTCHOICE

## 2023-10-20 RX ORDER — OXYCODONE HYDROCHLORIDE 5 MG/1
5 TABLET ORAL EVERY 4 HOURS PRN
Qty: 8 TABLET | Refills: 0 | Status: SHIPPED | OUTPATIENT
Start: 2023-10-20 | End: 2023-10-26 | Stop reason: ALTCHOICE

## 2023-10-20 RX ORDER — AMOXICILLIN AND CLAVULANATE POTASSIUM 875; 125 MG/1; MG/1
1 TABLET, FILM COATED ORAL EVERY 12 HOURS SCHEDULED
Qty: 12 TABLET | Refills: 0 | Status: SHIPPED | OUTPATIENT
Start: 2023-10-20 | End: 2023-10-26

## 2023-10-20 RX ADMIN — FERROUS SULFATE TAB 325 MG (65 MG ELEMENTAL FE) 325 MG: 325 (65 FE) TAB at 08:37

## 2023-10-20 RX ADMIN — METOPROLOL SUCCINATE 100 MG: 100 TABLET, EXTENDED RELEASE ORAL at 08:37

## 2023-10-20 RX ADMIN — HYDROCHLOROTHIAZIDE 25 MG: 25 TABLET ORAL at 08:37

## 2023-10-20 RX ADMIN — HYDRALAZINE HYDROCHLORIDE 75 MG: 25 TABLET ORAL at 08:37

## 2023-10-20 RX ADMIN — TAMSULOSIN HYDROCHLORIDE 0.4 MG: 0.4 CAPSULE ORAL at 08:37

## 2023-10-20 RX ADMIN — SODIUM CHLORIDE, SODIUM LACTATE, POTASSIUM CHLORIDE, AND CALCIUM CHLORIDE 125 ML/HR: .6; .31; .03; .02 INJECTION, SOLUTION INTRAVENOUS at 00:55

## 2023-10-20 RX ADMIN — FINASTERIDE 5 MG: 5 TABLET, FILM COATED ORAL at 08:37

## 2023-10-20 RX ADMIN — METRONIDAZOLE 500 MG: 500 TABLET ORAL at 05:36

## 2023-10-20 RX ADMIN — CEFTRIAXONE 1000 MG: 1 INJECTION, SOLUTION INTRAVENOUS at 08:36

## 2023-10-20 RX ADMIN — LISINOPRIL 40 MG: 20 TABLET ORAL at 08:36

## 2023-10-20 NOTE — CASE MANAGEMENT
Case Management Discharge Planning Note    Patient name Bobbi Umanzor  Location /-01 MRN [id number]  : 1950 Date 10/20/2023       Current Admission Date: 10/16/2023  Current Admission Diagnosis:Liver abscess   Patient Active Problem List    Diagnosis Date Noted    Liver abscess 10/16/2023    Nonrheumatic aortic valve stenosis 2023    Benign prostatic hyperplasia with urinary frequency 2023    Thoracic aortic aneurysm without rupture ([address]) 2021    Gout 2018    Moderate concentric left ventricular hypertrophy 10/12/2018    Hypertension 2018    Class 3 severe obesity due to excess calories without serious comorbidity with body mass index (BMI) of 40.0 to 44.9 in adult [facility]) 2018    Obstructive sleep apnea 2018    Hyperlipidemia 10/27/2016      LOS (days): 3  Geometric Mean LOS (GMLOS) (days): 3.20  Days to GMLOS:0.2     OBJECTIVE:  Risk of Unplanned Readmission Score: 8.58         Current admission status: Inpatient   Preferred Pharmacy:   [facility] [place] [address]  Phone: [phone] Fax: [phone]    Primary Care Provider: Vergia Claude, MD    Primary Insurance: MEDICARE  Secondary Insurance: [facility]    DISCHARGE DETAILS:

## 2023-10-20 NOTE — DISCHARGE SUMMARY
[address]  Discharge- Ana Melton 1950, 72 y.o. male MRN: [id number]  Unit/Bed#: -Rachel Encounter: [id number]  Primary Care Provider: Maxine Centeno MD   Date and time admitted to hospital: 10/16/2023  6:52 AM    * Liver abscess  Assessment & Plan  Patient with generalized complaints of flu like symptoms  CT imaging with evidence of liver abscess does not meet sirs criteria   Drain placed 10/16, body fluid culture showed Streptococcus anginosus, Gram stain no growth, awaiting parasite smear, Lyme antibody is positive however IgG and IgM negative, anaplasmosis negative  Will await drain placement and start cefazolin and flagyl  Tylenol for fever  Blood culture showed no growth after 48 hours  Augmentin 10 days post drainage at time of DC    Hyperlipidemia  Assessment & Plan  LFT elevated, AST 57, ALT 81  Will hold statin for now  Continue to trend for improvement with intervention     Class 3 severe obesity due to excess calories without serious comorbidity with body mass index (BMI) of 40.0 to 44.9 in adult [facility])  Assessment & Plan  Life style modifications discussed      Discharging Physician / Practitioner: Linnea Halsted, [address] [place] [address]  PCP: Maxine Centeno MD  Admission Date:   Admission Orders (From admission, onward)       Ordered        10/17/23 1004  Inpatient Admission  Once            10/16/23 1228  Place in Observation  Once                          Discharge Date: 10/20/23    Medical Problems       Resolved Problems  Date Reviewed: 10/16/2023   None         Consultations During Hospital Stay:  INPATIENT CONSULT TO IR    Procedures Performed:   IR drainage tube placement    Result Date: 10/17/2023  Narrative: CT scan guided abscess drainage This examination, like all CT scans performed in the [facility], was performed utilizing techniques to minimize radiation dose exposure, including the use of iterative reconstruction and automated exposure control.  History: 77-year-old male with hepatic abscess Images: Multiple Sedation: Moderate conscious sedation was utilized under my direct supervision administered by trained independent provider. A total of 15 minutes sedation time was utilized. I was present at the initial dose of sedation medication. Technique: The patient was brought to the CT scanner and placed supine on the table. After axial images were obtained through the abdomen, an area of the skin was then marked, prepped, and draped in usual sterile fashion. All elements of maximal sterile barrier technique were followed (cap, mask, sterile gown, sterile gloves, large sterile sheet, hand hygiene, and 2% chlorhexidine for cutaneous antisepsis). Lidocaine was administered to the skin and a small skin incision was made. A 18-gauge needle was advanced into the collection under CT-scan guidance. A Brunson wire was coiled within the lesion, and after tract dilatation, a 10 [place] all-purpose drainage catheter was advanced and the loop formed within the collection. 10 cc of purulent/bloody fluid was aspirated. The catheter was sutured in place, sterilely dressed, and connected to bulb suction. A specimen was sent to the laboratory for further analysis. The patient tolerated the procedure well and suffered no complications. Impression: Impression: Successful placement of a 10 [place] all-purpose drainage catheter into the hepatic fluid collection. 10 cc of purulent/bloody fluid was aspirated and a specimen sent to the laboratory as described. Workstation performed: [id number]     CTA dissection protocol chest abdomen pelvis w wo contrast    Result Date: 10/16/2023  Narrative: CTA - CHEST, ABDOMEN AND PELVIS - WITHOUT AND WITH IV CONTRAST INDICATION:   fevers for a month, low BP, dizziness, h/o AAA. COMPARISON: CTA chest 9/20/2023.  TECHNIQUE: CT examination of the chest, abdomen and pelvis was performed both prior to and after the administration of intravenous contrast.  The noncontrast portion of this examination was performed utilizing low radiation dose technique. Thin section  angiographic arterial phase post contrast technique was used in order to evaluate for aortic dissection. 3D reformatted images and volume rendering were performed on an independent workstation. Additionally, axial, sagittal, and coronal 2D reformatted  images were created from the source data and submitted for interpretation. Radiation dose length product (DLP) for this visit:  4059 mGy-cm . This examination, like all CT scans performed in the [facility], was performed utilizing techniques to minimize radiation dose exposure, including the use of iterative reconstruction and automated exposure control. IV Contrast:  100 mL of iohexol (OMNIPAQUE) Enteric Contrast:  Enteric contrast was not administered. FINDINGS: AORTA:  There is no aortic dissection or intramural hematoma. Stable 4.2 cm ascending aortic ectasia allowing for measurement differences. CHEST LUNGS: Mild dependent atelectasis. There is no tracheal or endobronchial lesion. PLEURA: No pneumothorax or pleural effusion. HEART/PULMONARY ARTERIAL TREE: Coronary artery calcifications. MEDIASTINUM AND ASHLEE:  Unremarkable. CHEST WALL AND LOWER NECK:   Unremarkable. ABDOMEN LIVER/BILIARY TREE: There is a large thick walled loculated fluid containing structure in in segment 8 of the liver measuring approximately 9 x 8.9 x 8.3 cm with additional 2.2 cm hypoenhancing focus immediately posterior to it (series 2 image 90). Stable 1.6 cm right hepatic cyst. No biliary ductal dilation GALLBLADDER:  No calcified gallstones. No pericholecystic inflammatory change. SPLEEN:  Unremarkable. PANCREAS:  Unremarkable. ADRENAL GLANDS:  Unremarkable. KIDNEYS/URETERS: Punctate nonobstructing right lower pole renal calculus. Dual left upper pole simple cysts. No hydronephrosis. STOMACH AND BOWEL: Normal course and caliber of the stomach and duodenum.  No dilated loops of bowel. Evidence of previous colonic partial resection with a sigmoid anastomosis in the pelvis. Colonic diverticulosis without evidence of acute diverticulitis. . APPENDIX:  No findings to suggest appendicitis. ABDOMINOPELVIC CAVITY:  No ascites or free intraperitoneal air. Mildly enlarged portacaval lymph nodes. PELVIS REPRODUCTIVE ORGANS: Obscured by artifact in the pelvis from the hip arthroplasties. URINARY BLADDER: Not well-visualized due to artifact in the pelvis. ABDOMINAL WALL/INGUINAL REGIONS:  Unremarkable. OSSEOUS STRUCTURES:  No acute fracture or destructive osseous lesion. Status post bilateral total hip arthroplasty. Impression: 1. Large thick walled fluid containing lesion in the right hepatic lobe with adjacent satellite nodule. Given clinical history as well as relatively rapid development since 9/20/2023, this is favored to reflect an abscess although necrotic tumor can have a similar appearance. Recommend confirmatory testing with either fluid/tissue sampling or MRI and follow-up to resolution. 2.  Portacaval lymphadenopathy which is likely related to the above. 3.  No acute aortic pathology. 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 I personally discussed this study with Lisa Miller on 10/16/2023 11:52 AM. Workstation performed: [id number]     XR chest 1 view portable    Result Date: 10/16/2023  Narrative: CHEST INDICATION:   fever. COMPARISON: Chest CT 9/20/2023, CXR 2/27/2018. EXAM PERFORMED/VIEWS:  XR CHEST PORTABLE. FINDINGS: Cardiomediastinal silhouette normal. Lungs clear. No effusion or pneumothorax. Upper abdomen normal. Bones normal for age. Impression: No acute cardiopulmonary disease.  Workstation performed: [id number]         Significant Findings / Test Results:   above    Incidental Findings:   no     Test Results Pending at Discharge (will require follow up):   no     Outpatient Tests Requested:  no    Complications:  no    Past Medical History:   Diagnosis Date    Anemia     Aortic aneurysm (HCC)     Elevated troponin     History of colonoscopy     6/5/13    History of esophagogastroduodenoscopy     6/5/13    Hypertension     Hypokalemia     Morbid obesity ([address])     Renal calculi     STEMI (ST elevation myocardial infarction) ([address])        Reason for Admission: Dizziness (Pt arrived with multiple complaints, has not been feeling well for two weeks. Pt has flu like symptoms but also c/o dizziness, confusion and intermittent chest pain. Pt has a stable AAA)       Hospital Course:     Aristeo Falcon is a 72 y.o. male patient with past medical history of above who originally presented to the hospital on 10/16/2023 due to Dizziness (Pt arrived with multiple complaints, has not been feeling well for two weeks. Pt has flu like symptoms but also c/o dizziness, confusion and intermittent chest pain. Pt has a stable AAA) patient came in with abdominal pain imaging was completed he was found to have a liver abscess IR was consulted a drain was placed patient was started on antibiotics his fevers resolved his cultures were noted he is discharged on 10 days of antibiotics    Please see above list of diagnoses and related plan for additional information. Condition at Discharge: stable     Discharge Day Visit / Exam:     Subjective: Patient is doing well out of bed in the chair answered all questions and concerns wife is at bedside reports an improvement of appetite  Vitals: Blood Pressure: 154/93 (10/20/23 0730)  Pulse: 79 (10/20/23 0730)  Temperature: 98.3 °F (36.8 °C) (10/20/23 0730)  Temp Source: Oral (10/19/23 2300)  Respirations: 17 (10/18/23 2148)  Height: 6' 1" (185.4 cm) (10/16/23 1300)  Weight - Scale: (!) 143 kg (315 lb 4.1 oz) (10/16/23 1300)  SpO2: 95 % (10/20/23 0730)  Exam:   Physical Exam  Vitals and nursing note reviewed. Constitutional:       General: He is not in acute distress. Appearance: He is obese. He is ill-appearing. Cardiovascular:      Rate and Rhythm: Normal rate. Pulmonary:      Effort: Pulmonary effort is normal.   Abdominal:      General: Abdomen is protuberant. Palpations: Abdomen is soft. Musculoskeletal:         General: Normal range of motion. Skin:     General: Skin is warm. Neurological:      Mental Status: He is alert. Mental status is at baseline. Psychiatric:         Mood and Affect: Mood normal.         Discussion with Family: wife at bedside    Discharge instructions/Information to patient and family:   See after visit summary for information provided to patient and family. Provisions for Follow-Up Care:  See after visit summary for information related to follow-up care and any pertinent home health orders. Disposition:     Home with VNA Services (Reminder: Complete face to face encounter)    Planned Readmission: no     Discharge Statement:  I spent 45 minutes discharging the patient. This time was spent on the day of discharge. I had direct contact with the patient on the day of discharge. Greater than 50% of the total time was spent examining patient, answering all patient questions, arranging and discussing plan of care with patient as well as directly providing post-discharge instructions. Additional time then spent on discharge activities. Discharge Medications:  See after visit summary for reconciled discharge medications provided to patient and family.       ** Please Note: This note has been constructed using a voice recognition system **

## 2023-10-20 NOTE — PHYSICAL THERAPY NOTE
PHYSICAL THERAPY EVALUATION  NAME:  Maurisio Osei  DATE: 10/20/23    AGE:   72 y.o.   Mrn:   [id number]  ADMIT DX:  Liver abscess [K75.0]  Hypotension [I95.9]  Confused [R41.0]  Fever [R50.9]  Problem List:   Patient Active Problem List   Diagnosis    Hypertension    Class 3 severe obesity due to excess calories without serious comorbidity with body mass index (BMI) of 40.0 to 44.9 in adult [facility])    Obstructive sleep apnea    Hyperlipidemia    Moderate concentric left ventricular hypertrophy    Gout    Thoracic aortic aneurysm without rupture ([address])    Nonrheumatic aortic valve stenosis    Benign prostatic hyperplasia with urinary frequency    Liver abscess       Past Medical History  Past Medical History:   Diagnosis Date    Anemia     Aortic aneurysm (HCC)     Elevated troponin     History of colonoscopy     6/5/13    History of esophagogastroduodenoscopy     6/5/13    Hypertension     Hypokalemia     Morbid obesity ([address])     Renal calculi     STEMI (ST elevation myocardial infarction) [facility])        Past Surgical History  Past Surgical History:   Procedure Laterality Date    APPENDECTOMY      ARTHROSCOPY KNEE Left     COLON SURGERY      COLOSTOMY      IR DRAINAGE TUBE PLACEMENT  10/16/2023    ROTATOR CUFF REPAIR Left     SATURATION BIOPSY OF PROSTATE      TOTAL HIP ARTHROPLASTY      Last Assessed:5/8/15       Length Of Stay: 3  Performed at least 2 patient identifiers during session: Name and Birthday     10/20/23 0938   PT Last Visit   PT Visit Date 10/20/23   Note Type   Note type Evaluation   Pain Assessment   Pain Score No Pain   Restrictions/Precautions   Weight Bearing Precautions Per Order No   Other Precautions Multiple lines  (drain)   Home Living   Type of [address] [place] [address] Dr Two level;Bed/bath upstairs  (full bath on 1st FL; bilevel; 1 RENATO; 7 steps between floors)   Bathroom Shower/Tub Walk-in shower   Bathroom Toilet Raised   Bathroom Equipment Grab bars in [address] Walker;Cane  (no AD used at baseline)   Prior Function   Level of [place] Independent with ADLs; Independent with functional mobility; Independent with IADLS   Lives With Spouse   IADLs Independent with driving; Independent with meal prep; Independent with medication management   Falls in the last 6 months 0   Vocational Retired   General   Family/Caregiver Present Yes   Cognition   Overall Cognitive Status WFL   Arousal/Participation Alert   Orientation Level Oriented X4   Memory Within functional limits   Following Commands Follows all commands and directions without difficulty   RLE Assessment   RLE Assessment WNL   LLE Assessment   LLE Assessment WNL   Vision-Basic Assessment   Current Vision Wears glasses only for reading   Coordination   Sensation WFL   Transfers   Sit to Stand 6  Modified independent   Additional items Armrests   Stand to Sit 6  Modified independent   Additional items Armrests   Additional Comments pt denied dizziness with transitional movement   Ambulation/Elevation   Gait pattern WNL  (pt reporting walking slower comapred to baseline)   Gait Assistance 6  Modified independent   Assistive Device   (pt pushed IV pole)   Distance 120 ft   Ambulation/Elevation Additional Comments no LOB or deficits noted   Balance   Static Sitting Normal   Dynamic Sitting Normal   Static Standing Normal   Dynamic Standing Good   Ambulatory Good   Endurance Deficit   Endurance Deficit No   Activity Tolerance   Activity Tolerance Patient tolerated treatment well   Assessment   Prognosis Excellent   Assessment Pt is 72 y.o. male seen for PT evaluation s/p [id number] w/ Liver abscess. PT consulted to assess pt's functional mobility and d/c needs. Order placed for PT eval and tx, w/ up and OOB as tolerated order. Pt agreeable to PT  session upon arrival, pt found seated OOB in chair. The following objective measures performed on IE also reveal limitations: AM-PAC 6 Clicks 99/72.   Patient was independent w/ all functional mobility w/ no AD. Pt presents at [facility] with transfers and ambulation. From PT/mobility standpoint, recommendation at time of d/c would be anticipate no needs. Upon conclusion pt seated in chair. D/c PT services at this time. Complexity: Comorbidities affecting pt's physical performance at time of assessment include: htn and obesity. Personal factors affecting pt at time of IE include: lives in [facility] and steps to enter home. Please find objective findings from PT assessment regarding body systems outlined above with impairments and limitations including  none . Pt's clinical presentation is currently evolving seen in pt's presentation of recent surgery and polypharmacy. The patient's AM-PAC Basic Mobility Inpatient Short Form Raw Score is 24. A Raw score of  greater than 16 suggests the patient may benefit from discharge to home. Please also refer to the recommendation of the Physical Therapist for safe discharge planning. RN verbalized pt appropriate for PT session.    Barriers to Discharge None  (discussed performing step to gait pattern with steps and using HR)   Goals   Patient Goals to go home today   Discharge Recommendation   PT Discharge Recommendation No rehabilitation needs   Equipment Recommended   (none)   AM-PAC Basic Mobility Inpatient   Turning in Flat Bed Without Bedrails 4   Lying on Back to Sitting on Edge of Flat Bed Without Bedrails 4   Moving Bed to Chair 4   Standing Up From Chair Using Arms 4   Walk in Room 4   Climb 3-5 Stairs With Railing 4   Basic Mobility Inpatient Raw Score 24   Basic Mobility Standardized Score 57.68   Highest Level Of Mobility   JH-HLM Goal 8: Walk 250 feet or more   JH-HLM Achieved 7: Walk 25 feet or more           Time In: 0935  Time Out: 0949  Total Evaluation Minutes: [facility],

## 2023-10-20 NOTE — ASSESSMENT & PLAN NOTE
Patient with generalized complaints of flu like symptoms  CT imaging with evidence of liver abscess does not meet sirs criteria   Drain placed 10/16, body fluid culture showed Streptococcus anginosus, Gram stain no growth, awaiting parasite smear, Lyme antibody is positive however IgG and IgM negative, anaplasmosis negative  Will await drain placement and start cefazolin and flagyl  Tylenol for fever  Blood culture showed no growth after 48 hours  Augmentin 10 days post drainage at time of DC

## 2023-10-20 NOTE — RESPIRATORY THERAPY NOTE
10/19/23 3306   Respiratory Assessment   Assessment Type Assess only   General Appearance Sleeping   Respiratory Pattern Normal   Chest Assessment Chest expansion symmetrical   Bilateral Breath Sounds Clear   Resp Comments pt wearing home cpap unit

## 2023-10-20 NOTE — PLAN OF CARE
Problem: Potential for Falls  Goal: Patient will remain free of falls  Description: INTERVENTIONS:  - Educate patient/family on patient safety including physical limitations  - Instruct patient to call for assistance with activity   - Consult OT/PT to assist with strengthening/mobility   - Keep Call bell within reach  - Keep bed low and locked with side rails adjusted as appropriate  - Keep care items and personal belongings within reach  - Initiate and maintain comfort rounds  - Make Fall Risk Sign visible to staff  - Apply yellow socks and bracelet for high fall risk patients  - Consider moving patient to room near nurses station  10/19/2023 2239 by Arthur Lott RN  Outcome: Progressing  10/19/2023 1820 by Arthur Lott RN  Outcome: Progressing     Problem: MOBILITY - ADULT  Goal: Maintain or return to baseline ADL function  Description: INTERVENTIONS:  -  Assess patient's ability to carry out ADLs; assess patient's baseline for ADL function and identify physical deficits which impact ability to perform ADLs (bathing, care of mouth/teeth, toileting, grooming, dressing, etc.)  - Assess/evaluate cause of self-care deficits   - Assess range of motion  - Assess patient's mobility; develop plan if impaired  - Assess patient's need for assistive devices and provide as appropriate  - Encourage maximum independence but intervene and supervise when necessary  - Involve family in performance of ADLs  - Assess for home care needs following discharge   - Consider OT consult to assist with ADL evaluation and planning for discharge  - Provide patient education as appropriate  10/19/2023 2239 by Arthur Lott RN  Outcome: Progressing  10/19/2023 1820 by Arthur Lott RN  Outcome: Progressing  Goal: Maintains/Returns to pre admission functional level  Description: INTERVENTIONS:  - Perform BMAT or MOVE assessment daily.   - Out of bed for toileting  - Record patient progress and toleration of activity level   10/19/2023 2239 by Juliana Scott RN  Outcome: Progressing  10/19/2023 1820 by Juliana Scott RN  Outcome: Progressing     Problem: PAIN - ADULT  Goal: Verbalizes/displays adequate comfort level or baseline comfort level  Description: Interventions:  - Encourage patient to monitor pain and request assistance  - Assess pain using appropriate pain scale  - Administer analgesics based on type and severity of pain and evaluate response  - Implement non-pharmacological measures as appropriate and evaluate response  - Consider cultural and social influences on pain and pain management  - Notify physician/advanced practitioner if interventions unsuccessful or patient reports new pain  10/19/2023 2239 by Juliana Scott RN  Outcome: Progressing  10/19/2023 1820 by Juliana Scott RN  Outcome: Progressing     Problem: INFECTION - ADULT  Goal: Absence or prevention of progression during hospitalization  Description: INTERVENTIONS:  - Assess and monitor for signs and symptoms of infection  - Monitor lab/diagnostic results  - Monitor all insertion sites, i.e. indwelling lines, tubes, and drains  - Monitor endotracheal if appropriate and nasal secretions for changes in amount and color  - [place] appropriate cooling/warming therapies per order  - Administer medications as ordered  - Instruct and encourage patient and family to use good hand hygiene technique  - Identify and instruct in appropriate isolation precautions for identified infection/condition  10/19/2023 2239 by Juliana Scott RN  Outcome: Progressing  10/19/2023 1820 by Juliana Scott RN  Outcome: Progressing  Goal: Absence of fever/infection during neutropenic period  Description: INTERVENTIONS:  - Monitor WBC    10/19/2023 2239 by Juliana Scott RN  Outcome: Progressing  10/19/2023 1820 by Juliana Scott RN  Outcome: Progressing     Problem: SAFETY ADULT  Goal: Patient will remain free of falls  Description: INTERVENTIONS:  - Educate patient/family on patient safety including physical limitations  - Instruct patient to call for assistance with activity   - Consult OT/PT to assist with strengthening/mobility   - Keep Call bell within reach  - Keep bed low and locked with side rails adjusted as appropriate  - Keep care items and personal belongings within reach  - Initiate and maintain comfort rounds  - Make Fall Risk Sign visible to staff  - Apply yellow socks and bracelet for high fall risk patients  - Consider moving patient to room near nurses station  10/19/2023 2239 by Chriss Khan RN  Outcome: Progressing  10/19/2023 1820 by Chriss Khan RN  Outcome: Progressing  Goal: Maintain or return to baseline ADL function  Description: INTERVENTIONS:  -  Assess patient's ability to carry out ADLs; assess patient's baseline for ADL function and identify physical deficits which impact ability to perform ADLs (bathing, care of mouth/teeth, toileting, grooming, dressing, etc.)  - Assess/evaluate cause of self-care deficits   - Assess range of motion  - Assess patient's mobility; develop plan if impaired  - Assess patient's need for assistive devices and provide as appropriate  - Encourage maximum independence but intervene and supervise when necessary  - Involve family in performance of ADLs  - Assess for home care needs following discharge   - Consider OT consult to assist with ADL evaluation and planning for discharge  - Provide patient education as appropriate  10/19/2023 2239 by Chriss Khan RN  Outcome: Progressing  10/19/2023 1820 by Chriss Khan RN  Outcome: Progressing  Goal: Maintains/Returns to pre admission functional level  Description: INTERVENTIONS:  - Perform BMAT or MOVE assessment daily.   - Set and communicate daily mobility goal to care team and patient/family/caregiver.    - Collaborate with rehabilitation services on mobility goals if consulted  - Out of bed for toileting  - Record patient progress and toleration of activity level   10/19/2023 2239 by Chriss Khan RN  Outcome: Progressing  10/19/2023 1820 by Chriss Khan RN  Outcome: Progressing     Problem: DISCHARGE PLANNING  Goal: Discharge to home or other facility with appropriate resources  Description: INTERVENTIONS:  - Identify barriers to discharge w/patient and caregiver  - Arrange for needed discharge resources and transportation as appropriate  - Identify discharge learning needs (meds, wound care, etc.)  - Arrange for interpretive services to assist at discharge as needed  - Refer to Case Management Department for coordinating discharge planning if the patient needs post-hospital services based on physician/advanced practitioner order or complex needs related to functional status, cognitive ability, or social support system  10/19/2023 2239 by Chriss Khan RN  Outcome: Progressing  10/19/2023 1820 by Chriss Khan RN  Outcome: Progressing     Problem: Knowledge Deficit  Goal: Patient/family/caregiver demonstrates understanding of disease process, treatment plan, medications, and discharge instructions  Description: Complete learning assessment and assess knowledge base.   Interventions:  - Provide teaching at level of understanding  - Provide teaching via preferred learning methods  10/19/2023 2239 by Chriss Khan RN  Outcome: Progressing  10/19/2023 1820 by Chriss Khan RN  Outcome: Progressing

## 2023-10-21 LAB
A PHAGOCYTOPH DNA BLD QL NAA+PROBE: NEGATIVE
BACTERIA BLD CULT: NORMAL
BACTERIA BLD CULT: NORMAL

## 2023-10-23 ENCOUNTER — TELEPHONE (OUTPATIENT)
Dept: FAMILY MEDICINE CLINIC | Facility: CLINIC | Age: 73
End: 2023-10-23

## 2023-10-23 NOTE — TELEPHONE ENCOUNTER
Patient was in the hospital for a liver abscess he was d/c Friday started Augmentin on Saturday, he was very dizzy for 6 hours, almost fell down when standing up after taking Augmentin, Patient is worried because he still has an infection.

## 2023-10-23 NOTE — TELEPHONE ENCOUNTER
Patient called back he took the abx again and doesn't feel dizzy.   He said to disregard his previous message and he will see you in follow up in 11/1/23

## 2023-10-26 ENCOUNTER — OFFICE VISIT (OUTPATIENT)
Dept: CARDIOLOGY CLINIC | Facility: CLINIC | Age: 73
End: 2023-10-26
Payer: MEDICARE

## 2023-10-26 VITALS
SYSTOLIC BLOOD PRESSURE: 92 MMHG | HEIGHT: 73 IN | BODY MASS INDEX: 38.43 KG/M2 | DIASTOLIC BLOOD PRESSURE: 60 MMHG | RESPIRATION RATE: 16 BRPM | OXYGEN SATURATION: 97 % | WEIGHT: 290 LBS | HEART RATE: 80 BPM

## 2023-10-26 DIAGNOSIS — E78.00 PURE HYPERCHOLESTEROLEMIA: ICD-10-CM

## 2023-10-26 DIAGNOSIS — I77.810 DILATED AORTIC ROOT (HCC): ICD-10-CM

## 2023-10-26 DIAGNOSIS — I10 PRIMARY HYPERTENSION: Primary | ICD-10-CM

## 2023-10-26 PROCEDURE — 99214 OFFICE O/P EST MOD 30 MIN: CPT | Performed by: PHYSICIAN ASSISTANT

## 2023-10-26 NOTE — PROGRESS NOTES
[facility] [place]  [address] [place] [address]  Cardiology Follow Up    Omkar Yanes  1950  [id number]    1. Primary hypertension        2. Pure hypercholesterolemia        3. Dilated aortic root (HCC)            Discussion/Summary:  Hypertension, on the lower side today 92/60 with repeat reading at 92/60. Patient currently on lisinopril 20, hydralazine 75 3 times daily, Toprol 100, given patient's hypotension advised to decrease lisinopril to 10 daily. Monitor symptoms, call if blood pressure seems to be going back up. 2.  Hyperlipidemia, continue diet control    3. Dilated aortic root, CT scan done during hospitalization 4.2 cm. Continue on medications with the change of lisinopril down to 10 mg daily given hypotension in the office today. Continue to monitor blood pressure at home. Patient also notes significant weight loss (25lbs) visit. This may also be contributing to patient's hypotension, multiple antihypertensives. Closely with PCP. Follow with IR. Continue all medications. Previous studies reviewed with patient, medications reviewed and possible side effects discussed. Continue risk factor modification. Optimize weight, regular exercise and follow up with appropriate specialists and primary care physician as discussed. All questions answered. Patient advised to report any problems prompting to medical attention. Return for follow up visit in 6 month or earlier if needed    Chief Complaint   Patient presents with    Follow-up       Interval History: Patient presents the office today for routine follow-up visit. Patient was actually hospitalized recently October 16 to October 20. He states he had gone to the hospital with flulike symptoms, intermittent fever/chills, confusion, chest pain. Imaging revealed abscess, patient had drain placed and started on antibiotics. Remains with the drain currently.   Patient states he is feeling much better but is still feeling weak and just starting to work on getting his strength back. Denies any chest pain, chest pressure, chest heaviness. Denies any shortness of breath potation's. He does note occasional dizziness. Patient blood pressure is noted to be low today and states this is lower than normal. Pt Is taking all medications as prescribed.     Medical history: Hypertension, hyperlipidemia, dilated aortic root    Patient Active Problem List   Diagnosis    Hypertension    Class 3 severe obesity due to excess calories without serious comorbidity with body mass index (BMI) of 40.0 to 44.9 in adult [facility])    Obstructive sleep apnea    Hyperlipidemia    Moderate concentric left ventricular hypertrophy    Gout    Thoracic aortic aneurysm without rupture ([address])    Nonrheumatic aortic valve stenosis    Benign prostatic hyperplasia with urinary frequency    Liver abscess     Past Medical History:   Diagnosis Date    Anemia     Aortic aneurysm (HCC)     Elevated troponin     History of colonoscopy     6/5/13    History of esophagogastroduodenoscopy     6/5/13    Hypertension     Hypokalemia     Morbid obesity ([address])     Renal calculi     STEMI (ST elevation myocardial infarction) ([address])      Social History     Socioeconomic History    Marital status: /Civil Union     Spouse name: Not on file    Number of children: Not on file    Years of education: Not on file    Highest education level: Not on file   Occupational History    Not on file   Tobacco Use    Smoking status: Never    Smokeless tobacco: Never   Vaping Use    Vaping Use: Never used   Substance and Sexual Activity    Alcohol use: Not Currently     Comment: social    Drug use: Never    Sexual activity: Not Currently     Partners: Female   Other Topics Concern    Not on file   Social History Narrative    Not on file     Social Determinants of Health     Financial Resource Strain: Low Risk  (8/8/2023)    Overall Financial Resource Strain (CARDIA)     Difficulty of Paying Living Expenses: Not hard at all   Food Insecurity: No Food Insecurity (10/17/2023)    Hunger Vital Sign     Worried About Running Out of Food in the Last Year: Never true     Ran Out of Food in the Last Year: Never true   Transportation Needs: No Transportation Needs (10/17/2023)    PRAPARE - Transportation     Lack of Transportation (Medical): No     Lack of Transportation (Non-Medical):  No   Physical Activity: Not on file   Stress: Not on file   Social Connections: Not on file   Intimate Partner Violence: Not on file   Housing Stability: Low Risk  (10/17/2023)    Housing Stability Vital Sign     Unable to Pay for Housing in the Last Year: No     Number of Places Lived in the Last Year: 1     Unstable Housing in the Last Year: No      Family History   Problem Relation Age of Onset    COPD Mother     Breast cancer Mother         malignant neoplasm    Other Father         thoracic aortic aneurysm     Past Surgical History:   Procedure Laterality Date    APPENDECTOMY      ARTHROSCOPY KNEE Left     COLON SURGERY      COLOSTOMY      IR DRAINAGE TUBE PLACEMENT  10/16/2023    ROTATOR CUFF REPAIR Left     SATURATION BIOPSY OF PROSTATE      TOTAL HIP ARTHROPLASTY      Last Assessed:5/8/15       Current Outpatient Medications:     amoxicillin-clavulanate (AUGMENTIN) 875-125 mg per tablet, Take 1 tablet by mouth every 12 (twelve) hours for 6 days, Disp: 12 tablet, Rfl: 0    colchicine (COLCRYS) 0.6 mg tablet, take 1 tablet by mouth every 3 hours if needed for gout, Disp: 30 tablet, Rfl: 0    enalapril (VASOTEC) 20 mg tablet, take 1 tablet by mouth once daily, Disp: 90 tablet, Rfl: 5    ferrous sulfate 325 (65 Fe) mg tablet, Take 325 mg by mouth every other day, Disp: , Rfl:     finasteride (PROSCAR) 5 mg tablet, Take 5 mg by mouth daily, Disp: , Rfl:     hydrALAZINE (APRESOLINE) 50 mg tablet, Take 1.5 tablets (75 mg total) by mouth 3 (three) times a day, Disp: 405 tablet, Rfl: 3    hydrochlorothiazide (HYDRODIURIL) 25 mg tablet, take 1 tablet by mouth once daily, Disp: 90 tablet, Rfl: 3    ibuprofen (MOTRIN) 400 mg tablet, Take 200 mg by mouth every 6 (six) hours as needed for mild pain (PRN.), Disp: , Rfl:     metoprolol succinate (TOPROL-XL) 100 mg 24 hr tablet, Take 1 tablet (100 mg total) by mouth daily, Disp: 90 tablet, Rfl: 0    sodium chloride, PF, 0.9 %, 10 mL by Intracatheter route daily Intracatheter flushing daily. May substitute prefilled syringe with normal saline 10 mL vials, 10 mL syringes, and 18 g blunt needles, Disp: 300 mL, Rfl: 0    tamsulosin (FLOMAX) 0.4 mg, Take 1 capsule (0.4 mg total) by mouth daily, Disp: 90 capsule, Rfl: 5  Allergies   Allergen Reactions    Lorazepam Other (See Comments)     very agitated  Psychosis         Imaging: IR drainage tube placement    Result Date: 10/17/2023  Narrative: CT scan guided abscess drainage This examination, like all CT scans performed in the [facility], was performed utilizing techniques to minimize radiation dose exposure, including the use of iterative reconstruction and automated exposure control. History: 79-year-old male with hepatic abscess Images: Multiple Sedation: Moderate conscious sedation was utilized under my direct supervision administered by trained independent provider. A total of 15 minutes sedation time was utilized. I was present at the initial dose of sedation medication. Technique: The patient was brought to the CT scanner and placed supine on the table. After axial images were obtained through the abdomen, an area of the skin was then marked, prepped, and draped in usual sterile fashion. All elements of maximal sterile barrier technique were followed (cap, mask, sterile gown, sterile gloves, large sterile sheet, hand hygiene, and 2% chlorhexidine for cutaneous antisepsis). Lidocaine was administered to the skin and a small skin incision was made. A 18-gauge needle was advanced into the collection under CT-scan guidance.  Yohan Waller wire was coiled within the lesion, and after tract dilatation, a 10 [place] all-purpose drainage catheter was advanced and the loop formed within the collection. 10 cc of purulent/bloody fluid was aspirated. The catheter was sutured in place, sterilely dressed, and connected to bulb suction. A specimen was sent to the laboratory for further analysis. The patient tolerated the procedure well and suffered no complications. Impression: Impression: Successful placement of a 10 [place] all-purpose drainage catheter into the hepatic fluid collection. 10 cc of purulent/bloody fluid was aspirated and a specimen sent to the laboratory as described. Workstation performed: [id number]     CTA dissection protocol chest abdomen pelvis w wo contrast    Result Date: 10/16/2023  Narrative: CTA - CHEST, ABDOMEN AND PELVIS - WITHOUT AND WITH IV CONTRAST INDICATION:   fevers for a month, low BP, dizziness, h/o AAA. COMPARISON: CTA chest 9/20/2023. TECHNIQUE: CT examination of the chest, abdomen and pelvis was performed both prior to and after the administration of intravenous contrast.  The noncontrast portion of this examination was performed utilizing low radiation dose technique. Thin section  angiographic arterial phase post contrast technique was used in order to evaluate for aortic dissection. 3D reformatted images and volume rendering were performed on an independent workstation. Additionally, axial, sagittal, and coronal 2D reformatted  images were created from the source data and submitted for interpretation. Radiation dose length product (DLP) for this visit:  4515 mGy-cm . This examination, like all CT scans performed in the [facility], was performed utilizing techniques to minimize radiation dose exposure, including the use of iterative reconstruction and automated exposure control. IV Contrast:  100 mL of iohexol (OMNIPAQUE) Enteric Contrast:  Enteric contrast was not administered.  FINDINGS: AORTA:  There is no aortic dissection or intramural hematoma. Stable 4.2 cm ascending aortic ectasia allowing for measurement differences. CHEST LUNGS: Mild dependent atelectasis. There is no tracheal or endobronchial lesion. PLEURA: No pneumothorax or pleural effusion. HEART/PULMONARY ARTERIAL TREE: Coronary artery calcifications. MEDIASTINUM AND ASHLEE:  Unremarkable. CHEST WALL AND LOWER NECK:   Unremarkable. ABDOMEN LIVER/BILIARY TREE: There is a large thick walled loculated fluid containing structure in in segment 8 of the liver measuring approximately 9 x 8.9 x 8.3 cm with additional 2.2 cm hypoenhancing focus immediately posterior to it (series 2 image 90). Stable 1.6 cm right hepatic cyst. No biliary ductal dilation GALLBLADDER:  No calcified gallstones. No pericholecystic inflammatory change. SPLEEN:  Unremarkable. PANCREAS:  Unremarkable. ADRENAL GLANDS:  Unremarkable. KIDNEYS/URETERS: Punctate nonobstructing right lower pole renal calculus. Dual left upper pole simple cysts. No hydronephrosis. STOMACH AND BOWEL: Normal course and caliber of the stomach and duodenum. No dilated loops of bowel. Evidence of previous colonic partial resection with a sigmoid anastomosis in the pelvis. Colonic diverticulosis without evidence of acute diverticulitis. . APPENDIX:  No findings to suggest appendicitis. ABDOMINOPELVIC CAVITY:  No ascites or free intraperitoneal air. Mildly enlarged portacaval lymph nodes. PELVIS REPRODUCTIVE ORGANS: Obscured by artifact in the pelvis from the hip arthroplasties. URINARY BLADDER: Not well-visualized due to artifact in the pelvis. ABDOMINAL WALL/INGUINAL REGIONS:  Unremarkable. OSSEOUS STRUCTURES:  No acute fracture or destructive osseous lesion. Status post bilateral total hip arthroplasty. Impression: 1. Large thick walled fluid containing lesion in the right hepatic lobe with adjacent satellite nodule.  Given clinical history as well as relatively rapid development since 9/20/2023, this is favored to reflect an abscess although necrotic tumor can have a similar appearance. Recommend confirmatory testing with either fluid/tissue sampling or MRI and follow-up to resolution. 2.  Portacaval lymphadenopathy which is likely related to the above. 3.  No acute aortic pathology. 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 I personally discussed this study with Yoav Mccullough on 10/16/2023 11:52 AM. Workstation performed: [id number]     XR chest 1 view portable    Result Date: 10/16/2023  Narrative: CHEST INDICATION:   fever. COMPARISON: Chest CT 9/20/2023, CXR 2/27/2018. EXAM PERFORMED/VIEWS:  XR CHEST PORTABLE. FINDINGS: Cardiomediastinal silhouette normal. Lungs clear. No effusion or pneumothorax. Upper abdomen normal. Bones normal for age. Impression: No acute cardiopulmonary disease. Workstation performed: [id number]       Review of Systems:  Review of Systems   Constitutional: Negative. Respiratory: Negative. Cardiovascular: Negative. Gastrointestinal:         +liver drain   Neurological:  Positive for dizziness. Hematological: Negative. Psychiatric/Behavioral: Negative. All other systems reviewed and are negative. BP 92/60 (BP Location: Left arm, Patient Position: Sitting, Cuff Size: Standard)   Pulse 80   Resp 16   Ht 6' 1" (1.854 m)   Wt 132 kg (290 lb)   SpO2 97%   BMI 38.26 kg/m²     Physical Exam:  Physical Exam  Vitals and nursing note reviewed. Constitutional:       Appearance: Normal appearance. HENT:      Head: Normocephalic and atraumatic. Cardiovascular:      Rate and Rhythm: Normal rate and regular rhythm. Pulses: Normal pulses. Heart sounds: Normal heart sounds. Pulmonary:      Effort: Pulmonary effort is normal.      Breath sounds: Normal breath sounds. Musculoskeletal:         General: No swelling. Normal range of motion. Cervical back: Normal range of motion and neck supple.    Skin:     General: Skin is warm and dry. Neurological:      General: No focal deficit present. Mental Status: He is alert and oriented to person, place, and time. Psychiatric:         Mood and Affect: Mood normal.         Behavior: Behavior normal.         Thought Content:  Thought content normal.         Judgment: Judgment normal.

## 2023-10-26 NOTE — PATIENT INSTRUCTIONS
Continue on medications with the change of lisinopril down to 10 mg daily given hypotension in the office today. Continue to monitor blood pressure at home. Patient also notes significant weight loss (25lbs) visit. This may also be contributing to patient's hypotension, multiple antihypertensives. Closely with PCP. Follow with IR. Continue all medications. Previous studies reviewed with patient, medications reviewed and possible side effects discussed. Continue risk factor modification. Optimize weight, regular exercise and follow up with appropriate specialists and primary care physician as discussed. All questions answered. Patient advised to report any problems prompting to medical attention.  Return for follow up visit in 6 month or earlier if needed

## 2023-10-30 ENCOUNTER — HOSPITAL ENCOUNTER (OUTPATIENT)
Dept: NON INVASIVE DIAGNOSTICS | Facility: HOSPITAL | Age: 73
Discharge: HOME | End: 2023-10-30
Attending: RADIOLOGY
Payer: MEDICARE

## 2023-10-30 DIAGNOSIS — I10 ESSENTIAL HYPERTENSION: Chronic | ICD-10-CM

## 2023-10-30 DIAGNOSIS — K75.0 LIVER ABSCESS: ICD-10-CM

## 2023-10-30 PROCEDURE — 76080 X-RAY EXAM OF FISTULA: CPT

## 2023-10-30 PROCEDURE — 76080 X-RAY EXAM OF FISTULA: CPT | Performed by: RADIOLOGY

## 2023-10-30 PROCEDURE — 49424 ASSESS CYST CONTRAST INJECT: CPT | Performed by: RADIOLOGY

## 2023-10-30 PROCEDURE — 49424 ASSESS CYST CONTRAST INJECT: CPT

## 2023-10-30 RX ADMIN — IOHEXOL 8 ML: 350 INJECTION, SOLUTION INTRAVENOUS at 11:45

## 2023-10-31 RX ORDER — HYDROCHLOROTHIAZIDE 25 MG/1
25 TABLET ORAL DAILY
Qty: 90 TABLET | Refills: 3 | Status: SHIPPED | OUTPATIENT
Start: 2023-10-31

## 2023-11-01 ENCOUNTER — OFFICE VISIT (OUTPATIENT)
Dept: FAMILY MEDICINE CLINIC | Facility: CLINIC | Age: 73
End: 2023-11-01
Payer: MEDICARE

## 2023-11-01 VITALS
HEART RATE: 78 BPM | OXYGEN SATURATION: 98 % | HEIGHT: 73 IN | DIASTOLIC BLOOD PRESSURE: 64 MMHG | WEIGHT: 292 LBS | SYSTOLIC BLOOD PRESSURE: 116 MMHG | BODY MASS INDEX: 38.7 KG/M2

## 2023-11-01 DIAGNOSIS — N40.1 BENIGN PROSTATIC HYPERPLASIA WITH URINARY FREQUENCY: ICD-10-CM

## 2023-11-01 DIAGNOSIS — I10 PRIMARY HYPERTENSION: Chronic | ICD-10-CM

## 2023-11-01 DIAGNOSIS — I35.0 NONRHEUMATIC AORTIC VALVE STENOSIS: ICD-10-CM

## 2023-11-01 DIAGNOSIS — E66.01 CLASS 2 SEVERE OBESITY DUE TO EXCESS CALORIES WITH SERIOUS COMORBIDITY AND BODY MASS INDEX (BMI) OF 38.0 TO 38.9 IN ADULT: ICD-10-CM

## 2023-11-01 DIAGNOSIS — R35.0 BENIGN PROSTATIC HYPERPLASIA WITH URINARY FREQUENCY: ICD-10-CM

## 2023-11-01 DIAGNOSIS — M10.9 GOUT, UNSPECIFIED CAUSE, UNSPECIFIED CHRONICITY, UNSPECIFIED SITE: ICD-10-CM

## 2023-11-01 DIAGNOSIS — E78.2 MIXED HYPERLIPIDEMIA: ICD-10-CM

## 2023-11-01 DIAGNOSIS — K75.0 LIVER ABSCESS: ICD-10-CM

## 2023-11-01 DIAGNOSIS — D64.9 ANEMIA, UNSPECIFIED TYPE: ICD-10-CM

## 2023-11-01 DIAGNOSIS — Z23 ENCOUNTER FOR IMMUNIZATION: Primary | ICD-10-CM

## 2023-11-01 DIAGNOSIS — I71.21 ANEURYSM OF ASCENDING AORTA WITHOUT RUPTURE (HCC): ICD-10-CM

## 2023-11-01 PROBLEM — E66.812 CLASS 2 SEVERE OBESITY DUE TO EXCESS CALORIES WITH SERIOUS COMORBIDITY AND BODY MASS INDEX (BMI) OF 38.0 TO 38.9 IN ADULT (HCC): Status: ACTIVE | Noted: 2018-02-27

## 2023-11-01 PROCEDURE — 90662 IIV NO PRSV INCREASED AG IM: CPT

## 2023-11-01 PROCEDURE — G0008 ADMIN INFLUENZA VIRUS VAC: HCPCS

## 2023-11-01 PROCEDURE — 99495 TRANSJ CARE MGMT MOD F2F 14D: CPT | Performed by: FAMILY MEDICINE

## 2023-11-01 NOTE — PROGRESS NOTES
Assessment & Plan   Return visit with next scheduled appointment February 1. Encounter for immunization  -     influenza vaccine, high-dose, PF 0.5 mL    2. Liver abscess    3. Primary hypertension  Assessment & Plan:  Continue enalapril 20 mg, hydrochlorothiazide 25 mg and Toprol- mg daily also hydralazine 75 mg 3 times daily      4. Mixed hyperlipidemia    5. Class 2 severe obesity due to excess calories with serious comorbidity and body mass index (BMI) of 38.0 to 38.9 in adult     6. Aneurysm of ascending aorta without rupture ([address])    7. Nonrheumatic aortic valve stenosis    8. Benign prostatic hyperplasia with urinary frequency  Assessment & Plan:  Flomax 0.4 mg daily      9. Gout, unspecified cause, unspecified chronicity, unspecified site  Assessment & Plan:  Continue colchicine as needed      10. Anemia, unspecified type  -     CBC and differential; Future  -     Iron Panel (Includes Ferritin, Iron Sat%, Iron, and TIBC); Future        Depression Screening and Follow-up Plan: Patient was screened for depression during today's encounter. They screened negative with a PHQ-2 score of 0. Subjective     Transitional Care Management Review:   Anne Marie Mart is a 72 y.o. male here for TCM follow up. During the TCM phone call patient stated:  TCM Call       Date and time call was made  10/20/2023  1:44 PM    Hospital care reviewed  Records reviewed    Patient was hospitialized at  [facility]    Date of Admission  10/16/23    Date of discharge  10/20/23    Diagnosis  liver abcesses    Disposition  Home    Were the patients medications reviewed and updated  Yes    Current Symptoms  None          TCM Call       Post hospital issues  None    Should patient be enrolled in anticoag monitoring? No    Scheduled for follow up?   Yes    Did you obtain your prescribed medications  Yes    Do you need help managing your prescriptions or medications  No    Is transportation to your appointment needed  No I have advised the patient to call PCP with any new or worsening symptoms  Modoc Roles / MA    Living Arrangements  Spouse or Significiant other    Support System  Family    The type of support provided  Emotional    Do you have social support  Yes, as much as I need    Are you recieving any outpatient services  No    Are you recieving home care services  No    Are you using any community resources  No    Have you fallen in the last 12 months  No    Interperter language line needed  No    Counseling  Patient    Counseling topics  patient and family education; instructions for management          Patient comes in for hospital follow-up. He had liver abscess. He had catheter drainage. Catheter was removed 2 days ago. He was treated with Augmentin. He developed anemia while in the hospital.      Review of Systems   Constitutional:  Positive for fatigue. Respiratory: Negative. Cardiovascular: Negative. Gastrointestinal: Negative. Objective     /64   Pulse 78   Ht 6' 1" (1.854 m)   Wt 132 kg (292 lb)   SpO2 98%   BMI 38.52 kg/m²      Physical Exam  Vitals and nursing note reviewed. Constitutional:       Appearance: He is well-developed. He is obese. HENT:      Head: Normocephalic and atraumatic. Eyes:      Conjunctiva/sclera: Conjunctivae normal.   Cardiovascular:      Rate and Rhythm: Normal rate and regular rhythm. Heart sounds: No murmur heard. Pulmonary:      Effort: Pulmonary effort is normal. No respiratory distress. Breath sounds: Normal breath sounds. Abdominal:      Palpations: Abdomen is soft. There is no mass. Tenderness: There is no abdominal tenderness. Musculoskeletal:         General: No swelling. Cervical back: Neck supple. Skin:     General: Skin is warm and dry. Capillary Refill: Capillary refill takes less than 2 seconds. Neurological:      Mental Status: He is alert.    Psychiatric:         Mood and Affect: Mood normal. Behavior: Behavior normal.       Medications have been reviewed by provider in current encounter    Thor Collet, MD

## 2023-11-01 NOTE — ASSESSMENT & PLAN NOTE
Continue enalapril 20 mg, hydrochlorothiazide 25 mg and Toprol- mg daily also hydralazine 75 mg 3 times daily

## 2023-12-05 DIAGNOSIS — I10 ESSENTIAL HYPERTENSION: Chronic | ICD-10-CM

## 2023-12-05 RX ORDER — METOPROLOL SUCCINATE 100 MG/1
100 TABLET, EXTENDED RELEASE ORAL DAILY
Qty: 90 TABLET | Refills: 0 | Status: SHIPPED | OUTPATIENT
Start: 2023-12-05

## 2023-12-06 ENCOUNTER — TELEPHONE (OUTPATIENT)
Age: 73
End: 2023-12-06

## 2023-12-11 DIAGNOSIS — I10 ESSENTIAL HYPERTENSION: Chronic | ICD-10-CM

## 2023-12-11 DIAGNOSIS — M10.9 GOUT, UNSPECIFIED CAUSE, UNSPECIFIED CHRONICITY, UNSPECIFIED SITE: ICD-10-CM

## 2023-12-11 RX ORDER — COLCHICINE 0.6 MG/1
TABLET ORAL
Qty: 30 TABLET | Refills: 0 | Status: SHIPPED | OUTPATIENT
Start: 2023-12-11 | End: 2023-12-18 | Stop reason: SDUPTHER

## 2023-12-11 RX ORDER — HYDRALAZINE HYDROCHLORIDE 50 MG/1
75 TABLET, FILM COATED ORAL 3 TIMES DAILY
Qty: 405 TABLET | Refills: 0 | Status: SHIPPED | OUTPATIENT
Start: 2023-12-11

## 2023-12-16 ENCOUNTER — PATIENT MESSAGE (OUTPATIENT)
Dept: FAMILY MEDICINE CLINIC | Facility: CLINIC | Age: 73
End: 2023-12-16

## 2023-12-16 DIAGNOSIS — M10.9 GOUT, UNSPECIFIED CAUSE, UNSPECIFIED CHRONICITY, UNSPECIFIED SITE: ICD-10-CM

## 2023-12-18 RX ORDER — COLCHICINE 0.6 MG/1
TABLET ORAL
Qty: 30 TABLET | Refills: 0 | Status: SHIPPED | OUTPATIENT
Start: 2023-12-18

## 2023-12-20 ENCOUNTER — PREP FOR PROCEDURE (OUTPATIENT)
Age: 73
End: 2023-12-20

## 2023-12-20 ENCOUNTER — OFFICE VISIT (OUTPATIENT)
Age: 73
End: 2023-12-20
Payer: MEDICARE

## 2023-12-20 VITALS
HEART RATE: 87 BPM | WEIGHT: 298.6 LBS | OXYGEN SATURATION: 97 % | HEIGHT: 78 IN | BODY MASS INDEX: 34.55 KG/M2 | DIASTOLIC BLOOD PRESSURE: 78 MMHG | SYSTOLIC BLOOD PRESSURE: 139 MMHG

## 2023-12-20 DIAGNOSIS — K75.0 LIVER ABSCESS: ICD-10-CM

## 2023-12-20 DIAGNOSIS — D50.9 IRON DEFICIENCY ANEMIA, UNSPECIFIED IRON DEFICIENCY ANEMIA TYPE: ICD-10-CM

## 2023-12-20 DIAGNOSIS — I10 PRIMARY HYPERTENSION: Chronic | ICD-10-CM

## 2023-12-20 DIAGNOSIS — Z86.010 HX OF ADENOMATOUS COLONIC POLYPS: Primary | ICD-10-CM

## 2023-12-20 DIAGNOSIS — I51.7 MODERATE CONCENTRIC LEFT VENTRICULAR HYPERTROPHY: ICD-10-CM

## 2023-12-20 DIAGNOSIS — I71.21 ANEURYSM OF ASCENDING AORTA WITHOUT RUPTURE (HCC): ICD-10-CM

## 2023-12-20 DIAGNOSIS — Z12.11 ENCOUNTER FOR SCREENING COLONOSCOPY: Primary | ICD-10-CM

## 2023-12-20 DIAGNOSIS — G47.33 OBSTRUCTIVE SLEEP APNEA: ICD-10-CM

## 2023-12-20 DIAGNOSIS — D50.9 IRON DEFICIENCY ANEMIA, UNSPECIFIED IRON DEFICIENCY ANEMIA TYPE: Primary | ICD-10-CM

## 2023-12-20 PROCEDURE — 99203 OFFICE O/P NEW LOW 30 MIN: CPT | Performed by: COLON & RECTAL SURGERY

## 2023-12-20 NOTE — H&P (VIEW-ONLY)
"Assessment/Plan:  Hx colon polyps   Plan:  colonoscopy       Diagnoses and all orders for this visit:    Hx of adenomatous colonic polyps  -     Colonoscopy; Future    Primary hypertension    Obstructive sleep apnea    Liver abscess    Iron deficiency anemia, unspecified iron deficiency anemia type    Moderate concentric left ventricular hypertrophy    Aneurysm of ascending aorta without rupture (HCC)    Other orders  -     Diet NPO; Sips with meds; Standing  -     Void on call to OR; Standing  -     Insert peripheral IV; Standing          Subjective:      Patient ID: Lobo Don is a 73 y.o. male.    HPI Hx colon polyps Last colonoscopy 2018    The following portions of the patient's history were reviewed and updated as appropriate: allergies, current medications, past family history, past medical history, past social history, past surgical history, and problem list.    Review of Systems   Constitutional:  Negative for chills and fever.   HENT:  Negative for ear pain and sore throat.    Eyes:  Negative for pain and visual disturbance.   Respiratory:  Negative for cough and shortness of breath.    Cardiovascular:  Negative for chest pain and palpitations.   Gastrointestinal:  Negative for abdominal pain and vomiting.   Genitourinary:  Negative for dysuria and hematuria.   Musculoskeletal:  Negative for arthralgias and back pain.   Skin:  Negative for color change and rash.   Neurological:  Negative for seizures and syncope.   All other systems reviewed and are negative.        Objective:      /78   Pulse 87   Ht 6' 11\" (2.108 m)   Wt 135 kg (298 lb 9.6 oz)   SpO2 97%   BMI 30.47 kg/m²          Physical Exam  Vitals and nursing note reviewed.   Constitutional:       Appearance: Normal appearance. He is obese.   HENT:      Head: Normocephalic and atraumatic.   Eyes:      Conjunctiva/sclera: Conjunctivae normal.   Cardiovascular:      Rate and Rhythm: Normal rate and regular rhythm.      Heart sounds: " Normal heart sounds.   Pulmonary:      Effort: Pulmonary effort is normal.      Breath sounds: Normal breath sounds.   Abdominal:      General: Bowel sounds are normal.      Palpations: Abdomen is soft.   Musculoskeletal:      Cervical back: Neck supple.   Skin:     General: Skin is warm and dry.   Neurological:      Mental Status: He is alert and oriented to person, place, and time.   Psychiatric:         Mood and Affect: Mood normal.         Behavior: Behavior normal.         Thought Content: Thought content normal.         Judgment: Judgment normal.

## 2024-01-03 ENCOUNTER — TELEPHONE (OUTPATIENT)
Age: 74
End: 2024-01-03

## 2024-01-03 NOTE — TELEPHONE ENCOUNTER
"I called patient left voice message This is [facility] confirming your upcoming Colonoscopy/EGD for 1/8/24 with Dr Gonzalez and Dr Lazaro. Please keep in mind you will receive a phone call the day prior with your exact arrival time.     Now is a good time to review your prep instructions. If you have any questions regarding the diet or prep instructions, please contact the office @ [phone].     Please reply \"Yes\" to confirm. To reschedule, please call the office at [phone].    "

## 2024-01-08 ENCOUNTER — HOSPITAL ENCOUNTER (OUTPATIENT)
Dept: GASTROENTEROLOGY | Facility: HOSPITAL | Age: 74
Setting detail: OUTPATIENT SURGERY
Discharge: HOME | End: 2024-01-08
Attending: COLON & RECTAL SURGERY
Payer: MEDICARE

## 2024-01-08 ENCOUNTER — ANESTHESIA (OUTPATIENT)
Dept: GASTROENTEROLOGY | Facility: HOSPITAL | Age: 74
End: 2024-01-08

## 2024-01-08 ENCOUNTER — TELEPHONE (OUTPATIENT)
Age: 74
End: 2024-01-08

## 2024-01-08 ENCOUNTER — ANESTHESIA EVENT (OUTPATIENT)
Dept: GASTROENTEROLOGY | Facility: HOSPITAL | Age: 74
End: 2024-01-08

## 2024-01-08 VITALS
HEIGHT: 72 IN | SYSTOLIC BLOOD PRESSURE: 125 MMHG | HEART RATE: 82 BPM | WEIGHT: 294.31 LBS | BODY MASS INDEX: 39.86 KG/M2 | OXYGEN SATURATION: 99 % | TEMPERATURE: 97.6 F | RESPIRATION RATE: 18 BRPM | DIASTOLIC BLOOD PRESSURE: 67 MMHG

## 2024-01-08 DIAGNOSIS — K26.9 DUODENAL ULCER: Primary | ICD-10-CM

## 2024-01-08 DIAGNOSIS — D50.9 IRON DEFICIENCY ANEMIA, UNSPECIFIED IRON DEFICIENCY ANEMIA TYPE: ICD-10-CM

## 2024-01-08 DIAGNOSIS — Z86.010 HX OF ADENOMATOUS COLONIC POLYPS: ICD-10-CM

## 2024-01-08 PROCEDURE — 88305 TISSUE EXAM BY PATHOLOGIST: CPT | Performed by: STUDENT IN AN ORGANIZED HEALTH CARE EDUCATION/TRAINING PROGRAM

## 2024-01-08 PROCEDURE — 43239 EGD BIOPSY SINGLE/MULTIPLE: CPT | Performed by: INTERNAL MEDICINE

## 2024-01-08 PROCEDURE — 88342 IMHCHEM/IMCYTCHM 1ST ANTB: CPT | Performed by: STUDENT IN AN ORGANIZED HEALTH CARE EDUCATION/TRAINING PROGRAM

## 2024-01-08 PROCEDURE — G0105 COLORECTAL SCRN; HI RISK IND: HCPCS | Performed by: COLON & RECTAL SURGERY

## 2024-01-08 RX ORDER — PROPOFOL 10 MG/ML
INJECTION, EMULSION INTRAVENOUS AS NEEDED
Status: DISCONTINUED | OUTPATIENT
Start: 2024-01-08 | End: 2024-01-08

## 2024-01-08 RX ORDER — LIDOCAINE HYDROCHLORIDE 20 MG/ML
INJECTION, SOLUTION EPIDURAL; INFILTRATION; INTRACAUDAL; PERINEURAL AS NEEDED
Status: DISCONTINUED | OUTPATIENT
Start: 2024-01-08 | End: 2024-01-08

## 2024-01-08 RX ORDER — SODIUM CHLORIDE, SODIUM LACTATE, POTASSIUM CHLORIDE, CALCIUM CHLORIDE 600; 310; 30; 20 MG/100ML; MG/100ML; MG/100ML; MG/100ML
INJECTION, SOLUTION INTRAVENOUS CONTINUOUS PRN
Status: DISCONTINUED | OUTPATIENT
Start: 2024-01-08 | End: 2024-01-08

## 2024-01-08 RX ORDER — PANTOPRAZOLE SODIUM 40 MG/1
40 TABLET, DELAYED RELEASE ORAL
Qty: 60 TABLET | Refills: 2 | Status: SHIPPED | OUTPATIENT
Start: 2024-01-08

## 2024-01-08 RX ORDER — METOCLOPRAMIDE HYDROCHLORIDE 5 MG/ML
10 INJECTION INTRAMUSCULAR; INTRAVENOUS ONCE AS NEEDED
Status: CANCELLED | OUTPATIENT
Start: 2024-01-08

## 2024-01-08 RX ORDER — DIPHENHYDRAMINE HYDROCHLORIDE 50 MG/ML
12.5 INJECTION INTRAMUSCULAR; INTRAVENOUS ONCE AS NEEDED
Status: CANCELLED | OUTPATIENT
Start: 2024-01-08

## 2024-01-08 RX ORDER — ONDANSETRON 2 MG/ML
4 INJECTION INTRAMUSCULAR; INTRAVENOUS ONCE AS NEEDED
Status: CANCELLED | OUTPATIENT
Start: 2024-01-08

## 2024-01-08 RX ORDER — LIDOCAINE HYDROCHLORIDE 10 MG/ML
0.5 INJECTION, SOLUTION EPIDURAL; INFILTRATION; INTRACAUDAL; PERINEURAL ONCE AS NEEDED
Status: DISCONTINUED | OUTPATIENT
Start: 2024-01-08 | End: 2024-01-12 | Stop reason: HOSPADM

## 2024-01-08 RX ADMIN — PROPOFOL 50 MG: 10 INJECTION, EMULSION INTRAVENOUS at 08:50

## 2024-01-08 RX ADMIN — PROPOFOL 50 MG: 10 INJECTION, EMULSION INTRAVENOUS at 08:46

## 2024-01-08 RX ADMIN — PROPOFOL 30 MG: 10 INJECTION, EMULSION INTRAVENOUS at 08:57

## 2024-01-08 RX ADMIN — PROPOFOL 150 MG: 10 INJECTION, EMULSION INTRAVENOUS at 08:43

## 2024-01-08 RX ADMIN — SODIUM CHLORIDE, SODIUM LACTATE, POTASSIUM CHLORIDE, AND CALCIUM CHLORIDE: .6; .31; .03; .02 INJECTION, SOLUTION INTRAVENOUS at 08:15

## 2024-01-08 RX ADMIN — LIDOCAINE HYDROCHLORIDE 100 MG: 20 INJECTION, SOLUTION EPIDURAL; INFILTRATION; INTRACAUDAL; PERINEURAL at 08:43

## 2024-01-08 RX ADMIN — SODIUM CHLORIDE, SODIUM LACTATE, POTASSIUM CHLORIDE, AND CALCIUM CHLORIDE: .6; .31; .03; .02 INJECTION, SOLUTION INTRAVENOUS at 09:07

## 2024-01-08 RX ADMIN — PROPOFOL 20 MG: 10 INJECTION, EMULSION INTRAVENOUS at 09:00

## 2024-01-08 RX ADMIN — PROPOFOL 40 MG: 10 INJECTION, EMULSION INTRAVENOUS at 08:54

## 2024-01-08 NOTE — INTERVAL H&P NOTE
H&P reviewed. After examining the patient I find no changes in the patients condition since the H&P had been written.    Vitals:    01/08/24 0722   BP: 125/80   Pulse: 92   Resp: 18   Temp: 97.6 °F (36.4 °C)   SpO2: 95%

## 2024-01-08 NOTE — TELEPHONE ENCOUNTER
----- Message from Frandy Lazaro MD sent at 1/8/2024  8:52 AM EST -----  Please refer for endoscopic ultrasound of the stomach for antral submucosal nodule

## 2024-01-08 NOTE — ANESTHESIA POSTPROCEDURE EVALUATION
Post-Op Assessment Note    CV Status:  Stable  Pain Score: 0    Pain management: adequate       Mental Status:  Alert and awake   Hydration Status:  Stable   PONV Controlled:  None   Airway Patency:  Patent and adequate     Post Op Vitals Reviewed: Yes      Staff: Anesthesiologist, CRNA

## 2024-01-08 NOTE — TELEPHONE ENCOUNTER
----- Message from Frandy Lazaro MD sent at 1/8/2024  8:52 AM EST -----  Please set up for capsule for iron deficiency anemia

## 2024-01-08 NOTE — H&P
History and Physical -  [facility]  Lobo Don 73 y.o. male MRN: [id number]                  HPI: Lobo Don is a 73 y.o. year old male who presents for EGD for iron deficiency anemia      REVIEW OF SYSTEMS: Per the HPI, and otherwise unremarkable.    Historical Information   Past Medical History:   Diagnosis Date    Anemia     Aortic aneurysm (HCC)     BPH (benign prostatic hyperplasia)     Diverticulitis     Recurrent - 1990s    Elevated troponin     Heart attack (HCC) 2018    Heart disease     History of colon polyps     Onset 6/30/89    History of colonoscopy     6/5/13    History of esophagogastroduodenoscopy     6/5/13    History of rectal polyps     Onset 6/30/89    Hypertension     Hypokalemia     Morbid obesity (HCC)     Renal calculi     Seasonal allergies     Sleep apnea with use of continuous positive airway pressure (CPAP)     STEMI (ST elevation myocardial infarction) (HCC)      Past Surgical History:   Procedure Laterality Date    APPENDECTOMY  1974    ARTHROSCOPY KNEE Left     COLON SURGERY  08/01/1996    Sigmoid colectomy for diverticulitis    COLONOSCOPY  06/05/2013    COLOSTOMY      CYST REMOVAL  2008    FLEXIBLE SIGMOIDOSCOPY  11/02/1996    HERNIA REPAIR      IR DRAINAGE TUBE CHECK/CHANGE/REPOSITION/REINSERTION/UPSIZE  10/30/2023    IR DRAINAGE TUBE PLACEMENT  10/16/2023    OTHER SURGICAL HISTORY  09/30/1996    Resection of small bowel fistula/resection of injured distal ileum secondary to enterolysis of adhesions    REVISION COLOSTOMY  02/24/1997    ROTATOR CUFF REPAIR Left     2004 & 2009    SATURATION BIOPSY OF PROSTATE      SHOULDER SURGERY  2001    Benign lump    TONSILLECTOMY  1959    TOTAL HIP ARTHROPLASTY Bilateral     2014 - L, 2016 - R / Last Assessed:5/8/15     Social History   Social History     Substance and Sexual Activity   Alcohol Use Not Currently    Comment: social     Social History     Substance and Sexual Activity   Drug Use Never     Social  History     Tobacco Use   Smoking Status Never   Smokeless Tobacco Never     Family History   Problem Relation Age of Onset    Depression Mother     COPD Mother     Breast cancer Mother         malignant neoplasm    Lung cancer Mother     Other Father         thoracic aortic aneurysm    Prostate cancer Father     Colon polyps Father     Colon cancer Brother     Diabetes Family     Colon cancer Family        Meds/Allergies     (Not in a hospital admission)      Allergies   Allergen Reactions    Lorazepam Other (See Comments)     very agitated  Psychosis       Objective     There were no vitals taken for this visit.      PHYSICAL EXAM    Gen: NAD  CV: RRR  CHEST: Clear  ABD: soft, NT/ND  EXT: no edema  Neuro: AAO      ASSESSMENT/PLAN:  This is a 73 y.o. year old male here for iron deficiency anemia    PLAN:   Procedure: EGD

## 2024-01-08 NOTE — ANESTHESIA PREPROCEDURE EVALUATION
Procedure:  COLONOSCOPY  EGD    Relevant Problems   ANESTHESIA (within normal limits)      CARDIO   (+) Hyperlipidemia   (+) Hypertension   (+) Nonrheumatic aortic valve stenosis   (+) Thoracic aortic aneurysm without rupture (HCC)      ENDO (within normal limits)      GI/HEPATIC   (+) Liver abscess      /RENAL (within normal limits)      HEMATOLOGY (within normal limits)      MUSCULOSKELETAL   (+) Gout      NEURO/PSYCH (within normal limits)      PULMONARY   (+) Obstructive sleep apnea      Cardiovascular and Mediastinum   (+) Moderate concentric left ventricular hypertrophy      Other   (+) Benign prostatic hyperplasia with urinary frequency   (+) Class 2 severe obesity due to excess calories with serious comorbidity and body mass index (BMI) of 38.0 to 38.9 in adult         Physical Exam    Airway    Mallampati score: II  TM Distance: >3 FB  Neck ROM: full     Dental   No notable dental hx     Cardiovascular  Rhythm: regular, Rate: normal, Cardiovascular exam normal    Pulmonary  Pulmonary exam normal Breath sounds clear to auscultation    Other Findings      Anesthesia Plan  ASA Score- 3     Anesthesia Type- IV sedation with anesthesia with ASA Monitors.         Additional Monitors:     Airway Plan:            Plan Factors-Exercise tolerance (METS): >4 METS.    Chart reviewed. EKG reviewed. Imaging results reviewed. Existing labs reviewed. Patient summary reviewed.                  Induction- intravenous.    Postoperative Plan-     Informed Consent- Anesthetic plan and risks discussed with patient.  I personally reviewed this patient with the CRNA. Discussed and agreed on the Anesthesia Plan with the CRNA..

## 2024-01-09 DIAGNOSIS — M10.9 GOUT, UNSPECIFIED CAUSE, UNSPECIFIED CHRONICITY, UNSPECIFIED SITE: ICD-10-CM

## 2024-01-09 RX ORDER — COLCHICINE 0.6 MG/1
TABLET ORAL
Qty: 30 TABLET | Refills: 0 | Status: SHIPPED | OUTPATIENT
Start: 2024-01-09

## 2024-01-12 PROCEDURE — 88342 IMHCHEM/IMCYTCHM 1ST ANTB: CPT | Performed by: STUDENT IN AN ORGANIZED HEALTH CARE EDUCATION/TRAINING PROGRAM

## 2024-01-12 PROCEDURE — 88305 TISSUE EXAM BY PATHOLOGIST: CPT | Performed by: STUDENT IN AN ORGANIZED HEALTH CARE EDUCATION/TRAINING PROGRAM

## 2024-01-15 ENCOUNTER — TRANSCRIBE ORDERS (OUTPATIENT)
Age: 74
End: 2024-01-15

## 2024-01-15 ENCOUNTER — PREP FOR PROCEDURE (OUTPATIENT)
Dept: GASTROENTEROLOGY | Facility: CLINIC | Age: 74
End: 2024-01-15

## 2024-01-15 DIAGNOSIS — D50.9 IRON DEFICIENCY ANEMIA, UNSPECIFIED IRON DEFICIENCY ANEMIA TYPE: Primary | ICD-10-CM

## 2024-01-15 DIAGNOSIS — K31.89 GASTRIC NODULE: Primary | ICD-10-CM

## 2024-01-15 NOTE — TELEPHONE ENCOUNTER
Scheduled date of EUS(as of today): 3/22/24  Physician performing EUS:   Location of EUS: [facility]  Instructions reviewed with patient by: Ekta/luz  Clearances: N/A

## 2024-01-15 NOTE — TELEPHONE ENCOUNTER
Pill cam scheduled for 1/30/24. Discussed prep and procedure and uploaded prep to the ptns mychart per ptn request.

## 2024-01-30 ENCOUNTER — PROCEDURE VISIT (OUTPATIENT)
Age: 74
End: 2024-01-30
Payer: MEDICARE

## 2024-01-30 DIAGNOSIS — D50.9 IRON DEFICIENCY ANEMIA, UNSPECIFIED IRON DEFICIENCY ANEMIA TYPE: ICD-10-CM

## 2024-02-01 ENCOUNTER — TELEPHONE (OUTPATIENT)
Dept: FAMILY MEDICINE CLINIC | Facility: CLINIC | Age: 74
End: 2024-02-01

## 2024-02-01 ENCOUNTER — TELEPHONE (OUTPATIENT)
Age: 74
End: 2024-02-01

## 2024-02-01 DIAGNOSIS — I10 PRIMARY HYPERTENSION: Primary | ICD-10-CM

## 2024-02-01 PROCEDURE — 91110 GI TRC IMG INTRAL ESOPH-ILE: CPT | Performed by: INTERNAL MEDICINE

## 2024-02-01 RX ORDER — HYDROCHLOROTHIAZIDE 25 MG/1
25 TABLET ORAL DAILY
Qty: 90 TABLET | Refills: 5 | Status: SHIPPED | OUTPATIENT
Start: 2024-02-01 | End: 2024-02-03 | Stop reason: SDUPTHER

## 2024-02-01 NOTE — TELEPHONE ENCOUNTER
Pt came to office -- reports pharmacy told him his script for hydrochlorothiazide 25mg was discontinued by provider.  Pt reports he was not told that he was supposed to stop this med?    Requesting it be sent in again for him.

## 2024-02-01 NOTE — TELEPHONE ENCOUNTER
Results discussed with patient.  He did eat fish the day before and likely we were looking at scales.

## 2024-02-01 NOTE — TELEPHONE ENCOUNTER
Patients GI provider:  Dr. POOL    Number to return call: [phone]    Reason for call: Pt returning call to Dr. Pool for recent capsule results. Please return patients call.    Scheduled procedure/appointment date if applicable: 03/22/2024 EUS

## 2024-02-03 DIAGNOSIS — I10 PRIMARY HYPERTENSION: ICD-10-CM

## 2024-02-05 RX ORDER — HYDROCHLOROTHIAZIDE 25 MG/1
25 TABLET ORAL DAILY
Qty: 90 TABLET | Refills: 0 | Status: SHIPPED | OUTPATIENT
Start: 2024-02-05 | End: 2024-02-06 | Stop reason: SDUPTHER

## 2024-02-06 DIAGNOSIS — I10 PRIMARY HYPERTENSION: ICD-10-CM

## 2024-02-06 RX ORDER — HYDROCHLOROTHIAZIDE 25 MG/1
25 TABLET ORAL DAILY
Qty: 90 TABLET | Refills: 0 | Status: SHIPPED | OUTPATIENT
Start: 2024-02-06

## 2024-02-14 ENCOUNTER — APPOINTMENT (OUTPATIENT)
Age: 74
End: 2024-02-14
Payer: MEDICARE

## 2024-02-14 DIAGNOSIS — M10.9 GOUT, UNSPECIFIED CAUSE, UNSPECIFIED CHRONICITY, UNSPECIFIED SITE: ICD-10-CM

## 2024-02-14 DIAGNOSIS — E78.2 MIXED HYPERLIPIDEMIA: ICD-10-CM

## 2024-02-14 DIAGNOSIS — D64.9 ANEMIA, UNSPECIFIED TYPE: ICD-10-CM

## 2024-02-14 LAB
ALBUMIN SERPL BCP-MCNC: 3.9 G/DL (ref 3.5–5)
ALP SERPL-CCNC: 77 U/L (ref 34–104)
ALT SERPL W P-5'-P-CCNC: 16 U/L (ref 7–52)
ANION GAP SERPL CALCULATED.3IONS-SCNC: 8 MMOL/L
AST SERPL W P-5'-P-CCNC: 17 U/L (ref 13–39)
BASOPHILS # BLD AUTO: 0.06 THOUSANDS/ÂΜL (ref 0–0.1)
BASOPHILS NFR BLD AUTO: 1 % (ref 0–1)
BILIRUB SERPL-MCNC: 0.6 MG/DL (ref 0.2–1)
BUN SERPL-MCNC: 20 MG/DL (ref 5–25)
CALCIUM SERPL-MCNC: 8.8 MG/DL (ref 8.4–10.2)
CHLORIDE SERPL-SCNC: 101 MMOL/L (ref 96–108)
CHOLEST SERPL-MCNC: 166 MG/DL
CO2 SERPL-SCNC: 29 MMOL/L (ref 21–32)
CREAT SERPL-MCNC: 0.89 MG/DL (ref 0.6–1.3)
EOSINOPHIL # BLD AUTO: 0.38 THOUSAND/ÂΜL (ref 0–0.61)
EOSINOPHIL NFR BLD AUTO: 6 % (ref 0–6)
ERYTHROCYTE [DISTWIDTH] IN BLOOD BY AUTOMATED COUNT: 12.7 % (ref 11.6–15.1)
FERRITIN SERPL-MCNC: 16 NG/ML (ref 24–336)
GFR SERPL CREATININE-BSD FRML MDRD: 86 ML/MIN/1.73SQ M
GLUCOSE P FAST SERPL-MCNC: 96 MG/DL (ref 65–99)
HCT VFR BLD AUTO: 43.7 % (ref 36.5–49.3)
HDLC SERPL-MCNC: 26 MG/DL
HGB BLD-MCNC: 14.3 G/DL (ref 12–17)
IMM GRANULOCYTES # BLD AUTO: 0.01 THOUSAND/UL (ref 0–0.2)
IMM GRANULOCYTES NFR BLD AUTO: 0 % (ref 0–2)
IRON SATN MFR SERPL: 13 % (ref 15–50)
IRON SERPL-MCNC: 51 UG/DL (ref 50–212)
LDLC SERPL CALC-MCNC: 113 MG/DL (ref 0–100)
LYMPHOCYTES # BLD AUTO: 0.96 THOUSANDS/ÂΜL (ref 0.6–4.47)
LYMPHOCYTES NFR BLD AUTO: 16 % (ref 14–44)
MCH RBC QN AUTO: 28.5 PG (ref 26.8–34.3)
MCHC RBC AUTO-ENTMCNC: 32.7 G/DL (ref 31.4–37.4)
MCV RBC AUTO: 87 FL (ref 82–98)
MONOCYTES # BLD AUTO: 0.75 THOUSAND/ÂΜL (ref 0.17–1.22)
MONOCYTES NFR BLD AUTO: 13 % (ref 4–12)
NEUTROPHILS # BLD AUTO: 3.8 THOUSANDS/ÂΜL (ref 1.85–7.62)
NEUTS SEG NFR BLD AUTO: 64 % (ref 43–75)
NONHDLC SERPL-MCNC: 140 MG/DL
NRBC BLD AUTO-RTO: 0 /100 WBCS
PLATELET # BLD AUTO: 211 THOUSANDS/UL (ref 149–390)
PMV BLD AUTO: 10.8 FL (ref 8.9–12.7)
POTASSIUM SERPL-SCNC: 4 MMOL/L (ref 3.5–5.3)
PROT SERPL-MCNC: 6.6 G/DL (ref 6.4–8.4)
RBC # BLD AUTO: 5.02 MILLION/UL (ref 3.88–5.62)
SODIUM SERPL-SCNC: 138 MMOL/L (ref 135–147)
TIBC SERPL-MCNC: 380 UG/DL (ref 250–450)
TRIGL SERPL-MCNC: 137 MG/DL
UIBC SERPL-MCNC: 329 UG/DL (ref 155–355)
URATE SERPL-MCNC: 8.5 MG/DL (ref 3.5–8.5)
WBC # BLD AUTO: 5.96 THOUSAND/UL (ref 4.31–10.16)

## 2024-02-14 PROCEDURE — 83540 ASSAY OF IRON: CPT

## 2024-02-14 PROCEDURE — 36415 COLL VENOUS BLD VENIPUNCTURE: CPT

## 2024-02-14 PROCEDURE — 82728 ASSAY OF FERRITIN: CPT

## 2024-02-14 PROCEDURE — 80061 LIPID PANEL: CPT

## 2024-02-14 PROCEDURE — 83550 IRON BINDING TEST: CPT

## 2024-02-14 PROCEDURE — 84550 ASSAY OF BLOOD/URIC ACID: CPT

## 2024-02-14 PROCEDURE — 85025 COMPLETE CBC W/AUTO DIFF WBC: CPT

## 2024-02-14 PROCEDURE — 80053 COMPREHEN METABOLIC PANEL: CPT

## 2024-02-16 ENCOUNTER — TELEPHONE (OUTPATIENT)
Dept: GASTROENTEROLOGY | Facility: MEDICAL CENTER | Age: 74
End: 2024-02-16

## 2024-02-16 ENCOUNTER — RA CDI HCC (OUTPATIENT)
Dept: OTHER | Facility: HOSPITAL | Age: 74
End: 2024-02-16

## 2024-02-16 NOTE — TELEPHONE ENCOUNTER
Called patient to verify address because EUS information came back as return to sender; address on chart was correct.  Will resend today.

## 2024-02-22 ENCOUNTER — OFFICE VISIT (OUTPATIENT)
Dept: FAMILY MEDICINE CLINIC | Facility: CLINIC | Age: 74
End: 2024-02-22
Payer: MEDICARE

## 2024-02-22 VITALS
BODY MASS INDEX: 39.96 KG/M2 | OXYGEN SATURATION: 99 % | DIASTOLIC BLOOD PRESSURE: 72 MMHG | TEMPERATURE: 97.6 F | WEIGHT: 295 LBS | SYSTOLIC BLOOD PRESSURE: 128 MMHG | HEIGHT: 72 IN | HEART RATE: 83 BPM

## 2024-02-22 DIAGNOSIS — K26.9 DUODENAL ULCER: ICD-10-CM

## 2024-02-22 DIAGNOSIS — I35.0 NONRHEUMATIC AORTIC VALVE STENOSIS: ICD-10-CM

## 2024-02-22 DIAGNOSIS — M10.9 GOUT, UNSPECIFIED CAUSE, UNSPECIFIED CHRONICITY, UNSPECIFIED SITE: ICD-10-CM

## 2024-02-22 DIAGNOSIS — I71.21 ANEURYSM OF ASCENDING AORTA WITHOUT RUPTURE (HCC): ICD-10-CM

## 2024-02-22 DIAGNOSIS — I10 PRIMARY HYPERTENSION: Primary | Chronic | ICD-10-CM

## 2024-02-22 DIAGNOSIS — K75.0 LIVER ABSCESS: ICD-10-CM

## 2024-02-22 DIAGNOSIS — E78.2 MIXED HYPERLIPIDEMIA: ICD-10-CM

## 2024-02-22 DIAGNOSIS — D50.9 IRON DEFICIENCY ANEMIA, UNSPECIFIED IRON DEFICIENCY ANEMIA TYPE: ICD-10-CM

## 2024-02-22 DIAGNOSIS — G47.33 OBSTRUCTIVE SLEEP APNEA: ICD-10-CM

## 2024-02-22 DIAGNOSIS — E66.01 CLASS 3 SEVERE OBESITY DUE TO EXCESS CALORIES WITH SERIOUS COMORBIDITY AND BODY MASS INDEX (BMI) OF 40.0 TO 44.9 IN ADULT (HCC): ICD-10-CM

## 2024-02-22 DIAGNOSIS — R35.0 BENIGN PROSTATIC HYPERPLASIA WITH URINARY FREQUENCY: ICD-10-CM

## 2024-02-22 DIAGNOSIS — N40.1 BENIGN PROSTATIC HYPERPLASIA WITH URINARY FREQUENCY: ICD-10-CM

## 2024-02-22 DIAGNOSIS — K57.90 DIVERTICULOSIS: ICD-10-CM

## 2024-02-22 PROCEDURE — 99214 OFFICE O/P EST MOD 30 MIN: CPT | Performed by: FAMILY MEDICINE

## 2024-02-22 RX ORDER — COLCHICINE 0.6 MG/1
TABLET ORAL
Qty: 30 TABLET | Refills: 5 | Status: SHIPPED | OUTPATIENT
Start: 2024-02-22

## 2024-02-22 RX ORDER — COLCHICINE 0.6 MG/1
TABLET ORAL
Qty: 30 TABLET | Refills: 5 | Status: SHIPPED | OUTPATIENT
Start: 2024-02-22 | End: 2024-02-22

## 2024-02-22 NOTE — PROGRESS NOTES
Name: Lobo Don      : 1950      MRN: [id number]  Encounter Provider: Yoan Toribio MD  Encounter Date: 2024   Encounter department: [facility] [address]    Assessment & Plan   Return visit in 6 months with fasting blood prior to visit.  1. Primary hypertension  Assessment & Plan:  Continue vase 20 mg, Toprol- mg and enalapril 20 mg daily also Apresoline 75 mg 3 times daily.      2. Nonrheumatic aortic valve stenosis    3. Aneurysm of ascending aorta without rupture (HCC)  Assessment & Plan:  Repeat CT chest in October.      4. Mixed hyperlipidemia  -     Comprehensive metabolic panel; Future  -     Lipid panel; Future    5. Benign prostatic hyperplasia with urinary frequency  Assessment & Plan:  Continue Flomax and finasteride.  Follow-up with urology      6. Obstructive sleep apnea    7. Gout, unspecified cause, unspecified chronicity, unspecified site  -     colchicine (COLCRYS) 0.6 mg tablet; take 1 tablet by mouth every 3 hours if needed for gout  -     Uric acid; Future    8. Class 3 severe obesity due to excess calories with serious comorbidity and body mass index (BMI) of 40.0 to 44.9 in adult ([facility])    9. Duodenal ulcer  Assessment & Plan:  Continue Protonix 40 mg daily      10. Iron deficiency anemia, unspecified iron deficiency anemia type  Assessment & Plan:  Continue ferrous sulfate    Orders:  -     CBC and differential; Future  -     Iron; Future    11. Diverticulosis    12. Liver abscess  Assessment & Plan:  Will repeat CAT scan of abdomen with CAT scan of chest for his thoracic aortic aneurysm in October.          Depression Screening and Follow-up Plan: Patient was screened for depression during today's encounter. They screened negative with a PHQ-2 score of 0.        Subjective      Patient comes in for checkup.  He is feeling well without complaints.  In the last year he was found to have iron deficiency anemia.  GI workup found to  have a duodenal ulcer and diverticulosis.  He also had liver abscess treated with drainage and IV antibiotics.  He had an episode of gout prep for his GI workup.  He has not had gout in several years.      Review of Systems   Constitutional: Negative.    Respiratory: Negative.     Cardiovascular: Negative.    Gastrointestinal: Negative.        Current Outpatient Medications on File Prior to Visit   Medication Sig    enalapril (VASOTEC) 20 mg tablet take 1 tablet by mouth once daily    ferrous sulfate 325 (65 Fe) mg tablet Take 325 mg by mouth every other day    finasteride (PROSCAR) 5 mg tablet Take 5 mg by mouth daily    hydrALAZINE (APRESOLINE) 50 mg tablet Take 1.5 tablets (75 mg total) by mouth 3 (three) times a day    hydroCHLOROthiazide 25 mg tablet Take 1 tablet (25 mg total) by mouth daily    ibuprofen (MOTRIN) 400 mg tablet Take 200 mg by mouth every 6 (six) hours as needed for mild pain (PRN.)    metoprolol succinate (TOPROL-XL) 100 mg 24 hr tablet TAKE 1 TABLET BY MOUTH EVERY DAY    pantoprazole (PROTONIX) 40 mg tablet Take 1 tablet (40 mg total) by mouth daily before breakfast    tamsulosin (FLOMAX) 0.4 mg Take 1 capsule (0.4 mg total) by mouth daily    [DISCONTINUED] colchicine (COLCRYS) 0.6 mg tablet take 1 tablet by mouth every 3 hours if needed for gout    [DISCONTINUED] polyethylene glycol (GOLYTELY) 4000 mL solution Take 4,000 mL by mouth once for 1 dose       Objective     /72   Pulse 83   Temp 97.6 °F (36.4 °C)   Ht 6' (1.829 m)   Wt 134 kg (295 lb)   SpO2 99%   BMI 40.01 kg/m²     Physical Exam  Constitutional:       Appearance: He is well-developed. He is obese.   HENT:      Head: Normocephalic and atraumatic.   Eyes:      Pupils: Pupils are equal, round, and reactive to light.   Cardiovascular:      Rate and Rhythm: Normal rate and regular rhythm.      Heart sounds: Normal heart sounds.   Pulmonary:      Effort: Pulmonary effort is normal.      Breath sounds: Normal breath sounds.    Musculoskeletal:         General: Normal range of motion.      Cervical back: Neck supple.   Skin:     General: Skin is warm and dry.   Neurological:      Mental Status: He is alert.   Psychiatric:         Mood and Affect: Mood normal.         Behavior: Behavior normal.       Yoan Toribio MD

## 2024-02-22 NOTE — ASSESSMENT & PLAN NOTE
Will repeat CAT scan of abdomen with CAT scan of chest for his thoracic aortic aneurysm in October.

## 2024-03-01 DIAGNOSIS — K26.9 DUODENAL ULCER: ICD-10-CM

## 2024-03-01 RX ORDER — PANTOPRAZOLE SODIUM 40 MG/1
40 TABLET, DELAYED RELEASE ORAL
Qty: 60 TABLET | Refills: 5 | Status: SHIPPED | OUTPATIENT
Start: 2024-03-01

## 2024-03-18 DIAGNOSIS — I10 ESSENTIAL HYPERTENSION: Chronic | ICD-10-CM

## 2024-03-18 RX ORDER — METOPROLOL SUCCINATE 100 MG/1
100 TABLET, EXTENDED RELEASE ORAL DAILY
Qty: 90 TABLET | Refills: 0 | Status: SHIPPED | OUTPATIENT
Start: 2024-03-18

## 2024-03-19 ENCOUNTER — APPOINTMENT (OUTPATIENT)
Age: 74
End: 2024-03-19
Payer: MEDICARE

## 2024-03-19 DIAGNOSIS — Z12.5 SPECIAL SCREENING FOR MALIGNANT NEOPLASM OF PROSTATE: ICD-10-CM

## 2024-03-19 LAB — PSA SERPL-MCNC: 1.15 NG/ML (ref 0–4)

## 2024-03-19 PROCEDURE — 36415 COLL VENOUS BLD VENIPUNCTURE: CPT

## 2024-03-19 PROCEDURE — G0103 PSA SCREENING: HCPCS

## 2024-04-07 DIAGNOSIS — I10 ESSENTIAL HYPERTENSION: Chronic | ICD-10-CM

## 2024-04-08 DIAGNOSIS — M10.9 GOUT, UNSPECIFIED CAUSE, UNSPECIFIED CHRONICITY, UNSPECIFIED SITE: ICD-10-CM

## 2024-04-08 RX ORDER — COLCHICINE 0.6 MG/1
TABLET ORAL
Qty: 30 TABLET | Refills: 0 | Status: SHIPPED | OUTPATIENT
Start: 2024-04-08

## 2024-04-08 RX ORDER — ENALAPRIL MALEATE 20 MG/1
20 TABLET ORAL DAILY
Qty: 90 TABLET | Refills: 3 | Status: SHIPPED | OUTPATIENT
Start: 2024-04-08

## 2024-04-23 DIAGNOSIS — K26.9 DUODENAL ULCER: ICD-10-CM

## 2024-04-23 RX ORDER — PANTOPRAZOLE SODIUM 40 MG/1
40 TABLET, DELAYED RELEASE ORAL
Qty: 90 TABLET | Refills: 1 | Status: SHIPPED | OUTPATIENT
Start: 2024-04-23

## 2024-04-30 DIAGNOSIS — I10 PRIMARY HYPERTENSION: ICD-10-CM

## 2024-05-01 RX ORDER — HYDROCHLOROTHIAZIDE 25 MG/1
25 TABLET ORAL DAILY
Qty: 90 TABLET | Refills: 1 | Status: SHIPPED | OUTPATIENT
Start: 2024-05-01

## 2024-05-15 DIAGNOSIS — R35.0 BENIGN PROSTATIC HYPERPLASIA WITH URINARY FREQUENCY: ICD-10-CM

## 2024-05-15 DIAGNOSIS — N40.1 BENIGN PROSTATIC HYPERPLASIA WITH URINARY FREQUENCY: ICD-10-CM

## 2024-05-15 RX ORDER — TAMSULOSIN HYDROCHLORIDE 0.4 MG/1
0.4 CAPSULE ORAL DAILY
Qty: 90 CAPSULE | Refills: 0 | Status: SHIPPED | OUTPATIENT
Start: 2024-05-15

## 2024-05-22 ENCOUNTER — OFFICE VISIT (OUTPATIENT)
Dept: CARDIOLOGY CLINIC | Facility: CLINIC | Age: 74
End: 2024-05-22
Payer: MEDICARE

## 2024-05-22 VITALS
HEIGHT: 73 IN | HEART RATE: 67 BPM | WEIGHT: 291 LBS | OXYGEN SATURATION: 96 % | SYSTOLIC BLOOD PRESSURE: 136 MMHG | BODY MASS INDEX: 38.57 KG/M2 | RESPIRATION RATE: 16 BRPM | DIASTOLIC BLOOD PRESSURE: 82 MMHG

## 2024-05-22 DIAGNOSIS — I51.7 LVH (LEFT VENTRICULAR HYPERTROPHY): ICD-10-CM

## 2024-05-22 DIAGNOSIS — I77.810 DILATED AORTIC ROOT (HCC): ICD-10-CM

## 2024-05-22 DIAGNOSIS — I71.21 ANEURYSM OF ASCENDING AORTA WITHOUT RUPTURE (HCC): ICD-10-CM

## 2024-05-22 DIAGNOSIS — M10.9 GOUT, UNSPECIFIED CAUSE, UNSPECIFIED CHRONICITY, UNSPECIFIED SITE: ICD-10-CM

## 2024-05-22 DIAGNOSIS — I51.89 DIASTOLIC DYSFUNCTION: ICD-10-CM

## 2024-05-22 DIAGNOSIS — G47.33 OBSTRUCTIVE SLEEP APNEA: ICD-10-CM

## 2024-05-22 DIAGNOSIS — I10 PRIMARY HYPERTENSION: Primary | ICD-10-CM

## 2024-05-22 DIAGNOSIS — E78.2 MIXED HYPERLIPIDEMIA: ICD-10-CM

## 2024-05-22 DIAGNOSIS — I35.0 NON-RHEUMATIC AORTIC STENOSIS: ICD-10-CM

## 2024-05-22 DIAGNOSIS — E66.9 OBESITY (BMI 30-39.9): ICD-10-CM

## 2024-05-22 PROCEDURE — 99214 OFFICE O/P EST MOD 30 MIN: CPT | Performed by: INTERNAL MEDICINE

## 2024-05-22 RX ORDER — COLCHICINE 0.6 MG/1
TABLET ORAL
Qty: 30 TABLET | Refills: 0 | Status: SHIPPED | OUTPATIENT
Start: 2024-05-22

## 2024-05-22 NOTE — PROGRESS NOTES
CARDIOLOGY OFFICE VISIT  [facility]  [address], [place] [address]  Tel: [phone]      NAME: Lobo Don  AGE: 73 y.o.  SEX: male  : 1950  MRN: [id number]      Chief Complaint:  Chief Complaint   Patient presents with    Follow-up         History of Present Illness:   Patient comes for follow up. States he is doing well from cardiac stand point and denies chest pain / pressure, SOB, palpitations, lightheadedness, syncope, swelling feet, orthopnea, PND, claudication.    Essential hypertension, DD, LVH -  Has been hypertensive for many years.  Taking medications regularly.  Denies lightheadedness, headache, medication side effects.      Mixed hyperlipidemia -  Has had hyperlipidemia for many years.  On diet control.  PCP closely monitoring the blood work.    Obesity -  Trying to lose weight.    Dilated aortic root, Ascending aortic aneurysm -  Aortic root 4.2 cm per echo in 2022.  Ascending thoracic aorta stable at 4.5 cm on CT scans done in 2018, Oct 2018, 2022 and measured as 4.2 cm on CT scan done in Oct 2023.     Aortic stenosis - mild per echo 2022     SRAVAN - states he uses CPAP regularly    Past Medical History:  Past Medical History:   Diagnosis Date    Anemia     Aortic aneurysm (HCC)     BPH (benign prostatic hyperplasia)     Diverticulitis     Recurrent -     Elevated troponin     Heart attack (HCC) 2018    Heart disease     History of colon polyps     Onset 89    History of colonoscopy     15    History of esophagogastroduodenoscopy     13    History of rectal polyps     Onset 89    Hypertension     Hypokalemia     Morbid obesity (HCC)     Myocardial infarction (HCC)     Renal calculi     Seasonal allergies     Sleep apnea with use of continuous positive airway pressure (CPAP)     STEMI (ST elevation myocardial infarction) (HCC)          Past Surgical History:  Past  Surgical History:   Procedure Laterality Date    APPENDECTOMY  1974    ARTHROSCOPY KNEE Left     COLON SURGERY  08/01/1996    Sigmoid colectomy for diverticulitis    COLONOSCOPY  06/05/2013    COLOSTOMY      CYST REMOVAL  2008    FLEXIBLE SIGMOIDOSCOPY  11/02/1996    HERNIA REPAIR      IR DRAINAGE TUBE CHECK/CHANGE/REPOSITION/REINSERTION/UPSIZE  10/30/2023    IR DRAINAGE TUBE PLACEMENT  10/16/2023    OTHER SURGICAL HISTORY  09/30/1996    Resection of small bowel fistula/resection of injured distal ileum secondary to enterolysis of adhesions    REVISION COLOSTOMY  02/24/1997    ROTATOR CUFF REPAIR Left     2004 & 2009    SATURATION BIOPSY OF PROSTATE      SHOULDER SURGERY  2001    Benign lump    TONSILLECTOMY  1959    TOTAL HIP ARTHROPLASTY Bilateral     2014 - L, 2016 - R / Last Assessed:5/8/15         Family History:  Family History   Problem Relation Age of Onset    Depression Mother     COPD Mother     Breast cancer Mother         malignant neoplasm    Lung cancer Mother     Other Father         thoracic aortic aneurysm    Prostate cancer Father     Colon polyps Father     Colon cancer Brother     Diabetes Family     Colon cancer Family          Social History:  Social History     Socioeconomic History    Marital status: /Civil Union     Spouse name: None    Number of children: None    Years of education: None    Highest education level: None   Occupational History    None   Tobacco Use    Smoking status: Never    Smokeless tobacco: Never   Vaping Use    Vaping status: Never Used   Substance and Sexual Activity    Alcohol use: Not Currently     Comment: social    Drug use: Never    Sexual activity: Not Currently     Partners: Female   Other Topics Concern    None   Social History Narrative    None     Social Determinants of Health     Financial Resource Strain: Low Risk  (8/8/2023)    Overall Financial Resource Strain (CARDIA)     Difficulty of Paying Living Expenses: Not hard at all   Food Insecurity: No  Food Insecurity (10/17/2023)    Hunger Vital Sign     Worried About Running Out of Food in the Last Year: Never true     Ran Out of Food in the Last Year: Never true   Transportation Needs: No Transportation Needs (10/17/2023)    PRAPARE - Transportation     Lack of Transportation (Medical): No     Lack of Transportation (Non-Medical): No   Physical Activity: Not on file   Stress: Not on file   Social Connections: Not on file   Intimate Partner Violence: Not on file   Housing Stability: Low Risk  (10/17/2023)    Housing Stability Vital Sign     Unable to Pay for Housing in the Last Year: No     Number of Places Lived in the Last Year: 1     Unstable Housing in the Last Year: No         Active Problems:  Patient Active Problem List   Diagnosis    Hypertension    Class 3 severe obesity due to excess calories with serious comorbidity and body mass index (BMI) of 40.0 to 44.9 in adult (HCC)    Obstructive sleep apnea    Hyperlipidemia    LVH (left ventricular hypertrophy)    Gout    Thoracic aortic aneurysm without rupture (HCC)    Nonrheumatic aortic valve stenosis    Benign prostatic hyperplasia with urinary frequency    Liver abscess    Duodenal ulcer    Iron deficiency anemia    Diverticulosis    Diastolic dysfunction         The following portions of the patient's history were reviewed and updated as appropriate: past medical history, past surgical history, past family history,  past social history, current medications, allergies and problem list.      Review of Systems:  Constitutional: Denies fever, chills  Eyes: Denies eye redness, eye discharge  ENT: Denies hearing loss, sneezing, nasal discharge, sore throat   Respiratory: Denies cough, expectoration, shortness of breath  Cardiovascular: Denies chest pain, palpitations, lower extremity swelling  Gastrointestinal: Denies abdominal pain, nausea, vomiting, diarrhea  Genito-Urinary: Denies dysuria, incontinence  Musculoskeletal: Denies back pain, joint pain,  muscle pain  Neurologic: Denies lightheadedness, syncope, headache, seizures  Endocrine: Denies polydipsia, temperature intolerance  Allergy and Immunology: Denies hives, insect bite sensitivity  Hematological and Lymphatic: Denies bleeding problems, swollen glands   Psychological: Denies depression, suicidal ideation, anxiety, panic  Dermatological: Denies pruritus, rash, skin lesion changes      Vitals:  Vitals:    05/22/24 0821   BP: 136/82   Pulse: 67   Resp: 16   SpO2: 96%       Body mass index is 38.39 kg/m².    Weight (last 2 days)       Date/Time Weight    05/22/24 0821 132 (291)              Physical Examination:  General: Patient is not in acute distress. Awake, alert, oriented in time, place and person. Responding to commands  Head: Normocephalic. Atraumatic  Eyes: Both pupils normal sized, round and reactive to light. Nonicteric  ENT: Normal external ear canals  Neck: Supple. JVP not raised. Trachea central. No thyromegaly  Lungs: Bilateral bronchovascular breath sounds with no crackles or rhonchi  Chest wall: No tenderness  Cardiovascular: RRR. S1 and S2 normal. No murmur, rub or gallop  Gastrointestinal: Abdomen soft, nontender. No guarding or rigidity. Liver and spleen not palpable. Bowel sounds present  Neurologic: Patient is awake, alert, oriented in time, place and person. Responding to commands. Moving all extremities  Integumentary:  No skin rash  Lymphatic: No cervical lymphadenopathy  Back: Symmetric. No CVA tenderness  Extremities: No clubbing, cyanosis or edema      Laboratory Results:  CBC with diff:   Lab Results   Component Value Date    WBC 5.96 02/14/2024    WBC 8.15 10/30/2015    RBC 5.02 02/14/2024    RBC 4.90 10/30/2015    HGB 14.3 02/14/2024    HGB 15.1 10/30/2015    HCT 43.7 02/14/2024    HCT 43.0 10/30/2015    MCV 87 02/14/2024    MCV 88 10/30/2015    MCH 28.5 02/14/2024    MCH 30.8 10/30/2015    RDW 12.7 02/14/2024    RDW 13.2 10/30/2015     02/14/2024     10/30/2015        CMP:  Lab Results   Component Value Date    CREATININE 0.89 02/14/2024    CREATININE 0.89 10/30/2015    BUN 20 02/14/2024    BUN 17 10/30/2015     10/30/2015    K 4.0 02/14/2024    K 3.9 10/30/2015     02/14/2024     10/30/2015    CO2 29 02/14/2024    CO2 32 10/30/2015    GLUCOSE 100 10/30/2015    PROT 6.8 10/30/2015    ALKPHOS 77 02/14/2024    ALKPHOS 80 10/30/2015    ALT 16 02/14/2024    ALT 41 10/30/2015    AST 17 02/14/2024    AST 25 10/30/2015       Lab Results   Component Value Date    HGBA1C 5.0 12/18/2019    MG 1.9 10/16/2023       Lab Results   Component Value Date    TROPONINI 0.03 02/27/2018    TROPONINI 0.05 (H) 02/27/2018    TROPONINI 0.03 02/27/2018       Lipid Profile:   Lab Results   Component Value Date    CHOL 187 10/30/2015     Lab Results   Component Value Date    HDL 26 (L) 02/14/2024    HDL 36 (L) 08/04/2023    HDL 30 (L) 02/07/2023     Lab Results   Component Value Date    LDLCALC 113 (H) 02/14/2024    LDLCALC 106 (H) 08/04/2023    LDLCALC 95 02/07/2023     Lab Results   Component Value Date    TRIG 137 02/14/2024    TRIG 159 (H) 08/04/2023    TRIG 176 (H) 02/07/2023       Cardiac testing:   Results for orders placed during the hospital encounter of 02/17/22    Echo complete w/ contrast if indicated    Interpretation Summary    Left Ventricle: Left ventricular cavity size is normal. Systolic function is normal (60%). Wall motion cannot be accurately assessed. Diastolic function is mildly abnormal, consistent with grade I (abnormal) relaxation. Wall thickness is moderately increased. There is moderate concentric hypertrophy.    Left Atrium: The atrium is mildly dilated.    Aortic Valve: There is mild stenosis.    Mitral Valve: There is mild annular calcification.    Tricuspid Valve: There is trace regurgitation.    Aorta: The aortic root is mildly dilated (4.2 cm).      Results for orders placed during the hospital encounter of 05/02/18    NM myocardial perfusion spect  (rx stress and/or rest)    [facility]   [place], [place] [id number] [phone]    Rest/Stress Gated SPECT Myocardial Perfusion Imaging After Regadenoson    Patient: MADHAVI NIEVES  MR number: [id number]  Account number: [id number]  : 1950  Age: 67 years  Gender: Male  Status: Outpatient  Location: [facility]  Height: 73 in  Weight: 319 lb  BP: 132/ 96 mmHg    Allergies: LORAZEPAM    Diagnosis: I21.4 - Non-ST elevation (NSTEMI) myocardial infarction, R06.09 - Other forms of dyspnea    Primary Physician:  Yoan Toribio MD  Interpreting Physician:  Clifton Byers MD  Referring Physician:  Clifton Byers MD  Technician:  Jerman Boss BS, BA, AART(N)  Group:  [facility]  Other:  Daniele Mosher MS, CCT    INDICATIONS: Detection of coronary artery disease.    HISTORY: The patient is a 67 year old  male. Chest pain status: no chest pain. Other symptoms: dyspnea. Coronary artery disease risk factors: dyslipidemia and hypertension. Cardiovascular history: prior myocardial infarction and  aortic valve disease. Co-morbidity: obesity. Thoracic aortic aneurysm, SRAVAN Medications: a beta blocker, an ACE inhibitor/ARB, and a diuretic. Previous test results: abnormal ECG.    PHYSICAL EXAM: Baseline physical exam screening: normal.    REST ECG: Normal sinus rhythm.    PROCEDURE: The study was performed at [facility]. A regadenoson infusion pharmacologic stress test was performed. Gated SPECT myocardial perfusion imaging was performed after stress and at rest. Systolic blood pressure  was 132 mmHg, at the start of the study. Diastolic blood pressure was 96 mmHg, at the start of the study. The heart rate was 55 bpm, at the start of the study.  Regadenoson protocol:  HR bpm SBP mmHg DBP mmHg Symptoms Rhythm/conduct  Baseline 55 132 96 none sinus piter  Immediate 83 138 84 mild dyspnea NSR, no ectopy  1 min  80 -- -- none NSR, no ectopy  2 min 75 126 76 none NSR, no ectopy    STRESS SUMMARY: Duration of pharmacologic stress was 3 min. Maximal heart rate during stress was 83 bpm. The rate-pressure product for the peak heart rate and blood pressure was 49443. There was no chest pain during stress. The stress test  was terminated due to protocol completion. The stress ECG was normal. There were no stress arrhythmias or conduction abnormalities.    ISOTOPE ADMINISTRATION:  Resting isotope administration Stress isotope administration  Agent Tetrofosmin Tetrofosmin  Dose 15.4 mCi 48.8 mCi  Date 05/02/2018 05/02/2018  Injection-image interval 30 min 45 min    The radiopharmaceutical was injected at the peak effect of pharmacologic stress.    PERFUSION DEFECTS:  -  There was a moderate-sized, moderately severe, fixed myocardial perfusion defect of the entire inferior wall likely due to diaphragm attenuation.    GATED SPECT:  The calculated left ventricular ejection fraction was 50 %. Left ventricular ejection fraction was within normal limits by visual estimate. There was no left ventricular regional abnormality.    SUMMARY:  -  Stress results: There was no chest pain during stress.  -  ECG conclusions: The stress ECG was normal.  -  Perfusion imaging: There was a moderate-sized, moderately severe, fixed myocardial perfusion defect of the entire inferior wall likely due to diaphragm attenuation.  -  Gated SPECT: The calculated left ventricular ejection fraction was 50 %. Left ventricular ejection fraction was within normal limits by visual estimate. There was no left ventricular regional abnormality.    IMPRESSIONS: Normal study. There was image artifact, without diagnostic evidence for perfusion abnormality. Left ventricular systolic function was normal.    Prepared and signed by    Clifton Byers MD  Signed 05/03/2018 09:45:57      Medications:    Current Outpatient Medications:     colchicine (COLCRYS) 0.6 mg tablet, take  1 tablet by mouth every 3 hours if needed for gout, Disp: 30 tablet, Rfl: 0    enalapril (VASOTEC) 20 mg tablet, TAKE 1 TABLET BY MOUTH DAILY, Disp: 90 tablet, Rfl: 3    ferrous sulfate 325 (65 Fe) mg tablet, Take 325 mg by mouth every other day, Disp: , Rfl:     finasteride (PROSCAR) 5 mg tablet, Take 5 mg by mouth daily, Disp: , Rfl:     hydrALAZINE (APRESOLINE) 50 mg tablet, Take 1.5 tablets (75 mg total) by mouth 3 (three) times a day, Disp: 405 tablet, Rfl: 0    hydroCHLOROthiazide 25 mg tablet, Take 1 tablet (25 mg total) by mouth daily, Disp: 90 tablet, Rfl: 1    ibuprofen (MOTRIN) 400 mg tablet, Take 200 mg by mouth every 6 (six) hours as needed for mild pain (PRN.), Disp: , Rfl:     metoprolol succinate (TOPROL-XL) 100 mg 24 hr tablet, Take 1 tablet (100 mg total) by mouth daily, Disp: 90 tablet, Rfl: 0    pantoprazole (PROTONIX) 40 mg tablet, TAKE 1 TABLET BY MOUTH DAILY BEFORE BREAKFAST, Disp: 90 tablet, Rfl: 1    tamsulosin (FLOMAX) 0.4 mg, Take 1 capsule (0.4 mg total) by mouth daily, Disp: 90 capsule, Rfl: 0      Allergies:  Allergies   Allergen Reactions    Lorazepam Other (See Comments)     very agitated  Psychosis         Assessment and Plan:  1. Primary hypertension  2. LVH (left ventricular hypertrophy)  3. Diastolic dysfunction  BP stable.  Readings normal even in his home diary.  Continue metoprolol succinate 100 mg daily, hydrochlorothiazide 25 mg daily, hydralazine 50 mg 3 times daily, enalapril 20 mg daily.  Continue to monitor BP at home and call if abnormal    4. Mixed hyperlipidemia  Diet control as discussed.  His PCP closely monitoring the blood work    5. Dilated aortic root (HCC)  6. Aneurysm of ascending aorta without rupture (HCC)  Stable per latest CT scan from October 2023.  Continue metoprolol succinate and enalapril    7. Non-rheumatic aortic stenosis  Mild per last echo    8. Obstructive sleep apnea  Regular CPAP use recommended    9. Obesity (BMI 30-39.9)  Try to lose  weight      Recommend aggressive risk factor modification and therapeutic lifestyle changes.  Low-salt, low-calorie, low-fat, low-cholesterol diet with regular exercise and to optimize weight.  I will defer the ordering and monitoring of necessity lab studies to you, but I am available and happy to review and manage any of the data at your request in the future.    Discussed concepts of atherosclerosis, including signs and symptoms of cardiac disease.    Previous studies were reviewed.    Safety measures were reviewed.  Questions were entertained and answered.  Patient was advised to report any problems requiring medical attention.    Follow-up with PCP and appropriate specialist and lab work as discussed.    Return for follow up visit as scheduled or earlier, if needed.  Thank you for allowing me to participate in the care and evaluation of your patient.  Should you have any questions, please feel free to contact me.    Khang Hernandez MD  5/22/2024,9:03 AM

## 2024-06-12 DIAGNOSIS — I10 ESSENTIAL HYPERTENSION: Chronic | ICD-10-CM

## 2024-06-12 RX ORDER — HYDRALAZINE HYDROCHLORIDE 50 MG/1
TABLET, FILM COATED ORAL
Qty: 405 TABLET | Refills: 1 | Status: SHIPPED | OUTPATIENT
Start: 2024-06-12

## 2024-06-18 DIAGNOSIS — I10 ESSENTIAL HYPERTENSION: Chronic | ICD-10-CM

## 2024-06-18 RX ORDER — METOPROLOL SUCCINATE 100 MG/1
100 TABLET, EXTENDED RELEASE ORAL DAILY
Qty: 90 TABLET | Refills: 1 | Status: SHIPPED | OUTPATIENT
Start: 2024-06-18

## 2024-06-24 DIAGNOSIS — M10.9 GOUT, UNSPECIFIED CAUSE, UNSPECIFIED CHRONICITY, UNSPECIFIED SITE: ICD-10-CM

## 2024-06-24 RX ORDER — COLCHICINE 0.6 MG/1
TABLET ORAL
Qty: 30 TABLET | Refills: 2 | Status: SHIPPED | OUTPATIENT
Start: 2024-06-24 | End: 2024-06-25

## 2024-06-25 RX ORDER — COLCHICINE 0.6 MG/1
TABLET ORAL
Qty: 30 TABLET | Refills: 5 | Status: SHIPPED | OUTPATIENT
Start: 2024-06-25

## 2024-07-23 DIAGNOSIS — I10 PRIMARY HYPERTENSION: ICD-10-CM

## 2024-07-23 RX ORDER — HYDROCHLOROTHIAZIDE 25 MG/1
25 TABLET ORAL DAILY
Qty: 90 TABLET | Refills: 1 | Status: SHIPPED | OUTPATIENT
Start: 2024-07-23

## 2024-07-29 ENCOUNTER — APPOINTMENT (EMERGENCY)
Dept: RADIOLOGY | Facility: HOSPITAL | Age: 74
End: 2024-07-29
Payer: MEDICARE

## 2024-07-29 ENCOUNTER — HOSPITAL ENCOUNTER (EMERGENCY)
Facility: HOSPITAL | Age: 74
Discharge: HOME | End: 2024-07-29
Attending: EMERGENCY MEDICINE
Payer: MEDICARE

## 2024-07-29 VITALS
RESPIRATION RATE: 21 BRPM | BODY MASS INDEX: 38.39 KG/M2 | TEMPERATURE: 97.9 F | DIASTOLIC BLOOD PRESSURE: 62 MMHG | OXYGEN SATURATION: 100 % | HEART RATE: 86 BPM | WEIGHT: 291 LBS | SYSTOLIC BLOOD PRESSURE: 90 MMHG

## 2024-07-29 DIAGNOSIS — M54.16 LUMBAR RADICULOPATHY, ACUTE: ICD-10-CM

## 2024-07-29 DIAGNOSIS — M19.90 DJD (DEGENERATIVE JOINT DISEASE): Primary | ICD-10-CM

## 2024-07-29 PROCEDURE — 99283 EMERGENCY DEPT VISIT LOW MDM: CPT

## 2024-07-29 PROCEDURE — 73502 X-RAY EXAM HIP UNI 2-3 VIEWS: CPT

## 2024-07-29 PROCEDURE — 73564 X-RAY EXAM KNEE 4 OR MORE: CPT

## 2024-07-29 PROCEDURE — 72100 X-RAY EXAM L-S SPINE 2/3 VWS: CPT

## 2024-07-29 PROCEDURE — 99285 EMERGENCY DEPT VISIT HI MDM: CPT | Performed by: PHYSICIAN ASSISTANT

## 2024-07-29 RX ORDER — IBUPROFEN 400 MG/1
400 TABLET ORAL EVERY 6 HOURS PRN
Qty: 30 TABLET | Refills: 0 | Status: SHIPPED | OUTPATIENT
Start: 2024-07-29

## 2024-07-29 RX ORDER — IBUPROFEN 600 MG/1
600 TABLET ORAL ONCE
Status: COMPLETED | OUTPATIENT
Start: 2024-07-29 | End: 2024-07-29

## 2024-07-29 RX ORDER — OXYCODONE HYDROCHLORIDE AND ACETAMINOPHEN 5; 325 MG/1; MG/1
1 TABLET ORAL ONCE
Status: COMPLETED | OUTPATIENT
Start: 2024-07-29 | End: 2024-07-29

## 2024-07-29 RX ORDER — OXYCODONE HYDROCHLORIDE AND ACETAMINOPHEN 5; 325 MG/1; MG/1
1 TABLET ORAL EVERY 6 HOURS PRN
Qty: 20 TABLET | Refills: 0 | Status: SHIPPED | OUTPATIENT
Start: 2024-07-29

## 2024-07-29 RX ADMIN — IBUPROFEN 600 MG: 600 TABLET, FILM COATED ORAL at 07:38

## 2024-07-29 RX ADMIN — OXYCODONE HYDROCHLORIDE AND ACETAMINOPHEN 1 TABLET: 5; 325 TABLET ORAL at 07:38

## 2024-07-29 NOTE — ED PROVIDER NOTES
"History  Chief Complaint   Patient presents with    Leg Pain     Bilateral leg pain for months, reports having \"attacks of horrible pains that start in hips and goes to the toes\". Denies injury/trauma. Last 5-6 days pain has been severe.      74 y.o. male with past medical history significant for hypertension, anemia, CAD, aortic aneurysm, and prior bilateral DAMARIS presents to ED with chief complaint of right hip pain that shoots down right leg  Onset of symptoms reported as 5-6 days ago  Location of symptoms reported as right hip radiating down leg to right foot  Quality is reported as sharp shooting severe pain  Severity is reported as moderate to severe  Associated symptoms: denies joint redness or swelling, denies fevers, denies abdominal pain, denies urinary retention, bowel or bladder incontinence or numbness/paraesthesias  Modifying factors: movement, weight bearing, bending and raising/lower from/to seated position exacerbate pain.    Tried OTC ibuprofen without relief.     Context: denies acute fall, injury or trauma.  Reports most of the pain radiates from the right hip to the right knee.  States symptoms got worse after mowing the lawn the other day.             History provided by:  Patient and spouse   used: No    Leg Pain  Associated symptoms: no back pain, no fatigue and no fever        Prior to Admission Medications   Prescriptions Last Dose Informant Patient Reported? Taking?   colchicine (COLCRYS) 0.6 mg tablet   No No   Sig: TAKE 1 TABLET BY MOUTH EVERY 3 HOURS IF NEEDED FOR GOUT   enalapril (VASOTEC) 20 mg tablet  Self No No   Sig: TAKE 1 TABLET BY MOUTH DAILY   ferrous sulfate 325 (65 Fe) mg tablet  Self Yes No   Sig: Take 325 mg by mouth every other day   finasteride (PROSCAR) 5 mg tablet  Self Yes No   Sig: Take 5 mg by mouth daily   hydrALAZINE (APRESOLINE) 50 mg tablet   No No   Sig: TAKE 1.5 TABLETS (75 MG TOTAL) BY MOUTH 3 TIMES A DAY   hydroCHLOROthiazide 25 mg tablet   " No No   Sig: Take 1 tablet (25 mg total) by mouth daily   ibuprofen (MOTRIN) 400 mg tablet  Self Yes No   Sig: Take 200 mg by mouth every 6 (six) hours as needed for mild pain (PRN.)   metoprolol succinate (TOPROL-XL) 100 mg 24 hr tablet   No No   Sig: Take 1 tablet (100 mg total) by mouth daily   pantoprazole (PROTONIX) 40 mg tablet  Self No No   Sig: TAKE 1 TABLET BY MOUTH DAILY BEFORE BREAKFAST   tamsulosin (FLOMAX) 0.4 mg  Self No No   Sig: Take 1 capsule (0.4 mg total) by mouth daily      Facility-Administered Medications: None       Past Medical History:   Diagnosis Date    Anemia     Aortic aneurysm (HCC)     BPH (benign prostatic hyperplasia)     Diverticulitis     Recurrent - 1990s    Elevated troponin     Heart attack (HCC) 2018    Heart disease     History of colon polyps     Onset 6/30/89    History of colonoscopy     6/5/13    History of esophagogastroduodenoscopy     6/5/13    History of rectal polyps     Onset 6/30/89    Hypertension     Hypokalemia     Morbid obesity (HCC)     Myocardial infarction (HCC)     Renal calculi     Seasonal allergies     Sleep apnea with use of continuous positive airway pressure (CPAP)     STEMI (ST elevation myocardial infarction) (HCC)        Past Surgical History:   Procedure Laterality Date    APPENDECTOMY  1974    ARTHROSCOPY KNEE Left     COLON SURGERY  08/01/1996    Sigmoid colectomy for diverticulitis    COLONOSCOPY  06/05/2013    COLOSTOMY      CYST REMOVAL  2008    FLEXIBLE SIGMOIDOSCOPY  11/02/1996    HERNIA REPAIR      IR DRAINAGE TUBE CHECK/CHANGE/REPOSITION/REINSERTION/UPSIZE  10/30/2023    IR DRAINAGE TUBE PLACEMENT  10/16/2023    OTHER SURGICAL HISTORY  09/30/1996    Resection of small bowel fistula/resection of injured distal ileum secondary to enterolysis of adhesions    REVISION COLOSTOMY  02/24/1997    ROTATOR CUFF REPAIR Left     2004 & 2009    SATURATION BIOPSY OF PROSTATE      SHOULDER SURGERY  2001    Benign lump    TONSILLECTOMY  1959    TOTAL HIP  ARTHROPLASTY Bilateral     2014 - L, 2016 - R / Last Assessed:5/8/15       Family History   Problem Relation Age of Onset    Depression Mother     COPD Mother     Breast cancer Mother         malignant neoplasm    Lung cancer Mother     Other Father         thoracic aortic aneurysm    Prostate cancer Father     Colon polyps Father     Colon cancer Brother     Diabetes Family     Colon cancer Family      I have reviewed and agree with the history as documented.    E-Cigarette/Vaping    E-Cigarette Use Never User      E-Cigarette/Vaping Substances    Nicotine No     THC No     CBD No     Flavoring No     Other No     Unknown No      Social History     Tobacco Use    Smoking status: Never    Smokeless tobacco: Never   Vaping Use    Vaping status: Never Used   Substance Use Topics    Alcohol use: Not Currently     Comment: social    Drug use: Never       Review of Systems   Constitutional:  Negative for chills, diaphoresis, fatigue, fever and unexpected weight change.   Respiratory:  Negative for chest tightness, shortness of breath and stridor.    Cardiovascular:  Negative for chest pain.   Gastrointestinal:  Negative for abdominal distention, abdominal pain, nausea and vomiting.   Genitourinary:  Negative for decreased urine volume, difficulty urinating, dysuria, flank pain, frequency and hematuria.   Musculoskeletal:  Positive for arthralgias. Negative for back pain and joint swelling.   Skin:  Negative for rash.   Neurological:  Negative for seizures, syncope, facial asymmetry, weakness and numbness.   All other systems reviewed and are negative.      Physical Exam  Physical Exam  Vitals and nursing note reviewed.   Constitutional:       General: He is not in acute distress.     Appearance: Normal appearance.   HENT:      Head: Normocephalic and atraumatic.      Right Ear: External ear normal.      Left Ear: External ear normal.      Nose: Nose normal.   Eyes:      General: No scleral icterus.        Right eye: No  discharge.         Left eye: No discharge.      Conjunctiva/sclera: Conjunctivae normal.   Cardiovascular:      Rate and Rhythm: Normal rate and regular rhythm.      Pulses: Normal pulses.   Pulmonary:      Effort: Pulmonary effort is normal. No respiratory distress.   Abdominal:      Palpations: There is no mass.      Hernia: No hernia is present.   Musculoskeletal:         General: No tenderness, deformity or signs of injury.      Cervical back: Normal range of motion and neck supple.      Lumbar back: No tenderness or bony tenderness. Normal range of motion. No scoliosis.      Right hip: No deformity, lacerations, tenderness or bony tenderness. Normal strength.      Right knee: No deformity, erythema, ecchymosis or bony tenderness. Decreased range of motion. Normal alignment. Normal pulse.      Right ankle: Normal.   Skin:     Capillary Refill: Capillary refill takes less than 2 seconds.      Coloration: Skin is not jaundiced or pale.      Findings: No erythema or rash.   Neurological:      General: No focal deficit present.      Mental Status: He is alert and oriented to person, place, and time. Mental status is at baseline.      Cranial Nerves: No cranial nerve deficit.      Sensory: No sensory deficit.      Motor: No weakness.   Psychiatric:         Mood and Affect: Mood normal.         Behavior: Behavior normal.         Thought Content: Thought content normal.         Judgment: Judgment normal.         Vital Signs  ED Triage Vitals   Temperature Pulse Respirations Blood Pressure SpO2   07/29/24 0703 07/29/24 0703 07/29/24 0703 07/29/24 0703 07/29/24 0703   97.9 °F (36.6 °C) 86 21 90/62 100 %      Temp Source Heart Rate Source Patient Position - Orthostatic VS BP Location FiO2 (%)   07/29/24 0703 07/29/24 0703 07/29/24 0703 07/29/24 0703 --   Temporal Monitor Sitting Left arm       Pain Score       07/29/24 0738       8           Vitals:    07/29/24 0703   BP: 90/62   Pulse: 86   Patient Position - Orthostatic  VS: Sitting         Visual Acuity      ED Medications  Medications   oxyCODONE-acetaminophen (PERCOCET) 5-325 mg per tablet 1 tablet (1 tablet Oral Given 7/29/24 0738)   ibuprofen (MOTRIN) tablet 600 mg (600 mg Oral Given 7/29/24 0738)       Diagnostic Studies  Results Reviewed       None                   XR hip/pelv 2-3 vws right    (Results Pending)   XR knee 4+ views Right injury    (Results Pending)   XR lumbar spine 2 or 3 views    (Results Pending)              Procedures  Procedures         ED Course  ED Course as of 07/29/24 0837   Mon Jul 29, 2024   0812 XR lumbar spine ordered and interpreted by me.  My initial interpretation:  osteophytes present at multiple levels, no acute vertebral compression fracture,   0830 Re-evaluation:  pain improved after analgesics given in ED.                                    SBIRT 20yo+      Flowsheet Row Most Recent Value   Initial Alcohol Screen: US AUDIT-C     1. How often do you have a drink containing alcohol? 0 Filed at: 07/29/2024 0705   2. How many drinks containing alcohol do you have on a typical day you are drinking?  0 Filed at: 07/29/2024 0705   3a. Male UNDER 65: How often do you have five or more drinks on one occasion? 0 Filed at: 07/29/2024 0705   Audit-C Score 0 Filed at: 07/29/2024 0705   JIMENA: How many times in the past year have you...    Used an illegal drug or used a prescription medication for non-medical reasons? Never Filed at: 07/29/2024 0705                      Medical Decision Making  MDM:     Based on my history and physical examination, I considered the following life or limb threatening disease(s) in my Differential Diagnosis: suspect lumbar DJD with radiculopathy, consider AAA, epidural abscess, cauda equina, epidural hematoma, discitis, pyelonephritis, obstructing kidney stone, cancer, compression fracture.  Additional ddx include: musculoskeletal pain, disc herniation, muscle spasm, spondylosis, OA, RA, compression fracture, occult  fracture, overuse/repetitive use injury    Patient has been medically screened for potential limb- or life-threatening conditions that may lead to permanent organ injury or dysfunctionan. Initial history raises concern for an acute emergent process.  Patient evaluated for indications to pursue advanced imaging. XR R hip, knee and lumbar spine ordered    Based on my history and clinical evaluation I will order the appropriate diagnostic testing to narrow my differential diagnosis and arrive at a final diagnosis. I have reviewed the patient's vital signs, nursing notes, and other relevant ancillary testing/information. I have had a detailed discussion with the patient regarding the historical points, examination findings, and any diagnostic results     Patient meets Back pain low risk criteria, no fever chills or weight loss, no neurological deficit, no history of cancer, no history of injecting drugs, pain improved with rest.    No red flag back pain signs/symptoms on exam:  Clinical exam is reassuring.  Pain improved with rest,  Pain relieved by lying flat, worse with bending/twisthing.  No swelling to low back or lower extremities, no pulsations in leg or thigh.   No severe weakness or loss of sensation to low back, lower extremities, perineum, or genitals. No fevers, chills, nights sweats or unexplained weight loss.  No change in color of the skin over the legs or feet (vascular insufficiency) or unhealing lower extremity wounds.  No partial or total loss of bladder and/or bowel control, no difficulty in passing urine or having a bowel movement, no hematuria.  Pain not severe or intractable in ED.      Given the large differential diagnosis for this patient, the decision making in this case is of high complexity.    Final Assessment (see ED course for additional MDM):  XR lumbar spine demonstrated no vertebral compression fracture, but significant DJD.   XR rigth knee demonstrated no fracture but significant DJD,  XR R hip demonstrated stable R total hip arthroplasty with no periprosthetic fracture or dislocation.     Dx DJD lumbar spine with Right sided lumbar radiculopathy, Arthralgia/DJD right knee, stable right DAMARIS    -Past Medical History, Surgical History, Social History, Medications and allergies reviewed   -No red flag back pain signs or symptoms.    -MRI not indicated.   -No Admission Criteria Present:    -Patient without extreme pain refractory to medical treatment.      Tx:  -  Will treat for relief of acute symptoms, but discussed does not offer long lasting benefit.   -  Discussed use of NSAIDs, Tylenol and prescribed medications for Pain if no contraindication.    -  Narcotics for severe pain. Standard narcotic precautions given.    -  Discussed rest, ice, avoidance of aggravating activities  -  Reviewed diagnosis of lumbar radiculopathy/Patient education performed.    -  Instructed to follow up with pcp and orthopedics for further evaluation   -  I discussed diagnosis and treatment plan with patient at bedside.    -  Extended discussion with patient regarding the diagnosis, pathophysiology, expectant coarse and treatment plan.    -  Reviewed reasons to return to ed including but not limited to urinary retention, bowel or bladder incontinence, fevers of 100.4 F or higher, lower extremity weakness/paralysis, worsening pain or any other worsening or worrisome symptoms.    -  Patient verbalized understanding and agreement of same.  -  Patient verbalized understanding of diagnosis and agreement with discharge plan of care as well as understanding of reasons to return to ed.        Amount and/or Complexity of Data Reviewed  Radiology: ordered.    Risk  Prescription drug management.                 Disposition  Final diagnoses:   DJD (degenerative joint disease)   Lumbar radiculopathy, acute     Time reflects when diagnosis was documented in both MDM as applicable and the Disposition within this note       Time User  Action Codes Description Comment    7/29/2024  8:14 AM Alexander Hong Add [M19.90] DJD (degenerative joint disease)     7/29/2024  8:15 AM Alexander Hong Add [M54.16] Lumbar radiculopathy, acute           ED Disposition       ED Disposition   Discharge    Condition   Stable    Date/Time   Mon Jul 29, 2024 0814    Comment   Lobo Don discharge to home/self care.                   Follow-up Information       Follow up With Specialties Details Why Contact Info Additional Information    Yoan Toribio MD Family Medicine Call in 1 week for further evaluation of symptoms [address]   [address]  [facility] [address]  [phone]       [facility] Emergency Department Emergency Medicine Go to  If symptoms worsen [address] [facility] [id number] [phone] [facility] Emergency Department, [address] [place], [place], [address]    Kevin Dempsey DO Orthopedic Surgery Call in 3 days for further evaluation of symptoms [address] [facility]  [address]  [facility] [address]  [phone]               Patient's Medications   Discharge Prescriptions    IBUPROFEN (MOTRIN) 400 MG TABLET    Take 1 tablet (400 mg total) by mouth every 6 (six) hours as needed for moderate pain (back/leg pain/initial rx.)       Start Date: 7/29/2024 End Date: --       Order Dose: 400 mg       Quantity: 30 tablet    Refills: 0    OXYCODONE-ACETAMINOPHEN (PERCOCET) 5-325 MG PER TABLET    Take 1 tablet by mouth every 6 (six) hours as needed for severe pain (back/leg pain/ongoing rx) Label no driving no etoh. Initial rx.  Dx: Max Daily Amount: 4 tablets       Start Date: 7/29/2024 End Date: --       Order Dose: 1 tablet       Quantity: 20 tablet    Refills: 0       No discharge procedures on file.    PDMP Review       None            ED Provider  Electronically Signed by             Alexander Hong PA-C  07/29/24 0837

## 2024-08-01 ENCOUNTER — TELEPHONE (OUTPATIENT)
Dept: OBGYN CLINIC | Facility: MEDICAL CENTER | Age: 74
End: 2024-08-01

## 2024-08-01 NOTE — TELEPHONE ENCOUNTER
Caller: Jasiel     Doctor: DR Dempsey    Reason for call: The patient called and asked to be seen by Dr Dempsey. He was referred to him by the ED.  He would like to see seen for his right hip/down into right leg pain. He did have a right hip replacement back in 2018 with [facility] ( at the time). New xrays in Epic.  Checking to make sure , due to the prior hip replacement, it is okay for him to be seen.  Thank you .    An appointment was made for him on 8/20.    Call back#: [phone]

## 2024-08-15 DIAGNOSIS — M54.16 LUMBAR RADICULOPATHY, ACUTE: ICD-10-CM

## 2024-08-15 DIAGNOSIS — M10.9 GOUT, UNSPECIFIED CAUSE, UNSPECIFIED CHRONICITY, UNSPECIFIED SITE: ICD-10-CM

## 2024-08-15 RX ORDER — COLCHICINE 0.6 MG/1
TABLET ORAL
Qty: 30 TABLET | Refills: 0 | Status: SHIPPED | OUTPATIENT
Start: 2024-08-15

## 2024-08-16 ENCOUNTER — APPOINTMENT (OUTPATIENT)
Age: 74
End: 2024-08-16
Payer: MEDICARE

## 2024-08-16 DIAGNOSIS — D50.9 IRON DEFICIENCY ANEMIA, UNSPECIFIED IRON DEFICIENCY ANEMIA TYPE: ICD-10-CM

## 2024-08-16 DIAGNOSIS — E78.2 MIXED HYPERLIPIDEMIA: ICD-10-CM

## 2024-08-16 DIAGNOSIS — M10.9 GOUT, UNSPECIFIED CAUSE, UNSPECIFIED CHRONICITY, UNSPECIFIED SITE: ICD-10-CM

## 2024-08-16 LAB
ALBUMIN SERPL BCG-MCNC: 3.7 G/DL (ref 3.5–5)
ALP SERPL-CCNC: 75 U/L (ref 34–104)
ALT SERPL W P-5'-P-CCNC: 19 U/L (ref 7–52)
ANION GAP SERPL CALCULATED.3IONS-SCNC: 11 MMOL/L (ref 4–13)
AST SERPL W P-5'-P-CCNC: 17 U/L (ref 13–39)
BASOPHILS # BLD AUTO: 0.05 THOUSANDS/ÂΜL (ref 0–0.1)
BASOPHILS NFR BLD AUTO: 1 % (ref 0–1)
BILIRUB SERPL-MCNC: 0.49 MG/DL (ref 0.2–1)
BUN SERPL-MCNC: 23 MG/DL (ref 5–25)
CALCIUM SERPL-MCNC: 8.9 MG/DL (ref 8.4–10.2)
CHLORIDE SERPL-SCNC: 103 MMOL/L (ref 96–108)
CHOLEST SERPL-MCNC: 149 MG/DL
CO2 SERPL-SCNC: 26 MMOL/L (ref 21–32)
CREAT SERPL-MCNC: 1.06 MG/DL (ref 0.6–1.3)
EOSINOPHIL # BLD AUTO: 0.21 THOUSAND/ÂΜL (ref 0–0.61)
EOSINOPHIL NFR BLD AUTO: 3 % (ref 0–6)
ERYTHROCYTE [DISTWIDTH] IN BLOOD BY AUTOMATED COUNT: 13.6 % (ref 11.6–15.1)
GFR SERPL CREATININE-BSD FRML MDRD: 69 ML/MIN/1.73SQ M
GLUCOSE P FAST SERPL-MCNC: 93 MG/DL (ref 65–99)
HCT VFR BLD AUTO: 35.2 % (ref 36.5–49.3)
HDLC SERPL-MCNC: 24 MG/DL
HGB BLD-MCNC: 11.2 G/DL (ref 12–17)
IMM GRANULOCYTES # BLD AUTO: 0.03 THOUSAND/UL (ref 0–0.2)
IMM GRANULOCYTES NFR BLD AUTO: 1 % (ref 0–2)
IRON SERPL-MCNC: 43 UG/DL (ref 50–212)
LDLC SERPL CALC-MCNC: 97 MG/DL (ref 0–100)
LYMPHOCYTES # BLD AUTO: 1.27 THOUSANDS/ÂΜL (ref 0.6–4.47)
LYMPHOCYTES NFR BLD AUTO: 19 % (ref 14–44)
MCH RBC QN AUTO: 26.7 PG (ref 26.8–34.3)
MCHC RBC AUTO-ENTMCNC: 31.8 G/DL (ref 31.4–37.4)
MCV RBC AUTO: 84 FL (ref 82–98)
MONOCYTES # BLD AUTO: 0.76 THOUSAND/ÂΜL (ref 0.17–1.22)
MONOCYTES NFR BLD AUTO: 12 % (ref 4–12)
NEUTROPHILS # BLD AUTO: 4.29 THOUSANDS/ÂΜL (ref 1.85–7.62)
NEUTS SEG NFR BLD AUTO: 64 % (ref 43–75)
NONHDLC SERPL-MCNC: 125 MG/DL
NRBC BLD AUTO-RTO: 0 /100 WBCS
PLATELET # BLD AUTO: 316 THOUSANDS/UL (ref 149–390)
PMV BLD AUTO: 10.1 FL (ref 8.9–12.7)
POTASSIUM SERPL-SCNC: 3.9 MMOL/L (ref 3.5–5.3)
PROT SERPL-MCNC: 7.2 G/DL (ref 6.4–8.4)
RBC # BLD AUTO: 4.2 MILLION/UL (ref 3.88–5.62)
SODIUM SERPL-SCNC: 140 MMOL/L (ref 135–147)
TRIGL SERPL-MCNC: 138 MG/DL
URATE SERPL-MCNC: 8.4 MG/DL (ref 3.5–8.5)
WBC # BLD AUTO: 6.61 THOUSAND/UL (ref 4.31–10.16)

## 2024-08-16 PROCEDURE — 80061 LIPID PANEL: CPT

## 2024-08-16 PROCEDURE — 80053 COMPREHEN METABOLIC PANEL: CPT

## 2024-08-16 PROCEDURE — 84550 ASSAY OF BLOOD/URIC ACID: CPT

## 2024-08-16 PROCEDURE — 36415 COLL VENOUS BLD VENIPUNCTURE: CPT

## 2024-08-16 PROCEDURE — 83540 ASSAY OF IRON: CPT

## 2024-08-16 PROCEDURE — 85025 COMPLETE CBC W/AUTO DIFF WBC: CPT

## 2024-08-16 NOTE — TELEPHONE ENCOUNTER
Medication:  PDMP   [id number] 07/29/2024 07/29/2024 oxyCODONE HYDROCHLORIDE 5 MG ORAL TABLET/ACETAMINOPHEN 325 MG (Tablet) 20.0 4 325 MG-5 MG 37.50 MARLYN MEJIA [facility] Medicare 0 / 0 [place]        Active agreement on file -No

## 2024-08-18 RX ORDER — OXYCODONE AND ACETAMINOPHEN 5; 325 MG/1; MG/1
1 TABLET ORAL EVERY 6 HOURS PRN
Qty: 20 TABLET | Refills: 0 | Status: SHIPPED | OUTPATIENT
Start: 2024-08-18

## 2024-08-20 ENCOUNTER — OFFICE VISIT (OUTPATIENT)
Dept: OBGYN CLINIC | Facility: CLINIC | Age: 74
End: 2024-08-20
Payer: MEDICARE

## 2024-08-20 VITALS
SYSTOLIC BLOOD PRESSURE: 114 MMHG | WEIGHT: 275 LBS | HEIGHT: 73 IN | HEART RATE: 62 BPM | DIASTOLIC BLOOD PRESSURE: 78 MMHG | BODY MASS INDEX: 36.45 KG/M2

## 2024-08-20 DIAGNOSIS — M51.36 DEGENERATIVE DISC DISEASE, LUMBAR: Primary | ICD-10-CM

## 2024-08-20 DIAGNOSIS — M17.11 PRIMARY OSTEOARTHRITIS OF RIGHT KNEE: ICD-10-CM

## 2024-08-20 PROCEDURE — 99203 OFFICE O/P NEW LOW 30 MIN: CPT | Performed by: ORTHOPAEDIC SURGERY

## 2024-08-20 RX ORDER — AMOXICILLIN 500 MG/1
CAPSULE ORAL
COMMUNITY
Start: 2024-08-13 | End: 2024-08-28 | Stop reason: ALTCHOICE

## 2024-08-20 NOTE — PROGRESS NOTES
Patient Name:  Lobo Don  MRN:  [id number]    Assessment & Plan     1. Degenerative disc disease, lumbar  -     Ambulatory referral to Physical Therapy; Future  2. Primary osteoarthritis of right knee  -     Ambulatory referral to Physical Therapy; Future    Lumbar degenerative disc disease with lower extremity radicular symptoms with interval resolution  X-rays reviewed and discussed with the patient in the office today  Non operative treatment options were discussed including formal physical therapy and home exercise program.    Prescription placed for formal physical therapy.  We discussed monitoring of symptoms and potential further diagnostic imaging in the form of MRI versus referral to spine pain management if symptoms return.  We also discussed the possibility of weightbearing x-rays of bilateral knees and injection therapy if knee pain returns.  I will see the patient back on as-needed basis.    Chief Complaint     Bilateral lower extremity pain and weakness    History of the Present Illness     Lobo Don is a 74 y.o. male with bilateral lower extremity pain and weakness that began in the spring after doing yard work.  Patient began using a cane secondary to weakness.  Pain was worse in the right lower extremity than the left.  He does report intermittent pain shooting down to his right foot.  He also notes an intermittent ache in his bilateral knees.  He does have a history of gout which has affected several joints including his bilateral knees.  He was seen in the ER on 7/29/2024 secondary to persistent pain and weakness in bilateral lower extremities, right greater than left.  He has a history of bilateral hip replacement approximately 10 years ago and is doing well in regards to his hips.  He does note left knee arthroscopy approximately 20 years ago.  He states that over the past few days his pain has significantly improved and he notes improved strength.  Symptoms are nearly fully  "resolved.  He denies any loss of bladder or bowel control.  He denies any discrete numbness but did have a short period of numbness in his right leg a few weeks ago.    Review of Systems     Review of Systems   Constitutional:  Negative for appetite change and unexpected weight change.   HENT:  Negative for congestion and trouble swallowing.    Eyes:  Negative for visual disturbance.   Respiratory:  Negative for cough and shortness of breath.    Cardiovascular:  Negative for chest pain and palpitations.   Gastrointestinal:  Negative for nausea and vomiting.   Endocrine: Negative for cold intolerance and heat intolerance.   Musculoskeletal:  Negative for gait problem and myalgias.   Skin:  Negative for rash.   Neurological:  Negative for numbness.       Physical Exam     /78   Pulse 62   Ht 6' 1\" (1.854 m)   Wt 125 kg (275 lb)   BMI 36.28 kg/m²     Lumbar spine  There is no midline tenderness present.    There is no paraspinal tenderness.    Sensation intact to light touch left L2 through S1 dermatomes.   Sensation intact to light touch right L2 through S1 dermatomes   Left Motor: 5/5 iliopsoas, 5/5 quadriceps, 5/5 tibialis anterior, 5/5 extensor hallucis longus, and 5/5 gastrocsoleus.   Right Motor: 5/5 iliopsoas, 5/5 quadriceps, 5/5 tibialis anterior, 5/5 extensor hallucis longus, and 5/5 gastrocsoleus.   Negative clonus bilaterally.    Negative Babinski bilaterally  Straight leg raise test is negative Bilateral   The patient is well perfused distally.     Eyes:  Anicteric sclerae.  Neck:  Supple.  Lungs:  Normal respiratory effort.  Cardiovascular:  Capillary refill is less than 2 seconds.  Skin:  Intact without erythema.  Neurologic:  Sensation grossly intact to light touch.  Psychiatric:  Mood and affect are appropriate.    Data Review     I have personally reviewed pertinent films in PACS, and my interpretation follows:    X-rays of the lumbar spine from 7/29/2024 independently reviewed and display no " fracture or dislocation.  Multilevel degenerative changes present with disc space narrowing and osteophyte formation.    X-rays of pelvis and right hip from 7/29/2024 displays no fracture or dislocation.  Well-fixed total hip arthroplasty prosthesis present without sign of loosening.    Nonweightbearing right knee x-rays taken 7/29/2024 independently reviewed and display no fracture or dislocation.  Medial compartment degenerative changes present with mild joint space narrowing and osteophyte formation.    Past Medical History:   Diagnosis Date    Anemia     Aortic aneurysm (HCC)     BPH (benign prostatic hyperplasia)     Diverticulitis     Recurrent - 1990s    Elevated troponin     Heart attack (HCC) 2018    Heart disease     History of colon polyps     Onset 6/30/89    History of colonoscopy     6/5/13    History of esophagogastroduodenoscopy     6/5/13    History of rectal polyps     Onset 6/30/89    Hypertension     Hypokalemia     Morbid obesity (HCC)     Myocardial infarction (HCC)     Renal calculi     Seasonal allergies     Sleep apnea with use of continuous positive airway pressure (CPAP)     STEMI (ST elevation myocardial infarction) ([facility])        Past Surgical History:   Procedure Laterality Date    APPENDECTOMY  1974    ARTHROSCOPY KNEE Left     COLON SURGERY  08/01/1996    Sigmoid colectomy for diverticulitis    COLONOSCOPY  06/05/2013    COLOSTOMY      CYST REMOVAL  2008    FLEXIBLE SIGMOIDOSCOPY  11/02/1996    HERNIA REPAIR      IR DRAINAGE TUBE CHECK/CHANGE/REPOSITION/REINSERTION/UPSIZE  10/30/2023    IR DRAINAGE TUBE PLACEMENT  10/16/2023    JOINT REPLACEMENT      OTHER SURGICAL HISTORY  09/30/1996    Resection of small bowel fistula/resection of injured distal ileum secondary to enterolysis of adhesions    REVISION COLOSTOMY  02/24/1997    ROTATOR CUFF REPAIR Left     2004 & 2009    SATURATION BIOPSY OF PROSTATE      SHOULDER SURGERY  2001    Benign lump    TONSILLECTOMY  1959    TOTAL HIP  ARTHROPLASTY Bilateral     2014 - L, 2016 - R / Last Assessed:5/8/15       Allergies   Allergen Reactions    Lorazepam Other (See Comments)     very agitated  Psychosis       Current Outpatient Medications on File Prior to Visit   Medication Sig Dispense Refill    amoxicillin (AMOXIL) 500 mg capsule TAKE 4 CAPS BY MOUTH 1 HOUR PRIOR      colchicine (COLCRYS) 0.6 mg tablet take 1 tablet by mouth every 3 hours if needed for gout 30 tablet 0    enalapril (VASOTEC) 20 mg tablet TAKE 1 TABLET BY MOUTH DAILY 90 tablet 3    ferrous sulfate 325 (65 Fe) mg tablet Take 325 mg by mouth every other day      finasteride (PROSCAR) 5 mg tablet Take 5 mg by mouth daily      hydrALAZINE (APRESOLINE) 50 mg tablet TAKE 1.5 TABLETS (75 MG TOTAL) BY MOUTH 3 TIMES A  tablet 1    hydroCHLOROthiazide 25 mg tablet Take 1 tablet (25 mg total) by mouth daily 90 tablet 1    ibuprofen (MOTRIN) 400 mg tablet Take 1 tablet (400 mg total) by mouth every 6 (six) hours as needed for moderate pain (back/leg pain/initial rx.) 30 tablet 0    metoprolol succinate (TOPROL-XL) 100 mg 24 hr tablet Take 1 tablet (100 mg total) by mouth daily 90 tablet 1    oxyCODONE-acetaminophen (PERCOCET) 5-325 mg per tablet Take 1 tablet by mouth every 6 (six) hours as needed for severe pain (back/leg pain/ongoing rx) Label no driving no etoh. Initial rx.  Dx: Max Daily Amount: 4 tablets 20 tablet 0    tamsulosin (FLOMAX) 0.4 mg Take 1 capsule (0.4 mg total) by mouth daily 90 capsule 0    [DISCONTINUED] ibuprofen (MOTRIN) 400 mg tablet Take 200 mg by mouth every 6 (six) hours as needed for mild pain (PRN.)      [DISCONTINUED] pantoprazole (PROTONIX) 40 mg tablet TAKE 1 TABLET BY MOUTH DAILY BEFORE BREAKFAST 90 tablet 1     No current facility-administered medications on file prior to visit.       Social History     Tobacco Use    Smoking status: Never    Smokeless tobacco: Never   Vaping Use    Vaping status: Never Used   Substance Use Topics    Alcohol use: Not  Currently     Comment: social    Drug use: Never       Family History   Problem Relation Age of Onset    Depression Mother     COPD Mother     Breast cancer Mother         malignant neoplasm    Lung cancer Mother     Other Father         thoracic aortic aneurysm    Prostate cancer Father     Colon polyps Father     Colon cancer Brother     Diabetes Family     Colon cancer Family              Procedures Performed     Procedures        Kevin Dempsey DO

## 2024-08-22 ENCOUNTER — RA CDI HCC (OUTPATIENT)
Dept: OTHER | Facility: HOSPITAL | Age: 74
End: 2024-08-22

## 2024-08-28 ENCOUNTER — OFFICE VISIT (OUTPATIENT)
Dept: FAMILY MEDICINE CLINIC | Facility: CLINIC | Age: 74
End: 2024-08-28
Payer: MEDICARE

## 2024-08-28 ENCOUNTER — APPOINTMENT (OUTPATIENT)
Dept: LAB | Facility: HOSPITAL | Age: 74
End: 2024-08-28
Payer: MEDICARE

## 2024-08-28 VITALS
BODY MASS INDEX: 35.78 KG/M2 | TEMPERATURE: 97.9 F | WEIGHT: 270 LBS | SYSTOLIC BLOOD PRESSURE: 112 MMHG | OXYGEN SATURATION: 98 % | DIASTOLIC BLOOD PRESSURE: 62 MMHG | HEART RATE: 88 BPM | HEIGHT: 73 IN

## 2024-08-28 DIAGNOSIS — R35.0 BENIGN PROSTATIC HYPERPLASIA WITH URINARY FREQUENCY: ICD-10-CM

## 2024-08-28 DIAGNOSIS — I71.21 ANEURYSM OF ASCENDING AORTA WITHOUT RUPTURE (HCC): ICD-10-CM

## 2024-08-28 DIAGNOSIS — Z00.00 MEDICARE ANNUAL WELLNESS VISIT, SUBSEQUENT: Primary | ICD-10-CM

## 2024-08-28 DIAGNOSIS — M19.90 ARTHRITIS: ICD-10-CM

## 2024-08-28 DIAGNOSIS — E78.2 MIXED HYPERLIPIDEMIA: ICD-10-CM

## 2024-08-28 DIAGNOSIS — M54.16 LUMBAR RADICULOPATHY: ICD-10-CM

## 2024-08-28 DIAGNOSIS — M10.9 GOUT, UNSPECIFIED CAUSE, UNSPECIFIED CHRONICITY, UNSPECIFIED SITE: ICD-10-CM

## 2024-08-28 DIAGNOSIS — K57.90 DIVERTICULOSIS: ICD-10-CM

## 2024-08-28 DIAGNOSIS — I10 PRIMARY HYPERTENSION: Chronic | ICD-10-CM

## 2024-08-28 DIAGNOSIS — L03.115 CELLULITIS OF RIGHT LOWER EXTREMITY: ICD-10-CM

## 2024-08-28 DIAGNOSIS — D50.0 IRON DEFICIENCY ANEMIA DUE TO CHRONIC BLOOD LOSS: ICD-10-CM

## 2024-08-28 DIAGNOSIS — N40.1 BENIGN PROSTATIC HYPERPLASIA WITH URINARY FREQUENCY: ICD-10-CM

## 2024-08-28 DIAGNOSIS — E66.01 CLASS 3 SEVERE OBESITY DUE TO EXCESS CALORIES WITH SERIOUS COMORBIDITY AND BODY MASS INDEX (BMI) OF 40.0 TO 44.9 IN ADULT (HCC): ICD-10-CM

## 2024-08-28 DIAGNOSIS — I35.0 NONRHEUMATIC AORTIC VALVE STENOSIS: ICD-10-CM

## 2024-08-28 DIAGNOSIS — G47.33 OBSTRUCTIVE SLEEP APNEA: ICD-10-CM

## 2024-08-28 DIAGNOSIS — M51.36 DDD (DEGENERATIVE DISC DISEASE), LUMBAR: ICD-10-CM

## 2024-08-28 PROBLEM — M51.369 DDD (DEGENERATIVE DISC DISEASE), LUMBAR: Status: ACTIVE | Noted: 2024-08-28

## 2024-08-28 LAB
ANA SER QL IA: NEGATIVE
B BURGDOR IGG SERPL QL IA: NEGATIVE
B BURGDOR IGG+IGM SER QL IA: POSITIVE
B BURGDOR IGM SERPL QL IA: NEGATIVE
CRP SERPL QL: 25.8 MG/L
ERYTHROCYTE [SEDIMENTATION RATE] IN BLOOD: 68 MM/HOUR (ref 0–19)
URATE SERPL-MCNC: 10.2 MG/DL (ref 3.5–8.5)

## 2024-08-28 PROCEDURE — 86430 RHEUMATOID FACTOR TEST QUAL: CPT

## 2024-08-28 PROCEDURE — 86617 LYME DISEASE ANTIBODY: CPT

## 2024-08-28 PROCEDURE — 86618 LYME DISEASE ANTIBODY: CPT

## 2024-08-28 PROCEDURE — G0439 PPPS, SUBSEQ VISIT: HCPCS | Performed by: FAMILY MEDICINE

## 2024-08-28 PROCEDURE — 36415 COLL VENOUS BLD VENIPUNCTURE: CPT

## 2024-08-28 PROCEDURE — 84550 ASSAY OF BLOOD/URIC ACID: CPT

## 2024-08-28 PROCEDURE — 85652 RBC SED RATE AUTOMATED: CPT

## 2024-08-28 PROCEDURE — 86140 C-REACTIVE PROTEIN: CPT

## 2024-08-28 PROCEDURE — 86038 ANTINUCLEAR ANTIBODIES: CPT

## 2024-08-28 PROCEDURE — 99214 OFFICE O/P EST MOD 30 MIN: CPT | Performed by: FAMILY MEDICINE

## 2024-08-28 RX ORDER — PREDNISONE 10 MG/1
TABLET ORAL
Qty: 30 TABLET | Refills: 0 | Status: SHIPPED | OUTPATIENT
Start: 2024-08-28

## 2024-08-28 RX ORDER — SENNOSIDES 8.6 MG
250 CAPSULE ORAL EVERY 8 HOURS PRN
COMMUNITY

## 2024-08-28 NOTE — PROGRESS NOTES
Ambulatory Visit  Name: Lobo Don      : 1950      MRN: [id number]  Encounter Provider: Yoan Toribio MD  Encounter Date: 2024   Encounter department: [facility] [address]  Return visit in 2 weeks  Assessment & Plan   1. Medicare annual wellness visit, subsequent  2. Primary hypertension  Assessment & Plan:  Continue enalapril 20 mg and Toprol- mg daily also hydralazine 50 mg 3 times daily.  3. Nonrheumatic aortic valve stenosis  4. Aneurysm of ascending aorta without rupture ([facility])  5. Obstructive sleep apnea  6. Benign prostatic hyperplasia with urinary frequency  7. Class 3 severe obesity due to excess calories with serious comorbidity and body mass index (BMI) of 40.0 to 44.9 in adult ([facility])  8. Mixed hyperlipidemia  9. DDD (degenerative disc disease), lumbar  -     MRI lumbar spine wo contrast; Future; Expected date: 2024  10. Arthritis  -     Sedimentation rate, automated; Future  -     Uric acid; Future  -     ZAIDA Screen w/ Reflex to Titer/Pattern; Future  -     RF Screen w/ Reflex to Titer; Future  -     C-reactive protein; Future  -     Lyme Total AB W Reflex to IGM/IGG; Future  -     predniSONE 10 mg tablet; 4 daily x3, 3 dailyx3, 2 dailyx3, 1 daily x3  11. Lumbar radiculopathy  -     MRI lumbar spine wo contrast; Future; Expected date: 2024  12. Diverticulosis  13. Iron deficiency anemia due to chronic blood loss  Assessment & Plan:  Continue ferrous sulfate 325 mg daily.  This is most likely due to diverticulosis.  He had full GI workup in the past.  14. Gout, unspecified cause, unspecified chronicity, unspecified site  Assessment & Plan:  Continue colchicine as needed  15. Cellulitis of right lower extremity  Assessment & Plan:  Continue Flomax 0.4 mg daily and finasteride 5 mg daily.       Preventive health issues were discussed with patient, and age appropriate screening tests were ordered as noted in patient's After Visit  Summary. Personalized health advice and appropriate referrals for health education or preventive services given if needed, as noted in patient's After Visit Summary.    History of Present Illness     Patient comes in for checkup.  He has had a 2-month problem of achiness in his legs and knees.  X-rays have shown him to have lumbar degenerative disc disease and osteoarthritis of his left knee.  He is seeing a chiropractor and doing physical therapy with mild relief.  He is not taking NSAIDs due to history of thoracic aneurysm and taking Tylenol as needed for pain.  He also was given a prescription for Percocet which he uses occasionally.       Patient Care Team:  oYan Toribio MD as PCP - General  Juan Gonzalez MD    Review of Systems   Constitutional: Negative.    Respiratory: Negative.     Cardiovascular: Negative.    Gastrointestinal: Negative.    Musculoskeletal:  Positive for arthralgias.     Medical History Reviewed by provider this encounter:  Tobacco  Allergies  Meds  Problems  Med Hx  Surg Hx  Fam Hx       Annual Wellness Visit Questionnaire   Lobo is here for his Subsequent Wellness visit. Last Medicare Wellness visit information reviewed, patient interviewed and updates made to the record today.      Health Risk Assessment:   Patient rates overall health as fair. Patient feels that their physical health rating is slightly worse. Patient is satisfied with their life. Eyesight was rated as same. Hearing was rated as same. Patient feels that their emotional and mental health rating is slightly worse. Patients states they are sometimes angry. Patient states they are sometimes unusually tired/fatigued. Pain experienced in the last 7 days has been a lot. Patient's pain rating has been 8/10. Patient states that he has experienced weight loss or gain in last 6 months.     Depression Screening:   PHQ-2 Score: 2      Fall Risk Screening:   In the past year, patient has experienced: no history of falling  in past year      Home Safety:  Patient has trouble with stairs inside or outside of their home. Patient has working smoke alarms and has no working carbon monoxide detector. Home safety hazards include: medications that cause fatigue.     Nutrition:   Current diet is Limited junk food.     Medications:   Patient is currently taking over-the-counter supplements. OTC medications include: see medication list. Patient is able to manage medications.     Activities of Daily Living (ADLs)/Instrumental Activities of Daily Living (IADLs):   Walk and transfer into and out of bed and chair?: Yes  Dress and groom yourself?: Yes    Bathe or shower yourself?: Yes    Feed yourself? Yes  Do your laundry/housekeeping?: Yes  Manage your money, pay your bills and track your expenses?: Yes  Make your own meals?: Yes    Do your own shopping?: Yes    Durable Medical Equipment Suppliers  no    Previous Hospitalizations:   Any hospitalizations or ED visits within the last 12 months?: Yes    How many hospitalizations have you had in the last year?: 1-2    Advance Care Planning:   Living will: Yes    Durable POA for healthcare: Yes    Advanced directive: Yes    Advanced directive counseling given: Yes    Five wishes given: No    End of Life Decisions reviewed with patient: Yes    Provider agrees with end of life decisions: Yes      PREVENTIVE SCREENINGS      Cardiovascular Screening:    General: Screening Not Indicated, History Lipid Disorder and Screening Current      Diabetes Screening:     General: Screening Current      Colorectal Cancer Screening:     General: Screening Current      Prostate Cancer Screening:    General: Screening Current      Osteoporosis Screening:    General: Screening Not Indicated      Abdominal Aortic Aneurysm (AAA) Screening:    Risk factors include: age between 65-76 yo        General: Screening Not Indicated      Lung Cancer Screening:     General: Screening Not Indicated      Hepatitis C Screening:     "General: Screening Current    Screening, Brief Intervention, and Referral to Treatment (SBIRT)    Screening  Typical number of drinks in a day: 0  Typical number of drinks in a week: 0  Interpretation: Low risk drinking behavior.    AUDIT-C Screenin) How often did you have a drink containing alcohol in the past year? never  2) How many drinks did you have on a typical day when you were drinking in the past year? 0  3) How often did you have 6 or more drinks on one occasion in the past year? never    AUDIT-C Score: 0  Interpretation: Score 0-3 (male): Negative screen for alcohol misuse    Single Item Drug Screening:  How often have you used an illegal drug (including marijuana) or a prescription medication for non-medical reasons in the past year? never    Single Item Drug Screen Score: 0  Interpretation: Negative screen for possible drug use disorder    Review of Current Opioid Use    Opioid Risk Tool (ORT) Interpretation: Complete Opioid Risk Tool (ORT)    Social Determinants of Health     Financial Resource Strain: Low Risk  (2023)    Overall Financial Resource Strain (CARDIA)     Difficulty of Paying Living Expenses: Not hard at all   Food Insecurity: No Food Insecurity (2024)    Hunger Vital Sign     Worried About Running Out of Food in the Last Year: Never true     Ran Out of Food in the Last Year: Never true   Transportation Needs: No Transportation Needs (2024)    PRAPARE - Transportation     Lack of Transportation (Medical): No     Lack of Transportation (Non-Medical): No   Housing Stability: Low Risk  (2024)    Housing Stability Vital Sign     Unable to Pay for Housing in the Last Year: No     Number of Times Moved in the Last Year: 0     Homeless in the Last Year: No   Utilities: Not At Risk (2024)    [facility] Utilities     Threatened with loss of utilities: No     No results found.    Objective     /62   Pulse 88   Temp 97.9 °F (36.6 °C)   Ht 6' 1\" (1.854 m)   Wt 122 kg " (270 lb)   SpO2 98%   BMI 35.62 kg/m²     Physical Exam  Constitutional:       Appearance: He is well-developed. He is obese.   HENT:      Head: Normocephalic and atraumatic.   Eyes:      Pupils: Pupils are equal, round, and reactive to light.   Cardiovascular:      Rate and Rhythm: Normal rate and regular rhythm.      Heart sounds: Normal heart sounds.   Pulmonary:      Effort: Pulmonary effort is normal.      Breath sounds: Normal breath sounds.   Musculoskeletal:      Cervical back: Neck supple.      Comments: Back is nontender.  Straight leg raising and abduction are negative bilaterally.  Pain with range of motion left knee.   Skin:     General: Skin is warm and dry.   Neurological:      Mental Status: He is alert.   Psychiatric:         Mood and Affect: Mood normal.         Behavior: Behavior normal.

## 2024-08-28 NOTE — PATIENT INSTRUCTIONS
Medicare Preventive Visit Patient Instructions  Thank you for completing your Welcome to Medicare Visit or Medicare Annual Wellness Visit today. Your next wellness visit will be due in one year (8/29/2025).  The screening/preventive services that you may require over the next 5-10 years are detailed below. Some tests may not apply to you based off risk factors and/or age. Screening tests ordered at today's visit but not completed yet may show as past due. Also, please note that scanned in results may not display below.  Preventive Screenings:  Service Recommendations Previous Testing/Comments   Colorectal Cancer Screening  Colonoscopy    Fecal Occult Blood Test (FOBT)/Fecal Immunochemical Test (FIT)  Fecal DNA/Cologuard Test  Flexible Sigmoidoscopy Age: 45-75 years old   Colonoscopy: every 10 years (May be performed more frequently if at higher risk)  OR  FOBT/FIT: every 1 year  OR  Cologuard: every 3 years  OR  Sigmoidoscopy: every 5 years  Screening may be recommended earlier than age 45 if at higher risk for colorectal cancer. Also, an individualized decision between you and your healthcare provider will decide whether screening between the ages of 76-85 would be appropriate. Colonoscopy: 01/08/2024  FOBT/FIT: Not on file  Cologuard: Not on file  Sigmoidoscopy: Not on file    Screening Current     Prostate Cancer Screening Individualized decision between patient and health care provider in men between ages of 55-69   Medicare will cover every 12 months beginning on the day after your 50th birthday PSA: 1.15 ng/mL     Screening Current     Hepatitis C Screening Once for adults born between 1945 and 1965  More frequently in patients at high risk for Hepatitis C Hep C Antibody: 06/12/2018    Screening Current   Diabetes Screening 1-2 times per year if you're at risk for diabetes or have pre-diabetes Fasting glucose: 93 mg/dL (8/16/2024)  A1C: 5.0 % (12/18/2019)  Screening Current   Cholesterol Screening Once every 5  years if you don't have a lipid disorder. May order more often based on risk factors. Lipid panel: 08/16/2024  Screening Not Indicated  History Lipid Disorder      Other Preventive Screenings Covered by Medicare:  Abdominal Aortic Aneurysm (AAA) Screening: covered once if your at risk. You're considered to be at risk if you have a family history of AAA or a male between the age of 65-75 who smoking at least 100 cigarettes in your lifetime.  Lung Cancer Screening: covers low dose CT scan once per year if you meet all of the following conditions: (1) Age 55-77; (2) No signs or symptoms of lung cancer; (3) Current smoker or have quit smoking within the last 15 years; (4) You have a tobacco smoking history of at least 20 pack years (packs per day x number of years you smoked); (5) You get a written order from a healthcare provider.  Glaucoma Screening: covered annually if you're considered high risk: (1) You have diabetes OR (2) Family history of glaucoma OR (3)  aged 50 and older OR (4)  American aged 65 and older  Osteoporosis Screening: covered every 2 years if you meet one of the following conditions: (1) Have a vertebral abnormality; (2) On glucocorticoid therapy for more than 3 months; (3) Have primary hyperparathyroidism; (4) On osteoporosis medications and need to assess response to drug therapy.  HIV Screening: covered annually if you're between the age of 15-65. Also covered annually if you are younger than 15 and older than 65 with risk factors for HIV infection. For pregnant patients, it is covered up to 3 times per pregnancy.    Immunizations:  Immunization Recommendations   Influenza Vaccine Annual influenza vaccination during flu season is recommended for all persons aged >= 6 months who do not have contraindications   Pneumococcal Vaccine   * Pneumococcal conjugate vaccine = PCV13 (Prevnar 13), PCV15 (Vaxneuvance), PCV20 (Prevnar 20)  * Pneumococcal polysaccharide vaccine = PPSV23  (Pneumovax) Adults 19-63 yo with certain risk factors or if 65+ yo  If never received any pneumonia vaccine: recommend Prevnar 20 (PCV20)  Give PCV20 if previously received 1 dose of PCV13 or PPSV23   Hepatitis B Vaccine 3 dose series if at intermediate or high risk (ex: diabetes, end stage renal disease, liver disease)   Respiratory syncytial virus (RSV) Vaccine - COVERED BY MEDICARE PART D  * RSVPreF3 (Arexvy) CDC recommends that adults 60 years of age and older may receive a single dose of RSV vaccine using shared clinical decision-making (SCDM)   Tetanus (Td) Vaccine - COST NOT COVERED BY MEDICARE PART B Following completion of primary series, a booster dose should be given every 10 years to maintain immunity against tetanus. Td may also be given as tetanus wound prophylaxis.   Tdap Vaccine - COST NOT COVERED BY MEDICARE PART B Recommended at least once for all adults. For pregnant patients, recommended with each pregnancy.   Shingles Vaccine (Shingrix) - COST NOT COVERED BY MEDICARE PART B  2 shot series recommended in those 19 years and older who have or will have weakened immune systems or those 50 years and older     Health Maintenance Due:      Topic Date Due   • Colorectal Cancer Screening  01/06/2029   • Hepatitis C Screening  Completed     Immunizations Due:      Topic Date Due   • COVID-19 Vaccine (3 - 2023-24 season) 09/01/2023   • Influenza Vaccine (1) 09/01/2024     Advance Directives   What are advance directives?  Advance directives are legal documents that state your wishes and plans for medical care. These plans are made ahead of time in case you lose your ability to make decisions for yourself. Advance directives can apply to any medical decision, such as the treatments you want, and if you want to donate organs.   What are the types of advance directives?  There are many types of advance directives, and each state has rules about how to use them. You may choose a combination of any of the  following:  Living will:  This is a written record of the treatment you want. You can also choose which treatments you do not want, which to limit, and which to stop at a certain time. This includes surgery, medicine, IV fluid, and tube feedings.   Durable power of  for healthcare (DPAHC):  This is a written record that states who you want to make healthcare choices for you when you are unable to make them for yourself. This person, called a proxy, is usually a family member or a friend. You may choose more than 1 proxy.  Do not resuscitate (DNR) order:  A DNR order is used in case your heart stops beating or you stop breathing. It is a request not to have certain forms of treatment, such as CPR. A DNR order may be included in other types of advance directives.  Medical directive:  This covers the care that you want if you are in a coma, near death, or unable to make decisions for yourself. You can list the treatments you want for each condition. Treatment may include pain medicine, surgery, blood transfusions, dialysis, IV or tube feedings, and a ventilator (breathing machine).  Values history:  This document has questions about your views, beliefs, and how you feel and think about life. This information can help others choose the care that you would choose.  Why are advance directives important?  An advance directive helps you control your care. Although spoken wishes may be used, it is better to have your wishes written down. Spoken wishes can be misunderstood, or not followed. Treatments may be given even if you do not want them. An advance directive may make it easier for your family to make difficult choices about your care.   Weight Management   Why it is important to manage your weight:  Being overweight increases your risk of health conditions such as heart disease, high blood pressure, type 2 diabetes, and certain types of cancer. It can also increase your risk for osteoarthritis, sleep apnea, and  other respiratory problems. Aim for a slow, steady weight loss. Even a small amount of weight loss can lower your risk of health problems.  How to lose weight safely:  A safe and healthy way to lose weight is to eat fewer calories and get regular exercise. You can lose up about 1 pound a week by decreasing the number of calories you eat by 500 calories each day.   Healthy meal plan for weight management:  A healthy meal plan includes a variety of foods, contains fewer calories, and helps you stay healthy. A healthy meal plan includes the following:  Eat whole-grain foods more often.  A healthy meal plan should contain fiber. Fiber is the part of grains, fruits, and vegetables that is not broken down by your body. Whole-grain foods are healthy and provide extra fiber in your diet. Some examples of whole-grain foods are whole-wheat breads and pastas, oatmeal, brown rice, and bulgur.  Eat a variety of vegetables every day.  Include dark, leafy greens such as spinach, kale, quique greens, and mustard greens. Eat yellow and orange vegetables such as carrots, sweet potatoes, and winter squash.   Eat a variety of fruits every day.  Choose fresh or canned fruit (canned in its own juice or light syrup) instead of juice. Fruit juice has very little or no fiber.  Eat low-fat dairy foods.  Drink fat-free (skim) milk or 1% milk. Eat fat-free yogurt and low-fat cottage cheese. Try low-fat cheeses such as mozzarella and other reduced-fat cheeses.  Choose meat and other protein foods that are low in fat.  Choose beans or other legumes such as split peas or lentils. Choose fish, skinless poultry (chicken or turkey), or lean cuts of red meat (beef or pork). Before you cook meat or poultry, cut off any visible fat.   Use less fat and oil.  Try baking foods instead of frying them. Add less fat, such as margarine, sour cream, regular salad dressing and mayonnaise to foods. Eat fewer high-fat foods. Some examples of high-fat foods  include french fries, doughnuts, ice cream, and cakes.  Eat fewer sweets.  Limit foods and drinks that are high in sugar. This includes candy, cookies, regular soda, and sweetened drinks.  Exercise:  Exercise at least 30 minutes per day on most days of the week. Some examples of exercise include walking, biking, dancing, and swimming. You can also fit in more physical activity by taking the stairs instead of the elevator or parking farther away from stores. Ask your healthcare provider about the best exercise plan for you.   Narcotic (Opioid) Safety    Use narcotics safely:  Take prescribed narcotics exactly as directed  Do not give narcotics to others or take narcotics that belong to someone else  Do not mix narcotics without medicines or alcohol  Do not drive or operate heavy machinery after you take the narcotic  Monitor for side effects and notify your healthcare provider if you experienced side effects such as nausea, sleepiness, itching, or trouble thinking clearly.    Manage constipation:    Constipation is the most common side effect of narcotic medicine. Constipation is when you have hard, dry bowel movements, or you go longer than usual between bowel movements. Tell your healthcare provider about all changes in your bowel movements while you are taking narcotics. He or she may recommend laxative medicine to help you have a bowel movement. He or she may also change the kind of narcotic you are taking, or change when you take it. The following are more ways you can prevent or relieve constipation:    Drink liquids as directed.  You may need to drink extra liquids to help soften and move your bowels. Ask how much liquid to drink each day and which liquids are best for you.  Eat high-fiber foods.  This may help decrease constipation by adding bulk to your bowel movements. High-fiber foods include fruits, vegetables, whole-grain breads and cereals, and beans. Your healthcare provider or dietitian can help you  create a high-fiber meal plan. Your provider may also recommend a fiber supplement if you cannot get enough fiber from food.  Exercise regularly.  Regular physical activity can help stimulate your intestines. Walking is a good exercise to prevent or relieve constipation. Ask which exercises are best for you.  Schedule a time each day to have a bowel movement.  This may help train your body to have regular bowel movements. Bend forward while you are on the toilet to help move the bowel movement out. Sit on the toilet for at least 10 minutes, even if you do not have a bowel movement.    Store narcotics safely:   Store narcotics where others cannot easily get them.  Keep them in a locked cabinet or secure area. Do not  keep them in a purse or other bag you carry with you. A person may be looking for something else and find the narcotics.  Make sure narcotics are stored out of the reach of children.  A child can easily overdose on narcotics. Narcotics may look like candy to a small child.    The best way to dispose of narcotics:      The laws vary by country and area. In the [place], the best way is to return the narcotics through a take-back program. This program is offered by the [place] Drug Enforcement Agency (BENNIE). The following are options for using the program:  Take the narcotics to a BENNIE collection site.  The site is often a law enforcement center. Call your local law enforcement center for scheduled take-back days in your area. You will be given information on where to go if the collection site is in a different location.  Take the narcotics to an approved pharmacy or hospital.  A pharmacy or hospital may be set up as a collection site. You will need to ask if it is a BENNIE collection site if you were not directed there. A pharmacy or doctor's office may not be able to take back narcotics unless it is a BENNIE site.  Use a mail-back system.  This means you are given containers to put the narcotics into. You will  then mail them in the containers.  Use a take-back drop box.  This is a place to leave the narcotics at any time. People and animals will not be able to get into the box. Your local law enforcement agency can tell you where to find a drop box in your area.    Other ways to manage pain:   Ask your healthcare provider about non-narcotic medicines to control pain.  Nonprescription medicines include NSAIDs (such as ibuprofen) and acetaminophen. Prescription medicines include muscle relaxers, antidepressants, and steroids.  Pain may be managed without any medicines.  Some ways to relieve pain include massage, aromatherapy, or meditation. Physical or occupational therapy may also help.    For more information:   Drug Enforcement Administration  [address] [place] [address]  Phone: 6- 423 - 993-0775  Web Address: https://www.deadiversion.[facility].gov/drug_disposal/     Food and Drug Administration  [address] [place] [address]  Phone: 7- 222 - 999-6890  Web Address: http://www.fda.gov     © Copyright [employer] 2018 Information is for End User's use only and may not be sold, redistributed or otherwise used for commercial purposes. All illustrations and images included in CareNotes® are the copyrighted property of [employer].A[employer]M., Inc. or [employer]

## 2024-08-28 NOTE — ASSESSMENT & PLAN NOTE
Continue ferrous sulfate 325 mg daily.  This is most likely due to diverticulosis.  He had full GI workup in the past.

## 2024-08-29 LAB — RHEUMATOID FACT SER QL LA: NEGATIVE

## 2024-09-03 ENCOUNTER — TELEPHONE (OUTPATIENT)
Dept: FAMILY MEDICINE CLINIC | Facility: CLINIC | Age: 74
End: 2024-09-03

## 2024-09-03 DIAGNOSIS — R79.82 ELEVATED C-REACTIVE PROTEIN (CRP): ICD-10-CM

## 2024-09-03 DIAGNOSIS — M19.90 ARTHRITIS: Primary | ICD-10-CM

## 2024-09-03 NOTE — TELEPHONE ENCOUNTER
Patient requesting an order rheumatology be placed. Number given to patient. Please advise and call patient once order is placed.

## 2024-09-03 NOTE — TELEPHONE ENCOUNTER
----- Message from Yoan Toribio MD sent at 9/2/2024  4:17 PM EDT -----  Call patient, autoimmune inflammatory markers are elevated.  He may want to see a rheumatologist for evaluation.  We can make referral.  ----- Message -----  From: Lab, Background User  Sent: 8/28/2024  10:52 AM EDT  To: Yoan Toribio MD

## 2024-09-05 DIAGNOSIS — M54.16 LUMBAR RADICULOPATHY, ACUTE: ICD-10-CM

## 2024-09-07 RX ORDER — OXYCODONE AND ACETAMINOPHEN 5; 325 MG/1; MG/1
1 TABLET ORAL EVERY 6 HOURS PRN
Qty: 20 TABLET | Refills: 0 | Status: SHIPPED | OUTPATIENT
Start: 2024-09-07

## 2024-09-11 ENCOUNTER — HOSPITAL ENCOUNTER (OUTPATIENT)
Dept: MRI IMAGING | Facility: CLINIC | Age: 74
Discharge: HOME | End: 2024-09-11
Payer: MEDICARE

## 2024-09-11 DIAGNOSIS — M54.16 LUMBAR RADICULOPATHY: ICD-10-CM

## 2024-09-11 DIAGNOSIS — M51.36 DDD (DEGENERATIVE DISC DISEASE), LUMBAR: ICD-10-CM

## 2024-09-11 PROCEDURE — 72148 MRI LUMBAR SPINE W/O DYE: CPT

## 2024-09-16 ENCOUNTER — APPOINTMENT (OUTPATIENT)
Dept: LAB | Facility: HOSPITAL | Age: 74
End: 2024-09-16
Payer: MEDICARE

## 2024-09-16 ENCOUNTER — OFFICE VISIT (OUTPATIENT)
Dept: FAMILY MEDICINE CLINIC | Facility: CLINIC | Age: 74
End: 2024-09-16
Payer: MEDICARE

## 2024-09-16 VITALS
DIASTOLIC BLOOD PRESSURE: 63 MMHG | SYSTOLIC BLOOD PRESSURE: 114 MMHG | WEIGHT: 276.8 LBS | OXYGEN SATURATION: 99 % | BODY MASS INDEX: 36.68 KG/M2 | HEIGHT: 73 IN | TEMPERATURE: 98.4 F | HEART RATE: 73 BPM

## 2024-09-16 DIAGNOSIS — M54.16 LUMBAR RADICULOPATHY: ICD-10-CM

## 2024-09-16 DIAGNOSIS — M51.36 DDD (DEGENERATIVE DISC DISEASE), LUMBAR: Primary | ICD-10-CM

## 2024-09-16 DIAGNOSIS — R70.0 ELEVATED SED RATE: ICD-10-CM

## 2024-09-16 DIAGNOSIS — I10 PRIMARY HYPERTENSION: Chronic | ICD-10-CM

## 2024-09-16 LAB — CK SERPL-CCNC: 48 U/L (ref 39–308)

## 2024-09-16 PROCEDURE — 87484 EHRLICHA CHAFFEENSIS AMP PRB: CPT

## 2024-09-16 PROCEDURE — 99214 OFFICE O/P EST MOD 30 MIN: CPT | Performed by: FAMILY MEDICINE

## 2024-09-16 PROCEDURE — 82550 ASSAY OF CK (CPK): CPT

## 2024-09-16 PROCEDURE — 87469 BABESIA MICROTI AMP PRB: CPT

## 2024-09-16 PROCEDURE — 87478 BORRELIA MIYAMOTOI AMP PRB: CPT

## 2024-09-16 PROCEDURE — 87468 ANAPLSMA PHGCYTOPHLM AMP PRB: CPT

## 2024-09-16 PROCEDURE — 36415 COLL VENOUS BLD VENIPUNCTURE: CPT

## 2024-09-16 PROCEDURE — G2211 COMPLEX E/M VISIT ADD ON: HCPCS | Performed by: FAMILY MEDICINE

## 2024-09-16 RX ORDER — DOXYCYCLINE HYCLATE 100 MG
100 TABLET ORAL 2 TIMES DAILY
Qty: 28 TABLET | Refills: 0 | Status: SHIPPED | OUTPATIENT
Start: 2024-09-16 | End: 2024-09-30

## 2024-09-16 NOTE — ASSESSMENT & PLAN NOTE
Continue enalapril 20 mg, hydrochlorothiazide 25 mg and Toprol- mg daily also Apresoline 50 mg 3 times daily.

## 2024-09-16 NOTE — PROGRESS NOTES
Assessment/Plan:  Return visit in 1 month       Problem List Items Addressed This Visit       Hypertension (Chronic)     Continue enalapril 20 mg, hydrochlorothiazide 25 mg and Toprol- mg daily also Apresoline 50 mg 3 times daily.         DDD (degenerative disc disease), lumbar - Primary    Lumbar radiculopathy     Will do trial of doxycycline         Relevant Medications    doxycycline hyclate (VIBRA-TABS) 100 mg tablet    Other Relevant Orders    Ambulatory referral to Spine & Pain Management    T[place] BORNE DISEASE, ACUTE MOLECULAR PANEL    CK    Elevated sed rate     Rheumatology evaluation              Subjective:      Patient ID: Lobo Don is a 74 y.o. male.    Patient comes in for recheck.  He has persistent pain down both of his legs.  Right is worse than left.  Pain is worse in the evening.  His pain becomes severe enough that he is unable to bend his right knee.  He is taking oxycodone 5 mg once per day to help him sleep.  He is concerned regarding Lyme disease.  MRI shows him to have lumbar degenerative disc disease.  He got no relief with a prednisone taper.  He gets no relief seeing a chiropractor.  He had elevated sed rate and CRP on recent blood work.        The following portions of the patient's history were reviewed and updated as appropriate:   Past Medical History:  He has a past medical history of Anemia, Aortic aneurysm ([facility]), Arthritis (August 2020), BPH (benign prostatic hyperplasia), Diverticulitis, Diverticulitis of colon (Early 1990's), Elevated troponin, Heart attack ([facility]) (2018), Heart disease, History of colon polyps, History of colonoscopy, History of esophagogastroduodenoscopy, History of rectal polyps, Hypertension, Hypokalemia, Morbid obesity ([facility]), Myocardial infarction ([facility]), Obesity (Early 1990's), Pneumonia (February 2018), Renal calculi, Seasonal allergies, Sleep apnea with use of continuous positive airway pressure (CPAP), and STEMI (ST elevation myocardial  infarction) (HCC).,  _______________________________________________________________________  Medical Problems:  does not have any pertinent problems on file.,  _______________________________________________________________________  Past Surgical History:   has a past surgical history that includes Colon surgery (08/01/1996); Appendectomy (1974); ARTHROSCOPY KNEE (Left); Colostomy; Saturation biopsy of prostate; Rotator cuff repair (Left); Total hip arthroplasty (Bilateral); IR drainage tube placement (10/16/2023); IR drainage tube check/change/reposition/reinsertion/upsize (10/30/2023); Colonoscopy (06/05/2013); Hernia repair; Tonsillectomy (1959); Other surgical history (09/30/1996); Revision Colostomy (02/24/1997); Flexible sigmoidoscopy (11/02/1996); Shoulder surgery (2001); Cyst Removal (2008); Joint replacement; and Knee surgery (Early 2010's).,  _______________________________________________________________________  Family History:  family history includes Alcohol abuse in his mother; Breast cancer in his mother; COPD in his mother; Cancer in his mother; Colon cancer in his brother and family; Colon polyps in his father; Depression in his mother; Diabetes in his family; Hypertension in his father; Lung cancer in his mother; Mental illness in his mother; Other in his father; Prostate cancer in his father.,  _______________________________________________________________________  Social History:   reports that he has never smoked. He has never used smokeless tobacco. He reports that he does not currently use alcohol. He reports that he does not use drugs.,  _______________________________________________________________________  Allergies:  is allergic to lorazepam..  _______________________________________________________________________  Current Outpatient Medications   Medication Sig Dispense Refill    acetaminophen (TYLENOL) 650 mg CR tablet Take 250 mg by mouth every 8 (eight) hours as needed for mild  "pain      colchicine (COLCRYS) 0.6 mg tablet take 1 tablet by mouth every 3 hours if needed for gout 30 tablet 0    doxycycline hyclate (VIBRA-TABS) 100 mg tablet Take 1 tablet (100 mg total) by mouth 2 (two) times a day for 14 days 28 tablet 0    enalapril (VASOTEC) 20 mg tablet TAKE 1 TABLET BY MOUTH DAILY 90 tablet 3    ferrous sulfate 325 (65 Fe) mg tablet Take 325 mg by mouth every other day      finasteride (PROSCAR) 5 mg tablet Take 5 mg by mouth daily      hydrALAZINE (APRESOLINE) 50 mg tablet TAKE 1.5 TABLETS (75 MG TOTAL) BY MOUTH 3 TIMES A  tablet 1    hydroCHLOROthiazide 25 mg tablet Take 1 tablet (25 mg total) by mouth daily 90 tablet 1    metoprolol succinate (TOPROL-XL) 100 mg 24 hr tablet Take 1 tablet (100 mg total) by mouth daily 90 tablet 1    oxyCODONE-acetaminophen (PERCOCET) 5-325 mg per tablet Take 1 tablet by mouth every 6 (six) hours as needed for severe pain (back/leg pain/ongoing rx) Label no driving no etoh. Initial rx.  Dx: Max Daily Amount: 4 tablets 20 tablet 0    tamsulosin (FLOMAX) 0.4 mg Take 1 capsule (0.4 mg total) by mouth daily 90 capsule 0     No current facility-administered medications for this visit.     _______________________________________________________________________  Review of Systems   Constitutional: Negative.    Respiratory: Negative.     Musculoskeletal:  Positive for gait problem. Negative for arthralgias and back pain.         Objective:  Vitals:    09/16/24 0949   BP: 114/63   Pulse: 73   Temp: 98.4 °F (36.9 °C)   TempSrc: Tympanic   SpO2: 99%   Weight: 126 kg (276 lb 12.8 oz)   Height: 6' 1\" (1.854 m)     Body mass index is 36.52 kg/m².     Physical Exam  Constitutional:       Appearance: He is obese.   HENT:      Head: Normocephalic and atraumatic.   Musculoskeletal:      Comments: Back is nontender.  Straight leg raising on right side is positive at 45 degrees.   Neurological:      Mental Status: He is alert.   Psychiatric:         Mood and Affect: " Mood normal.         Behavior: Behavior normal.

## 2024-09-17 DIAGNOSIS — I10 ESSENTIAL HYPERTENSION: Chronic | ICD-10-CM

## 2024-09-18 RX ORDER — METOPROLOL SUCCINATE 100 MG/1
100 TABLET, EXTENDED RELEASE ORAL DAILY
Qty: 90 TABLET | Refills: 1 | Status: SHIPPED | OUTPATIENT
Start: 2024-09-18

## 2024-09-19 LAB — MISCELLANEOUS LAB TEST RESULT: NORMAL

## 2024-09-20 NOTE — PROGRESS NOTES
Ambulatory Visit  Name: Lobo Don      : 1950      MRN: [id number]  Encounter Provider: Talya Moctezuma DO  Encounter Date: 2024   Encounter department: [facility] [address] [place] [address]    Assessment & Plan  Chronic gout of multiple sites, unspecified cause  He also complains that he has gout for 15 years and he gets flares in his big toes, ankles and elbows and wrists. He takes colchicine prn for flares. He was started allopurinol by his PCP 2 years ago and noticed that his gout became more worse with that medication. He was not taking colchicine consistently while allopurinol was initiated. His recent uric acid is 10.2. It appears that he has uncontrolled chronic gout. So will start him on daily allopurinol with daily colchicine     Orders:    allopurinol (ZYLOPRIM) 100 mg tablet; Take 1 tablet (100 mg total) by mouth daily    Uric acid; Standing    colchicine (COLCRYS) 0.6 mg tablet; Take 1 tablet (0.6 mg total) by mouth daily    Comprehensive metabolic panel; Standing    Primary osteoarthritis of both knees  Lobo Don is a 74 y.o. male with a PMH of HTN, thoracic aortic aneurysm, SRAVAN, PUD, and HLD who presents for further evaluation of joint pain. He reports he has joint pains in his bilateral knees started around 2024 and started to worsen more from July this year. Pain is present with rest and aggravates more with activity. He was taking ibuprofen and oxycodone which only takes the edge off. His R Knee X-ray showed significant medical knee joint space narrowing. His bilateral knees were injected with intra-articular steroid injections as below. Also recommended him to use Voltaren gel prn for knee pain    Orders:    triamcinolone acetonide (KENALOG-40) 40 mg/mL injection 40 mg    triamcinolone acetonide (KENALOG-40) 40 mg/mL injection 40 mg    lidocaine (XYLOCAINE) 1 % injection 1 mL    lidocaine (XYLOCAINE) 1 % injection 1 mL    Arthritis    Orders:    Ambulatory Referral  to Rheumatology    Elevated C-reactive protein (CRP)    Orders:    Ambulatory Referral to Rheumatology      History of Present Illness     Lobo Don is a 74 y.o. male with a PMH of HTN, thoracic aortic aneurysm, SRAVAN, PUD, and HLD who presents for further evaluation of joint pain. He reports he has joint pains in his bilateral knees started around April 2024 and started to worsen more from July this year. Pain is present with rest and aggravates more with activity. He was taking ibuprofen and oxycodone which only takes the edge off. His R Knee X-ray showed significant medical knee joint space narrowing    He also complains that he has gout for 15 years and he gets flares in his big toes, ankles and elbows and wrists. He takes colchicine prn for flares. He was started allopurinol by his PCP 2 years ago and noticed that his gout became more worse with that medication. He was not taking colchicine consistently while allopurinol was initiated. His recent uric acid is 10.2    History obtained from : patient      Review of Systems   Constitutional:  Positive for fatigue. Negative for diaphoresis and fever.   Respiratory:  Positive for shortness of breath.    Cardiovascular:  Negative for chest pain and palpitations.   Gastrointestinal:  Negative for abdominal pain, nausea and vomiting.   Musculoskeletal:  Negative for back pain and neck pain.   Neurological:  Positive for dizziness, weakness and light-headedness. Negative for syncope and headaches.   Psychiatric/Behavioral:  Negative for confusion.         Denies:  Fever  Rash  Oral/nasal/genital ulcers  Dry eyes, dry mouth  Vision changes  Dysphagia/odynophagia  CP  PARNELL  SOB at rest  Pleurisy  Stomach pain  Constipation/bloating  Hematochezia  Gross hematuria  Numbness/tingling  Muscle weakness   Dactylitis  Raynaud's  Joint issues other than noted above    Pertinent Medical History       Medical History Reviewed by provider this encounter:       Past Medical  History   Past Medical History:   Diagnosis Date    Anemia     Aortic aneurysm (HCC)     Arthritis August 2020    BPH (benign prostatic hyperplasia)     Diverticulitis     Recurrent - 1990s    Diverticulitis of colon Early 1990's    Elevated troponin     Heart attack (HCC) 2018    Heart disease     History of colon polyps     Onset 6/30/89    History of colonoscopy     6/5/13    History of esophagogastroduodenoscopy     6/5/13    History of rectal polyps     Onset 6/30/89    Hypertension     Hypokalemia     Morbid obesity (HCC)     Myocardial infarction (HCC)     Obesity Early 1990's    Pneumonia February 2018    Renal calculi     Seasonal allergies     Sleep apnea with use of continuous positive airway pressure (CPAP)     STEMI (ST elevation myocardial infarction) (HCC)      Past Surgical History:   Procedure Laterality Date    APPENDECTOMY  1974    ARTHROSCOPY KNEE Left     COLON SURGERY  08/01/1996    Sigmoid colectomy for diverticulitis    COLONOSCOPY  06/05/2013    COLOSTOMY      CYST REMOVAL  2008    FLEXIBLE SIGMOIDOSCOPY  11/02/1996    HERNIA REPAIR      IR DRAINAGE TUBE CHECK/CHANGE/REPOSITION/REINSERTION/UPSIZE  10/30/2023    IR DRAINAGE TUBE PLACEMENT  10/16/2023    JOINT REPLACEMENT      KNEE SURGERY  Early 2010's    Dr. 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    REVISION COLOSTOMY  02/24/1997    ROTATOR CUFF REPAIR Left     2004 & 2009    SATURATION BIOPSY OF PROSTATE      SHOULDER SURGERY  2001    Benign lump    TONSILLECTOMY  1959    TOTAL HIP ARTHROPLASTY Bilateral     2014 - L, 2016 - R / Last Assessed:5/8/15     Family History   Problem Relation Age of Onset    Depression Mother     COPD Mother     Breast cancer Mother         malignant neoplasm    Lung cancer Mother     Alcohol abuse Mother     Mental illness Mother     Cancer Mother     Other Father         thoracic aortic aneurysm     Prostate cancer Father     Colon polyps Father     Hypertension Father     Colon cancer Brother     Diabetes Family     Colon cancer Family      Current Outpatient Medications on File Prior to Visit   Medication Sig Dispense Refill    acetaminophen (TYLENOL) 650 mg CR tablet Take 250 mg by mouth every 8 (eight) hours as needed for mild pain      doxycycline hyclate (VIBRA-TABS) 100 mg tablet Take 1 tablet (100 mg total) by mouth 2 (two) times a day for 14 days 28 tablet 0    enalapril (VASOTEC) 20 mg tablet TAKE 1 TABLET BY MOUTH DAILY 90 tablet 3    ferrous sulfate 325 (65 Fe) mg tablet Take 325 mg by mouth every other day      finasteride (PROSCAR) 5 mg tablet Take 1 tablet (5 mg total) by mouth daily 90 tablet 5    hydrALAZINE (APRESOLINE) 50 mg tablet Take 1.5 tablets (75 mg total) by mouth 3 (three) times a day 405 tablet 3    hydroCHLOROthiazide 25 mg tablet Take 1 tablet (25 mg total) by mouth daily 90 tablet 1    metoprolol succinate (TOPROL-XL) 100 mg 24 hr tablet TAKE 1 TABLET BY MOUTH EVERY DAY 90 tablet 1    oxyCODONE-acetaminophen (PERCOCET) 5-325 mg per tablet Take 1 tablet by mouth every 6 (six) hours as needed for severe pain (back/leg pain/ongoing rx) Label no driving no etoh. Initial rx.  Dx: Max Daily Amount: 4 tablets 20 tablet 0    tamsulosin (FLOMAX) 0.4 mg Take 1 capsule (0.4 mg total) by mouth daily 90 capsule 0    [DISCONTINUED] colchicine (COLCRYS) 0.6 mg tablet take 1 tablet by mouth every 3 hours if needed for gout 30 tablet 0     No current facility-administered medications on file prior to visit.     Allergies   Allergen Reactions    Lorazepam Other (See Comments)     very agitated  Psychosis      Current Outpatient Medications on File Prior to Visit   Medication Sig Dispense Refill    acetaminophen (TYLENOL) 650 mg CR tablet Take 250 mg by mouth every 8 (eight) hours as needed for mild pain      doxycycline hyclate (VIBRA-TABS) 100 mg tablet Take 1 tablet (100 mg total) by mouth 2  "(two) times a day for 14 days 28 tablet 0    enalapril (VASOTEC) 20 mg tablet TAKE 1 TABLET BY MOUTH DAILY 90 tablet 3    ferrous sulfate 325 (65 Fe) mg tablet Take 325 mg by mouth every other day      finasteride (PROSCAR) 5 mg tablet Take 1 tablet (5 mg total) by mouth daily 90 tablet 5    hydrALAZINE (APRESOLINE) 50 mg tablet Take 1.5 tablets (75 mg total) by mouth 3 (three) times a day 405 tablet 3    hydroCHLOROthiazide 25 mg tablet Take 1 tablet (25 mg total) by mouth daily 90 tablet 1    metoprolol succinate (TOPROL-XL) 100 mg 24 hr tablet TAKE 1 TABLET BY MOUTH EVERY DAY 90 tablet 1    oxyCODONE-acetaminophen (PERCOCET) 5-325 mg per tablet Take 1 tablet by mouth every 6 (six) hours as needed for severe pain (back/leg pain/ongoing rx) Label no driving no etoh. Initial rx.  Dx: Max Daily Amount: 4 tablets 20 tablet 0    tamsulosin (FLOMAX) 0.4 mg Take 1 capsule (0.4 mg total) by mouth daily 90 capsule 0    [DISCONTINUED] colchicine (COLCRYS) 0.6 mg tablet take 1 tablet by mouth every 3 hours if needed for gout 30 tablet 0     No current facility-administered medications on file prior to visit.      Social History     Tobacco Use    Smoking status: Never    Smokeless tobacco: Never   Vaping Use    Vaping status: Never Used   Substance and Sexual Activity    Alcohol use: Not Currently     Comment: social    Drug use: No    Sexual activity: Not Currently     Partners: Female     Birth control/protection: None         Objective     /68   Pulse 85   Temp 98.5 °F (36.9 °C) (Tympanic)   Ht 6' 1\" (1.854 m)   Wt 124 kg (274 lb)   SpO2 99%   BMI 36.15 kg/m²     Physical Exam  Constitutional:       Appearance: Normal appearance.   HENT:      Head: Normocephalic and atraumatic.   Musculoskeletal:      Comments: Bilateral knee joints with mild tenderness on palpation of medial knees with no evidence of synovitis. Normal ROM   Skin:     Findings: No rash.   Neurological:      General: No focal deficit present. "      Mental Status: He is alert and oriented to person, place, and time.   Psychiatric:         Mood and Affect: Mood normal.             I have independently reviewed the following labs/images and interpret them as follows.    ZAIDA negative  RF negative  CRP 25.8    MRI L spine 9/11/24  IMPRESSION:  Multilevel degenerative changes without significant canal stenosis at any level. Multilevel foraminal and subarticular narrowing as above.    After discussing the risks/benefits of (bilateral) knee steroid injection, including minor risk of infection, bleeding, pain, or ineffectiveness, informed consent was obtained and patient was agreeable to proceed.  Using sterile technique, the (bilateral knees) were prepped with Chloraprep, and topically anesthetized with Ethyl Chloride. Each joint was entered using an anterolateral approach and Kenalog 40 mg and 1% lidocaine 1 mL were then injected and the needle withdrawn.  The procedure was well tolerated.    Patient should avoid strenuous activities for the next 1-2 days. Patient should avoid vaccines for the next 2 weeks if possible. If patient is diabetic, should monitor glucose levels for the next 7 to 10 days. Can apply cold compress for soreness. If patient feels relief with the injections, procedure can be repeated as frequently as every 3 months.   Patient was instructed to contact our office with any concerns or questions.      Talya Moctezuma DO, CCD, [facility]

## 2024-09-23 DIAGNOSIS — N40.1 BENIGN PROSTATIC HYPERPLASIA WITH URINARY FREQUENCY: Primary | ICD-10-CM

## 2024-09-23 DIAGNOSIS — I10 ESSENTIAL HYPERTENSION: Chronic | ICD-10-CM

## 2024-09-23 DIAGNOSIS — M54.16 LUMBAR RADICULOPATHY, ACUTE: ICD-10-CM

## 2024-09-23 DIAGNOSIS — R35.0 BENIGN PROSTATIC HYPERPLASIA WITH URINARY FREQUENCY: Primary | ICD-10-CM

## 2024-09-24 ENCOUNTER — CONSULT (OUTPATIENT)
Age: 74
End: 2024-09-24
Payer: MEDICARE

## 2024-09-24 VITALS
HEART RATE: 85 BPM | DIASTOLIC BLOOD PRESSURE: 68 MMHG | TEMPERATURE: 98.5 F | SYSTOLIC BLOOD PRESSURE: 120 MMHG | HEIGHT: 73 IN | BODY MASS INDEX: 36.31 KG/M2 | OXYGEN SATURATION: 99 % | WEIGHT: 274 LBS

## 2024-09-24 DIAGNOSIS — M1A.09X0 CHRONIC GOUT OF MULTIPLE SITES, UNSPECIFIED CAUSE: Primary | ICD-10-CM

## 2024-09-24 DIAGNOSIS — M17.0 PRIMARY OSTEOARTHRITIS OF BOTH KNEES: ICD-10-CM

## 2024-09-24 DIAGNOSIS — R79.82 ELEVATED C-REACTIVE PROTEIN (CRP): ICD-10-CM

## 2024-09-24 DIAGNOSIS — M19.90 ARTHRITIS: ICD-10-CM

## 2024-09-24 PROCEDURE — 99204 OFFICE O/P NEW MOD 45 MIN: CPT | Performed by: STUDENT IN AN ORGANIZED HEALTH CARE EDUCATION/TRAINING PROGRAM

## 2024-09-24 PROCEDURE — 20610 DRAIN/INJ JOINT/BURSA W/O US: CPT | Performed by: STUDENT IN AN ORGANIZED HEALTH CARE EDUCATION/TRAINING PROGRAM

## 2024-09-24 RX ORDER — COLCHICINE 0.6 MG/1
0.6 TABLET ORAL DAILY
Qty: 30 TABLET | Refills: 3 | Status: SHIPPED | OUTPATIENT
Start: 2024-09-24

## 2024-09-24 RX ORDER — TRIAMCINOLONE ACETONIDE 40 MG/ML
40 INJECTION, SUSPENSION INTRA-ARTICULAR; INTRAMUSCULAR ONCE
Status: COMPLETED | OUTPATIENT
Start: 2024-09-24 | End: 2024-09-24

## 2024-09-24 RX ORDER — ALLOPURINOL 100 MG/1
100 TABLET ORAL DAILY
Qty: 60 TABLET | Refills: 3 | Status: SHIPPED | OUTPATIENT
Start: 2024-09-24

## 2024-09-24 RX ORDER — LIDOCAINE HYDROCHLORIDE 10 MG/ML
1 INJECTION, SOLUTION INFILTRATION; PERINEURAL ONCE
Status: COMPLETED | OUTPATIENT
Start: 2024-09-24 | End: 2024-09-24

## 2024-09-24 RX ORDER — FINASTERIDE 5 MG/1
5 TABLET, FILM COATED ORAL DAILY
Qty: 90 TABLET | Refills: 5 | Status: SHIPPED | OUTPATIENT
Start: 2024-09-24

## 2024-09-24 RX ORDER — HYDRALAZINE HYDROCHLORIDE 50 MG/1
75 TABLET, FILM COATED ORAL 3 TIMES DAILY
Qty: 405 TABLET | Refills: 3 | Status: SHIPPED | OUTPATIENT
Start: 2024-09-24 | End: 2024-09-27 | Stop reason: SDUPTHER

## 2024-09-24 RX ORDER — OXYCODONE AND ACETAMINOPHEN 5; 325 MG/1; MG/1
1 TABLET ORAL EVERY 6 HOURS PRN
Qty: 20 TABLET | Refills: 0 | Status: SHIPPED | OUTPATIENT
Start: 2024-09-24

## 2024-09-24 RX ADMIN — TRIAMCINOLONE ACETONIDE 40 MG: 40 INJECTION, SUSPENSION INTRA-ARTICULAR; INTRAMUSCULAR at 16:13

## 2024-09-24 RX ADMIN — LIDOCAINE HYDROCHLORIDE 1 ML: 10 INJECTION, SOLUTION INFILTRATION; PERINEURAL at 15:46

## 2024-09-24 RX ADMIN — LIDOCAINE HYDROCHLORIDE 1 ML: 10 INJECTION, SOLUTION INFILTRATION; PERINEURAL at 16:12

## 2024-09-24 NOTE — ASSESSMENT & PLAN NOTE
He also complains that he has gout for 15 years and he gets flares in his big toes, ankles and elbows and wrists. He takes colchicine prn for flares. He was started allopurinol by his PCP 2 years ago and noticed that his gout became more worse with that medication. He was not taking colchicine consistently while allopurinol was initiated. His recent uric acid is 10.2. It appears that he has uncontrolled chronic gout. So will start him on daily allopurinol with daily colchicine     Orders:    allopurinol (ZYLOPRIM) 100 mg tablet; Take 1 tablet (100 mg total) by mouth daily    Uric acid; Standing    colchicine (COLCRYS) 0.6 mg tablet; Take 1 tablet (0.6 mg total) by mouth daily    Comprehensive metabolic panel; Standing

## 2024-09-24 NOTE — PATIENT INSTRUCTIONS
For gout:   - take colchicine 0.6mg every day   - take allopurinol 100mg every day   - repeat labs test in 2-4 weeks         Your joint pain is caused by osteoarthritis. This is wear-and-tear, degenerative arthritis that happens over time. Your genetics can also make you more likely to get it. The biggest risk factors for developing osteoarthritis are being overweight and repetitive use of the joint. There are several things that can help to manage the pain.      1. Weight loss and exercise improve joint pain and function. If you lose 5% of your body weight, this can increase the function of your joint by 25%. The best exercises are low-impact such as swimming/water aerobics, elliptical, walking, and biking.   2. Tylenol is the best initial treatment. You should take this scheduled throughout the day as follows: 650 mg (tylenol arthritis) three times per day or 500 mg (extra strength) two tablets three times per day. Do not take more than 3,000 mg of tylenol in 24 hours.   3. NSAIDs such as ibuprofen, naproxen, diclofenac, meloxicam, celecoxib are useful for pain as well. You cannot take more than one NSAID medication at a time as it may harm your kidneys. Some of these may upset your stomach, so take them with food.   4. Topical therapy with Voltaren gel (over-the-counter). You can rub this on your joints up to 4 times per day. It works best on the small joints such as the hands and feet.   5. Other things to try: Paraffin baths for hands (can buy this at the pharmacy or online), topical capsaicin cream, Blue Emu cream    6. Cymbalta is a medication that can be used for arthritis pain if you do not get enough relief from these other measures.

## 2024-09-27 DIAGNOSIS — I10 ESSENTIAL HYPERTENSION: Chronic | ICD-10-CM

## 2024-09-28 RX ORDER — HYDRALAZINE HYDROCHLORIDE 50 MG/1
75 TABLET, FILM COATED ORAL 3 TIMES DAILY
Qty: 405 TABLET | Refills: 0 | Status: SHIPPED | OUTPATIENT
Start: 2024-09-28

## 2024-10-11 DIAGNOSIS — M54.16 LUMBAR RADICULOPATHY, ACUTE: ICD-10-CM

## 2024-10-12 RX ORDER — OXYCODONE AND ACETAMINOPHEN 5; 325 MG/1; MG/1
1 TABLET ORAL EVERY 6 HOURS PRN
Qty: 20 TABLET | Refills: 0 | Status: SHIPPED | OUTPATIENT
Start: 2024-10-12

## 2024-10-15 ENCOUNTER — APPOINTMENT (OUTPATIENT)
Age: 74
End: 2024-10-15
Payer: MEDICARE

## 2024-10-15 DIAGNOSIS — M1A.09X0 CHRONIC GOUT OF MULTIPLE SITES, UNSPECIFIED CAUSE: ICD-10-CM

## 2024-10-15 LAB
ALBUMIN SERPL BCG-MCNC: 3.8 G/DL (ref 3.5–5)
ALP SERPL-CCNC: 68 U/L (ref 34–104)
ALT SERPL W P-5'-P-CCNC: 15 U/L (ref 7–52)
ANION GAP SERPL CALCULATED.3IONS-SCNC: 10 MMOL/L (ref 4–13)
AST SERPL W P-5'-P-CCNC: 17 U/L (ref 13–39)
BILIRUB SERPL-MCNC: 0.49 MG/DL (ref 0.2–1)
BUN SERPL-MCNC: 25 MG/DL (ref 5–25)
CALCIUM SERPL-MCNC: 8.6 MG/DL (ref 8.4–10.2)
CHLORIDE SERPL-SCNC: 104 MMOL/L (ref 96–108)
CO2 SERPL-SCNC: 27 MMOL/L (ref 21–32)
CREAT SERPL-MCNC: 0.9 MG/DL (ref 0.6–1.3)
GFR SERPL CREATININE-BSD FRML MDRD: 85 ML/MIN/1.73SQ M
GLUCOSE P FAST SERPL-MCNC: 91 MG/DL (ref 65–99)
POTASSIUM SERPL-SCNC: 4.2 MMOL/L (ref 3.5–5.3)
PROT SERPL-MCNC: 6.8 G/DL (ref 6.4–8.4)
SODIUM SERPL-SCNC: 141 MMOL/L (ref 135–147)
URATE SERPL-MCNC: 7.2 MG/DL (ref 3.5–8.5)

## 2024-10-15 PROCEDURE — 36415 COLL VENOUS BLD VENIPUNCTURE: CPT

## 2024-10-15 PROCEDURE — 84550 ASSAY OF BLOOD/URIC ACID: CPT

## 2024-10-15 PROCEDURE — 80053 COMPREHEN METABOLIC PANEL: CPT

## 2024-10-16 ENCOUNTER — OFFICE VISIT (OUTPATIENT)
Dept: FAMILY MEDICINE CLINIC | Facility: CLINIC | Age: 74
End: 2024-10-16
Payer: MEDICARE

## 2024-10-16 VITALS
BODY MASS INDEX: 36.58 KG/M2 | TEMPERATURE: 97.7 F | WEIGHT: 276 LBS | SYSTOLIC BLOOD PRESSURE: 122 MMHG | DIASTOLIC BLOOD PRESSURE: 78 MMHG | HEART RATE: 67 BPM | HEIGHT: 73 IN | OXYGEN SATURATION: 100 %

## 2024-10-16 DIAGNOSIS — I10 PRIMARY HYPERTENSION: Chronic | ICD-10-CM

## 2024-10-16 DIAGNOSIS — Z23 ENCOUNTER FOR IMMUNIZATION: Primary | ICD-10-CM

## 2024-10-16 DIAGNOSIS — N40.1 BENIGN PROSTATIC HYPERPLASIA WITH URINARY FREQUENCY: ICD-10-CM

## 2024-10-16 DIAGNOSIS — R35.0 BENIGN PROSTATIC HYPERPLASIA WITH URINARY FREQUENCY: ICD-10-CM

## 2024-10-16 DIAGNOSIS — E78.2 MIXED HYPERLIPIDEMIA: ICD-10-CM

## 2024-10-16 DIAGNOSIS — Z12.5 PROSTATE CANCER SCREENING: ICD-10-CM

## 2024-10-16 DIAGNOSIS — M10.9 GOUT, UNSPECIFIED CAUSE, UNSPECIFIED CHRONICITY, UNSPECIFIED SITE: ICD-10-CM

## 2024-10-16 DIAGNOSIS — M17.0 ARTHRITIS OF BOTH KNEES: ICD-10-CM

## 2024-10-16 PROCEDURE — 90662 IIV NO PRSV INCREASED AG IM: CPT

## 2024-10-16 PROCEDURE — 99214 OFFICE O/P EST MOD 30 MIN: CPT | Performed by: FAMILY MEDICINE

## 2024-10-16 PROCEDURE — G0008 ADMIN INFLUENZA VIRUS VAC: HCPCS

## 2024-10-16 NOTE — PROGRESS NOTES
Assessment/Plan:         Problem List Items Addressed This Visit       Hypertension (Chronic)     Continue Vasotec 20 mg and hydrochlorothiazide 25 mg also Toprol- mg daily.  Hydralazine 50 mg 3 times daily         Hyperlipidemia    Relevant Orders    CBC and differential    Comprehensive metabolic panel    Lipid panel    Gout     Continue allopurinol and colchicine.  Follow-up with rheumatology         Benign prostatic hyperplasia with urinary frequency     Continue Flomax         Arthritis of both knees     Continue Tylenol as needed.  Follow-up with rheumatology.  He takes occasional Percocet when his pain is bad.          Other Visit Diagnoses       Encounter for immunization    -  Primary    Relevant Orders    influenza vaccine, high-dose, PF 0.5 mL (Fluzone High Dose) (Completed)    Prostate cancer screening        Relevant Orders    PSA, Total Screen              Subjective:      Patient ID: Lobo Don is a 74 y.o. male.    Patient comes in for checkup.  He has seen rheumatology since last visit diagnosed with osteoarthritis of his knees and given cortisone injections with great deal of relief from his pain.        The following portions of the patient's history were reviewed and updated as appropriate:   Past Medical History:  He has a past medical history of Anemia, Aortic aneurysm (HCC), Arthritis (August 2020), BPH (benign prostatic hyperplasia), Diverticulitis, Diverticulitis of colon (Early 1990's), Elevated troponin, Heart attack (HCC) (2018), Heart disease, History of colon polyps, History of colonoscopy, History of esophagogastroduodenoscopy, History of rectal polyps, Hypertension, Hypokalemia, Morbid obesity (HCC), Myocardial infarction (HCC), Obesity (Early 1990's), Pneumonia (February 2018), Renal calculi, Seasonal allergies, Sleep apnea with use of continuous positive airway pressure (CPAP), and STEMI (ST elevation myocardial infarction)  ([facility]).,  _______________________________________________________________________  Medical Problems:  does not have any pertinent problems on file.,  _______________________________________________________________________  Past Surgical History:   has a past surgical history that includes Colon surgery (08/01/1996); Appendectomy (1974); ARTHROSCOPY KNEE (Left); Colostomy; Saturation biopsy of prostate; Rotator cuff repair (Left); Total hip arthroplasty (Bilateral); IR drainage tube placement (10/16/2023); IR drainage tube check/change/reposition/reinsertion/upsize (10/30/2023); Colonoscopy (06/05/2013); Hernia repair; Tonsillectomy (1959); Other surgical history (09/30/1996); Revision Colostomy (02/24/1997); Flexible sigmoidoscopy (11/02/1996); Shoulder surgery (2001); Cyst Removal (2008); Joint replacement; and Knee surgery (Early 2010's).,  _______________________________________________________________________  Family History:  family history includes Alcohol abuse in his mother; Breast cancer in his mother; COPD in his mother; Cancer in his mother; Colon cancer in his brother and family; Colon polyps in his father; Depression in his mother; Diabetes in his family; Hypertension in his father; Lung cancer in his mother; Mental illness in his mother; Other in his father; Prostate cancer in his father.,  _______________________________________________________________________  Social History:   reports that he has never smoked. He has never used smokeless tobacco. He reports that he does not currently use alcohol. He reports that he does not use drugs.,  _______________________________________________________________________  Allergies:  is allergic to lorazepam..  _______________________________________________________________________  Current Outpatient Medications   Medication Sig Dispense Refill    acetaminophen (TYLENOL) 650 mg CR tablet Take 250 mg by mouth every 8 (eight) hours as needed for mild pain 1500       "allopurinol (ZYLOPRIM) 100 mg tablet Take 1 tablet (100 mg total) by mouth daily 60 tablet 3    colchicine (COLCRYS) 0.6 mg tablet Take 1 tablet (0.6 mg total) by mouth daily 30 tablet 3    enalapril (VASOTEC) 20 mg tablet TAKE 1 TABLET BY MOUTH DAILY 90 tablet 3    ferrous sulfate 325 (65 Fe) mg tablet Take 325 mg by mouth every other day      finasteride (PROSCAR) 5 mg tablet Take 1 tablet (5 mg total) by mouth daily 90 tablet 5    hydrALAZINE (APRESOLINE) 50 mg tablet Take 1.5 tablets (75 mg total) by mouth 3 (three) times a day 405 tablet 0    hydroCHLOROthiazide 25 mg tablet Take 1 tablet (25 mg total) by mouth daily 90 tablet 1    metoprolol succinate (TOPROL-XL) 100 mg 24 hr tablet TAKE 1 TABLET BY MOUTH EVERY DAY 90 tablet 1    oxyCODONE-acetaminophen (PERCOCET) 5-325 mg per tablet Take 1 tablet by mouth every 6 (six) hours as needed for severe pain (back/leg pain/ongoing rx) Label no driving no etoh. Initial rx.  Dx: Max Daily Amount: 4 tablets 20 tablet 0    tamsulosin (FLOMAX) 0.4 mg Take 1 capsule (0.4 mg total) by mouth daily 90 capsule 0     No current facility-administered medications for this visit.     _______________________________________________________________________  Review of Systems   Constitutional: Negative.    Respiratory: Negative.     Musculoskeletal:  Positive for arthralgias and gait problem.         Objective:  Vitals:    10/16/24 1050   BP: 122/78   Pulse: 67   Temp: 97.7 °F (36.5 °C)   SpO2: 100%   Weight: 125 kg (276 lb)   Height: 6' 1\" (1.854 m)     Body mass index is 36.41 kg/m².     Physical Exam  Constitutional:       Appearance: He is well-developed. He is obese.   HENT:      Head: Normocephalic and atraumatic.   Eyes:      Pupils: Pupils are equal, round, and reactive to light.   Cardiovascular:      Rate and Rhythm: Normal rate and regular rhythm.      Heart sounds: Normal heart sounds.   Pulmonary:      Effort: Pulmonary effort is normal.      Breath sounds: Normal breath " sounds.   Musculoskeletal:         General: Normal range of motion.      Cervical back: Neck supple.   Skin:     General: Skin is warm and dry.   Neurological:      Mental Status: He is alert.   Psychiatric:         Mood and Affect: Mood normal.         Behavior: Behavior normal.

## 2024-10-16 NOTE — ASSESSMENT & PLAN NOTE
Continue Vasotec 20 mg and hydrochlorothiazide 25 mg also Toprol- mg daily.  Hydralazine 50 mg 3 times daily

## 2024-10-16 NOTE — ASSESSMENT & PLAN NOTE
Continue Tylenol as needed.  Follow-up with rheumatology.  He takes occasional Percocet when his pain is bad.

## 2024-10-18 DIAGNOSIS — R35.0 BENIGN PROSTATIC HYPERPLASIA WITH URINARY FREQUENCY: ICD-10-CM

## 2024-10-18 DIAGNOSIS — N40.1 BENIGN PROSTATIC HYPERPLASIA WITH URINARY FREQUENCY: ICD-10-CM

## 2024-10-18 RX ORDER — TAMSULOSIN HYDROCHLORIDE 0.4 MG/1
0.4 CAPSULE ORAL DAILY
Qty: 90 CAPSULE | Refills: 1 | Status: SHIPPED | OUTPATIENT
Start: 2024-10-18

## 2024-10-25 DIAGNOSIS — I10 PRIMARY HYPERTENSION: ICD-10-CM

## 2024-10-25 RX ORDER — HYDROCHLOROTHIAZIDE 25 MG/1
25 TABLET ORAL DAILY
Qty: 90 TABLET | Refills: 1 | Status: SHIPPED | OUTPATIENT
Start: 2024-10-25

## 2024-11-06 ENCOUNTER — APPOINTMENT (OUTPATIENT)
Age: 74
End: 2024-11-06
Payer: MEDICARE

## 2024-11-06 DIAGNOSIS — M1A.09X0 CHRONIC GOUT OF MULTIPLE SITES, UNSPECIFIED CAUSE: ICD-10-CM

## 2024-11-06 LAB — URATE SERPL-MCNC: 7 MG/DL (ref 3.5–8.5)

## 2024-11-06 PROCEDURE — 36415 COLL VENOUS BLD VENIPUNCTURE: CPT

## 2024-11-06 PROCEDURE — 84550 ASSAY OF BLOOD/URIC ACID: CPT

## 2024-11-07 DIAGNOSIS — M54.16 LUMBAR RADICULOPATHY, ACUTE: ICD-10-CM

## 2024-11-10 RX ORDER — OXYCODONE AND ACETAMINOPHEN 5; 325 MG/1; MG/1
1 TABLET ORAL EVERY 6 HOURS PRN
Qty: 20 TABLET | Refills: 0 | Status: SHIPPED | OUTPATIENT
Start: 2024-11-10

## 2024-11-19 DIAGNOSIS — M1A.09X0 CHRONIC GOUT OF MULTIPLE SITES, UNSPECIFIED CAUSE: ICD-10-CM

## 2024-11-19 RX ORDER — ALLOPURINOL 100 MG/1
100 TABLET ORAL DAILY
Qty: 60 TABLET | Refills: 0 | Status: SHIPPED | OUTPATIENT
Start: 2024-11-19 | End: 2024-11-20 | Stop reason: SDUPTHER

## 2024-11-20 DIAGNOSIS — M1A.09X0 CHRONIC GOUT OF MULTIPLE SITES, UNSPECIFIED CAUSE: ICD-10-CM

## 2024-11-20 RX ORDER — ALLOPURINOL 100 MG/1
100 TABLET ORAL DAILY
Qty: 60 TABLET | Refills: 5 | Status: SHIPPED | OUTPATIENT
Start: 2024-11-20

## 2024-11-26 ENCOUNTER — APPOINTMENT (OUTPATIENT)
Age: 74
End: 2024-11-26
Payer: MEDICARE

## 2024-11-26 DIAGNOSIS — M1A.09X0 CHRONIC GOUT OF MULTIPLE SITES, UNSPECIFIED CAUSE: ICD-10-CM

## 2024-11-26 LAB — URATE SERPL-MCNC: 7.3 MG/DL (ref 3.5–8.5)

## 2024-11-26 PROCEDURE — 84550 ASSAY OF BLOOD/URIC ACID: CPT

## 2024-11-26 PROCEDURE — 36415 COLL VENOUS BLD VENIPUNCTURE: CPT

## 2024-11-27 ENCOUNTER — RESULTS FOLLOW-UP (OUTPATIENT)
Age: 74
End: 2024-11-27

## 2024-11-27 DIAGNOSIS — M1A.09X0 CHRONIC GOUT OF MULTIPLE SITES, UNSPECIFIED CAUSE: ICD-10-CM

## 2024-11-27 RX ORDER — ALLOPURINOL 100 MG/1
300 TABLET ORAL DAILY
Qty: 90 TABLET | Refills: 2 | Status: SHIPPED | OUTPATIENT
Start: 2024-11-27 | End: 2025-01-09

## 2024-12-11 DIAGNOSIS — M1A.09X0 CHRONIC GOUT OF MULTIPLE SITES, UNSPECIFIED CAUSE: ICD-10-CM

## 2024-12-11 DIAGNOSIS — M54.16 LUMBAR RADICULOPATHY, ACUTE: ICD-10-CM

## 2024-12-11 RX ORDER — COLCHICINE 0.6 MG/1
0.6 TABLET ORAL DAILY
Qty: 30 TABLET | Refills: 5 | Status: SHIPPED | OUTPATIENT
Start: 2024-12-11

## 2024-12-12 ENCOUNTER — TELEPHONE (OUTPATIENT)
Age: 74
End: 2024-12-12

## 2024-12-12 DIAGNOSIS — Z12.11 ENCOUNTER FOR SCREENING COLONOSCOPY: Primary | ICD-10-CM

## 2024-12-12 RX ORDER — OXYCODONE AND ACETAMINOPHEN 5; 325 MG/1; MG/1
1 TABLET ORAL EVERY 6 HOURS PRN
Qty: 20 TABLET | Refills: 0 | Status: SHIPPED | OUTPATIENT
Start: 2024-12-12

## 2024-12-17 ENCOUNTER — APPOINTMENT (OUTPATIENT)
Age: 74
End: 2024-12-17
Payer: MEDICARE

## 2024-12-17 DIAGNOSIS — M1A.09X0 CHRONIC GOUT OF MULTIPLE SITES, UNSPECIFIED CAUSE: ICD-10-CM

## 2024-12-17 LAB
ALBUMIN SERPL BCG-MCNC: 3.9 G/DL (ref 3.5–5)
ALP SERPL-CCNC: 75 U/L (ref 34–104)
ALT SERPL W P-5'-P-CCNC: 19 U/L (ref 7–52)
ANION GAP SERPL CALCULATED.3IONS-SCNC: 8 MMOL/L (ref 4–13)
AST SERPL W P-5'-P-CCNC: 18 U/L (ref 13–39)
BILIRUB SERPL-MCNC: 0.38 MG/DL (ref 0.2–1)
BUN SERPL-MCNC: 26 MG/DL (ref 5–25)
CALCIUM SERPL-MCNC: 9 MG/DL (ref 8.4–10.2)
CHLORIDE SERPL-SCNC: 106 MMOL/L (ref 96–108)
CO2 SERPL-SCNC: 28 MMOL/L (ref 21–32)
CREAT SERPL-MCNC: 0.82 MG/DL (ref 0.6–1.3)
GFR SERPL CREATININE-BSD FRML MDRD: 88 ML/MIN/1.73SQ M
GLUCOSE P FAST SERPL-MCNC: 104 MG/DL (ref 65–99)
POTASSIUM SERPL-SCNC: 4 MMOL/L (ref 3.5–5.3)
PROT SERPL-MCNC: 6.7 G/DL (ref 6.4–8.4)
SODIUM SERPL-SCNC: 142 MMOL/L (ref 135–147)
URATE SERPL-MCNC: 7.1 MG/DL (ref 3.5–8.5)

## 2024-12-17 PROCEDURE — 36415 COLL VENOUS BLD VENIPUNCTURE: CPT

## 2024-12-17 PROCEDURE — 84550 ASSAY OF BLOOD/URIC ACID: CPT

## 2024-12-17 PROCEDURE — 80053 COMPREHEN METABOLIC PANEL: CPT

## 2024-12-24 ENCOUNTER — OFFICE VISIT (OUTPATIENT)
Age: 74
End: 2024-12-24
Payer: MEDICARE

## 2024-12-24 VITALS
DIASTOLIC BLOOD PRESSURE: 82 MMHG | TEMPERATURE: 96 F | WEIGHT: 282.8 LBS | OXYGEN SATURATION: 98 % | HEIGHT: 73 IN | HEART RATE: 83 BPM | BODY MASS INDEX: 37.48 KG/M2 | SYSTOLIC BLOOD PRESSURE: 124 MMHG

## 2024-12-24 DIAGNOSIS — M17.0 PRIMARY OSTEOARTHRITIS OF BOTH KNEES: ICD-10-CM

## 2024-12-24 DIAGNOSIS — M1A.09X0 CHRONIC GOUT OF MULTIPLE SITES, UNSPECIFIED CAUSE: Primary | ICD-10-CM

## 2024-12-24 PROCEDURE — 99214 OFFICE O/P EST MOD 30 MIN: CPT | Performed by: STUDENT IN AN ORGANIZED HEALTH CARE EDUCATION/TRAINING PROGRAM

## 2024-12-24 PROCEDURE — 20610 DRAIN/INJ JOINT/BURSA W/O US: CPT | Performed by: STUDENT IN AN ORGANIZED HEALTH CARE EDUCATION/TRAINING PROGRAM

## 2024-12-24 RX ORDER — LIDOCAINE HYDROCHLORIDE 10 MG/ML
1 INJECTION, SOLUTION INFILTRATION; PERINEURAL
Status: COMPLETED | OUTPATIENT
Start: 2024-12-24 | End: 2024-12-24

## 2024-12-24 RX ORDER — METHYLPREDNISOLONE ACETATE 40 MG/ML
1 INJECTION, SUSPENSION INTRA-ARTICULAR; INTRALESIONAL; INTRAMUSCULAR; SOFT TISSUE
Status: COMPLETED | OUTPATIENT
Start: 2024-12-24 | End: 2024-12-24

## 2024-12-24 RX ADMIN — LIDOCAINE HYDROCHLORIDE 1 ML: 10 INJECTION, SOLUTION INFILTRATION; PERINEURAL at 08:00

## 2024-12-24 RX ADMIN — METHYLPREDNISOLONE ACETATE 1 ML: 40 INJECTION, SUSPENSION INTRA-ARTICULAR; INTRALESIONAL; INTRAMUSCULAR; SOFT TISSUE at 08:00

## 2024-12-24 NOTE — PROGRESS NOTES
Name: Lobo Don      : 1950      MRN: [id number]  Encounter Provider: Talya Moctezuma DO  Encounter Date: 2024   Encounter department: [facility] [address]  :  Assessment & Plan  Chronic gout of multiple sites, unspecified cause  Patient is a 74-year-old male presenting for follow-up of chronic gout.  Denies any recent gout flares.  Patient has been on allopurinol 200 mg daily and colchicine.  Will plan to continue to monitor uric acid and titrate allopurinol as needed.  No evidence of recurrent gout flare today.  Last uric acid level 7.1.  -Increase allopurinol to 300 mg daily.  -Continue colchicine 0.6 mg daily for prophylaxis  -Repeat uric acid in 2 to 3 weeks  - Plan to titrate allopurinol to a goal uric acid of <6        Primary osteoarthritis of both knees  Patient has chronic pain in the knees which is worse with use and difficulty going up and down stairs.  Pain is consistent with osteoarthritis.  Patient had significant relief from intra-articular steroid injections last visit.  -Repeat bilateral intra-articular steroid injections performed as outlined below.  Risks and benefits were reviewed with the patient.  Patient consented to the procedure.  Patient tolerated procedure well.       RTC in 3 months       Pertinent Medical History   Patient was initially seen on 24:  Knee OA:   for further evaluation of bilateral knee pain. Patient states that the pain started around spring 2024. States that after mowing his lawn, his knee and legs become tight. Reports that it improves with rest. Patient has difficulty climbing stairs. Uses a cane to ambulate. Denies prolonged AM stiffness. Patient states that he did PT years ago which did not help. Has been seeing a chiropractor twice a week for the past 1 month which was helping at first, however doing physical activity makes things worse again. Was given a prednisone taper which did not help. Avoid NSAIDs. Oxycodone helped  minimally. Applies biofreeze daily which provides some relief. Patient noted to have tenderness to palpation at bilateral medial joint lines of knees. Xrays reviewed. Knee pain likely due to osteoarthritis. Bilateral steroids knee injections were performs.     Gout:   Patient also reports a long standing history of gout. Reports he has had it for many years. It is usually located in the big toe or ankle. He wakes up suddenly with erythematous, warm and tender joint. Last flare was a week ago. Takes colchicine which resolves it within a few days. States that he was given allopurinol years ago by his PCP however that made his gout worse so he had to stop it. Discussed chronic gout therapy and uric acid goals. Patient agreeable to trying ULT again. Patient was started on allopurinol and colchicine.          History of Present Illness   Lobo Don is a 74 y.o. male with a hx of HTN, thoracic aortic aneurysm, SRAVAN, PUD, and HLD who presents for follow up of OA and gout.     HPI   Patient reports that last visit the steroid injections helped significantly.  Reports that his pain is significantly better than receiving injections.  Patient states after 6 weeks he can feel that the pain was starting to come back but bearable.  Patient is requesting repeat steroid injections today.    Patient is on allopurinol 200 mg daily.  Patient reports that his insurance would not released a new prescription for 300 mg daily and should be getting it in the next week.  Also takes colchicine.  Denies any recent gout flares.    Review of Systems  Complete ROS conducted as per HPI. In addition, denies:  Fever  Photosensitive rash  Sicca symptoms  Recurrent oral ulcers  Muscle weakness  Uveitis  Dactylitis  Chest pain  SOB  Pleurisy  Gross hematuria  Foamy urine  Raynaud's  Joint issues other than noted above      Current Outpatient Medications on File Prior to Visit   Medication Sig Dispense Refill    acetaminophen (TYLENOL) 650 mg CR  "tablet Take 250 mg by mouth every 8 (eight) hours as needed for mild pain 1500      allopurinol (ZYLOPRIM) 100 mg tablet Take 3 tablets (300 mg total) by mouth daily 90 tablet 2    colchicine (COLCRYS) 0.6 mg tablet Take 1 tablet (0.6 mg total) by mouth daily 30 tablet 5    enalapril (VASOTEC) 20 mg tablet TAKE 1 TABLET BY MOUTH DAILY 90 tablet 3    finasteride (PROSCAR) 5 mg tablet Take 1 tablet (5 mg total) by mouth daily 90 tablet 5    hydrALAZINE (APRESOLINE) 50 mg tablet Take 1.5 tablets (75 mg total) by mouth 3 (three) times a day 405 tablet 0    hydroCHLOROthiazide 25 mg tablet Take 1 tablet (25 mg total) by mouth daily 90 tablet 1    oxyCODONE-acetaminophen (PERCOCET) 5-325 mg per tablet Take 1 tablet by mouth every 6 (six) hours as needed for severe pain (back/leg pain/ongoing rx) Label no driving no etoh. Initial rx.  Dx: Max Daily Amount: 4 tablets 20 tablet 0    tamsulosin (FLOMAX) 0.4 mg TAKE 1 CAPSULE BY MOUTH EVERY DAY 90 capsule 1    ferrous sulfate 325 (65 Fe) mg tablet Take 325 mg by mouth every other day      metoprolol succinate (TOPROL-XL) 100 mg 24 hr tablet TAKE 1 TABLET BY MOUTH EVERY DAY 90 tablet 1     No current facility-administered medications on file prior to visit.      Social History     Tobacco Use    Smoking status: Never    Smokeless tobacco: Never   Vaping Use    Vaping status: Never Used   Substance and Sexual Activity    Alcohol use: Not Currently     Comment: social    Drug use: No    Sexual activity: Not Currently     Partners: Female     Birth control/protection: None         Objective   /82 (BP Location: Right arm, Patient Position: Sitting, Cuff Size: Adult)   Pulse 83   Temp (!) 96 °F (35.6 °C) (Tympanic)   Ht 6' 1\" (1.854 m)   Wt 128 kg (282 lb 12.8 oz)   SpO2 98%   BMI 37.31 kg/m²     Physical Exam  General appearance: normal appearing, no acute distress  Skin: normal, no rashes  HEENT: normal, moist oropharynx, no nasal or oral ulcers  Lymph nodes: no " palpable adenopathy  Lungs: normal respiratory effort, comfortable on room air, lungs clear to auscultation b/l   Heart: normal heart sounds, normal rate, normal rhythm,  Abdomen: soft, normal bowel sounds, no tenderness  Neurologic: no obvious neurological deficits   Extremities: no edema, warm and well perfused     Musculoskeletal Exam:   - Observation: no obvious joint abnormalities    - Palpation: no joint tenderness  - Synovitis: absent  - Joint effusions: absent  - ROM: intact throughout  - Muscle Strength: 5/5 throughout       Recent labs:  Lab Results   Component Value Date/Time    SODIUM 142 12/17/2024 07:56 AM    K 4.0 12/17/2024 07:56 AM    K 3.9 10/30/2015 06:41 AM    BUN 26 (H) 12/17/2024 07:56 AM    BUN 17 10/30/2015 06:41 AM    CREATININE 0.82 12/17/2024 07:56 AM    CREATININE 0.89 10/30/2015 06:41 AM    GLUC 100 10/19/2023 06:44 AM    CALCIUM 9.0 12/17/2024 07:56 AM    CALCIUM 8.5 10/30/2015 06:41 AM    AST 18 12/17/2024 07:56 AM    AST 25 10/30/2015 06:41 AM    ALT 19 12/17/2024 07:56 AM    ALT 41 10/30/2015 06:41 AM    ALB 3.9 12/17/2024 07:56 AM    ALB 3.7 10/30/2015 06:41 AM    TP 6.7 12/17/2024 07:56 AM    EGFR 88 12/17/2024 07:56 AM     Lab Results   Component Value Date/Time    HGB 11.2 (L) 08/16/2024 07:51 AM    HGB 15.1 10/30/2015 06:41 AM    WBC 6.61 08/16/2024 07:51 AM    WBC 8.15 10/30/2015 06:41 AM     08/16/2024 07:51 AM     10/30/2015 06:41 AM    INR 1.46 (H) 10/16/2023 12:58 PM    PTT 38 (H) 10/16/2023 12:58 PM     Lab Results   Component Value Date/Time    TQT8MNPKPBXJ 2.878 10/16/2023 07:39 AM      11/06/24 08:01 11/26/24 07:45 12/17/24 07:56   URIC ACID 7.0 7.3 7.1       I have personally reviewed notes, labs, and imaging available in the chart.     Large joint arthrocentesis: bilateral knee  Universal Protocol:  procedure performed by consultantConsent: Verbal consent obtained.  Risks and benefits: risks, benefits and alternatives were discussed  Consent given by:  "patient  Time out: Immediately prior to procedure a \"time out\" was called to verify the correct patient, procedure, equipment, support staff and site/side marked as required.  Timeout called at: 12/24/2024 8:13 AM.  Patient understanding: patient states understanding of the procedure being performed  Patient consent: the patient's understanding of the procedure matches consent given  Site marked: the operative site was marked  Radiology Images displayed and confirmed. If images not available, report reviewed: imaging studies available  Patient identity confirmed: verbally with patient  Supporting Documentation  Indications: pain   Procedure Details  Location: knee - bilateral knee  Preparation: Patient was prepped and draped in the usual sterile fashion  Needle size: 25 G  Ultrasound guidance: no  Approach: anterolateral    Medications (Right): 1 mL lidocaine 1 %; 1 mL methylPREDNISolone acetate 40 mg/mLMedications (Left): 1 mL lidocaine 1 %; 1 mL methylPREDNISolone acetate 40 mg/mL   Patient tolerance: patient tolerated the procedure well with no immediate complications  Dressing:  Sterile dressing applied            Talya Moctezuma DO, CCD, ARGENIS  [facility]     "

## 2024-12-24 NOTE — ASSESSMENT & PLAN NOTE
Patient is a 74-year-old male presenting for follow-up of chronic gout.  Denies any recent gout flares.  Patient has been on allopurinol 200 mg daily and colchicine.  Will plan to continue to monitor uric acid and titrate allopurinol as needed.  No evidence of recurrent gout flare today.  Last uric acid level 7.1.  -Increase allopurinol to 300 mg daily.  -Continue colchicine 0.6 mg daily for prophylaxis  -Repeat uric acid in 2 to 3 weeks  - Plan to titrate allopurinol to a goal uric acid of <6

## 2025-01-01 DIAGNOSIS — N40.1 BENIGN PROSTATIC HYPERPLASIA WITH URINARY FREQUENCY: ICD-10-CM

## 2025-01-01 DIAGNOSIS — R35.0 BENIGN PROSTATIC HYPERPLASIA WITH URINARY FREQUENCY: ICD-10-CM

## 2025-01-02 RX ORDER — FINASTERIDE 5 MG/1
5 TABLET, FILM COATED ORAL DAILY
Qty: 90 TABLET | Refills: 0 | Status: SHIPPED | OUTPATIENT
Start: 2025-01-02

## 2025-01-06 DIAGNOSIS — M1A.09X0 CHRONIC GOUT OF MULTIPLE SITES, UNSPECIFIED CAUSE: ICD-10-CM

## 2025-01-06 RX ORDER — COLCHICINE 0.6 MG/1
0.6 TABLET ORAL DAILY
Qty: 30 TABLET | Refills: 5 | Status: SHIPPED | OUTPATIENT
Start: 2025-01-06

## 2025-01-08 ENCOUNTER — APPOINTMENT (OUTPATIENT)
Age: 75
End: 2025-01-08
Payer: MEDICARE

## 2025-01-08 DIAGNOSIS — M1A.09X0 CHRONIC GOUT OF MULTIPLE SITES, UNSPECIFIED CAUSE: ICD-10-CM

## 2025-01-08 LAB — URATE SERPL-MCNC: 6.4 MG/DL (ref 3.5–8.5)

## 2025-01-08 PROCEDURE — 84550 ASSAY OF BLOOD/URIC ACID: CPT

## 2025-01-08 PROCEDURE — 36415 COLL VENOUS BLD VENIPUNCTURE: CPT

## 2025-01-09 RX ORDER — ALLOPURINOL 100 MG/1
400 TABLET ORAL DAILY
Qty: 120 TABLET | Refills: 2 | Status: SHIPPED | OUTPATIENT
Start: 2025-01-09

## 2025-01-16 DIAGNOSIS — M54.16 LUMBAR RADICULOPATHY, ACUTE: ICD-10-CM

## 2025-01-17 RX ORDER — OXYCODONE AND ACETAMINOPHEN 5; 325 MG/1; MG/1
1 TABLET ORAL EVERY 6 HOURS PRN
Qty: 20 TABLET | Refills: 0 | Status: SHIPPED | OUTPATIENT
Start: 2025-01-17

## 2025-01-17 NOTE — TELEPHONE ENCOUNTER
Medication:  [facility]   [id number] 12/12/2024 12/12/2024 oxyCODONE HYDROCHLORIDE 5 MG ORAL TABLET/ACETAMINOPHEN 325 MG (Tablet) 20.0 5 325 MG-5 MG 30.0 THERESA TORIBIO [facility] Medicare 0 / 0 [place]     1 [id number] 11/10/2024 11/10/2024 oxyCODONE HYDROCHLORIDE 5 MG ORAL TABLET/ACETAMINOPHEN 325 MG (Tablet) 20.0 5 325 MG-5 MG 30.0 THERESA TORIBIO [facility] Medicare 0 / 0 [place]    1 [id number] 10/13/2024 10/12/2024 oxyCODONE HYDROCHLORIDE 5 MG ORAL TABLET/ACETAMINOPHEN 325 MG (Tablet) 20.0 5 325 MG-5 MG 30.0 THERESA TORIBIO [facility] Medicare 0 / 0 [place]        Active agreement on file -No    Dr. Johnson, are you able to assist us with this request in the absence of Dr. Toribio?

## 2025-01-23 ENCOUNTER — OFFICE VISIT (OUTPATIENT)
Dept: CARDIOLOGY CLINIC | Facility: CLINIC | Age: 75
End: 2025-01-23
Payer: MEDICARE

## 2025-01-23 VITALS
RESPIRATION RATE: 16 BRPM | WEIGHT: 282 LBS | HEIGHT: 73 IN | DIASTOLIC BLOOD PRESSURE: 82 MMHG | BODY MASS INDEX: 37.37 KG/M2 | HEART RATE: 67 BPM | OXYGEN SATURATION: 96 % | SYSTOLIC BLOOD PRESSURE: 133 MMHG

## 2025-01-23 DIAGNOSIS — I77.810 DILATED AORTIC ROOT (HCC): ICD-10-CM

## 2025-01-23 DIAGNOSIS — I71.21 ANEURYSM OF ASCENDING AORTA WITHOUT RUPTURE (HCC): ICD-10-CM

## 2025-01-23 DIAGNOSIS — I10 PRIMARY HYPERTENSION: Primary | ICD-10-CM

## 2025-01-23 DIAGNOSIS — E78.00 PURE HYPERCHOLESTEROLEMIA: ICD-10-CM

## 2025-01-23 PROBLEM — F11.20 OPIOID DEPENDENCE, UNCOMPLICATED (HCC): Status: ACTIVE | Noted: 2025-01-23

## 2025-01-23 PROCEDURE — 99214 OFFICE O/P EST MOD 30 MIN: CPT | Performed by: PHYSICIAN ASSISTANT

## 2025-01-23 NOTE — ASSESSMENT & PLAN NOTE
Continue diet control  Patient currently carb controlling  Trying to watch, to lose weight  Last LDL 97

## 2025-01-23 NOTE — PROGRESS NOTES
[facility] [place]  [address]  [place] [address]  Cardiology Follow Up    Lobo Don  1950  [id number]    Assessment & Plan  Primary hypertension  Stable, 133/82  Continue enalapril, hydralazine, HCTZ, Toprol  Dilated aortic root (HCC)  Known history  Last CT 2023  Plan for CT chest for further evaluation  Aneurysm of ascending aorta without rupture (HCC)  Known history  Last CT 2023  Plan for CT chest for further evaluation  Last measured at 4.2 cm  Pure hypercholesterolemia  Continue diet control  Patient currently carb controlling  Trying to watch, to lose weight  Last LDL 97    Continue medications.  Report any symptoms.  Plan for CT chest for further ration of dilated aortic root and ascending aortic aneurysm.  Follow with PCP.  Report any exertional symptoms.  Continue with weight loss.  Continue all medications. Previous studies reviewed with patient, medications reviewed and possible side effects discussed. Continue risk factor modification. Optimize weight, regular exercise and follow up with appropriate specialists and primary care physician as discussed.  All questions answered. Patient advised to report any problems prompting to medical attention. Return for follow up visit in 6 months or earlier if needed    Chief Complaint   Patient presents with    Follow-up       Interval History: Patient presents the office today for follow-up visit with history of hypertension, hyperlipidemia, dilated aortic root, ascending aortic aneurysm, aortic stenosis; mild.  Patient states he is overall feeling well since last visit.  Denies chest pain, chest pressure, chest heaviness.  Denies any shortness of breath, dizziness, palpitations.  Taking all medications as prescribed.  States he is overdue for testing on his ascending aortic aneurysm.  Follows with PCP closely.      Last CT noted stable 4.2 cm ascending aortic ectasia    Patient Active Problem List   Diagnosis     Hypertension    Class 3 severe obesity due to excess calories with serious comorbidity and body mass index (BMI) of 40.0 to 44.9 in adult (HCC)    Obstructive sleep apnea    Hyperlipidemia    LVH (left ventricular hypertrophy)    Gout    Thoracic aortic aneurysm without rupture (HCC)    Nonrheumatic aortic valve stenosis    Benign prostatic hyperplasia with urinary frequency    Liver abscess    Duodenal ulcer    Iron deficiency anemia    Diverticulosis    Diastolic dysfunction    DDD (degenerative disc disease), lumbar    Arthritis    Lumbar radiculopathy    Elevated sed rate    Arthritis of both knees    Opioid dependence, uncomplicated (HCC)     Past Medical History:   Diagnosis Date    Anemia     Aortic aneurysm (HCC)     Arthritis August 2020    BPH (benign prostatic hyperplasia)     Diverticulitis     Recurrent - 1990s    Diverticulitis of colon Early 1990's    Elevated troponin     Heart attack (HCC) 2018    Heart disease     History of colon polyps     Onset 6/30/89    History of colonoscopy     6/5/13    History of esophagogastroduodenoscopy     6/5/13    History of rectal polyps     Onset 6/30/89    Hypertension     Hypokalemia     Morbid obesity (HCC)     Myocardial infarction (HCC)     Obesity Early 1990's    Pneumonia February 2018    Renal calculi     Seasonal allergies     Sleep apnea with use of continuous positive airway pressure (CPAP)     STEMI (ST elevation myocardial infarction) (HCC)      Social History     Socioeconomic History    Marital status: /Civil Union     Spouse name: Not on file    Number of children: Not on file    Years of education: Not on file    Highest education level: Not on file   Occupational History    Not on file   Tobacco Use    Smoking status: Never    Smokeless tobacco: Never   Vaping Use    Vaping status: Never Used   Substance and Sexual Activity    Alcohol use: Not Currently     Comment: social    Drug use: No    Sexual activity: Not Currently     Partners:  Female     Birth control/protection: None   Other Topics Concern    Not on file   Social History Narrative    Not on file     Social Drivers of Health     Financial Resource Strain: Low Risk  (8/8/2023)    Overall Financial Resource Strain (CARDIA)     Difficulty of Paying Living Expenses: Not hard at all   Food Insecurity: No Food Insecurity (8/22/2024)    Nursing - Inadequate Food Risk Classification     Worried About Running Out of Food in the Last Year: Never true     Ran Out of Food in the Last Year: Never true     Ran Out of Food in the Last Year: Not on file   Transportation Needs: No Transportation Needs (8/22/2024)    PRAPARE - Transportation     Lack of Transportation (Medical): No     Lack of Transportation (Non-Medical): No   Physical Activity: Not on file   Stress: Not on file   Social Connections: Not on file   Intimate Partner Violence: Not on file   Housing Stability: Low Risk  (8/22/2024)    Housing Stability Vital Sign     Unable to Pay for Housing in the Last Year: No     Number of Times Moved in the Last Year: 0     Homeless in the Last Year: No      Family History   Problem Relation Age of Onset    Depression Mother     COPD Mother     Breast cancer Mother         malignant neoplasm    Lung cancer Mother     Alcohol abuse Mother     Mental illness Mother     Cancer Mother     Other Father         thoracic aortic aneurysm    Prostate cancer Father     Colon polyps Father     Hypertension Father     Colon cancer Brother     Diabetes Family     Colon cancer Family      Past Surgical History:   Procedure Laterality Date    APPENDECTOMY  1974    ARTHROSCOPY KNEE Left     COLON SURGERY  08/01/1996    Sigmoid colectomy for diverticulitis    COLONOSCOPY  06/05/2013    COLOSTOMY      CYST REMOVAL  2008    FLEXIBLE SIGMOIDOSCOPY  11/02/1996    HERNIA REPAIR      IR DRAINAGE TUBE CHECK/CHANGE/REPOSITION/REINSERTION/UPSIZE  10/30/2023    IR DRAINAGE TUBE PLACEMENT  10/16/2023    JOINT REPLACEMENT      KNEE  SURGERY  Early 2010's    Dr. 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    REVISION COLOSTOMY  02/24/1997    ROTATOR CUFF REPAIR Left     2004 & 2009    SATURATION BIOPSY OF PROSTATE      SHOULDER SURGERY  2001    Benign lump    TONSILLECTOMY  1959    TOTAL HIP ARTHROPLASTY Bilateral     2014 - L, 2016 - R / Last Assessed:5/8/15       Current Outpatient Medications:     acetaminophen (TYLENOL) 650 mg CR tablet, Take 250 mg by mouth every 8 (eight) hours as needed for mild pain 1500, Disp: , Rfl:     allopurinol (ZYLOPRIM) 100 mg tablet, Take 4 tablets (400 mg total) by mouth daily, Disp: 120 tablet, Rfl: 2    colchicine (COLCRYS) 0.6 mg tablet, Take 1 tablet (0.6 mg total) by mouth daily, Disp: 30 tablet, Rfl: 5    enalapril (VASOTEC) 20 mg tablet, TAKE 1 TABLET BY MOUTH DAILY, Disp: 90 tablet, Rfl: 3    ferrous sulfate 325 (65 Fe) mg tablet, Take 325 mg by mouth every other day, Disp: , Rfl:     finasteride (PROSCAR) 5 mg tablet, Take 1 tablet (5 mg total) by mouth daily, Disp: 90 tablet, Rfl: 0    hydrALAZINE (APRESOLINE) 50 mg tablet, Take 1.5 tablets (75 mg total) by mouth 3 (three) times a day, Disp: 405 tablet, Rfl: 0    hydroCHLOROthiazide 25 mg tablet, Take 1 tablet (25 mg total) by mouth daily, Disp: 90 tablet, Rfl: 1    metoprolol succinate (TOPROL-XL) 100 mg 24 hr tablet, TAKE 1 TABLET BY MOUTH EVERY DAY, Disp: 90 tablet, Rfl: 1    oxyCODONE-acetaminophen (PERCOCET) 5-325 mg per tablet, Take 1 tablet by mouth every 6 (six) hours as needed for severe pain (back/leg pain/ongoing rx) Label no driving no etoh. Initial rx.  Dx: Max Daily Amount: 4 tablets, Disp: 20 tablet, Rfl: 0    tamsulosin (FLOMAX) 0.4 mg, TAKE 1 CAPSULE BY MOUTH EVERY DAY, Disp: 90 capsule, Rfl: 1  Allergies   Allergen Reactions    Lorazepam Other (See Comments)     very agitated  Psychosis         Imaging: No results  "found.    Review of Systems:  Review of Systems   Constitutional: Negative.    Respiratory: Negative.     Cardiovascular: Negative.    Musculoskeletal: Negative.    Neurological: Negative.    Hematological: Negative.    Psychiatric/Behavioral: Negative.     All other systems reviewed and are negative.        /82   Pulse 67   Resp 16   Ht 6' 1\" (1.854 m)   Wt 128 kg (282 lb)   SpO2 96%   BMI 37.21 kg/m²     Physical Exam:  Physical Exam  Vitals and nursing note reviewed.   Constitutional:       Appearance: Normal appearance.   HENT:      Head: Normocephalic and atraumatic.   Cardiovascular:      Rate and Rhythm: Normal rate and regular rhythm.      Pulses: Normal pulses.      Heart sounds: Normal heart sounds.   Pulmonary:      Effort: Pulmonary effort is normal.      Breath sounds: Normal breath sounds.   Musculoskeletal:         General: Normal range of motion.      Cervical back: Normal range of motion and neck supple.   Skin:     General: Skin is warm and dry.   Neurological:      General: No focal deficit present.      Mental Status: He is alert and oriented to person, place, and time.   Psychiatric:         Mood and Affect: Mood normal.         Behavior: Behavior normal.         Thought Content: Thought content normal.         Judgment: Judgment normal.         "

## 2025-01-31 ENCOUNTER — HOSPITAL ENCOUNTER (OUTPATIENT)
Dept: CT IMAGING | Facility: CLINIC | Age: 75
Discharge: HOME | End: 2025-01-31
Payer: MEDICARE

## 2025-01-31 DIAGNOSIS — I71.21 ANEURYSM OF ASCENDING AORTA WITHOUT RUPTURE (HCC): ICD-10-CM

## 2025-01-31 DIAGNOSIS — I77.810 DILATED AORTIC ROOT (HCC): ICD-10-CM

## 2025-01-31 PROCEDURE — 71250 CT THORAX DX C-: CPT

## 2025-02-03 DIAGNOSIS — M1A.09X0 CHRONIC GOUT OF MULTIPLE SITES, UNSPECIFIED CAUSE: ICD-10-CM

## 2025-02-03 DIAGNOSIS — I10 ESSENTIAL HYPERTENSION: Chronic | ICD-10-CM

## 2025-02-03 RX ORDER — COLCHICINE 0.6 MG/1
0.6 TABLET ORAL DAILY
Qty: 30 TABLET | Refills: 5 | Status: SHIPPED | OUTPATIENT
Start: 2025-02-03

## 2025-02-04 RX ORDER — ENALAPRIL MALEATE 20 MG/1
20 TABLET ORAL DAILY
Qty: 90 TABLET | Refills: 1 | Status: SHIPPED | OUTPATIENT
Start: 2025-02-04

## 2025-02-10 ENCOUNTER — RESULTS FOLLOW-UP (OUTPATIENT)
Dept: NON INVASIVE DIAGNOSTICS | Facility: HOSPITAL | Age: 75
End: 2025-02-10

## 2025-02-16 DIAGNOSIS — M54.16 LUMBAR RADICULOPATHY, ACUTE: ICD-10-CM

## 2025-02-17 RX ORDER — OXYCODONE AND ACETAMINOPHEN 5; 325 MG/1; MG/1
1 TABLET ORAL EVERY 6 HOURS PRN
Qty: 20 TABLET | Refills: 0 | Status: SHIPPED | OUTPATIENT
Start: 2025-02-17

## 2025-02-26 DIAGNOSIS — M1A.09X0 CHRONIC GOUT OF MULTIPLE SITES, UNSPECIFIED CAUSE: ICD-10-CM

## 2025-02-26 RX ORDER — COLCHICINE 0.6 MG/1
0.6 TABLET ORAL DAILY
Qty: 30 TABLET | Refills: 5 | Status: SHIPPED | OUTPATIENT
Start: 2025-02-26

## 2025-03-17 DIAGNOSIS — M54.16 LUMBAR RADICULOPATHY, ACUTE: ICD-10-CM

## 2025-03-17 RX ORDER — OXYCODONE AND ACETAMINOPHEN 5; 325 MG/1; MG/1
1 TABLET ORAL EVERY 6 HOURS PRN
Qty: 20 TABLET | Refills: 0 | Status: SHIPPED | OUTPATIENT
Start: 2025-03-17

## 2025-03-25 ENCOUNTER — APPOINTMENT (OUTPATIENT)
Age: 75
End: 2025-03-25
Payer: MEDICARE

## 2025-03-25 DIAGNOSIS — M1A.09X0 CHRONIC GOUT OF MULTIPLE SITES, UNSPECIFIED CAUSE: ICD-10-CM

## 2025-03-25 DIAGNOSIS — E78.2 MIXED HYPERLIPIDEMIA: ICD-10-CM

## 2025-03-25 DIAGNOSIS — Z12.5 PROSTATE CANCER SCREENING: ICD-10-CM

## 2025-03-25 LAB
ALBUMIN SERPL BCG-MCNC: 4 G/DL (ref 3.5–5)
ALP SERPL-CCNC: 80 U/L (ref 34–104)
ALT SERPL W P-5'-P-CCNC: 19 U/L (ref 7–52)
ANION GAP SERPL CALCULATED.3IONS-SCNC: 7 MMOL/L (ref 4–13)
AST SERPL W P-5'-P-CCNC: 16 U/L (ref 13–39)
BASOPHILS # BLD AUTO: 0.07 THOUSANDS/ÂΜL (ref 0–0.1)
BASOPHILS NFR BLD AUTO: 1 % (ref 0–1)
BILIRUB SERPL-MCNC: 0.74 MG/DL (ref 0.2–1)
BUN SERPL-MCNC: 21 MG/DL (ref 5–25)
CALCIUM SERPL-MCNC: 9.1 MG/DL (ref 8.4–10.2)
CHLORIDE SERPL-SCNC: 103 MMOL/L (ref 96–108)
CHOLEST SERPL-MCNC: 164 MG/DL (ref ?–200)
CO2 SERPL-SCNC: 30 MMOL/L (ref 21–32)
CREAT SERPL-MCNC: 0.95 MG/DL (ref 0.6–1.3)
EOSINOPHIL # BLD AUTO: 0.47 THOUSAND/ÂΜL (ref 0–0.61)
EOSINOPHIL NFR BLD AUTO: 6 % (ref 0–6)
ERYTHROCYTE [DISTWIDTH] IN BLOOD BY AUTOMATED COUNT: 14.5 % (ref 11.6–15.1)
GFR SERPL CREATININE-BSD FRML MDRD: 79 ML/MIN/1.73SQ M
GLUCOSE P FAST SERPL-MCNC: 92 MG/DL (ref 65–99)
HCT VFR BLD AUTO: 43.1 % (ref 36.5–49.3)
HDLC SERPL-MCNC: 32 MG/DL
HGB BLD-MCNC: 13.6 G/DL (ref 12–17)
IMM GRANULOCYTES # BLD AUTO: 0.02 THOUSAND/UL (ref 0–0.2)
IMM GRANULOCYTES NFR BLD AUTO: 0 % (ref 0–2)
LDLC SERPL CALC-MCNC: 104 MG/DL (ref 0–100)
LYMPHOCYTES # BLD AUTO: 1.35 THOUSANDS/ÂΜL (ref 0.6–4.47)
LYMPHOCYTES NFR BLD AUTO: 16 % (ref 14–44)
MCH RBC QN AUTO: 28.3 PG (ref 26.8–34.3)
MCHC RBC AUTO-ENTMCNC: 31.6 G/DL (ref 31.4–37.4)
MCV RBC AUTO: 90 FL (ref 82–98)
MONOCYTES # BLD AUTO: 0.8 THOUSAND/ÂΜL (ref 0.17–1.22)
MONOCYTES NFR BLD AUTO: 9 % (ref 4–12)
NEUTROPHILS # BLD AUTO: 5.91 THOUSANDS/ÂΜL (ref 1.85–7.62)
NEUTS SEG NFR BLD AUTO: 68 % (ref 43–75)
NONHDLC SERPL-MCNC: 132 MG/DL
NRBC BLD AUTO-RTO: 0 /100 WBCS
PLATELET # BLD AUTO: 233 THOUSANDS/UL (ref 149–390)
PMV BLD AUTO: 10.1 FL (ref 8.9–12.7)
POTASSIUM SERPL-SCNC: 4.1 MMOL/L (ref 3.5–5.3)
PROT SERPL-MCNC: 7.3 G/DL (ref 6.4–8.4)
PSA SERPL-MCNC: 1.52 NG/ML (ref 0–4)
RBC # BLD AUTO: 4.8 MILLION/UL (ref 3.88–5.62)
SODIUM SERPL-SCNC: 140 MMOL/L (ref 135–147)
TRIGL SERPL-MCNC: 140 MG/DL (ref ?–150)
URATE SERPL-MCNC: 5.5 MG/DL (ref 3.5–8.5)
WBC # BLD AUTO: 8.62 THOUSAND/UL (ref 4.31–10.16)

## 2025-03-25 PROCEDURE — 80061 LIPID PANEL: CPT

## 2025-03-25 PROCEDURE — 36415 COLL VENOUS BLD VENIPUNCTURE: CPT

## 2025-03-25 PROCEDURE — G0103 PSA SCREENING: HCPCS

## 2025-03-25 PROCEDURE — 85025 COMPLETE CBC W/AUTO DIFF WBC: CPT

## 2025-03-25 PROCEDURE — 80053 COMPREHEN METABOLIC PANEL: CPT

## 2025-03-25 PROCEDURE — 84550 ASSAY OF BLOOD/URIC ACID: CPT

## 2025-03-27 DIAGNOSIS — M1A.09X0 CHRONIC GOUT OF MULTIPLE SITES, UNSPECIFIED CAUSE: ICD-10-CM

## 2025-03-27 RX ORDER — COLCHICINE 0.6 MG/1
0.6 TABLET ORAL DAILY
Qty: 30 TABLET | Refills: 0 | OUTPATIENT
Start: 2025-03-27

## 2025-03-27 NOTE — PROGRESS NOTES
Name: Lobo Don      : 1950      MRN: [id number]  Encounter Provider: Talya Moctezuma DO  Encounter Date: 3/28/2025   Encounter department: [facility] [address]  :  Assessment & Plan  Chronic gout of multiple sites, unspecified cause  Patient is a 74-year-old male presenting for follow-up of chronic gout.  Denies any recent gout flares.  Patient has been on allopurinol 400 mg daily and colchicine.  Will plan to continue to monitor uric acid and titrate allopurinol as needed.  No evidence of recurrent gout flare today.  Last uric acid level 5.5  -Continue allopurinol to 400 mg daily.  -Continue colchicine 0.6 mg daily for prophylaxis for now  -Repeat uric acid prior to next visit, if remains at goal, will plan to discontinue colchicine  - goal uric acid of <6   Orders:    colchicine (COLCRYS) 0.6 mg tablet; Take 1 tablet (0.6 mg total) by mouth daily    Uric acid; Future    Primary osteoarthritis of both knees  Patient has chronic pain in the knees which is worse with use and difficulty going up and down stairs.  Pain is consistent with osteoarthritis.  Patient did not have as much of a benefit from intra-articular steroids last visit, patient would like to try again and see if he has any further benefit.  If not, patient will consider discussing knee replacement surgery with orthopedics  -Repeat bilateral intra-articular steroid injections performed as outlined below.  Risks and benefits were reviewed with the patient.  Patient consented to the procedure.  Patient tolerated procedure well.          RTC in 3 months     Pertinent Medical History   Knee OA:   24: presenting for bilateral knee pain. Patient states that the pain started around spring 2024. States that after mowing his lawn, his knee and legs become tight. Reports that it improves with rest. Patient has difficulty climbing stairs. Uses a cane to ambulate. Denies prolonged AM stiffness. Patient states that he did PT  years ago which did not help. Has been seeing a chiropractor twice a week for the past 1 month which was helping at first, however doing physical activity makes things worse again. Was given a prednisone taper which did not help. Avoid NSAIDs. Oxycodone helped minimally. Applies biofreeze daily which provides some relief. Patient noted to have tenderness to palpation at bilateral medial joint lines of knees. Xrays reviewed. Knee pain likely due to osteoarthritis. Bilateral steroids knee injections were performed.   - 12/24/24: repeat b/l knee steroid injections    2. Gout:   Patient also reports a long standing history of gout. Reports he has had it for many years. It is usually located in the big toe or ankle. He wakes up suddenly with erythematous, warm and tender joint. Last flare was a week ago. Takes colchicine which resolves it within a few days. States that he was given allopurinol years ago by his PCP however that made his gout worse so he had to stop it. Discussed chronic gout therapy and uric acid goals. Patient agreeable to trying ULT again. Patient was started on allopurinol and colchicine.   - 12/24/24: allopurinol titrated to 300mg daily, continue colchicine 0.6mg daily        History of Present Illness   Lobo Don is a 74 y.o. male with a hx of HTN, thoracic aortic aneurysm, SRAVAN, PUD, and HLD who presents for follow up of OA and gout.     HPI   Patient reports that he has been doing well, however his knees are starting to bother him again.  Reports that the knee injections last time did not provide benefit as much.  Reports only lasted a couple weeks.  Pain is worse with use.  Patient states he is considering discussing knee replacement surgery again.  He wants to talk to his PCP first.    Currently on allopurinol 400 mg daily.  Denies any recent gout flares.  Also takes colchicine for prophylaxis without any difficulty.    Review of Systems  Complete ROS conducted as per HPI. In addition,  denies:  Fever  Photosensitive rash  Sicca symptoms  Recurrent oral ulcers  Muscle weakness  Uveitis  Dactylitis  Chest pain  SOB  Pleurisy  Gross hematuria  Foamy urine  Raynaud's  Joint issues other than noted above      Current Outpatient Medications on File Prior to Visit   Medication Sig Dispense Refill    acetaminophen (TYLENOL) 650 mg CR tablet Take 250 mg by mouth every 8 (eight) hours as needed for mild pain 1500      allopurinol (ZYLOPRIM) 100 mg tablet Take 4 tablets (400 mg total) by mouth daily (Patient taking differently: Take 100 mg by mouth daily Taking 4 tablets a day 400mg) 120 tablet 2    enalapril (VASOTEC) 20 mg tablet Take 1 tablet (20 mg total) by mouth daily 90 tablet 1    finasteride (PROSCAR) 5 mg tablet Take 1 tablet (5 mg total) by mouth daily 90 tablet 0    hydrALAZINE (APRESOLINE) 50 mg tablet Take 1.5 tablets (75 mg total) by mouth 3 (three) times a day 405 tablet 0    hydroCHLOROthiazide 25 mg tablet Take 1 tablet (25 mg total) by mouth daily 90 tablet 1    metoprolol succinate (TOPROL-XL) 100 mg 24 hr tablet TAKE 1 TABLET BY MOUTH EVERY DAY 90 tablet 1    oxyCODONE-acetaminophen (PERCOCET) 5-325 mg per tablet Take 1 tablet by mouth every 6 (six) hours as needed for severe pain (back/leg pain/ongoing rx) Label no driving no etoh. Initial rx.  Dx: Max Daily Amount: 4 tablets 20 tablet 0    tamsulosin (FLOMAX) 0.4 mg TAKE 1 CAPSULE BY MOUTH EVERY DAY 90 capsule 1    [DISCONTINUED] colchicine (COLCRYS) 0.6 mg tablet Take 1 tablet (0.6 mg total) by mouth daily 30 tablet 5    ferrous sulfate 325 (65 Fe) mg tablet Take 325 mg by mouth every other day       No current facility-administered medications on file prior to visit.      Social History     Tobacco Use    Smoking status: Never    Smokeless tobacco: Never   Vaping Use    Vaping status: Never Used   Substance and Sexual Activity    Alcohol use: Not Currently     Comment: social    Drug use: No    Sexual activity: Not Currently      "Partners: Female     Birth control/protection: None         Objective   /80 (BP Location: Left arm, Patient Position: Sitting, Cuff Size: Standard)   Pulse 88   Temp (!) 97.4 °F (36.3 °C) (Tympanic)   Ht 6' 1\" (1.854 m)   Wt 130 kg (285 lb 9.6 oz)   SpO2 97%   BMI 37.68 kg/m²     Physical Exam  General appearance: normal appearing, no acute distress  Skin: normal, no rashes  HEENT: normal, moist oropharynx, no nasal or oral ulcers  Lungs: normal respiratory effort, comfortable on room air  Heart: normal heart sounds, normal rate, normal rhythm,  Abdomen: soft,  no tenderness  Neurologic: no obvious neurological deficits   Extremities: no edema, warm and well perfused     Musculoskeletal Exam:   - Observation: no obvious joint abnormalities    - Palpation: no joint tenderness  - Synovitis: absent  - Joint effusions: absent  - ROM: intact throughout      Recent labs:  Lab Results   Component Value Date/Time    SODIUM 140 03/25/2025 08:03 AM    K 4.1 03/25/2025 08:03 AM    K 3.9 10/30/2015 06:41 AM    BUN 21 03/25/2025 08:03 AM    BUN 17 10/30/2015 06:41 AM    CREATININE 0.95 03/25/2025 08:03 AM    CREATININE 0.89 10/30/2015 06:41 AM    GLUC 100 10/19/2023 06:44 AM    CALCIUM 9.1 03/25/2025 08:03 AM    CALCIUM 8.5 10/30/2015 06:41 AM    AST 16 03/25/2025 08:03 AM    AST 25 10/30/2015 06:41 AM    ALT 19 03/25/2025 08:03 AM    ALT 41 10/30/2015 06:41 AM    ALB 4.0 03/25/2025 08:03 AM    ALB 3.7 10/30/2015 06:41 AM    TP 7.3 03/25/2025 08:03 AM    EGFR 79 03/25/2025 08:03 AM     Lab Results   Component Value Date/Time    HGB 13.6 03/25/2025 08:03 AM    HGB 15.1 10/30/2015 06:41 AM    WBC 8.62 03/25/2025 08:03 AM    WBC 8.15 10/30/2015 06:41 AM     03/25/2025 08:03 AM     10/30/2015 06:41 AM    INR 1.46 (H) 10/16/2023 12:58 PM    PTT 38 (H) 10/16/2023 12:58 PM     Lab Results   Component Value Date/Time    BHT6HZJEFUGL 2.878 10/16/2023 07:39 AM      12/17/24 07:56 01/08/25 07:45 03/25/25 08:03 " "  URIC ACID 7.1 6.4 5.5     I have personally reviewed notes, labs, and imaging available in the chart.     Large joint arthrocentesis: bilateral knee  Universal Protocol:  Consent: Verbal consent obtained.  Risks and benefits: risks, benefits and alternatives were discussed  Consent given by: patient  Time out: Immediately prior to procedure a \"time out\" was called to verify the correct patient, procedure, equipment, support staff and site/side marked as required.  Timeout called at: 3/28/2025 10:14 AM.  Patient understanding: patient states understanding of the procedure being performed  Patient consent: the patient's understanding of the procedure matches consent given  Site marked: the operative site was marked  Patient identity confirmed: verbally with patient  Supporting Documentation  Indications: pain   Procedure Details  Location: knee - bilateral knee  Preparation: Patient was prepped and draped in the usual sterile fashion  Needle size: 25 G  Ultrasound guidance: no  Approach: anterolateral    Medications (Right): 2 mL lidocaine 1 %; 40 mg methylPREDNISolone acetate 40 mg/mLMedications (Left): 2 mL lidocaine 1 %; 40 mg methylPREDNISolone acetate 40 mg/mL   Patient tolerance: patient tolerated the procedure well with no immediate complications            Talya Moctezuma DO, CCD, [facility]  [facility]     "

## 2025-03-28 ENCOUNTER — OFFICE VISIT (OUTPATIENT)
Age: 75
End: 2025-03-28
Payer: MEDICARE

## 2025-03-28 VITALS
WEIGHT: 285.6 LBS | HEIGHT: 73 IN | DIASTOLIC BLOOD PRESSURE: 80 MMHG | BODY MASS INDEX: 37.85 KG/M2 | SYSTOLIC BLOOD PRESSURE: 142 MMHG | OXYGEN SATURATION: 97 % | TEMPERATURE: 97.4 F | HEART RATE: 88 BPM

## 2025-03-28 DIAGNOSIS — M17.0 PRIMARY OSTEOARTHRITIS OF BOTH KNEES: ICD-10-CM

## 2025-03-28 DIAGNOSIS — M1A.09X0 CHRONIC GOUT OF MULTIPLE SITES, UNSPECIFIED CAUSE: Primary | ICD-10-CM

## 2025-03-28 PROCEDURE — 20610 DRAIN/INJ JOINT/BURSA W/O US: CPT | Performed by: STUDENT IN AN ORGANIZED HEALTH CARE EDUCATION/TRAINING PROGRAM

## 2025-03-28 PROCEDURE — 99213 OFFICE O/P EST LOW 20 MIN: CPT | Performed by: STUDENT IN AN ORGANIZED HEALTH CARE EDUCATION/TRAINING PROGRAM

## 2025-03-28 RX ORDER — LIDOCAINE HYDROCHLORIDE 10 MG/ML
2 INJECTION, SOLUTION INFILTRATION; PERINEURAL
Status: COMPLETED | OUTPATIENT
Start: 2025-03-28 | End: 2025-03-28

## 2025-03-28 RX ORDER — METHYLPREDNISOLONE ACETATE 40 MG/ML
40 INJECTION, SUSPENSION INTRA-ARTICULAR; INTRALESIONAL; INTRAMUSCULAR; SOFT TISSUE
Status: COMPLETED | OUTPATIENT
Start: 2025-03-28 | End: 2025-03-28

## 2025-03-28 RX ORDER — COLCHICINE 0.6 MG/1
0.6 TABLET ORAL DAILY
Qty: 30 TABLET | Refills: 2 | Status: SHIPPED | OUTPATIENT
Start: 2025-03-28

## 2025-03-28 RX ADMIN — METHYLPREDNISOLONE ACETATE 40 MG: 40 INJECTION, SUSPENSION INTRA-ARTICULAR; INTRALESIONAL; INTRAMUSCULAR; SOFT TISSUE at 10:00

## 2025-03-28 RX ADMIN — LIDOCAINE HYDROCHLORIDE 2 ML: 10 INJECTION, SOLUTION INFILTRATION; PERINEURAL at 10:00

## 2025-03-28 NOTE — ASSESSMENT & PLAN NOTE
Patient is a 74-year-old male presenting for follow-up of chronic gout.  Denies any recent gout flares.  Patient has been on allopurinol 400 mg daily and colchicine.  Will plan to continue to monitor uric acid and titrate allopurinol as needed.  No evidence of recurrent gout flare today.  Last uric acid level 5.5  -Continue allopurinol to 400 mg daily.  -Continue colchicine 0.6 mg daily for prophylaxis for now  -Repeat uric acid prior to next visit, if remains at goal, will plan to discontinue colchicine  - goal uric acid of <6   Orders:    colchicine (COLCRYS) 0.6 mg tablet; Take 1 tablet (0.6 mg total) by mouth daily    Uric acid; Future

## 2025-04-10 ENCOUNTER — RA CDI HCC (OUTPATIENT)
Dept: OTHER | Facility: HOSPITAL | Age: 75
End: 2025-04-10

## 2025-04-13 DIAGNOSIS — R35.0 BENIGN PROSTATIC HYPERPLASIA WITH URINARY FREQUENCY: ICD-10-CM

## 2025-04-13 DIAGNOSIS — N40.1 BENIGN PROSTATIC HYPERPLASIA WITH URINARY FREQUENCY: ICD-10-CM

## 2025-04-13 DIAGNOSIS — M54.16 LUMBAR RADICULOPATHY, ACUTE: ICD-10-CM

## 2025-04-13 RX ORDER — FINASTERIDE 5 MG/1
5 TABLET, FILM COATED ORAL DAILY
Qty: 90 TABLET | Refills: 1 | Status: SHIPPED | OUTPATIENT
Start: 2025-04-13

## 2025-04-14 RX ORDER — OXYCODONE AND ACETAMINOPHEN 5; 325 MG/1; MG/1
1 TABLET ORAL EVERY 6 HOURS PRN
Qty: 20 TABLET | Refills: 0 | Status: SHIPPED | OUTPATIENT
Start: 2025-04-14

## 2025-04-16 ENCOUNTER — OFFICE VISIT (OUTPATIENT)
Dept: FAMILY MEDICINE CLINIC | Facility: CLINIC | Age: 75
End: 2025-04-16
Payer: MEDICARE

## 2025-04-16 VITALS
HEART RATE: 73 BPM | DIASTOLIC BLOOD PRESSURE: 78 MMHG | WEIGHT: 283 LBS | BODY MASS INDEX: 37.51 KG/M2 | OXYGEN SATURATION: 97 % | HEIGHT: 73 IN | TEMPERATURE: 97.5 F | SYSTOLIC BLOOD PRESSURE: 116 MMHG

## 2025-04-16 DIAGNOSIS — M17.0 ARTHRITIS OF BOTH KNEES: ICD-10-CM

## 2025-04-16 DIAGNOSIS — M10.9 GOUT, UNSPECIFIED CAUSE, UNSPECIFIED CHRONICITY, UNSPECIFIED SITE: ICD-10-CM

## 2025-04-16 DIAGNOSIS — M51.360 DEGENERATION OF INTERVERTEBRAL DISC OF LUMBAR REGION WITH DISCOGENIC BACK PAIN: ICD-10-CM

## 2025-04-16 DIAGNOSIS — G47.33 OBSTRUCTIVE SLEEP APNEA: ICD-10-CM

## 2025-04-16 DIAGNOSIS — M19.90 ARTHRITIS: ICD-10-CM

## 2025-04-16 DIAGNOSIS — E78.2 MIXED HYPERLIPIDEMIA: ICD-10-CM

## 2025-04-16 DIAGNOSIS — I71.21 ANEURYSM OF ASCENDING AORTA WITHOUT RUPTURE (HCC): ICD-10-CM

## 2025-04-16 DIAGNOSIS — R35.0 BENIGN PROSTATIC HYPERPLASIA WITH URINARY FREQUENCY: ICD-10-CM

## 2025-04-16 DIAGNOSIS — I10 PRIMARY HYPERTENSION: Primary | Chronic | ICD-10-CM

## 2025-04-16 DIAGNOSIS — E66.9 OBESITY (BMI 30-39.9): ICD-10-CM

## 2025-04-16 DIAGNOSIS — N40.1 BENIGN PROSTATIC HYPERPLASIA WITH URINARY FREQUENCY: ICD-10-CM

## 2025-04-16 DIAGNOSIS — I35.0 NONRHEUMATIC AORTIC VALVE STENOSIS: ICD-10-CM

## 2025-04-16 PROBLEM — E66.813 CLASS 3 SEVERE OBESITY DUE TO EXCESS CALORIES WITH SERIOUS COMORBIDITY AND BODY MASS INDEX (BMI) OF 40.0 TO 44.9 IN ADULT: Status: RESOLVED | Noted: 2018-02-27 | Resolved: 2025-04-16

## 2025-04-16 PROBLEM — F11.20 OPIOID DEPENDENCE, UNCOMPLICATED (HCC): Status: RESOLVED | Noted: 2025-01-23 | Resolved: 2025-04-16

## 2025-04-16 PROCEDURE — G2211 COMPLEX E/M VISIT ADD ON: HCPCS | Performed by: FAMILY MEDICINE

## 2025-04-16 PROCEDURE — 99214 OFFICE O/P EST MOD 30 MIN: CPT | Performed by: FAMILY MEDICINE

## 2025-04-16 NOTE — ASSESSMENT & PLAN NOTE
Continue Vasotec 20 mg Toprol- mg and hydrochlorothiazide 25 mg daily also Apresoline 50 mg 3 times daily.

## 2025-04-16 NOTE — PROGRESS NOTES
Depression Screening and Follow-up Plan: Patient was screened for depression during today's encounter. They screened negative with a PHQ-2 score of 1.      Assessment/Plan:    Return visit in 6 months with fasting blood prior to visit     Problem List Items Addressed This Visit       Hypertension - Primary (Chronic)    Continue Vasotec 20 mg Toprol- mg and hydrochlorothiazide 25 mg daily also Apresoline 50 mg 3 times daily.         Arthritis    Arthritis of both knees    Relevant Orders    Ambulatory Referral to Orthopedic Surgery    Benign prostatic hyperplasia with urinary frequency    Continue Flomax and finasteride.  Follow-up with urology         DDD (degenerative disc disease), lumbar    Gout    Continue allopurinol 100 mg daily         Hyperlipidemia    Relevant Orders    CBC and differential    Comprehensive metabolic panel    Lipid panel    Nonrheumatic aortic valve stenosis    Obesity (BMI 30-39.9)    Obstructive sleep apnea    Thoracic aortic aneurysm without rupture (HCC)         Subjective:      Patient ID: Lobo Don is a 74 y.o. male.    Patient comes in for checkup.  He complains of worsening arthritis in his knees.        The following portions of the patient's history were reviewed and updated as appropriate:   Past Medical History:  He has a past medical history of Anemia (October 2023), Aneurysm (HCC) (March 2018), Aortic aneurysm (HCC), Arthritis (August 2020), BPH (benign prostatic hyperplasia), Diverticulitis, Diverticulitis of colon (Early 1990's), Diverticulosis (1996), Eczema (Early Childhood), Elevated troponin, Gout (2010), Heart attack (HCC) (2018), Heart disease, History of colon polyps, History of colonoscopy, History of esophagogastroduodenoscopy, History of rectal polyps, Hypertension, Hypokalemia, Liver disease (October 2023), Morbid obesity (HCC), Myocardial infarction (HCC), Obesity (Early 1990's), Osteoarthritis (July 2024), Pneumonia (February 2018), Renal calculi,  Seasonal allergies, Sleep apnea with use of continuous positive airway pressure (CPAP), and STEMI (ST elevation myocardial infarction) (HCC).,  _______________________________________________________________________  Medical Problems:  does not have any pertinent problems on file.,  _______________________________________________________________________  Past Surgical History:   has a past surgical history that includes Colon surgery (1996); Appendectomy (1974); ARTHROSCOPY KNEE (Left); Colostomy; Saturation biopsy of prostate; Rotator cuff repair (Left); Total hip arthroplasty (Bilateral); IR drainage tube placement (10/16/2023); IR drainage tube check/change/reposition/reinsertion/upsize (10/30/2023); Colonoscopy (06/05/2013); Hernia repair; Tonsillectomy (1959); Other surgical history (09/30/1996); Revision Colostomy (02/24/1997); Flexible sigmoidoscopy (11/02/1996); Shoulder surgery (2001); Cyst Removal (2008); Joint replacement (Hips in 2014 & 2016); Knee surgery (Early 2010's); Colonoscopy (Every 5 years); Liver biopsy (October 2023); Abdominal surgery (1996 /1997); Endoscopic ultrasonography, GI (Upper) (Scheduled March 2024); and Bowel resection (1996).,  _______________________________________________________________________  Family History:  family history includes Alcohol abuse in his mother; Arthritis in his mother; Breast cancer in his mother; COPD in his mother; Cancer in his mother; Colon cancer in his brother, brother, family, and maternal uncle; Colon polyps in his father; Depression in his mother; Diabetes in his family and paternal grandfather; Drug abuse in his brother; Gout in his father; Heart attack in his maternal grandmother; Heart disease in his paternal grandmother; Hypertension in his father; Lung cancer in his mother; Mental illness in his mother; Prostate cancer in his father; Substance Abuse in his brother and  mother.,  _______________________________________________________________________  Social History:   reports that he has never smoked. He has never used smokeless tobacco. He reports that he does not currently use alcohol. He reports that he does not use drugs.,  _______________________________________________________________________  Allergies:  is allergic to lorazepam..  _______________________________________________________________________  Current Outpatient Medications   Medication Sig Dispense Refill    acetaminophen (TYLENOL) 650 mg CR tablet Take 250 mg by mouth every 8 (eight) hours as needed for mild pain 1500      allopurinol (ZYLOPRIM) 100 mg tablet Take 4 tablets (400 mg total) by mouth daily (Patient taking differently: Take 100 mg by mouth daily Taking 4 tablets a day 400mg) 120 tablet 2    colchicine (COLCRYS) 0.6 mg tablet Take 1 tablet (0.6 mg total) by mouth daily 30 tablet 2    enalapril (VASOTEC) 20 mg tablet Take 1 tablet (20 mg total) by mouth daily 90 tablet 1    ferrous sulfate 325 (65 Fe) mg tablet Take 325 mg by mouth every other day      finasteride (PROSCAR) 5 mg tablet Take 1 tablet (5 mg total) by mouth daily 90 tablet 1    hydrALAZINE (APRESOLINE) 50 mg tablet Take 1.5 tablets (75 mg total) by mouth 3 (three) times a day 405 tablet 0    hydroCHLOROthiazide 25 mg tablet Take 1 tablet (25 mg total) by mouth daily 90 tablet 1    metoprolol succinate (TOPROL-XL) 100 mg 24 hr tablet TAKE 1 TABLET BY MOUTH EVERY DAY 90 tablet 1    oxyCODONE-acetaminophen (PERCOCET) 5-325 mg per tablet Take 1 tablet by mouth every 6 (six) hours as needed for severe pain (back/leg pain/ongoing rx) Label no driving no etoh. Initial rx.  Dx: Max Daily Amount: 4 tablets 20 tablet 0    tamsulosin (FLOMAX) 0.4 mg TAKE 1 CAPSULE BY MOUTH EVERY DAY 90 capsule 1     No current facility-administered medications for this visit.     _______________________________________________________________________  Review of  "Systems   Constitutional: Negative.    Respiratory: Negative.     Cardiovascular: Negative.    Musculoskeletal:  Positive for arthralgias, back pain and gait problem.         Objective:  Vitals:    04/16/25 0850   BP: 116/78   BP Location: Left arm   Patient Position: Sitting   Cuff Size: Standard   Pulse: 73   Temp: 97.5 °F (36.4 °C)   TempSrc: Temporal   SpO2: 97%   Weight: 128 kg (283 lb)   Height: 6' 1\" (1.854 m)     Body mass index is 37.34 kg/m².     Physical Exam  Constitutional:       Appearance: Normal appearance. He is well-developed.   HENT:      Head: Normocephalic and atraumatic.   Eyes:      Pupils: Pupils are equal, round, and reactive to light.   Cardiovascular:      Rate and Rhythm: Normal rate and regular rhythm.      Heart sounds: Normal heart sounds.   Pulmonary:      Effort: Pulmonary effort is normal.      Breath sounds: Normal breath sounds.   Musculoskeletal:         General: Normal range of motion.      Cervical back: Neck supple.   Skin:     General: Skin is warm and dry.   Neurological:      Mental Status: He is alert.   Psychiatric:         Mood and Affect: Mood normal.         Behavior: Behavior normal.         "

## 2025-04-18 ENCOUNTER — TELEPHONE (OUTPATIENT)
Dept: RHEUMATOLOGY | Facility: CLINIC | Age: 75
End: 2025-04-18

## 2025-04-18 NOTE — TELEPHONE ENCOUNTER
Called and spoke with the patient. Advised him that Dr. Moctezuma would still like to see him in July for a routine follow up. Patient has been scheduled for 7/25/25.

## 2025-04-19 DIAGNOSIS — I10 PRIMARY HYPERTENSION: ICD-10-CM

## 2025-04-19 RX ORDER — HYDROCHLOROTHIAZIDE 25 MG/1
25 TABLET ORAL DAILY
Qty: 90 TABLET | Refills: 1 | Status: SHIPPED | OUTPATIENT
Start: 2025-04-19

## 2025-04-20 DIAGNOSIS — R35.0 BENIGN PROSTATIC HYPERPLASIA WITH URINARY FREQUENCY: ICD-10-CM

## 2025-04-20 DIAGNOSIS — N40.1 BENIGN PROSTATIC HYPERPLASIA WITH URINARY FREQUENCY: ICD-10-CM

## 2025-04-20 RX ORDER — TAMSULOSIN HYDROCHLORIDE 0.4 MG/1
0.4 CAPSULE ORAL DAILY
Qty: 90 CAPSULE | Refills: 1 | Status: SHIPPED | OUTPATIENT
Start: 2025-04-20

## 2025-05-07 DIAGNOSIS — M1A.09X0 CHRONIC GOUT OF MULTIPLE SITES, UNSPECIFIED CAUSE: ICD-10-CM

## 2025-05-07 RX ORDER — ALLOPURINOL 100 MG/1
400 TABLET ORAL DAILY
Qty: 120 TABLET | Refills: 2 | Status: SHIPPED | OUTPATIENT
Start: 2025-05-07

## 2025-05-09 DIAGNOSIS — M54.16 LUMBAR RADICULOPATHY, ACUTE: ICD-10-CM

## 2025-05-09 DIAGNOSIS — M1A.09X0 CHRONIC GOUT OF MULTIPLE SITES, UNSPECIFIED CAUSE: ICD-10-CM

## 2025-05-09 RX ORDER — COLCHICINE 0.6 MG/1
0.6 TABLET ORAL DAILY
Qty: 30 TABLET | Refills: 1 | Status: SHIPPED | OUTPATIENT
Start: 2025-05-09

## 2025-05-09 RX ORDER — OXYCODONE AND ACETAMINOPHEN 5; 325 MG/1; MG/1
1 TABLET ORAL EVERY 6 HOURS PRN
Qty: 20 TABLET | Refills: 0 | Status: SHIPPED | OUTPATIENT
Start: 2025-05-09

## 2025-06-04 DIAGNOSIS — I10 ESSENTIAL HYPERTENSION: Chronic | ICD-10-CM

## 2025-06-04 RX ORDER — METOPROLOL SUCCINATE 100 MG/1
100 TABLET, EXTENDED RELEASE ORAL DAILY
Qty: 90 TABLET | Refills: 1 | Status: SHIPPED | OUTPATIENT
Start: 2025-06-04

## 2025-06-09 DIAGNOSIS — M1A.09X0 CHRONIC GOUT OF MULTIPLE SITES, UNSPECIFIED CAUSE: ICD-10-CM

## 2025-06-09 DIAGNOSIS — M54.16 LUMBAR RADICULOPATHY, ACUTE: ICD-10-CM

## 2025-06-09 RX ORDER — COLCHICINE 0.6 MG/1
0.6 TABLET ORAL DAILY
Qty: 30 TABLET | Refills: 2 | Status: SHIPPED | OUTPATIENT
Start: 2025-06-09

## 2025-06-09 RX ORDER — OXYCODONE AND ACETAMINOPHEN 5; 325 MG/1; MG/1
1 TABLET ORAL EVERY 6 HOURS PRN
Qty: 20 TABLET | Refills: 0 | Status: SHIPPED | OUTPATIENT
Start: 2025-06-09

## 2025-07-07 ENCOUNTER — OFFICE VISIT (OUTPATIENT)
Dept: CARDIOLOGY CLINIC | Facility: CLINIC | Age: 75
End: 2025-07-07
Payer: MEDICARE

## 2025-07-07 VITALS
WEIGHT: 289 LBS | SYSTOLIC BLOOD PRESSURE: 130 MMHG | BODY MASS INDEX: 38.3 KG/M2 | HEIGHT: 73 IN | HEART RATE: 55 BPM | OXYGEN SATURATION: 98 % | RESPIRATION RATE: 16 BRPM | DIASTOLIC BLOOD PRESSURE: 80 MMHG

## 2025-07-07 DIAGNOSIS — I51.89 DIASTOLIC DYSFUNCTION: ICD-10-CM

## 2025-07-07 DIAGNOSIS — Z01.810 PREOP CARDIOVASCULAR EXAM: Primary | ICD-10-CM

## 2025-07-07 DIAGNOSIS — I35.0 NONRHEUMATIC AORTIC VALVE STENOSIS: ICD-10-CM

## 2025-07-07 DIAGNOSIS — I77.810 DILATED AORTIC ROOT (HCC): ICD-10-CM

## 2025-07-07 DIAGNOSIS — I10 PRIMARY HYPERTENSION: ICD-10-CM

## 2025-07-07 DIAGNOSIS — E78.2 MIXED HYPERLIPIDEMIA: ICD-10-CM

## 2025-07-07 DIAGNOSIS — G47.33 OBSTRUCTIVE SLEEP APNEA: ICD-10-CM

## 2025-07-07 DIAGNOSIS — E66.9 OBESITY (BMI 30-39.9): ICD-10-CM

## 2025-07-07 DIAGNOSIS — M54.16 LUMBAR RADICULOPATHY, ACUTE: ICD-10-CM

## 2025-07-07 DIAGNOSIS — I51.7 LVH (LEFT VENTRICULAR HYPERTROPHY): ICD-10-CM

## 2025-07-07 DIAGNOSIS — I71.21 ANEURYSM OF ASCENDING AORTA WITHOUT RUPTURE (HCC): ICD-10-CM

## 2025-07-07 PROCEDURE — 93000 ELECTROCARDIOGRAM COMPLETE: CPT | Performed by: INTERNAL MEDICINE

## 2025-07-07 PROCEDURE — 99214 OFFICE O/P EST MOD 30 MIN: CPT | Performed by: INTERNAL MEDICINE

## 2025-07-07 NOTE — PROGRESS NOTES
CARDIOLOGY OFFICE VISIT  [facility]  [address], [place] [address]  Tel: [phone]      NAME: Lobo Don  AGE: 75 y.o.  SEX: male  : 1950  MRN: [id number]      Chief Complaint:  Chief Complaint   Patient presents with    Follow-up    Pre-op Exam         History of Present Illness:   Patient comes for preop cardiac risk assessment prior to left TKR. States he is doing well from cardiac stand point and denies chest pain / pressure, SOB, palpitations, lightheadedness, syncope, swelling feet, orthopnea, PND, claudication.    Primary hypertension, LVH, DD -  Has been hypertensive for many years.  Taking medications regularly.  Denies lightheadedness, headache, medication side effects.      Mixed hyperlipidemia -  Has had hyperlipidemia for many years. On diet control. PCP closely monitoring the blood work.    Obesity -  Trying to lose weight.    Dilated aortic root, Ascending aortic aneurysm -  Aortic root 4.2 cm per echo in 2022.  Ascending thoracic aorta stable at 4.5 cm on CT scans done in 2018, Oct 2018, 2022 and measured as 4.2 cm on CT scan done in Oct 2023, 2025.     Aortic stenosis - mild per echo 2022     SRAVAN - states he uses CPAP regularly    Past Medical History:  Past Medical History[1]      Past Surgical History:  Past Surgical History[2]      Family History:  Family History[3]      Social History:  Social History[4]      Active Problems:  Problem List[5]      The following portions of the patient's history were reviewed and updated as appropriate: past medical history, past surgical history, past family history,  past social history, current medications, allergies and problem list.      Review of Systems:  Constitutional: Denies fever, chills  Eyes: Denies eye redness, eye discharge  ENT: Denies sneezing, nasal discharge, sore throat   Respiratory: Denies cough, expectoration, shortness  of breath  Cardiovascular: Denies chest pain, palpitations, lower extremity swelling  Gastrointestinal: Denies abdominal pain, nausea, vomiting, diarrhea  Genito-Urinary: Denies dysuria, incontinence  Musculoskeletal: +b/l knee pain  Neurologic: Denies lightheadedness, syncope, headache, seizures  Endocrine: Denies polydipsia, temperature intolerance  Allergy and Immunology: Denies hives, insect bite sensitivity  Hematological and Lymphatic: Denies bleeding problems, swollen glands   Psychological: Denies suicidal ideation, panic  Dermatological: Denies pruritus, rash      Vitals:  Vitals:    07/07/25 0804   BP: 130/80   Pulse: 55   Resp: 16   SpO2: 98%       Body mass index is 38.13 kg/m².    Weight (last 2 days)       Date/Time Weight    07/07/25 0804 131 (289)              Physical Examination:  General: Patient is not in acute distress. Awake, alert, oriented in time, place and person. Responding to commands  Head: Normocephalic. Atraumatic  Eyes: Both pupils normal sized, round and reactive to light. Nonicteric  ENT: Normal external ear canals  Neck: Supple. JVP not raised. Trachea central. No thyromegaly  Lungs: Bilateral bronchovascular breath sounds with no crackles or rhonchi  Chest wall: No tenderness  Cardiovascular: S1 and S2 normal. No murmur, rub or gallop  Gastrointestinal: Abdomen soft, nontender. No guarding or rigidity. Liver and spleen not palpable. Bowel sounds present  Neurologic: Patient is awake, alert, oriented in time, place and person. Responding to commands. Moving all extremities  Integumentary:  No skin rash  Lymphatic: No cervical lymphadenopathy  Back: Symmetric. No CVA tenderness  Extremities: No clubbing, cyanosis or edema      Laboratory Results:  CBC with diff:   Lab Results   Component Value Date    WBC 8.62 03/25/2025    WBC 8.15 10/30/2015    RBC 4.80 03/25/2025    RBC 4.90 10/30/2015    HGB 13.6 03/25/2025    HGB 15.1 10/30/2015    HCT 43.1 03/25/2025    HCT 43.0 10/30/2015     MCV 90 03/25/2025    MCV 88 10/30/2015    MCH 28.3 03/25/2025    MCH 30.8 10/30/2015    RDW 14.5 03/25/2025    RDW 13.2 10/30/2015     03/25/2025     10/30/2015       CMP:  Lab Results   Component Value Date    CREATININE 0.95 03/25/2025    CREATININE 0.89 10/30/2015    BUN 21 03/25/2025    BUN 17 10/30/2015     10/30/2015    K 4.1 03/25/2025    K 3.9 10/30/2015     03/25/2025     10/30/2015    CO2 30 03/25/2025    CO2 32 10/30/2015    GLUCOSE 100 10/30/2015    PROT 6.8 10/30/2015    ALKPHOS 80 03/25/2025    ALKPHOS 80 10/30/2015    ALT 19 03/25/2025    ALT 41 10/30/2015    AST 16 03/25/2025    AST 25 10/30/2015       Lab Results   Component Value Date    HGBA1C 5.0 12/18/2019    MG 1.9 10/16/2023       Lab Results   Component Value Date    TROPONINI 0.03 02/27/2018    TROPONINI 0.05 (H) 02/27/2018    TROPONINI 0.03 02/27/2018    CKTOTAL 48 09/16/2024       Lipid Profile:   Lab Results   Component Value Date    CHOL 187 10/30/2015     Lab Results   Component Value Date    HDL 32 (L) 03/25/2025    HDL 24 (L) 08/16/2024    HDL 26 (L) 02/14/2024     Lab Results   Component Value Date    LDLCALC 104 (H) 03/25/2025    LDLCALC 97 08/16/2024    LDLCALC 113 (H) 02/14/2024     Lab Results   Component Value Date    TRIG 140 03/25/2025    TRIG 138 08/16/2024    TRIG 137 02/14/2024       Cardiac testing:   Results for orders placed during the hospital encounter of 02/17/22    Echo complete w/ contrast if indicated    Interpretation Summary    Left Ventricle: Left ventricular cavity size is normal. Systolic function is normal (60%). Wall motion cannot be accurately assessed. Diastolic function is mildly abnormal, consistent with grade I (abnormal) relaxation. Wall thickness is moderately increased. There is moderate concentric hypertrophy.    Left Atrium: The atrium is mildly dilated.    Aortic Valve: There is mild stenosis.    Mitral Valve: There is mild annular calcification.    Tricuspid Valve:  There is trace regurgitation.    Aorta: The aortic root is mildly dilated (4.2 cm).    [facility]   Results for orders placed during the hospital encounter of 18    NM myocardial perfusion spect (rx stress and/or rest)    [facility]  [address], [place] [id number] [phone]    Rest/Stress Gated SPECT Myocardial Perfusion Imaging After Regadenoson    Patient: MADHAVI NIEVES  MR number: [id number]  Account number: [id number]  : 1950  Age: 67 years  Gender: Male  Status: Outpatient  Location: [facility]  Height: 73 in  Weight: 319 lb  BP: 132/ 96 mmHg    Allergies: LORAZEPAM    Diagnosis: I21.4 - Non-ST elevation (NSTEMI) myocardial infarction, R06.09 - Other forms of dyspnea    Primary Physician:  Yoan Toribio MD  Interpreting Physician:  Clifton Byers MD  Referring Physician:  Clifton Byers MD  Technician:  Jerman Boss BS, BA, AART(N)  Group:  [facility]  Other:  Daniele Mosher MS, CCT    INDICATIONS: Detection of coronary artery disease.    HISTORY: The patient is a 67 year old  male. Chest pain status: no chest pain. Other symptoms: dyspnea. Coronary artery disease risk factors: dyslipidemia and hypertension. Cardiovascular history: prior myocardial infarction and  aortic valve disease. Co-morbidity: obesity. Thoracic aortic aneurysm, SRAVAN Medications: a beta blocker, an ACE inhibitor/ARB, and a diuretic. Previous test results: abnormal ECG.    PHYSICAL EXAM: Baseline physical exam screening: normal.    REST ECG: Normal sinus rhythm.    PROCEDURE: The study was performed at [facility]. A regadenoson infusion pharmacologic stress test was performed. Gated SPECT myocardial perfusion imaging was performed after stress and at rest. Systolic blood pressure  was 132 mmHg, at the start of the study. Diastolic blood pressure was 96 mmHg, at the start of the study. The heart rate was 55  bpm, at the start of the study.  Regadenoson protocol:  HR bpm SBP mmHg DBP mmHg Symptoms Rhythm/conduct  Baseline 55 132 96 none sinus piter  Immediate 83 138 84 mild dyspnea NSR, no ectopy  1 min 80 -- -- none NSR, no ectopy  2 min 75 126 76 none NSR, no ectopy    STRESS SUMMARY: Duration of pharmacologic stress was 3 min. Maximal heart rate during stress was 83 bpm. The rate-pressure product for the peak heart rate and blood pressure was 35031. There was no chest pain during stress. The stress test  was terminated due to protocol completion. The stress ECG was normal. There were no stress arrhythmias or conduction abnormalities.    ISOTOPE ADMINISTRATION:  Resting isotope administration Stress isotope administration  Agent Tetrofosmin Tetrofosmin  Dose 15.4 mCi 48.8 mCi  Date 05/02/2018 05/02/2018  Injection-image interval 30 min 45 min    The radiopharmaceutical was injected at the peak effect of pharmacologic stress.    PERFUSION DEFECTS:  -  There was a moderate-sized, moderately severe, fixed myocardial perfusion defect of the entire inferior wall likely due to diaphragm attenuation.    GATED SPECT:  The calculated left ventricular ejection fraction was 50 %. Left ventricular ejection fraction was within normal limits by visual estimate. There was no left ventricular regional abnormality.    SUMMARY:  -  Stress results: There was no chest pain during stress.  -  ECG conclusions: The stress ECG was normal.  -  Perfusion imaging: There was a moderate-sized, moderately severe, fixed myocardial perfusion defect of the entire inferior wall likely due to diaphragm attenuation.  -  Gated SPECT: The calculated left ventricular ejection fraction was 50 %. Left ventricular ejection fraction was within normal limits by visual estimate. There was no left ventricular regional abnormality.    IMPRESSIONS: Normal study. There was image artifact, without diagnostic evidence for perfusion abnormality. Left ventricular  systolic function was normal.    Prepared and signed by    Clifton Byers MD  Signed 05/03/2018 09:45:57      EKG: Reviewed by me.  7/7/2025.  Normal sinus rhythm.  LAD.  LVH with QRS widening.      Medications:  Current Medications[6]      Allergies:  Allergies[7]      Assessment and Plan:  1. Preop cardiovascular exam (Primary)  EKG done in the clinic reviewed with the patient.  2D echocardiogram ordered for EF, aortic stenosis severity.  Full recommendations after the echo report is available    2. Primary hypertension  Blood pressure stable.  Continue enalapril 20 mg daily, hydralazine 75 mg 3 times daily, metoprolol succinate 100 mg daily, HCTZ 25 mg daily.  Continue to monitor BP at home and call if abnormal    3. LVH (left ventricular hypertrophy)  4. Diastolic dysfunction  Tight BP control    5. Mixed hyperlipidemia  Continue diet control.  His PCP closely monitors his blood work    6. Obesity (BMI 30-39.9)  Try to lose weight    7. Aneurysm of ascending aorta without rupture (HCC)  8. Dilated aortic root (HCC)  Stable.  Continue beta-blocker.,  ACE inhibitor    9. Nonrheumatic aortic valve stenosis  Follow-up echocardiogram ordered    10. Obstructive sleep apnea  Use CPAP regularly      Recommend aggressive risk factor modification and therapeutic lifestyle changes.  Low-salt, low-calorie, low-fat, low-cholesterol diet with regular exercise and to optimize weight.  I will defer the ordering and monitoring of necessity lab studies to you, but I am available and happy to review and manage any of the data at your request in the future.    Discussed concepts of atherosclerosis, including signs and symptoms of cardiac disease.    Previous studies were reviewed.    Safety measures were reviewed.  Questions were entertained and answered.  Patient was advised to report any problems requiring medical attention.    Follow-up with PCP and appropriate specialist and lab work as discussed.    Return for follow up  visit as scheduled or earlier, if needed.  Thank you for allowing me to participate in the care and evaluation of your patient.  Should you have any questions, please feel free to contact me.    Khang Hernandez MD  7/7/2025,11:34 AM         [1]   Past Medical History:  Diagnosis Date    Anemia October 2023    Aneurysm (HCC) March 2018    Aortic aneurysm (HCC)     Arthritis August 2020    BPH (benign prostatic hyperplasia)     Diverticulitis     Recurrent - 1990s    Diverticulitis of colon Early 1990's    Diverticulosis 1996    Eczema Early Childhood    Elevated troponin     Gout 2010    Heart attack (HCC) 2018    Heart disease     History of colon polyps     Onset 6/30/89    History of colonoscopy     6/5/13    History of esophagogastroduodenoscopy     6/5/13    History of rectal polyps     Onset 6/30/89    Hypertension     Hypokalemia     Liver disease October 2023    Liver Abscess    Morbid obesity (HCC)     Myocardial infarction (HCC)     Obesity Early 1990's    Osteoarthritis July 2024    Pneumonia February 2018    Renal calculi     Seasonal allergies     Sleep apnea with use of continuous positive airway pressure (CPAP)     STEMI (ST elevation myocardial infarction) (HCC)     ((Pt Qnr Sub: na))    [2]   Past Surgical History:  Procedure Laterality Date    ABDOMINAL SURGERY  1996 /1997    Dr. Gonzalez    APPENDECTOMY  1974    ARTHROSCOPY KNEE Left     BOWEL RESECTION  1996    COLON SURGERY  1996    Sigmoid colectomy for diverticulitis    COLONOSCOPY  06/05/2013    COLONOSCOPY  Every 5 years    Dr. Gonzalez    COLOSTOMY      CYST REMOVAL  2008    ENDOSCOPIC ULTRASOUND (UPPER)  Scheduled March 2024    FLEXIBLE SIGMOIDOSCOPY  11/02/1996    HERNIA REPAIR      ((Pt Qnr Sub: na))     IR DRAINAGE TUBE CHECK/CHANGE/REPOSITION/REINSERTION/UPSIZE  10/30/2023    IR DRAINAGE TUBE PLACEMENT  10/16/2023    JOINT REPLACEMENT  Hips in 2014 & 2016    Dr. Librado Cooper    KNEE SURGERY  Early 2010's    Dr. Phan    LIVER BIOPSY   October 2023    OTHER SURGICAL HISTORY  09/30/1996    Resection of small bowel fistula/resection of injured distal ileum secondary to enterolysis of adhesions    REVISION COLOSTOMY  02/24/1997    ROTATOR CUFF REPAIR Left     2004 & 2009    SATURATION BIOPSY OF PROSTATE      SHOULDER SURGERY  2001    Benign lump    TONSILLECTOMY  1959    TOTAL HIP ARTHROPLASTY Bilateral     2014 - L, 2016 - R / Last Assessed:5/8/15   [3]   Family History  Problem Relation Name Age of Onset    Depression Mother Jyothi Don     COPD Mother Jyothi Don     Breast cancer Mother Jyothi Don         malignant neoplasm    Lung cancer Mother Jyothi Don     Alcohol abuse Mother Jyothi Don     Mental illness Mother Jyothi Don     Cancer Mother Jyothi Don     Arthritis Mother Jyothi Don     Substance Abuse Mother Jyothi Don     Prostate cancer Father Lobo Sr     Colon polyps Father Lobo Sr     Hypertension Father Lobo Sr     Gout Father Lobo Sr     Colon cancer Brother      Diabetes Family Grandfather     Colon cancer Family Uncle     Heart attack Maternal Grandmother Jyothi     Diabetes Paternal Grandfather Lobo Florencia     Heart disease Paternal Grandmother Jyothi Don     Drug abuse Brother Valerio Don     Substance Abuse Brother Valerio Don     Colon cancer Brother Valerio Don     Colon cancer Maternal Uncle Jae Verdugo    [4]   Social History  Socioeconomic History    Marital status: /Civil Union   Tobacco Use    Smoking status: Never    Smokeless tobacco: Never   Vaping Use    Vaping status: Never Used   Substance and Sexual Activity    Alcohol use: Not Currently     Comment: social    Drug use: No    Sexual activity: Not Currently     Partners: Female     Birth control/protection: None     Social Drivers of Health     Financial Resource Strain: Low Risk  (8/8/2023)    Overall Financial Resource Strain (CARDIA)     Difficulty of Paying Living  Expenses: Not hard at all   Food Insecurity: No Food Insecurity (8/22/2024)    Nursing - Inadequate Food Risk Classification     Worried About Running Out of Food in the Last Year: Never true     Ran Out of Food in the Last Year: Never true   Transportation Needs: No Transportation Needs (8/22/2024)    PRAPARE - Transportation     Lack of Transportation (Medical): No     Lack of Transportation (Non-Medical): No   Housing Stability: Low Risk  (8/22/2024)    Housing Stability Vital Sign     Unable to Pay for Housing in the Last Year: No     Number of Times Moved in the Last Year: 0     Homeless in the Last Year: No   [5]   Patient Active Problem List  Diagnosis    Hypertension    Obstructive sleep apnea    Hyperlipidemia    LVH (left ventricular hypertrophy)    Gout    Thoracic aortic aneurysm without rupture (HCC)    Nonrheumatic aortic valve stenosis    Benign prostatic hyperplasia with urinary frequency    Liver abscess    Duodenal ulcer    Iron deficiency anemia    Diverticulosis    Diastolic dysfunction    DDD (degenerative disc disease), lumbar    Arthritis    Lumbar radiculopathy    Elevated sed rate    Arthritis of both knees    Obesity (BMI 30-39.9)   [6]   Current Outpatient Medications:     acetaminophen (TYLENOL) 650 mg CR tablet, Take 250 mg by mouth every 8 (eight) hours as needed for mild pain 1500, Disp: , Rfl:     allopurinol (ZYLOPRIM) 100 mg tablet, Take 4 tablets (400 mg total) by mouth daily Taking 4 tablets a day 400mg, Disp: 120 tablet, Rfl: 2    colchicine (COLCRYS) 0.6 mg tablet, Take 1 tablet (0.6 mg total) by mouth daily, Disp: 30 tablet, Rfl: 2    enalapril (VASOTEC) 20 mg tablet, Take 1 tablet (20 mg total) by mouth daily, Disp: 90 tablet, Rfl: 1    ferrous sulfate 325 (65 Fe) mg tablet, Take 325 mg by mouth every other day, Disp: , Rfl:     finasteride (PROSCAR) 5 mg tablet, Take 1 tablet (5 mg total) by mouth daily, Disp: 90 tablet, Rfl: 1    hydrALAZINE (APRESOLINE) 50 mg tablet, Take  1.5 tablets (75 mg total) by mouth 3 (three) times a day, Disp: 405 tablet, Rfl: 0    hydroCHLOROthiazide 25 mg tablet, TAKE 1 TABLET (25 MG TOTAL) BY MOUTH DAILY., Disp: 90 tablet, Rfl: 1    metoprolol succinate (TOPROL-XL) 100 mg 24 hr tablet, TAKE 1 TABLET BY MOUTH EVERY DAY, Disp: 90 tablet, Rfl: 1    oxyCODONE-acetaminophen (PERCOCET) 5-325 mg per tablet, Take 1 tablet by mouth every 6 (six) hours as needed for severe pain (back/leg pain/ongoing rx) Label no driving no etoh. Initial rx.  Dx: Max Daily Amount: 4 tablets, Disp: 20 tablet, Rfl: 0    tamsulosin (FLOMAX) 0.4 mg, TAKE 1 CAPSULE BY MOUTH EVERY DAY, Disp: 90 capsule, Rfl: 1  [7]   Allergies  Allergen Reactions    Lorazepam Other (See Comments)     very agitated  Psychosis

## 2025-07-09 RX ORDER — OXYCODONE AND ACETAMINOPHEN 5; 325 MG/1; MG/1
1 TABLET ORAL EVERY 6 HOURS PRN
Qty: 20 TABLET | Refills: 0 | Status: SHIPPED | OUTPATIENT
Start: 2025-07-09

## 2025-07-13 ENCOUNTER — HOSPITAL ENCOUNTER (INPATIENT)
Facility: HOSPITAL | Age: 75
LOS: 2 days | Discharge: HOME | DRG: 315 | End: 2025-07-16
Admitting: FAMILY MEDICINE
Payer: MEDICARE

## 2025-07-13 ENCOUNTER — APPOINTMENT (EMERGENCY)
Dept: RADIOLOGY | Facility: HOSPITAL | Age: 75
DRG: 315 | End: 2025-07-13
Payer: MEDICARE

## 2025-07-13 ENCOUNTER — APPOINTMENT (EMERGENCY)
Dept: CT IMAGING | Facility: HOSPITAL | Age: 75
DRG: 315 | End: 2025-07-13
Payer: MEDICARE

## 2025-07-13 DIAGNOSIS — R10.9 ABDOMINAL PAIN, UNSPECIFIED ABDOMINAL LOCATION: Primary | ICD-10-CM

## 2025-07-13 DIAGNOSIS — K64.9 HEMORRHOID: ICD-10-CM

## 2025-07-13 DIAGNOSIS — K57.90 DIVERTICULOSIS: ICD-10-CM

## 2025-07-13 DIAGNOSIS — R10.32 LLQ ABDOMINAL PAIN: ICD-10-CM

## 2025-07-13 DIAGNOSIS — K59.00 CONSTIPATION, UNSPECIFIED CONSTIPATION TYPE: ICD-10-CM

## 2025-07-13 PROBLEM — R65.10 SIRS (SYSTEMIC INFLAMMATORY RESPONSE SYNDROME) (HCC): Status: ACTIVE | Noted: 2025-07-13

## 2025-07-13 LAB
2HR DELTA HS TROPONIN: -6 NG/L
4HR DELTA HS TROPONIN: -6 NG/L
ABO GROUP BLD: NORMAL
ALBUMIN SERPL BCG-MCNC: 3.8 G/DL (ref 3.5–5)
ALP SERPL-CCNC: 88 U/L (ref 34–104)
ALT SERPL W P-5'-P-CCNC: 19 U/L (ref 7–52)
ANION GAP SERPL CALCULATED.3IONS-SCNC: 11 MMOL/L (ref 4–13)
APTT PPP: 34 SECONDS (ref 23–34)
AST SERPL W P-5'-P-CCNC: 19 U/L (ref 13–39)
ATRIAL RATE: 80 BPM
BASE EX.OXY STD BLDV CALC-SCNC: 94.3 % (ref 60–80)
BASE EXCESS BLDV CALC-SCNC: 2.7 MMOL/L
BASOPHILS # BLD AUTO: 0.06 THOUSANDS/ÂΜL (ref 0–0.1)
BASOPHILS NFR BLD AUTO: 0 % (ref 0–1)
BILIRUB SERPL-MCNC: 0.83 MG/DL (ref 0.2–1)
BILIRUB UR QL STRIP: NEGATIVE
BLD GP AB SCN SERPL QL: NEGATIVE
BNP SERPL-MCNC: 183 PG/ML (ref 0–100)
BUN SERPL-MCNC: 33 MG/DL (ref 5–25)
CALCIUM SERPL-MCNC: 9.2 MG/DL (ref 8.4–10.2)
CARDIAC TROPONIN I PNL SERPL HS: 26 NG/L (ref ?–50)
CARDIAC TROPONIN I PNL SERPL HS: 26 NG/L (ref ?–50)
CARDIAC TROPONIN I PNL SERPL HS: 32 NG/L (ref ?–50)
CHLORIDE SERPL-SCNC: 99 MMOL/L (ref 96–108)
CLARITY UR: CLEAR
CO2 SERPL-SCNC: 27 MMOL/L (ref 21–32)
COLOR UR: NORMAL
CREAT SERPL-MCNC: 1.27 MG/DL (ref 0.6–1.3)
EOSINOPHIL # BLD AUTO: 0.13 THOUSAND/ÂΜL (ref 0–0.61)
EOSINOPHIL NFR BLD AUTO: 1 % (ref 0–6)
ERYTHROCYTE [DISTWIDTH] IN BLOOD BY AUTOMATED COUNT: 13.3 % (ref 11.6–15.1)
FLUAV RNA RESP QL NAA+PROBE: NEGATIVE
FLUBV RNA RESP QL NAA+PROBE: NEGATIVE
GFR SERPL CREATININE-BSD FRML MDRD: 55 ML/MIN/1.73SQ M
GLUCOSE SERPL-MCNC: 113 MG/DL (ref 65–140)
GLUCOSE UR STRIP-MCNC: NEGATIVE MG/DL
HCO3 BLDV-SCNC: 25.7 MMOL/L (ref 24–30)
HCT VFR BLD AUTO: 39.1 % (ref 36.5–49.3)
HGB BLD-MCNC: 13.4 G/DL (ref 12–17)
HGB UR QL STRIP.AUTO: NEGATIVE
IMM GRANULOCYTES # BLD AUTO: 0.05 THOUSAND/UL (ref 0–0.2)
IMM GRANULOCYTES NFR BLD AUTO: 0 % (ref 0–2)
INR PPP: 1.2 (ref 0.85–1.19)
KETONES UR STRIP-MCNC: NEGATIVE MG/DL
LACTATE SERPL-SCNC: 1 MMOL/L (ref 0.5–2)
LEUKOCYTE ESTERASE UR QL STRIP: NEGATIVE
LIPASE SERPL-CCNC: 10 U/L (ref 11–82)
LYMPHOCYTES # BLD AUTO: 1.09 THOUSANDS/ÂΜL (ref 0.6–4.47)
LYMPHOCYTES NFR BLD AUTO: 7 % (ref 14–44)
MAGNESIUM SERPL-MCNC: 2.1 MG/DL (ref 1.9–2.7)
MCH RBC QN AUTO: 29.5 PG (ref 26.8–34.3)
MCHC RBC AUTO-ENTMCNC: 34.3 G/DL (ref 31.4–37.4)
MCV RBC AUTO: 86 FL (ref 82–98)
MONOCYTES # BLD AUTO: 1.46 THOUSAND/ÂΜL (ref 0.17–1.22)
MONOCYTES NFR BLD AUTO: 10 % (ref 4–12)
NEUTROPHILS # BLD AUTO: 12.46 THOUSANDS/ÂΜL (ref 1.85–7.62)
NEUTS SEG NFR BLD AUTO: 82 % (ref 43–75)
NITRITE UR QL STRIP: NEGATIVE
NRBC BLD AUTO-RTO: 0 /100 WBCS
O2 CT BLDV-SCNC: 17.4 ML/DL
PCO2 BLDV: 34.4 MM HG (ref 42–50)
PH BLDV: 7.49 [PH] (ref 7.3–7.4)
PH UR STRIP.AUTO: 6.5 [PH]
PLATELET # BLD AUTO: 250 THOUSANDS/UL (ref 149–390)
PMV BLD AUTO: 9.1 FL (ref 8.9–12.7)
PO2 BLDV: 85.7 MM HG (ref 35–45)
POTASSIUM SERPL-SCNC: 3.7 MMOL/L (ref 3.5–5.3)
PR INTERVAL: 160 MS
PROCALCITONIN SERPL-MCNC: 18.73 NG/ML
PROT SERPL-MCNC: 7.2 G/DL (ref 6.4–8.4)
PROT UR STRIP-MCNC: NEGATIVE MG/DL
PROTHROMBIN TIME: 15.9 SECONDS (ref 12.3–15)
QRS AXIS: -62 DEGREES
QRSD INTERVAL: 152 MS
QT INTERVAL: 430 MS
QTC INTERVAL: 495 MS
RBC # BLD AUTO: 4.54 MILLION/UL (ref 3.88–5.62)
RH BLD: POSITIVE
RSV RNA RESP QL NAA+PROBE: NEGATIVE
SARS-COV-2 RNA RESP QL NAA+PROBE: NEGATIVE
SODIUM SERPL-SCNC: 137 MMOL/L (ref 135–147)
SP GR UR STRIP.AUTO: 1.02 (ref 1–1.03)
SPECIMEN EXPIRATION DATE: NORMAL
T WAVE AXIS: 63 DEGREES
UROBILINOGEN UR STRIP-ACNC: <2 MG/DL
VENTRICULAR RATE: 80 BPM
WBC # BLD AUTO: 15.25 THOUSAND/UL (ref 4.31–10.16)

## 2025-07-13 PROCEDURE — 83880 ASSAY OF NATRIURETIC PEPTIDE: CPT | Performed by: PHYSICIAN ASSISTANT

## 2025-07-13 PROCEDURE — 85610 PROTHROMBIN TIME: CPT | Performed by: PHYSICIAN ASSISTANT

## 2025-07-13 PROCEDURE — 82805 BLOOD GASES W/O2 SATURATION: CPT | Performed by: PHYSICIAN ASSISTANT

## 2025-07-13 PROCEDURE — 83690 ASSAY OF LIPASE: CPT | Performed by: EMERGENCY MEDICINE

## 2025-07-13 PROCEDURE — 80053 COMPREHEN METABOLIC PANEL: CPT | Performed by: EMERGENCY MEDICINE

## 2025-07-13 PROCEDURE — 96375 TX/PRO/DX INJ NEW DRUG ADDON: CPT

## 2025-07-13 PROCEDURE — 86850 RBC ANTIBODY SCREEN: CPT | Performed by: PHYSICIAN ASSISTANT

## 2025-07-13 PROCEDURE — 85025 COMPLETE CBC W/AUTO DIFF WBC: CPT | Performed by: EMERGENCY MEDICINE

## 2025-07-13 PROCEDURE — 83735 ASSAY OF MAGNESIUM: CPT | Performed by: PHYSICIAN ASSISTANT

## 2025-07-13 PROCEDURE — 85730 THROMBOPLASTIN TIME PARTIAL: CPT | Performed by: PHYSICIAN ASSISTANT

## 2025-07-13 PROCEDURE — 0241U HB NFCT DS VIR RESP RNA 4 TRGT: CPT | Performed by: PHYSICIAN ASSISTANT

## 2025-07-13 PROCEDURE — 71275 CT ANGIOGRAPHY CHEST: CPT

## 2025-07-13 PROCEDURE — 99223 1ST HOSP IP/OBS HIGH 75: CPT | Performed by: INTERNAL MEDICINE

## 2025-07-13 PROCEDURE — 36415 COLL VENOUS BLD VENIPUNCTURE: CPT | Performed by: EMERGENCY MEDICINE

## 2025-07-13 PROCEDURE — 86900 BLOOD TYPING SEROLOGIC ABO: CPT | Performed by: PHYSICIAN ASSISTANT

## 2025-07-13 PROCEDURE — 93005 ELECTROCARDIOGRAM TRACING: CPT

## 2025-07-13 PROCEDURE — 71045 X-RAY EXAM CHEST 1 VIEW: CPT

## 2025-07-13 PROCEDURE — 83605 ASSAY OF LACTIC ACID: CPT | Performed by: PHYSICIAN ASSISTANT

## 2025-07-13 PROCEDURE — 81003 URINALYSIS AUTO W/O SCOPE: CPT | Performed by: PHYSICIAN ASSISTANT

## 2025-07-13 PROCEDURE — 96365 THER/PROPH/DIAG IV INF INIT: CPT

## 2025-07-13 PROCEDURE — 96361 HYDRATE IV INFUSION ADD-ON: CPT

## 2025-07-13 PROCEDURE — 96376 TX/PRO/DX INJ SAME DRUG ADON: CPT

## 2025-07-13 PROCEDURE — 74174 CTA ABD&PLVS W/CONTRAST: CPT

## 2025-07-13 PROCEDURE — 99285 EMERGENCY DEPT VISIT HI MDM: CPT

## 2025-07-13 PROCEDURE — 86901 BLOOD TYPING SEROLOGIC RH(D): CPT | Performed by: PHYSICIAN ASSISTANT

## 2025-07-13 PROCEDURE — 93010 ELECTROCARDIOGRAM REPORT: CPT | Performed by: INTERNAL MEDICINE

## 2025-07-13 PROCEDURE — 84484 ASSAY OF TROPONIN QUANT: CPT | Performed by: EMERGENCY MEDICINE

## 2025-07-13 PROCEDURE — 87040 BLOOD CULTURE FOR BACTERIA: CPT | Performed by: PHYSICIAN ASSISTANT

## 2025-07-13 PROCEDURE — 99285 EMERGENCY DEPT VISIT HI MDM: CPT | Performed by: PHYSICIAN ASSISTANT

## 2025-07-13 PROCEDURE — 84145 PROCALCITONIN (PCT): CPT | Performed by: PHYSICIAN ASSISTANT

## 2025-07-13 RX ORDER — FINASTERIDE 5 MG/1
5 TABLET, FILM COATED ORAL DAILY
Status: DISCONTINUED | OUTPATIENT
Start: 2025-07-13 | End: 2025-07-16 | Stop reason: HOSPADM

## 2025-07-13 RX ORDER — ACETAMINOPHEN 325 MG/1
650 TABLET ORAL EVERY 6 HOURS PRN
Status: DISCONTINUED | OUTPATIENT
Start: 2025-07-13 | End: 2025-07-16 | Stop reason: HOSPADM

## 2025-07-13 RX ORDER — SODIUM CHLORIDE 9 MG/ML
75 INJECTION, SOLUTION INTRAVENOUS CONTINUOUS
Status: DISCONTINUED | OUTPATIENT
Start: 2025-07-13 | End: 2025-07-16 | Stop reason: HOSPADM

## 2025-07-13 RX ORDER — SODIUM CHLORIDE 9 MG/ML
3 INJECTION INTRAVENOUS EVERY 8 HOURS SCHEDULED
Status: DISCONTINUED | OUTPATIENT
Start: 2025-07-13 | End: 2025-07-16 | Stop reason: HOSPADM

## 2025-07-13 RX ORDER — POLYETHYLENE GLYCOL 3350 17 G/17G
17 POWDER, FOR SOLUTION ORAL DAILY
Status: DISCONTINUED | OUTPATIENT
Start: 2025-07-13 | End: 2025-07-16 | Stop reason: HOSPADM

## 2025-07-13 RX ORDER — COLCHICINE 0.6 MG/1
0.6 TABLET ORAL DAILY
Status: DISCONTINUED | OUTPATIENT
Start: 2025-07-13 | End: 2025-07-16 | Stop reason: HOSPADM

## 2025-07-13 RX ORDER — HYDROMORPHONE HCL IN WATER/PF 6 MG/30 ML
0.2 PATIENT CONTROLLED ANALGESIA SYRINGE INTRAVENOUS ONCE
Status: COMPLETED | OUTPATIENT
Start: 2025-07-13 | End: 2025-07-13

## 2025-07-13 RX ORDER — HEPARIN SODIUM 5000 [USP'U]/ML
5000 INJECTION, SOLUTION INTRAVENOUS; SUBCUTANEOUS EVERY 8 HOURS SCHEDULED
Status: DISCONTINUED | OUTPATIENT
Start: 2025-07-14 | End: 2025-07-16 | Stop reason: HOSPADM

## 2025-07-13 RX ORDER — TAMSULOSIN HYDROCHLORIDE 0.4 MG/1
0.4 CAPSULE ORAL DAILY
Status: DISCONTINUED | OUTPATIENT
Start: 2025-07-13 | End: 2025-07-14

## 2025-07-13 RX ORDER — FENTANYL CITRATE 50 UG/ML
25 INJECTION, SOLUTION INTRAMUSCULAR; INTRAVENOUS ONCE
Refills: 0 | Status: COMPLETED | OUTPATIENT
Start: 2025-07-13 | End: 2025-07-13

## 2025-07-13 RX ORDER — AMOXICILLIN 250 MG
1 CAPSULE ORAL 2 TIMES DAILY
Status: DISCONTINUED | OUTPATIENT
Start: 2025-07-13 | End: 2025-07-16 | Stop reason: HOSPADM

## 2025-07-13 RX ADMIN — SENNOSIDES, DOCUSATE SODIUM 1 TABLET: 8.6; 5 TABLET ORAL at 14:59

## 2025-07-13 RX ADMIN — FINASTERIDE 5 MG: 5 TABLET, FILM COATED ORAL at 12:53

## 2025-07-13 RX ADMIN — SODIUM CHLORIDE 1000 ML: 0.9 INJECTION, SOLUTION INTRAVENOUS at 06:34

## 2025-07-13 RX ADMIN — POLYETHYLENE GLYCOL 3350 17 G: 17 POWDER, FOR SOLUTION ORAL at 14:59

## 2025-07-13 RX ADMIN — SODIUM CHLORIDE, PRESERVATIVE FREE 3 ML: 5 INJECTION INTRAVENOUS at 14:59

## 2025-07-13 RX ADMIN — COLCHICINE 0.6 MG: 0.6 TABLET ORAL at 12:53

## 2025-07-13 RX ADMIN — HYDROMORPHONE HYDROCHLORIDE 0.2 MG: 0.2 INJECTION, SOLUTION INTRAMUSCULAR; INTRAVENOUS; SUBCUTANEOUS at 21:18

## 2025-07-13 RX ADMIN — ALLOPURINOL 400 MG: 300 TABLET ORAL at 12:53

## 2025-07-13 RX ADMIN — ACETAMINOPHEN 650 MG: 325 TABLET ORAL at 21:09

## 2025-07-13 RX ADMIN — TAMSULOSIN HYDROCHLORIDE 0.4 MG: 0.4 CAPSULE ORAL at 12:53

## 2025-07-13 RX ADMIN — ACETAMINOPHEN 650 MG: 325 TABLET ORAL at 14:54

## 2025-07-13 RX ADMIN — SODIUM CHLORIDE 75 ML/HR: 0.9 INJECTION, SOLUTION INTRAVENOUS at 10:42

## 2025-07-13 RX ADMIN — FENTANYL CITRATE 25 MCG: 50 INJECTION INTRAMUSCULAR; INTRAVENOUS at 09:02

## 2025-07-13 RX ADMIN — FENTANYL CITRATE 25 MCG: 0.05 INJECTION, SOLUTION INTRAMUSCULAR; INTRAVENOUS at 06:34

## 2025-07-13 RX ADMIN — IOHEXOL 100 ML: 350 INJECTION, SOLUTION INTRAVENOUS at 06:53

## 2025-07-13 RX ADMIN — PIPERACILLIN AND TAZOBACTAM 4.5 G: 36; 4.5 INJECTION, POWDER, FOR SOLUTION INTRAVENOUS at 07:03

## 2025-07-13 RX ADMIN — SODIUM CHLORIDE, PRESERVATIVE FREE 3 ML: 5 INJECTION INTRAVENOUS at 21:10

## 2025-07-13 NOTE — ED PROVIDER NOTES
Time reflects when diagnosis was documented in both MDM as applicable and the Disposition within this note       Time User Action Codes Description Comment    7/13/2025  9:17 AM Marvel Briceño Add [R10.9] Abdominal pain, unspecified abdominal location     7/13/2025  9:18 AM Marvel Briceño Add [R10.32] LLQ abdominal pain           ED Disposition       ED Disposition   Admit    Condition   Stable    Date/Time   Sun Jul 13, 2025  9:17 AM    Comment   Case was discussed with Dr Rowe  and the patient's admission status was agreed to be Admission Status: observation status to the service of Dr. Kebede .               Assessment & Plan       Medical Decision Making  75-year-old male presenting to the emergency room today for evaluation of abdominal pain and suprapubic pain that has caused him to have worsening chills over the last 3 days now.  Has had no bowel movement and has a history of obstruction with colon reconstruction in the past and worrisome for obstruction.  Also does note that he has a history of aortic aneurysm.  Patient was hypertensive upon arrival here today, upon my evaluation he was hypotensive, and with his chills and 15,000 white count, worrisome for SIRS/sepsis/severe sepsis so sepsis alert was activated and broad-spectrum antibiotic, Zosyn was administered to cover for empiric abdominal pathology that may be requiring hospitalization like aneurysm rupture, diverticulitis, bowel obstruction, abscess, perforation or other complication.  Labs show a leukocytosis at 15,000, with a left shift.  Procalcitonin markedly elevated at 18.7, concern for diffuse bacterial illness.  Urinalysis negative for UTI.  BNP also mildly elevated, will have light fluid resuscitation for him, was given IV fluids, normal saline 1 L bolus and 2 rounds of 25 mcg of fentanyl for analgesic control which did help him.  A CT angiogram was done and this shows no evidence of acute aortic pathology.  Has an aortic ascending aneurysm at  4.3 cm which he gets adequate follow-up outpatient and screening yearly for this.  His troponin value was trended and trended down, reassuring to rule out myocardial ischemia being the cause of this since he did have some shortness of breath associated with that which was in the differential.  The case was discussed with the hospitalist here today who assessed the patient for inpatient hospitalization admission given the high risk sepsis and requiring IV antibiotics and observation given hypotensive episodes as well as lab results worrisome for signs of infection, a possible uncertain origin even though he did have abdominal pain given no acute diverticulitis found on the CT per radiology read.  Hospitalist accepts, patient and family agreeable for inpatient hospitalization and admission.    Amount and/or Complexity of Data Reviewed  Labs: ordered. Decision-making details documented in ED Course.  Radiology: ordered and independent interpretation performed. Decision-making details documented in ED Course.    Risk  Prescription drug management.  Decision regarding hospitalization.        ED Course as of 07/13/25 1639   Sun Jul 13, 2025   0715 Patient reevaluated this time, reports improvement of pain, he is well-appearing, abdomen remains tender in the suprapubic and left lower quadrant, abdomen soft.  Advise a urinalysis.   0742 Procalcitonin(!): 18.73   0914 XR chest portable - 1 view       Medications   sodium chloride (PF) 0.9 % injection 3 mL (3 mL Intravenous Not Given 7/13/25 0948)   heparin (porcine) subcutaneous injection 5,000 Units (has no administration in time range)   acetaminophen (TYLENOL) tablet 650 mg (has no administration in time range)   sodium chloride 0.9 % infusion (75 mL/hr Intravenous New Bag 7/13/25 1042)   sodium chloride 0.9 % bolus 1,000 mL (0 mL Intravenous Stopped 7/13/25 0852)   piperacillin-tazobactam (ZOSYN) IVPB 4.5 g (0 g Intravenous Stopped 7/13/25 0733)   fentaNYL injection 25  "mcg (25 mcg Intravenous Given 7/13/25 0634)   iohexol (OMNIPAQUE) 350 MG/ML injection (MULTI-DOSE) 100 mL (100 mL Intravenous Given 7/13/25 0653)   fentaNYL injection 25 mcg (25 mcg Intravenous Given 7/13/25 0902)       ED Risk Strat Scores                    No data recorded        SBIRT 20yo+      Flowsheet Row Most Recent Value   Initial Alcohol Screen: US AUDIT-C     1. How often do you have a drink containing alcohol? 0 Filed at: 07/13/2025 0518   2. How many drinks containing alcohol do you have on a typical day you are drinking?  0 Filed at: 07/13/2025 0518   3b. FEMALE Any Age, or MALE 65+: How often do you have 4 or more drinks on one occassion? 0 Filed at: 07/13/2025 0518   Audit-C Score 0 Filed at: 07/13/2025 0518   JIMENA: How many times in the past year have you...    Used an illegal drug or used a prescription medication for non-medical reasons? Never Filed at: 07/13/2025 0518                            History of Present Illness       Chief Complaint   Patient presents with    Abdominal Pain     Pt c/o abd pain and \"bad hemorrhoid\" pain x 3 days, last BM Thursday, +nausea       Past Medical History[1]   Past Surgical History[2]   Family History[3]   Social History[4]   E-Cigarette/Vaping    E-Cigarette Use Never User       E-Cigarette/Vaping Substances    Nicotine No     THC No     CBD No     Flavoring No     Other No     Unknown No       I have reviewed and agree with the history as documented.       History provided by:  Patient, medical records and spouse   used: No    Abdominal Pain  Pain location:  Suprapubic and periumbilical  Pain quality: aching, cramping, heavy and pressure    Pain severity:  Moderate  Onset quality:  Gradual  Duration:  3 days  Timing:  Constant  Progression:  Worsening  Chronicity:  New  Relieved by:  Nothing  Worsened by:  Movement and palpation  Associated symptoms: chills, fatigue and shortness of breath    Associated symptoms: no chest pain    Risk " factors: multiple surgeries and obesity        Review of Systems   Constitutional:  Positive for activity change, appetite change, chills and fatigue.   Respiratory:  Positive for shortness of breath. Negative for chest tightness.    Cardiovascular:  Negative for chest pain and leg swelling.   Gastrointestinal:  Positive for abdominal pain.   All other systems reviewed and are negative.          Objective       ED Triage Vitals   Temperature Pulse Blood Pressure Respirations SpO2 Patient Position - Orthostatic VS   07/13/25 0512 07/13/25 0512 07/13/25 0512 07/13/25 0512 07/13/25 0512 07/13/25 0512   98 °F (36.7 °C) 75 (!) 189/78 (!) 25 98 % Sitting      Temp Source Heart Rate Source BP Location FiO2 (%) Pain Score    07/13/25 1155 07/13/25 0512 07/13/25 0512 -- 07/13/25 0512    Oral Monitor Left arm  10 - Worst Possible Pain      Vitals      Date and Time Temp Pulse SpO2 Resp BP Pain Score FACES Pain Rating User   07/13/25 1454 -- -- -- -- -- 8 -- JA   07/13/25 1441 -- 72 95 % -- 116/68 -- -- DII   07/13/25 1253 -- -- -- -- -- 6 -- JA   07/13/25 1155 98 °F (36.7 °C) -- -- 18 -- -- -- TP   07/13/25 1155 -- 72 98 % -- 120/71 -- -- DII   07/13/25 1130 -- 71 96 % 17 117/61 -- -- AA   07/13/25 1100 -- 70 97 % 19 99/57 -- -- AA   07/13/25 1030 -- 73 98 % 22 89/52 provider aware -- -- AA   07/13/25 1000 -- 71 98 % 21 96/57 -- -- AA   07/13/25 0930 -- 72 96 % 17 103/59 -- -- AA   07/13/25 0902 -- -- -- -- -- 7 -- AA   07/13/25 0900 -- 74 99 % 19 100/61 -- -- AA   07/13/25 0815 -- 79 97 % 16 112/63 -- -- AA   07/13/25 0800 -- 79 95 % 18 112/62 -- --    07/13/25 0745 -- 78 98 % 20 111/60 -- --    07/13/25 0730 -- 77 98 % 22 111/57 -- --    07/13/25 0715 -- 80 97 % 19 118/68 -- --    07/13/25 0700 -- 81 97 % 19 111/65 -- --    07/13/25 0634 -- -- -- -- -- 9 --    07/13/25 0609 -- 80 95 % 22 91/59 -- --    07/13/25 0552 -- 80 92 % 14 89/54 -- --    07/13/25 0512 98 °F (36.7 °C) 75 98 % 25 189/78 10 - Worst  Possible Pain -- KM            Physical Exam  Vitals and nursing note reviewed.   Constitutional:       General: He is in acute distress.      Appearance: He is obese. He is ill-appearing.   HENT:      Head: Normocephalic and atraumatic.     Eyes:      Conjunctiva/sclera: Conjunctivae normal.       Cardiovascular:      Rate and Rhythm: Normal rate and regular rhythm.   Pulmonary:      Effort: Pulmonary effort is normal. No respiratory distress.   Abdominal:      Palpations: Abdomen is soft.      Tenderness: There is abdominal tenderness in the epigastric area, periumbilical area and suprapubic area. There is guarding.     Musculoskeletal:         General: No swelling.      Cervical back: Neck supple.     Skin:     General: Skin is warm and dry.      Capillary Refill: Capillary refill takes less than 2 seconds.     Neurological:      Mental Status: He is alert.     Psychiatric:         Mood and Affect: Mood normal.         Results Reviewed       Procedure Component Value Units Date/Time    HS Troponin I 4hr [[id number]]  (Normal) Collected: 07/13/25 1043    Lab Status: Final result Specimen: Blood from Arm, Left Updated: 07/13/25 1133     hs TnI 4hr 26 ng/L      Delta 4hr hsTnI -6 ng/L     HS Troponin I 2hr [[id number]]  (Normal) Collected: 07/13/25 0856    Lab Status: Final result Specimen: Blood from Arm, Left Updated: 07/13/25 0929     hs TnI 2hr 26 ng/L      Delta 2hr hsTnI -6 ng/L     UA w Reflex to Microscopic w Reflex to Culture [[id number]] Collected: 07/13/25 0730    Lab Status: Final result Specimen: Urine, Clean Catch Updated: 07/13/25 0814     Color, UA Light Yellow     Clarity, UA Clear     Specific Gravity, UA 1.025     pH, UA 6.5     Leukocytes, UA Negative     Nitrite, UA Negative     Protein, UA Negative mg/dl      Glucose, UA Negative mg/dl      Ketones, UA Negative mg/dl      Urobilinogen, UA <2.0 mg/dl      Bilirubin, UA Negative     Occult Blood, UA Negative    FLU/RSV/COVID - if FLU/RSV clinically  relevant [[id number]]  (Normal) Collected: 07/13/25 0643    Lab Status: Final result Specimen: Nares from Nose Updated: 07/13/25 0739     SARS-CoV-2 Negative     INFLUENZA A PCR Negative     INFLUENZA B PCR Negative     RSV PCR Negative    Narrative:      This test has been performed using the CoV-2/Flu/RSV plus assay on the [employer] GeneXpert platform. This test has been validated by the  and verified by the performing laboratory.     This test is designed to amplify and detect the following: nucleocapsid (N), envelope (E), and RNA-dependent RNA polymerase (RdRP) genes of the SARS-CoV-2 genome; matrix (M), basic polymerase (PB2), and acidic protein (PA) segments of the influenza A genome; matrix (M) and non-structural protein (NS) segments of the influenza B genome, and the nucleocapsid genes of RSV A and RSV B.     Positive results are indicative of the presence of Flu A, Flu B, RSV, and/or SARS-CoV-2 RNA. Positive results for SARS-CoV-2 or suspected novel influenza should be reported to state, local, or federal health departments according to local reporting requirements.      All results should be assessed in conjunction with clinical presentation and other laboratory markers for clinical management.     FOR PEDIATRIC PATIENTS - copy/paste COVID Guidelines URL to browser: [email or web address]       Protime-INR [[id number]]  (Abnormal) Collected: 07/13/25 0643    Lab Status: Final result Specimen: Blood from Arm, Left Updated: 07/13/25 0736     Protime 15.9 seconds      INR 1.20    Narrative:      INR Therapeutic Range    Indication                                             INR Range      Atrial Fibrillation                                               2.0-3.0  Hypercoagulable State                                    2.0.2.3  Left Ventricular Asist Device                            2.0-3.0  Mechanical Heart Valve                                   -    Aortic(with afib, MI, embolism, HF, LA enlargement,    and/or coagulopathy)                                     2.0-3.0 (2.5-3.5)     Mitral                                                             2.5-3.5  Prosthetic/Bioprosthetic Heart Valve               2.0-3.0  Venous thromboembolism (VTE: VT, PE        2.0-3.0    APTT [[id number]]  (Normal) Collected: 07/13/25 0643    Lab Status: Final result Specimen: Blood from Arm, Left Updated: 07/13/25 0736     PTT 34 seconds     Procalcitonin [[id number]]  (Abnormal) Collected: 07/13/25 0643    Lab Status: Final result Specimen: Blood from Arm, Left Updated: 07/13/25 0729     Procalcitonin 18.73 ng/ml     B-Type Natriuretic Peptide(BNP) [[id number]]  (Abnormal) Collected: 07/13/25 0643    Lab Status: Final result Specimen: Blood from Arm, Left Updated: 07/13/25 0726      pg/mL     Magnesium [[id number]]  (Normal) Collected: 07/13/25 0643    Lab Status: Final result Specimen: Blood from Arm, Left Updated: 07/13/25 0716     Magnesium 2.1 mg/dL     Lactic acid [[id number]]  (Normal) Collected: 07/13/25 0625    Lab Status: Final result Specimen: Blood from Arm, Right Updated: 07/13/25 0709     LACTIC ACID 1.0 mmol/L     Narrative:      Result may be elevated if tourniquet was used during collection.    Blood gas, Venous [[id number]]  (Abnormal) Collected: 07/13/25 0643    Lab Status: Final result Specimen: Blood from Arm, Left Updated: 07/13/25 0655     pH, Mak 7.491     pCO2, Mak 34.4 mm Hg      pO2, Mak 85.7 mm Hg      HCO3, Mak 25.7 mmol/L      Base Excess, Mak 2.7 mmol/L      O2 Content, Mak 17.4 ml/dL      O2 HGB, VENOUS 94.3 %     Blood culture #2 [[id number]] Collected: 07/13/25 0625    Lab Status: In process Specimen: Blood from Arm, Left Updated: 07/13/25 0653    Blood culture #1 [[id number]] Updated: 07/13/25 0632    Lab Status: In process Specimen: Blood     HS Troponin 0hr (reflex protocol) [[id number]]  (Normal) Collected: 07/13/25 0552    Lab Status:  Final result Specimen: Blood from Arm, Left Updated: 07/13/25 0623     hs TnI 0hr 32 ng/L     CMP [[id number]]  (Abnormal) Collected: 07/13/25 0552    Lab Status: Final result Specimen: Blood from Arm, Left Updated: 07/13/25 0617     Sodium 137 mmol/L      Potassium 3.7 mmol/L      Chloride 99 mmol/L      CO2 27 mmol/L      ANION GAP 11 mmol/L      BUN 33 mg/dL      Creatinine 1.27 mg/dL      Glucose 113 mg/dL      Calcium 9.2 mg/dL      AST 19 U/L      ALT 19 U/L      Alkaline Phosphatase 88 U/L      Total Protein 7.2 g/dL      Albumin 3.8 g/dL      Total Bilirubin 0.83 mg/dL      eGFR 55 ml/min/1.73sq m     Narrative:      National Kid[employer] guidelines for Chronic Kidney Disease (CKD):     Stage 1 with normal or high GFR (GFR > 90 mL/min/1.73 square meters)    Stage 2 Mild CKD (GFR = 60-89 mL/min/1.73 square meters)    Stage 3A Moderate CKD (GFR = 45-59 mL/min/1.73 square meters)    Stage 3B Moderate CKD (GFR = 30-44 mL/min/1.73 square meters)    Stage 4 Severe CKD (GFR = 15-29 mL/min/1.73 square meters)    Stage 5 End Stage CKD (GFR <15 mL/min/1.73 square meters)  Note: GFR calculation is accurate only with a steady state creatinine    Lipase [[id number]]  (Abnormal) Collected: 07/13/25 0552    Lab Status: Final result Specimen: Blood from Arm, Left Updated: 07/13/25 0617     Lipase 10 u/L     CBC and differential [[id number]]  (Abnormal) Collected: 07/13/25 0552    Lab Status: Final result Specimen: Blood from Arm, Left Updated: 07/13/25 0602     WBC 15.25 Thousand/uL      RBC 4.54 Million/uL      Hemoglobin 13.4 g/dL      Hematocrit 39.1 %      MCV 86 fL      MCH 29.5 pg      MCHC 34.3 g/dL      RDW 13.3 %      MPV 9.1 fL      Platelets 250 Thousands/uL      nRBC 0 /100 WBCs      Segmented % 82 %      Immature Grans % 0 %      Lymphocytes % 7 %      Monocytes % 10 %      Eosinophils Relative 1 %      Basophils Relative 0 %      Absolute Neutrophils 12.46 Thousands/µL      Absolute Immature Grans  0.05 Thousand/uL      Absolute Lymphocytes 1.09 Thousands/µL      Absolute Monocytes 1.46 Thousand/µL      Eosinophils Absolute 0.13 Thousand/µL      Basophils Absolute 0.06 Thousands/µL             CTA dissection protocol chest/abdomen/pelvis   Final Interpretation by Daniel Nevarez DO (07/13 0832)      No evidence of acute aortic pathology. Ascending aortic aneurysm measures 4.3 cm.      No acute intra-abdominal abnormality. No free air, free fluid, mesenteric inflammatory process, or bowel wall thickening.            Computerized Assisted Algorithm (CAA) may have aided analysis of applicable images.         Workstation performed: [id number]         XR chest portable - 1 view   ED Interpretation by Marvel Briceño PA-C (07/13 0849)   Neg for focal consolidation, rotation          Procedures    ED Medication and Procedure Management   Prior to Admission Medications   Prescriptions Last Dose Informant Patient Reported? Taking?   acetaminophen (TYLENOL) 650 mg CR tablet  Self Yes No   Sig: Take 250 mg by mouth every 8 (eight) hours as needed for mild pain 1500   allopurinol (ZYLOPRIM) 100 mg tablet 7/12/2025 Self No Yes   Sig: Take 4 tablets (400 mg total) by mouth daily Taking 4 tablets a day 400mg   colchicine (COLCRYS) 0.6 mg tablet More than a month Self No No   Sig: Take 1 tablet (0.6 mg total) by mouth daily   enalapril (VASOTEC) 20 mg tablet 7/13/2025 Morning Self No Yes   Sig: Take 1 tablet (20 mg total) by mouth daily   ferrous sulfate 325 (65 Fe) mg tablet 7/12/2025 Self Yes Yes   Sig: Take 325 mg by mouth every other day   finasteride (PROSCAR) 5 mg tablet 7/12/2025 Self No Yes   Sig: Take 1 tablet (5 mg total) by mouth daily   hydrALAZINE (APRESOLINE) 50 mg tablet 7/13/2025 Morning Self No Yes   Sig: Take 1.5 tablets (75 mg total) by mouth 3 (three) times a day   hydroCHLOROthiazide 25 mg tablet 7/13/2025 Morning Self No Yes   Sig: TAKE 1 TABLET (25 MG TOTAL) BY MOUTH DAILY.   metoprolol succinate  (TOPROL-XL) 100 mg 24 hr tablet 7/13/2025 Morning Self No Yes   Sig: TAKE 1 TABLET BY MOUTH EVERY DAY   oxyCODONE-acetaminophen (PERCOCET) 5-325 mg per tablet 7/12/2025  No Yes   Sig: Take 1 tablet by mouth every 6 (six) hours as needed for severe pain (back/leg pain/ongoing rx) Label no driving no etoh. Initial rx.  Dx: Max Daily Amount: 4 tablets   tamsulosin (FLOMAX) 0.4 mg 7/12/2025 Self No Yes   Sig: TAKE 1 CAPSULE BY MOUTH EVERY DAY      Facility-Administered Medications: None     Current Discharge Medication List        CONTINUE these medications which have NOT CHANGED    Details   allopurinol (ZYLOPRIM) 100 mg tablet Take 4 tablets (400 mg total) by mouth daily Taking 4 tablets a day 400mg  Qty: 120 tablet, Refills: 2    Associated Diagnoses: Chronic gout of multiple sites, unspecified cause      enalapril (VASOTEC) 20 mg tablet Take 1 tablet (20 mg total) by mouth daily  Qty: 90 tablet, Refills: 1    Associated Diagnoses: Essential hypertension      ferrous sulfate 325 (65 Fe) mg tablet Take 325 mg by mouth every other day      finasteride (PROSCAR) 5 mg tablet Take 1 tablet (5 mg total) by mouth daily  Qty: 90 tablet, Refills: 1    Associated Diagnoses: Benign prostatic hyperplasia with urinary frequency      hydrALAZINE (APRESOLINE) 50 mg tablet Take 1.5 tablets (75 mg total) by mouth 3 (three) times a day  Qty: 405 tablet, Refills: 0    Associated Diagnoses: Essential hypertension      hydroCHLOROthiazide 25 mg tablet TAKE 1 TABLET (25 MG TOTAL) BY MOUTH DAILY.  Qty: 90 tablet, Refills: 1    Associated Diagnoses: Primary hypertension      metoprolol succinate (TOPROL-XL) 100 mg 24 hr tablet TAKE 1 TABLET BY MOUTH EVERY DAY  Qty: 90 tablet, Refills: 1    Associated Diagnoses: Essential hypertension      oxyCODONE-acetaminophen (PERCOCET) 5-325 mg per tablet Take 1 tablet by mouth every 6 (six) hours as needed for severe pain (back/leg pain/ongoing rx) Label no driving no etoh. Initial rx.  Dx: Max Daily  Amount: 4 tablets  Qty: 20 tablet, Refills: 0    Associated Diagnoses: Lumbar radiculopathy, acute      tamsulosin (FLOMAX) 0.4 mg TAKE 1 CAPSULE BY MOUTH EVERY DAY  Qty: 90 capsule, Refills: 1    Associated Diagnoses: Benign prostatic hyperplasia with urinary frequency      acetaminophen (TYLENOL) 650 mg CR tablet Take 250 mg by mouth every 8 (eight) hours as needed for mild pain 1500      colchicine (COLCRYS) 0.6 mg tablet Take 1 tablet (0.6 mg total) by mouth daily  Qty: 30 tablet, Refills: 2    Associated Diagnoses: Chronic gout of multiple sites, unspecified cause           No discharge procedures on file.  ED SEPSIS DOCUMENTATION   Time reflects when diagnosis was documented in both MDM as applicable and the Disposition within this note       Time User Action Codes Description Comment    7/13/2025  9:17 AM Marvel Briceño Add [R10.9] Abdominal pain, unspecified abdominal location     7/13/2025  9:18 AM Marvel Briceño Add [R10.32] LLQ abdominal pain            Initial Sepsis Screening       Row Name 07/13/25 0918                Is the patient's history suggestive of a new or worsening infection? Yes (Proceed)  -CR        Suspected source of infection acute abdominal infection  -CR        Indicate SIRS criteria Tachycardia > 90 bpm;Tachypnea > 20 resp per min;Leukocytosis (WBC > 50713 IJL) OR Leukopenia (WBC <4000 IJL) OR Bandemia (WBC >10% bands)  -CR        Are two or more of the above signs & symptoms of infection both present and new to the patient? Yes (Proceed)  -CR        Assess for evidence of organ dysfunction: Are any of the below criteria present within 6 hours of suspected infection and SIRS criteria that are NOT considered to be chronic conditions? SBP < 90  -CR        Date of presentation of septic shock 07/13/25  -CR        Time of presentation of septic shock 0929  -CR        Fluid Resuscitation: A lesser volume than 30 ml/kg IV fluid will be given  -CR        The 30 mL/kg fluid bolus was not given to  "the patient despite having hypotension, a lactate of >= 4 mmol/L, or documentation of septic shock secondary to: Concern for fluid/volume overload  -CR        Instead of the 30 ml/kg fluid bolus, the following volume of crystalloid fluid will be ordered: 1L  -CR        Of the following fluid type: NSS  -CR        Is the patient persistently hypotensive in the hour after fluid bolus administration? If yes, patient meets criteria for vasopressor use. NO  -CR        Sepsis Note: Click \"NEXT\" below (NOT \"close\") to generate sepsis note based on above information. YES (proceed by clicking \"NEXT\")  -CR                  User Key  (r) = Recorded By, (t) = Taken By, (c) = Cosigned By      Initials Name Provider Type    CR Marvel Briceño PA-C Physician Assistant                         [1]   Past Medical History:  Diagnosis Date    Anemia October 2023    Aneurysm (HCC) March 2018    Aortic aneurysm (HCC)     Arthritis August 2020    BPH (benign prostatic hyperplasia)     Diverticulitis     Recurrent - 1990s    Diverticulitis of colon Early 1990's    Diverticulosis 1996    Eczema Early Childhood    Elevated troponin     Gout 2010    Heart attack (HCC) 2018    Heart disease     History of colon polyps     Onset 6/30/89    History of colonoscopy     6/5/13    History of esophagogastroduodenoscopy     6/5/13    History of rectal polyps     Onset 6/30/89    Hypertension     Hypokalemia     Liver disease October 2023    Liver Abscess    Morbid obesity (HCC)     Myocardial infarction (HCC)     Obesity Early 1990's    Osteoarthritis July 2024    Pneumonia February 2018    Renal calculi     Seasonal allergies     Sleep apnea with use of continuous positive airway pressure (CPAP)     STEMI (ST elevation myocardial infarction) (HCC)     ((Pt Qnr Sub: na))    [2]   Past Surgical History:  Procedure Laterality Date    ABDOMINAL SURGERY  1996 /1997    Dr. Gonzalez    APPENDECTOMY  1974    ARTHROSCOPY KNEE Left     BOWEL RESECTION  " 1996    COLON SURGERY  1996    Sigmoid colectomy for diverticulitis    COLONOSCOPY  06/05/2013    COLONOSCOPY  Every 5 years    Dr. Gonzalez    COLOSTOMY      CYST REMOVAL  2008    ENDOSCOPIC ULTRASOUND (UPPER)  Scheduled March 2024    FLEXIBLE SIGMOIDOSCOPY  11/02/1996    HERNIA REPAIR      ((Pt Qnr Sub: na))     IR DRAINAGE TUBE CHECK/CHANGE/REPOSITION/REINSERTION/UPSIZE  10/30/2023    IR DRAINAGE TUBE PLACEMENT  10/16/2023    JOINT REPLACEMENT  Hips in 2014 & 2016    Dr. Librado Cooper    KNEE SURGERY  Early 2010's    Dr. 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    REVISION COLOSTOMY  02/24/1997    ROTATOR CUFF REPAIR Left     2004 & 2009    SATURATION BIOPSY OF PROSTATE      SHOULDER SURGERY  2001    Benign lump    TONSILLECTOMY  1959    TOTAL HIP ARTHROPLASTY Bilateral     2014 - L, 2016 - R / Last Assessed:5/8/15   [3]   Family History  Problem Relation Name Age of Onset    Depression Mother Jyothi Don     COPD Mother Jyothi Don     Breast cancer Mother Jyothi Don         malignant neoplasm    Lung cancer Mother Jyothi Don     Alcohol abuse Mother Jyothi Don     Mental illness Mother Jyothi Don     Cancer Mother Jyothi Don     Arthritis Mother Jyothi Don     Substance Abuse Mother Jyothi Don     Prostate cancer Father Lobo Sr     Colon polyps Father Lobo Sr     Hypertension Father Lobo Sr     Gout Father Lobo Sr     Colon cancer Brother      Diabetes Family Grandfather     Colon cancer Family Uncle     Heart attack Maternal Grandmother Jyothi     Diabetes Paternal Grandfather Lobo Don     Heart disease Paternal Grandmother Jyothi Kateyshawnee     Drug abuse Brother Valerio Florencia     Substance Abuse Brother Valerio Don     Colon cancer Brother Valerio Don     Colon cancer Maternal Uncle Jae  Kartik    [4]   Social History  Tobacco Use    Smoking status: Never    Smokeless tobacco: Never   Vaping Use    Vaping status: Never Used   Substance Use Topics    Alcohol use: Not Currently     Comment: social    Drug use: No        Marvel Briceño PA-C  07/13/25 1640

## 2025-07-13 NOTE — SEPSIS NOTE
"Sepsis Note   Lobo Don 75 y.o. male MRN: [id number]  Unit/Bed#: ED 12 Encounter: [id number]       Initial Sepsis Screening       Row Name 07/13/25 0918                Is the patient's history suggestive of a new or worsening infection? Yes (Proceed)  -CR        Suspected source of infection acute abdominal infection  -CR        Indicate SIRS criteria Tachycardia > 90 bpm;Tachypnea > 20 resp per min;Leukocytosis (WBC > 14582 IJL) OR Leukopenia (WBC <4000 IJL) OR Bandemia (WBC >10% bands)  -CR        Are two or more of the above signs & symptoms of infection both present and new to the patient? Yes (Proceed)  -CR        Assess for evidence of organ dysfunction: Are any of the below criteria present within 6 hours of suspected infection and SIRS criteria that are NOT considered to be chronic conditions? SBP < 90  -CR        Date of presentation of septic shock 07/13/25  -CR        Time of presentation of septic shock 0929  -CR        Fluid Resuscitation: A lesser volume than 30 ml/kg IV fluid will be given  -CR        The 30 mL/kg fluid bolus was not given to the patient despite having hypotension, a lactate of >= 4 mmol/L, or documentation of septic shock secondary to: Concern for fluid/volume overload  -CR        Instead of the 30 ml/kg fluid bolus, the following volume of crystalloid fluid will be ordered: 1L  -CR        Of the following fluid type: NSS  -CR        Is the patient persistently hypotensive in the hour after fluid bolus administration? If yes, patient meets criteria for vasopressor use. NO  -CR        Sepsis Note: Click \"NEXT\" below (NOT \"close\") to generate sepsis note based on above information. YES (proceed by clicking \"NEXT\")  -CR                  User Key  (r) = Recorded By, (t) = Taken By, (c) = Cosigned By      Initials Name Provider Type    CR Marvel Briceño PA-C Physician Assistant                   Initial Sepsis Screening       Row Name 07/13/25 0918                   Initial " Sepsis Assessment    Is the patient's history suggestive of a new or worsening infection? Yes (Proceed)  -CR        Suspected source of infection acute abdominal infection  -CR        Indicate SIRS criteria Tachycardia > 90 bpm;Tachypnea > 20 resp per min;Leukocytosis (WBC > 31544 IJL) OR Leukopenia (WBC <4000 IJL) OR Bandemia (WBC >10% bands)  -CR        Are two or more of the above signs & symptoms of infection both present and new to the patient? Yes (Proceed)  -CR           If the answer is yes to both above questions, suspicion of sepsis is present. Proceed with algorithm to determine if severe sepsis or septic shock criteria are met.    Assess for evidence of organ dysfunction: Are any of the below criteria present within 6 hours of suspected infection and SIRS criteria that are NOT considered to be chronic conditions? SBP < 90  -CR           Based on the above information, the patient meets criteria for SEPTIC SHOCK. CALL A SEPSIS ALERT. Use sepsis order set. If the lactate is > or equal to 4, or the SBP is <90, give 30 ml/kg cystalloid IV fluid bolus or document exclusion criteria.    Date of presentation of septic shock 07/13/25  -CR        Time of presentation of septic shock 0929 -CR        Fluid Resuscitation: A lesser volume than 30 ml/kg IV fluid will be given  -CR        The 30 mL/kg fluid bolus was not given to the patient despite having hypotension, a lactate of >= 4 mmol/L, or documentation of septic shock secondary to: Concern for fluid/volume overload  -CR        Instead of the 30 ml/kg fluid bolus, the following volume of crystalloid fluid will be ordered: 1L  -CR        Of the following fluid type: NSS  -CR        Is the patient persistently hypotensive in the hour after fluid bolus administration? If yes, patient meets criteria for vasopressor use. NO  -CR                  User Key  (r) = Recorded By, (t) = Taken By, (c) = Cosigned By      Initials Name Provider Type    CR Marvel Briceño  PAChelsiC Physician Assistant                         There is no height or weight on file to calculate BMI.  Wt Readings from Last 1 Encounters:   07/07/25 131 kg (289 lb)        Ideal body weight: 79.9 kg (176 lb 2.4 oz)  Adjusted ideal body weight: 100 kg (221 lb 4.6 oz)

## 2025-07-13 NOTE — PLAN OF CARE
Problem: Potential for Falls  Goal: Patient will remain free of falls  Description: INTERVENTIONS:  - Educate patient/family on patient safety including physical limitations  - Instruct patient to call for assistance with activity   - Consider consulting OT/PT to assist with strengthening/mobility based on AM PAC & JH-HLM score  - Consult OT/PT to assist with strengthening/mobility   - Keep Call bell within reach  - Keep bed low and locked with side rails adjusted as appropriate  - Keep care items and personal belongings within reach  - Initiate and maintain comfort rounds  - Make Fall Risk Sign visible to staff  - Offer Toileting every 2 Hours, in advance of need  - Initiate/Maintain bed alarm  - Obtain necessary fall risk management equipment  - Apply yellow socks and bracelet for high fall risk patients  - Consider moving patient to room near nurses station  Outcome: Progressing     Problem: PAIN - ADULT  Goal: Verbalizes/displays adequate comfort level or baseline comfort level  Description: Interventions:  - Encourage patient to monitor pain and request assistance  - Assess pain using appropriate pain scale  - Administer analgesics as ordered based on type and severity of pain and evaluate response  - Implement non-pharmacological measures as appropriate and evaluate response  - Consider cultural and social influences on pain and pain management  - Notify physician/advanced practitioner if interventions unsuccessful or patient reports new pain  - Educate patient/family on pain management process including their role and importance of  reporting pain   - Provide non-pharmacologic/complimentary pain relief interventions  Outcome: Progressing     Problem: INFECTION - ADULT  Goal: Absence or prevention of progression during hospitalization  Description: INTERVENTIONS:  - Assess and monitor for signs and symptoms of infection  - Monitor lab/diagnostic results  - Monitor all insertion sites, i.e. indwelling lines,  tubes, and drains  - Monitor endotracheal if appropriate and nasal secretions for changes in amount and color  - [place] appropriate cooling/warming therapies per order  - Administer medications as ordered  - Instruct and encourage patient and family to use good hand hygiene technique  - Identify and instruct in appropriate isolation precautions for identified infection/condition  Outcome: Progressing  Goal: Absence of fever/infection during neutropenic period  Description: INTERVENTIONS:  - Monitor WBC  - Perform strict hand hygiene  - Limit to healthy visitors only  - No plants, dried, fresh or silk flowers with henry in patient room  Outcome: Progressing     Problem: Knowledge Deficit  Goal: Patient/family/caregiver demonstrates understanding of disease process, treatment plan, medications, and discharge instructions  Description: Complete learning assessment and assess knowledge base.  Interventions:  - Provide teaching at level of understanding  - Provide teaching via preferred learning methods  Outcome: Progressing     Problem: GASTROINTESTINAL - ADULT  Goal: Minimal or absence of nausea and/or vomiting  Description: INTERVENTIONS:  - Administer IV fluids if ordered to ensure adequate hydration  - Maintain NPO status until nausea and vomiting are resolved  - Nasogastric tube if ordered  - Administer ordered antiemetic medications as needed  - Provide nonpharmacologic comfort measures as appropriate  - Advance diet as tolerated, if ordered  - Consider nutrition services referral to assist patient with adequate nutrition and appropriate food choices  Outcome: Progressing  Goal: Maintains or returns to baseline bowel function  Description: INTERVENTIONS:  - Assess bowel function  - Encourage oral fluids to ensure adequate hydration  - Administer IV fluids if ordered to ensure adequate hydration  - Administer ordered medications as needed  - Encourage mobilization and activity  - Consider nutritional services  referral to assist patient with adequate nutrition and appropriate food choices  Outcome: Progressing  Goal: Maintains adequate nutritional intake  Description: INTERVENTIONS:  - Monitor percentage of each meal consumed  - Identify factors contributing to decreased intake, treat as appropriate  - Assist with meals as needed  - Monitor I&O, weight, and lab values if indicated  - Obtain nutrition services referral as needed  Outcome: Progressing  Goal: Establish and maintain optimal ostomy function  Description: INTERVENTIONS:  - Assess bowel function  - Encourage oral fluids to ensure adequate hydration  - Administer IV fluids if ordered to ensure adequate hydration   - Administer ordered medications as needed  - Encourage mobilization and activity  - Nutrition services referral to assist patient with appropriate food choices  - Assess stoma site  - Consider wound care consult   Outcome: Progressing  Goal: Oral mucous membranes remain intact  Description: INTERVENTIONS  - Assess oral mucosa and hygiene practices  - Implement preventative oral hygiene regimen  - Implement oral medicated treatments as ordered  - Initiate Nutrition services referral as needed  Outcome: Progressing     Problem: GENITOURINARY - ADULT  Goal: Maintains or returns to baseline urinary function  Description: INTERVENTIONS:  - Assess urinary function  - Encourage oral fluids to ensure adequate hydration if ordered  - Administer IV fluids as ordered to ensure adequate hydration  - Administer ordered medications as needed  - Offer frequent toileting  - Follow urinary retention protocol if ordered  Outcome: Progressing  Goal: Absence of urinary retention  Description: INTERVENTIONS:  - Assess patient’s ability to void and empty bladder  - Monitor I/O  - Bladder scan as needed  - Discuss with physician/AP medications to alleviate retention as needed  - Discuss catheterization for long term situations as appropriate  Outcome: Progressing  Goal:  Urinary catheter remains patent  Description: INTERVENTIONS:  - Assess patency of urinary catheter  - If patient has a chronic gee, consider changing catheter if non-functioning  - Follow guidelines for intermittent irrigation of non-functioning urinary catheter  Outcome: Progressing

## 2025-07-13 NOTE — ASSESSMENT & PLAN NOTE
Hold home BP meds entirely at this time given hypotension on arrival  Blood pressure has since improved  Hold hydralazine, beta-blocker, HCTZ, enalapril

## 2025-07-13 NOTE — ASSESSMENT & PLAN NOTE
Presented with leukocytosis of 15K, tachypnea  Also reports chills but no fevers  Was noted to have significantly elevated Pro-Kenneth  CT chest abdomen pelvis revealed no acute abnormalities  Was provided a dose of Zosyn in the emergency department  Will monitor off antibiotics  Monitor fever curve  Follow-up blood cultures

## 2025-07-14 LAB
ALBUMIN SERPL BCG-MCNC: 3.6 G/DL (ref 3.5–5)
ALP SERPL-CCNC: 81 U/L (ref 34–104)
ALT SERPL W P-5'-P-CCNC: 19 U/L (ref 7–52)
ANION GAP SERPL CALCULATED.3IONS-SCNC: 9 MMOL/L (ref 4–13)
AST SERPL W P-5'-P-CCNC: 17 U/L (ref 13–39)
BASOPHILS # BLD AUTO: 0.02 THOUSANDS/ÂΜL (ref 0–0.1)
BASOPHILS NFR BLD AUTO: 0 % (ref 0–1)
BILIRUB DIRECT SERPL-MCNC: 0.13 MG/DL (ref 0–0.2)
BILIRUB SERPL-MCNC: 0.59 MG/DL (ref 0.2–1)
BUN SERPL-MCNC: 25 MG/DL (ref 5–25)
CALCIUM SERPL-MCNC: 8.6 MG/DL (ref 8.4–10.2)
CHLORIDE SERPL-SCNC: 103 MMOL/L (ref 96–108)
CO2 SERPL-SCNC: 27 MMOL/L (ref 21–32)
CREAT SERPL-MCNC: 0.87 MG/DL (ref 0.6–1.3)
EOSINOPHIL # BLD AUTO: 0.13 THOUSAND/ÂΜL (ref 0–0.61)
EOSINOPHIL NFR BLD AUTO: 1 % (ref 0–6)
ERYTHROCYTE [DISTWIDTH] IN BLOOD BY AUTOMATED COUNT: 13.3 % (ref 11.6–15.1)
GFR SERPL CREATININE-BSD FRML MDRD: 85 ML/MIN/1.73SQ M
GLUCOSE P FAST SERPL-MCNC: 97 MG/DL (ref 65–99)
GLUCOSE SERPL-MCNC: 97 MG/DL (ref 65–140)
HCT VFR BLD AUTO: 37.5 % (ref 36.5–49.3)
HGB BLD-MCNC: 11.9 G/DL (ref 12–17)
IMM GRANULOCYTES # BLD AUTO: 0.02 THOUSAND/UL (ref 0–0.2)
IMM GRANULOCYTES NFR BLD AUTO: 0 % (ref 0–2)
LYMPHOCYTES # BLD AUTO: 0.91 THOUSANDS/ÂΜL (ref 0.6–4.47)
LYMPHOCYTES NFR BLD AUTO: 9 % (ref 14–44)
MAGNESIUM SERPL-MCNC: 2.1 MG/DL (ref 1.9–2.7)
MCH RBC QN AUTO: 28.1 PG (ref 26.8–34.3)
MCHC RBC AUTO-ENTMCNC: 31.7 G/DL (ref 31.4–37.4)
MCV RBC AUTO: 89 FL (ref 82–98)
MONOCYTES # BLD AUTO: 1.07 THOUSAND/ÂΜL (ref 0.17–1.22)
MONOCYTES NFR BLD AUTO: 11 % (ref 4–12)
NEUTROPHILS # BLD AUTO: 7.84 THOUSANDS/ÂΜL (ref 1.85–7.62)
NEUTS SEG NFR BLD AUTO: 79 % (ref 43–75)
NRBC BLD AUTO-RTO: 0 /100 WBCS
PLATELET # BLD AUTO: 181 THOUSANDS/UL (ref 149–390)
PMV BLD AUTO: 9.1 FL (ref 8.9–12.7)
POTASSIUM SERPL-SCNC: 3.6 MMOL/L (ref 3.5–5.3)
PROCALCITONIN SERPL-MCNC: 8.09 NG/ML
PROT SERPL-MCNC: 6.6 G/DL (ref 6.4–8.4)
RBC # BLD AUTO: 4.23 MILLION/UL (ref 3.88–5.62)
SODIUM SERPL-SCNC: 139 MMOL/L (ref 135–147)
WBC # BLD AUTO: 9.99 THOUSAND/UL (ref 4.31–10.16)

## 2025-07-14 PROCEDURE — 80048 BASIC METABOLIC PNL TOTAL CA: CPT | Performed by: INTERNAL MEDICINE

## 2025-07-14 PROCEDURE — 84145 PROCALCITONIN (PCT): CPT | Performed by: PHYSICIAN ASSISTANT

## 2025-07-14 PROCEDURE — 83735 ASSAY OF MAGNESIUM: CPT | Performed by: INTERNAL MEDICINE

## 2025-07-14 PROCEDURE — 80076 HEPATIC FUNCTION PANEL: CPT | Performed by: INTERNAL MEDICINE

## 2025-07-14 PROCEDURE — 99232 SBSQ HOSP IP/OBS MODERATE 35: CPT | Performed by: PHYSICIAN ASSISTANT

## 2025-07-14 PROCEDURE — 85025 COMPLETE CBC W/AUTO DIFF WBC: CPT | Performed by: INTERNAL MEDICINE

## 2025-07-14 RX ORDER — HYDROMORPHONE HCL IN WATER/PF 6 MG/30 ML
0.2 PATIENT CONTROLLED ANALGESIA SYRINGE INTRAVENOUS ONCE
Refills: 0 | Status: DISCONTINUED | OUTPATIENT
Start: 2025-07-14 | End: 2025-07-16

## 2025-07-14 RX ORDER — LIDOCAINE HYDROCHLORIDE 20 MG/ML
1 JELLY TOPICAL ONCE
Status: COMPLETED | OUTPATIENT
Start: 2025-07-14 | End: 2025-07-14

## 2025-07-14 RX ORDER — TAMSULOSIN HYDROCHLORIDE 0.4 MG/1
0.8 CAPSULE ORAL
Status: DISCONTINUED | OUTPATIENT
Start: 2025-07-15 | End: 2025-07-16 | Stop reason: HOSPADM

## 2025-07-14 RX ORDER — HYDROCORTISONE 25 MG/G
CREAM TOPICAL 2 TIMES DAILY
Status: DISCONTINUED | OUTPATIENT
Start: 2025-07-14 | End: 2025-07-16 | Stop reason: HOSPADM

## 2025-07-14 RX ORDER — METRONIDAZOLE 500 MG/1
500 TABLET ORAL EVERY 12 HOURS SCHEDULED
Status: DISCONTINUED | OUTPATIENT
Start: 2025-07-14 | End: 2025-07-16

## 2025-07-14 RX ADMIN — SODIUM CHLORIDE, PRESERVATIVE FREE 3 ML: 5 INJECTION INTRAVENOUS at 14:35

## 2025-07-14 RX ADMIN — HEPARIN SODIUM 5000 UNITS: 5000 INJECTION INTRAVENOUS; SUBCUTANEOUS at 14:34

## 2025-07-14 RX ADMIN — METRONIDAZOLE 500 MG: 500 TABLET ORAL at 21:47

## 2025-07-14 RX ADMIN — SODIUM CHLORIDE 75 ML/HR: 0.9 INJECTION, SOLUTION INTRAVENOUS at 02:16

## 2025-07-14 RX ADMIN — ALLOPURINOL 400 MG: 300 TABLET ORAL at 08:31

## 2025-07-14 RX ADMIN — ACETAMINOPHEN 650 MG: 325 TABLET ORAL at 16:34

## 2025-07-14 RX ADMIN — SODIUM CHLORIDE, PRESERVATIVE FREE 3 ML: 5 INJECTION INTRAVENOUS at 05:30

## 2025-07-14 RX ADMIN — COLCHICINE 0.6 MG: 0.6 TABLET ORAL at 08:31

## 2025-07-14 RX ADMIN — FINASTERIDE 5 MG: 5 TABLET, FILM COATED ORAL at 08:32

## 2025-07-14 RX ADMIN — SENNOSIDES, DOCUSATE SODIUM 1 TABLET: 8.6; 5 TABLET ORAL at 17:59

## 2025-07-14 RX ADMIN — HEPARIN SODIUM 5000 UNITS: 5000 INJECTION INTRAVENOUS; SUBCUTANEOUS at 05:30

## 2025-07-14 RX ADMIN — LIDOCAINE HYDROCHLORIDE 1 APPLICATION: 20 JELLY TOPICAL at 12:54

## 2025-07-14 RX ADMIN — SENNOSIDES, DOCUSATE SODIUM 1 TABLET: 8.6; 5 TABLET ORAL at 08:31

## 2025-07-14 RX ADMIN — POLYETHYLENE GLYCOL 3350 17 G: 17 POWDER, FOR SOLUTION ORAL at 08:31

## 2025-07-14 RX ADMIN — TAMSULOSIN HYDROCHLORIDE 0.4 MG: 0.4 CAPSULE ORAL at 08:31

## 2025-07-14 RX ADMIN — SODIUM CHLORIDE, PRESERVATIVE FREE 3 ML: 5 INJECTION INTRAVENOUS at 23:19

## 2025-07-14 RX ADMIN — HYDROCORTISONE: 25 CREAM TOPICAL at 14:33

## 2025-07-14 RX ADMIN — MINERAL OIL 1 ENEMA: 100 ENEMA RECTAL at 12:54

## 2025-07-14 RX ADMIN — CEFTRIAXONE SODIUM 1000 MG: 10 INJECTION, POWDER, FOR SOLUTION INTRAVENOUS at 18:00

## 2025-07-14 RX ADMIN — SODIUM CHLORIDE 75 ML/HR: 0.9 INJECTION, SOLUTION INTRAVENOUS at 20:22

## 2025-07-14 RX ADMIN — HEPARIN SODIUM 5000 UNITS: 5000 INJECTION INTRAVENOUS; SUBCUTANEOUS at 21:47

## 2025-07-14 NOTE — PROGRESS NOTES
[facility]  Progress Note  Name: Lobo Don I MRN: [id number]  Unit/Bed#: -01I Date of Admission: 7/13/2025   Date of Service: 7/13/2025  I Hospital Day: 0    * SIRS (systemic inflammatory response syndrome) (HCC)  Assessment & Plan  Presented with lower abdominal pain, radiating to groin with associated constipation. Also notes chills, no fevers    SIRS: WBC 15K, tachypnea  INFECTIOUS SOURCE: nothing definitive  Add'l labs: procal 18.73, UA negative   CT chest abdomen pelvis revealed no acute abnormalities    Treatment: s/p IV Zosyn in ER, hold off on further abx given lack of source    Monitor fever curve  Follow-up blood cultures    Constipation  Assessment & Plan  No bowel movements for 4 days prior to presentation, also notes painful hemorrhoid (h/o same)  Started on senna/Colace and MiraLAX  Added anusol/lidocaine topical and mineral oil enema  Consider GI evaluation     Benign prostatic hyperplasia with urinary frequency  Assessment & Plan  Continue Flomax, Proscar    Hypertension  Assessment & Plan  Hold home BP meds entirely at this time given hypotension on arrival, suspect dehydration  Blood pressure has since improved, monitor per unit protocol q shift     Obesity (BMI 30-39.9)  Assessment & Plan  Diet and exercise counseling provided    Hyperlipidemia  Assessment & Plan  Not on statin  Outpatient follow-up      VTE Pharmacologic Prophylaxis: VTE Score: 5 High Risk (Score >/= 5) - Pharmacological DVT Prophylaxis Ordered: heparin. Sequential Compression Devices Ordered.    Mobility:   Basic Mobility Inpatient Raw Score: 23  JH-HLM Goal: 7: Walk 25 feet or more  JH-HLM Achieved: 6: Walk 10 steps or more  JH-HLM Goal NOT achieved. Continue with multidisciplinary rounding and encourage appropriate mobility to improve upon JH-HLM goals.    Patient Centered Rounds: I performed bedside rounds with nursing staff today.   Discussions with Specialists or Other Care Team  Provider: none     Education and Discussions with Family / Patient: Patient declined call to .     Total Time Spent on Date of Encounter in care of patient: 45 mins. This time was spent on one or more of the following: performing physical exam; counseling and coordination of care; obtaining or reviewing history; documenting in the medical record; reviewing/ordering tests, medications or procedures; communicating with other healthcare professionals and discussing with patient's family/caregivers.    Current Length of Stay: 0 day(s)  Current Patient Status: Inpatient   Certification Statement: The patient, admitted on an observation basis, will now require > 2 midnight hospital stay due to ongoing rectal/groin pain and constipation  Discharge Plan: Anticipate discharge tomorrow to home.    Code Status: Level 1 - Full Code    Subjective:   Patient reports waves of pain. Unable to have a BM due to discomfort and feels like he has a large hemorrhoid sticking out. Notes history of constipation and hemorrhoid, but never quite this bad. Followed Dr. Gonzalez outpatient and is now decreased to every 5 years for scopes rather every 3, per patient report.     Objective:     Vitals:   Temp (24hrs), Av.7 °F (37.1 °C), Min:97.5 °F (36.4 °C), Max:99.3 °F (37.4 °C)    Temp:  [97.5 °F (36.4 °C)-99.3 °F (37.4 °C)] 97.5 °F (36.4 °C)  HR:  [76-86] 86  Resp:  [20] 20  BP: (117-145)/(67-86) 145/86  SpO2:  [91 %-97 %] 97 %  Body mass index is 36.92 kg/m².     Input and Output Summary (last 24 hours):     Intake/Output Summary (Last 24 hours) at 2025 1450  Last data filed at 2025 1300  Gross per 24 hour   Intake 840 ml   Output 1225 ml   Net -385 ml       Physical Exam:   Physical Exam  Vitals and nursing note reviewed.   Constitutional:       General: He is awake. He is in acute distress.      Appearance: Normal appearance. He is well-developed. He is not ill-appearing or toxic-appearing.   HENT:      Head:  Normocephalic and atraumatic.     Cardiovascular:      Rate and Rhythm: Normal rate and regular rhythm.   Pulmonary:      Effort: Pulmonary effort is normal. No respiratory distress.   Abdominal:      General: Abdomen is protuberant. Bowel sounds are normal.      Palpations: Abdomen is soft.      Tenderness: There is no abdominal tenderness.     Skin:     Coloration: Skin is not jaundiced or pale.     Neurological:      Mental Status: He is alert and oriented to person, place, and time.     Psychiatric:         Mood and Affect: Mood normal.         Behavior: Behavior normal. Behavior is cooperative.           Additional Data:     Labs:  Results from last 7 days   Lab Units 07/14/25  0438   WBC Thousand/uL 9.99   HEMOGLOBIN g/dL 11.9*   HEMATOCRIT % 37.5   PLATELETS Thousands/uL 181   SEGS PCT % 79*   LYMPHO PCT % 9*   MONO PCT % 11   EOS PCT % 1     Results from last 7 days   Lab Units 07/14/25  0438   SODIUM mmol/L 139   POTASSIUM mmol/L 3.6   CHLORIDE mmol/L 103   CO2 mmol/L 27   BUN mg/dL 25   CREATININE mg/dL 0.87   ANION GAP mmol/L 9   CALCIUM mg/dL 8.6   ALBUMIN g/dL 3.6   TOTAL BILIRUBIN mg/dL 0.59   ALK PHOS U/L 81   ALT U/L 19   AST U/L 17   GLUCOSE RANDOM mg/dL 97     Results from last 7 days   Lab Units 07/13/25  0643   INR  1.20*             Results from last 7 days   Lab Units 07/14/25  0438 07/13/25  0643 07/13/25  0625   LACTIC ACID mmol/L  --   --  1.0   PROCALCITONIN ng/ml 8.09* 18.73*  --        Lines/Drains:  Invasive Devices       Peripheral Intravenous Line  Duration             Peripheral IV 07/13/25 Left;Ventral (anterior) Forearm 1 day                          Imaging: Radiology Review: No additional pertinent studies reviewed.    Recent Cultures (last 7 days):   Results from last 7 days   Lab Units 07/13/25  0625 07/13/25  0619   BLOOD CULTURE  Received in Microbiology Lab. Culture in Progress. Received in Microbiology Lab. Culture in Progress.       Last 24 Hours Medication List:   Current  Facility-Administered Medications   Medication Dose Route Frequency Provider Last Rate    acetaminophen  650 mg Oral Q6H PRN Wei Kebede MD      allopurinol  400 mg Oral Daily Wei Kebede MD      colchicine  0.6 mg Oral Daily Wei Kebede MD      finasteride  5 mg Oral Daily Wei Kebede MD      heparin (porcine)  5,000 Units Subcutaneous Q8H [facility] Wei Kebede MD      hydrocortisone   Topical BID Mayra Cast PA-C      polyethylene glycol  17 g Oral Daily Wei Kebede MD      senna-docusate sodium  1 tablet Oral BID Wei Kebede MD      sodium chloride (PF)  3 mL Intravenous Q8H [facility] Wei Kebede MD      sodium chloride  75 mL/hr Intravenous Continuous Wei Kebede MD 75 mL/hr (07/14/25 0216)    tamsulosin  0.4 mg Oral Daily Wei Kebede MD          Today, Patient Was Seen By: Mayra Cast PA-C    **Please Note: This note may have been constructed using a voice recognition system.**

## 2025-07-14 NOTE — PLAN OF CARE
Problem: Potential for Falls  Goal: Patient will remain free of falls  Description: INTERVENTIONS:  - Educate patient/family on patient safety including physical limitations  - Instruct patient to call for assistance with activity   - Consider consulting OT/PT to assist with strengthening/mobility based on AM PAC & JH-HLM score  - Consult OT/PT to assist with strengthening/mobility   - Keep Call bell within reach  - Keep bed low and locked with side rails adjusted as appropriate  - Keep care items and personal belongings within reach  - Initiate and maintain comfort rounds  - Make Fall Risk Sign visible to staff  - Offer Toileting every 2 Hours, in advance of need  - Initiate/Maintain 24/7 alarm  - Obtain necessary fall risk management equipment: bed alarm  - Apply yellow socks and bracelet for high fall risk patients  - Consider moving patient to room near nurses station  Outcome: Progressing     Problem: PAIN - ADULT  Goal: Verbalizes/displays adequate comfort level or baseline comfort level  Description: Interventions:  - Encourage patient to monitor pain and request assistance  - Assess pain using appropriate pain scale  - Administer analgesics as ordered based on type and severity of pain and evaluate response  - Implement non-pharmacological measures as appropriate and evaluate response  - Consider cultural and social influences on pain and pain management  - Notify physician/advanced practitioner if interventions unsuccessful or patient reports new pain  - Educate patient/family on pain management process including their role and importance of  reporting pain   - Provide non-pharmacologic/complimentary pain relief interventions  Outcome: Progressing     Problem: INFECTION - ADULT  Goal: Absence or prevention of progression during hospitalization  Description: INTERVENTIONS:  - Assess and monitor for signs and symptoms of infection  - Monitor lab/diagnostic results  - Monitor all insertion sites, i.e.  indwelling lines, tubes, and drains  - Monitor endotracheal if appropriate and nasal secretions for changes in amount and color  - [place] appropriate cooling/warming therapies per order  - Administer medications as ordered  - Instruct and encourage patient and family to use good hand hygiene technique  - Identify and instruct in appropriate isolation precautions for identified infection/condition  Outcome: Progressing

## 2025-07-14 NOTE — ASSESSMENT & PLAN NOTE
No bowel movements for 4 days prior to presentation, also notes painful hemorrhoid (h/o same)  Started on senna/Colace and MiraLAX  Added anusol/lidocaine topical and mineral oil enema  Consider GI evaluation

## 2025-07-14 NOTE — ASSESSMENT & PLAN NOTE
Hold home BP meds entirely at this time given hypotension on arrival, suspect dehydration  Blood pressure has since improved, monitor per unit protocol q shift

## 2025-07-14 NOTE — ASSESSMENT & PLAN NOTE
Presented with lower abdominal pain, radiating to groin with associated constipation. Also notes chills, no fevers    SIRS: WBC 15K, tachypnea  INFECTIOUS SOURCE: nothing definitive  Add'l labs: procal 18.73, UA negative   CT chest abdomen pelvis revealed no acute abnormalities    Treatment: s/p IV Zosyn in ER, hold off on further abx given lack of source    Monitor fever curve  Follow-up blood cultures

## 2025-07-15 LAB
ANION GAP SERPL CALCULATED.3IONS-SCNC: 9 MMOL/L (ref 4–13)
BASOPHILS # BLD AUTO: 0.05 THOUSANDS/ÂΜL (ref 0–0.1)
BASOPHILS NFR BLD AUTO: 1 % (ref 0–1)
BUN SERPL-MCNC: 18 MG/DL (ref 5–25)
CALCIUM SERPL-MCNC: 9.1 MG/DL (ref 8.4–10.2)
CHLORIDE SERPL-SCNC: 105 MMOL/L (ref 96–108)
CO2 SERPL-SCNC: 26 MMOL/L (ref 21–32)
CREAT SERPL-MCNC: 0.78 MG/DL (ref 0.6–1.3)
EOSINOPHIL # BLD AUTO: 0.22 THOUSAND/ÂΜL (ref 0–0.61)
EOSINOPHIL NFR BLD AUTO: 2 % (ref 0–6)
ERYTHROCYTE [DISTWIDTH] IN BLOOD BY AUTOMATED COUNT: 13.2 % (ref 11.6–15.1)
ERYTHROCYTE [SEDIMENTATION RATE] IN BLOOD: 93 MM/HOUR (ref 0–19)
GFR SERPL CREATININE-BSD FRML MDRD: 89 ML/MIN/1.73SQ M
GLUCOSE SERPL-MCNC: 100 MG/DL (ref 65–140)
HCT VFR BLD AUTO: 42.8 % (ref 36.5–49.3)
HGB BLD-MCNC: 14.3 G/DL (ref 12–17)
IMM GRANULOCYTES # BLD AUTO: 0.05 THOUSAND/UL (ref 0–0.2)
IMM GRANULOCYTES NFR BLD AUTO: 1 % (ref 0–2)
LYMPHOCYTES # BLD AUTO: 1.19 THOUSANDS/ÂΜL (ref 0.6–4.47)
LYMPHOCYTES NFR BLD AUTO: 12 % (ref 14–44)
MCH RBC QN AUTO: 29.1 PG (ref 26.8–34.3)
MCHC RBC AUTO-ENTMCNC: 33.4 G/DL (ref 31.4–37.4)
MCV RBC AUTO: 87 FL (ref 82–98)
MONOCYTES # BLD AUTO: 1.03 THOUSAND/ÂΜL (ref 0.17–1.22)
MONOCYTES NFR BLD AUTO: 10 % (ref 4–12)
NEUTROPHILS # BLD AUTO: 7.73 THOUSANDS/ÂΜL (ref 1.85–7.62)
NEUTS SEG NFR BLD AUTO: 74 % (ref 43–75)
NRBC BLD AUTO-RTO: 0 /100 WBCS
PLATELET # BLD AUTO: 217 THOUSANDS/UL (ref 149–390)
PMV BLD AUTO: 8.7 FL (ref 8.9–12.7)
POTASSIUM SERPL-SCNC: 4 MMOL/L (ref 3.5–5.3)
PROCALCITONIN SERPL-MCNC: 3.73 NG/ML
RBC # BLD AUTO: 4.91 MILLION/UL (ref 3.88–5.62)
SODIUM SERPL-SCNC: 140 MMOL/L (ref 135–147)
WBC # BLD AUTO: 10.27 THOUSAND/UL (ref 4.31–10.16)

## 2025-07-15 PROCEDURE — 99222 1ST HOSP IP/OBS MODERATE 55: CPT | Performed by: INTERNAL MEDICINE

## 2025-07-15 PROCEDURE — 99232 SBSQ HOSP IP/OBS MODERATE 35: CPT | Performed by: PHYSICIAN ASSISTANT

## 2025-07-15 PROCEDURE — 85025 COMPLETE CBC W/AUTO DIFF WBC: CPT | Performed by: PHYSICIAN ASSISTANT

## 2025-07-15 PROCEDURE — 85652 RBC SED RATE AUTOMATED: CPT | Performed by: PHYSICIAN ASSISTANT

## 2025-07-15 PROCEDURE — 84145 PROCALCITONIN (PCT): CPT | Performed by: PHYSICIAN ASSISTANT

## 2025-07-15 PROCEDURE — 80048 BASIC METABOLIC PNL TOTAL CA: CPT | Performed by: PHYSICIAN ASSISTANT

## 2025-07-15 RX ORDER — LISINOPRIL 20 MG/1
40 TABLET ORAL DAILY
Status: DISCONTINUED | OUTPATIENT
Start: 2025-07-15 | End: 2025-07-16 | Stop reason: HOSPADM

## 2025-07-15 RX ORDER — HYDRALAZINE HYDROCHLORIDE 25 MG/1
75 TABLET, FILM COATED ORAL 3 TIMES DAILY
Status: DISCONTINUED | OUTPATIENT
Start: 2025-07-15 | End: 2025-07-16 | Stop reason: HOSPADM

## 2025-07-15 RX ORDER — POLYETHYLENE GLYCOL 3350 17 G/17G
238 POWDER, FOR SOLUTION ORAL ONCE
Status: COMPLETED | OUTPATIENT
Start: 2025-07-15 | End: 2025-07-15

## 2025-07-15 RX ORDER — METOPROLOL SUCCINATE 100 MG/1
100 TABLET, EXTENDED RELEASE ORAL DAILY
Status: DISCONTINUED | OUTPATIENT
Start: 2025-07-15 | End: 2025-07-16 | Stop reason: HOSPADM

## 2025-07-15 RX ADMIN — SODIUM CHLORIDE, PRESERVATIVE FREE 3 ML: 5 INJECTION INTRAVENOUS at 13:03

## 2025-07-15 RX ADMIN — HEPARIN SODIUM 5000 UNITS: 5000 INJECTION INTRAVENOUS; SUBCUTANEOUS at 13:01

## 2025-07-15 RX ADMIN — POLYETHYLENE GLYCOL 3350 17 G: 17 POWDER, FOR SOLUTION ORAL at 08:15

## 2025-07-15 RX ADMIN — CEFTRIAXONE SODIUM 1000 MG: 10 INJECTION, POWDER, FOR SOLUTION INTRAVENOUS at 16:30

## 2025-07-15 RX ADMIN — HEPARIN SODIUM 5000 UNITS: 5000 INJECTION INTRAVENOUS; SUBCUTANEOUS at 05:55

## 2025-07-15 RX ADMIN — HYDRALAZINE HYDROCHLORIDE 75 MG: 25 TABLET ORAL at 22:42

## 2025-07-15 RX ADMIN — COLCHICINE 0.6 MG: 0.6 TABLET ORAL at 08:17

## 2025-07-15 RX ADMIN — HYDRALAZINE HYDROCHLORIDE 75 MG: 25 TABLET ORAL at 16:28

## 2025-07-15 RX ADMIN — LISINOPRIL 40 MG: 20 TABLET ORAL at 13:01

## 2025-07-15 RX ADMIN — TAMSULOSIN HYDROCHLORIDE 0.8 MG: 0.4 CAPSULE ORAL at 16:28

## 2025-07-15 RX ADMIN — SODIUM CHLORIDE, PRESERVATIVE FREE 3 ML: 5 INJECTION INTRAVENOUS at 07:43

## 2025-07-15 RX ADMIN — METOPROLOL SUCCINATE 100 MG: 100 TABLET, EXTENDED RELEASE ORAL at 13:01

## 2025-07-15 RX ADMIN — METRONIDAZOLE 500 MG: 500 TABLET ORAL at 22:42

## 2025-07-15 RX ADMIN — FINASTERIDE 5 MG: 5 TABLET, FILM COATED ORAL at 08:17

## 2025-07-15 RX ADMIN — SODIUM CHLORIDE 75 ML/HR: 0.9 INJECTION, SOLUTION INTRAVENOUS at 09:43

## 2025-07-15 RX ADMIN — SODIUM CHLORIDE 75 ML/HR: 0.9 INJECTION, SOLUTION INTRAVENOUS at 22:42

## 2025-07-15 RX ADMIN — METRONIDAZOLE 500 MG: 500 TABLET ORAL at 08:17

## 2025-07-15 RX ADMIN — SENNOSIDES, DOCUSATE SODIUM 1 TABLET: 8.6; 5 TABLET ORAL at 08:17

## 2025-07-15 RX ADMIN — HYDROCORTISONE: 25 CREAM TOPICAL at 16:22

## 2025-07-15 RX ADMIN — HEPARIN SODIUM 5000 UNITS: 5000 INJECTION INTRAVENOUS; SUBCUTANEOUS at 22:42

## 2025-07-15 RX ADMIN — POLYETHYLENE GLYCOL 3350 238 G: 17 POWDER, FOR SOLUTION ORAL at 12:43

## 2025-07-15 RX ADMIN — ALLOPURINOL 400 MG: 300 TABLET ORAL at 08:17

## 2025-07-15 RX ADMIN — ACETAMINOPHEN 650 MG: 325 TABLET ORAL at 03:23

## 2025-07-15 NOTE — PROGRESS NOTES
Progress Note - Hospitalist   Name: Lobo Don 75 y.o. male I MRN: [id number]  Unit/Bed#: -01 I Date of Admission: 7/13/2025   Date of Service: 7/15/2025 I Hospital Day: 1    Assessment & Plan  SIRS (systemic inflammatory response syndrome) (HCC)  Presented with leukocytosis of 15K, tachypnea  Was noted to have significantly elevated Pro-Kenneth  CT chest abdomen pelvis revealed no acute abnormalities  Was provided a dose of Zosyn in the emergency department  Monitor fever curve  Continue Flagyl and Rocephin  Am labs ordered  Hypertension  BP low initially and now rising, will restart home BP meds and monitor closely   Hyperlipidemia  Not on statin  Outpatient follow-up  Benign prostatic hyperplasia with urinary frequency  Continue Flomax, Proscar  Obesity (BMI 30-39.9)  Diet and exercise counseling provided  Constipation  No bowel movements for 5 days  GI consulted, recommending full bowel prep and AVOID narcotics     VTE Pharmacologic Prophylaxis: VTE Score: 5 High Risk (Score >/= 5) - Pharmacological DVT Prophylaxis Ordered: heparin. Sequential Compression Devices Ordered.    Mobility:   Basic Mobility Inpatient Raw Score: 23  JH-HLM Goal: 7: Walk 25 feet or more  JH-HLM Achieved: 7: Walk 25 feet or more  JH-HLM Goal achieved. Continue to encourage appropriate mobility.    Patient Centered Rounds: I performed bedside rounds with nursing staff today.   Discussions with Specialists or Other Care Team Provider: CM, GI    Education and Discussions with Family / Patient: Patient declined call to .     Current Length of Stay: 1 day(s)  Current Patient Status: Inpatient   Certification Statement: The patient will continue to require additional inpatient hospital stay due to constipation and rectal pain  Discharge Plan: Anticipate discharge tomorrow to home.    Code Status: Level 1 - Full Code    Subjective   Patient was examined at bedside this am in slight discomfort but no acute distress. No acute  "overnight events. Reported he \"feels better but still feels pressure pushing up from my rectum.\" Reported increased urge and frequency of urination. Denied blood in urine. Denied a bowel movement since last Thursday. Reported a decreased appetite, tolerating only oatmeal, protein shakes and liquids. Patient is oob without assistance. Reported abdominal discomfort but denied abdominal pain, chest pain, sob or difficulty breathing.     Objective :  Temp:  [99.3 °F (37.4 °C)-101.3 °F (38.5 °C)] 99.3 °F (37.4 °C)  HR:  [] 90  BP: (114-161)/() 160/101  Resp:  [22] 22  SpO2:  [93 %-97 %] 97 %  O2 Device: None (Room air)    Body mass index is 36.92 kg/m².     Input and Output Summary (last 24 hours):     Intake/Output Summary (Last 24 hours) at 7/15/2025 1417  Last data filed at 7/15/2025 0100  Gross per 24 hour   Intake 480 ml   Output --   Net 480 ml       Physical Exam  Vitals and nursing note reviewed.   Constitutional:       General: He is not in acute distress.     Appearance: He is obese. He is not ill-appearing or toxic-appearing.     Cardiovascular:      Rate and Rhythm: Normal rate and regular rhythm.      Pulses: Normal pulses.      Heart sounds: Normal heart sounds.   Pulmonary:      Effort: Pulmonary effort is normal. No respiratory distress.      Breath sounds: Normal breath sounds. No stridor. No wheezing.   Chest:      Chest wall: No tenderness.   Abdominal:      General: Abdomen is protuberant. Bowel sounds are normal. There is no distension.      Tenderness: There is no abdominal tenderness.      Comments: Positive discomfort     Musculoskeletal:         General: No swelling or tenderness.      Right lower leg: No edema.      Left lower leg: No edema.     Skin:     General: Skin is warm and dry.     Neurological:      Mental Status: He is alert and oriented to person, place, and time. Mental status is at baseline.     Psychiatric:         Mood and Affect: Mood normal.         Behavior: " Behavior normal.                   Lab Results: I have reviewed the following results:   Results from last 7 days   Lab Units 07/15/25  1021   WBC Thousand/uL 10.27*   HEMOGLOBIN g/dL 14.3   HEMATOCRIT % 42.8   PLATELETS Thousands/uL 217   SEGS PCT % 74   LYMPHO PCT % 12*   MONO PCT % 10   EOS PCT % 2     Results from last 7 days   Lab Units 07/15/25  1021 07/14/25  0438   SODIUM mmol/L 140 139   POTASSIUM mmol/L 4.0 3.6   CHLORIDE mmol/L 105 103   CO2 mmol/L 26 27   BUN mg/dL 18 25   CREATININE mg/dL 0.78 0.87   ANION GAP mmol/L 9 9   CALCIUM mg/dL 9.1 8.6   ALBUMIN g/dL  --  3.6   TOTAL BILIRUBIN mg/dL  --  0.59   ALK PHOS U/L  --  81   ALT U/L  --  19   AST U/L  --  17   GLUCOSE RANDOM mg/dL 100 97     Results from last 7 days   Lab Units 07/13/25  0643   INR  1.20*             Results from last 7 days   Lab Units 07/15/25  1021 07/14/25  0438 07/13/25  0643 07/13/25  0625   LACTIC ACID mmol/L  --   --   --  1.0   PROCALCITONIN ng/ml 3.73* 8.09* 18.73*  --        Recent Cultures (last 7 days):   Results from last 7 days   Lab Units 07/13/25  0625 07/13/25  0619   BLOOD CULTURE  No Growth at 24 hrs. No Growth at 24 hrs.       Imaging Results Review: I personally reviewed the following image studies in PACS and associated radiology reports: chest xray, CT chest, and CT abdomen/pelvis. My interpretation of the radiology images/reports is: no acute abdominal changes.  Other Study Results Review: EKG was reviewed.     Last 24 Hours Medication List:     Current Facility-Administered Medications:     acetaminophen (TYLENOL) tablet 650 mg, Q6H PRN    allopurinol (ZYLOPRIM) tablet 400 mg, Daily    cefTRIAXone (ROCEPHIN) 1,000 mg in dextrose 5 % 50 mL IVPB, Q24H, Last Rate: 1,000 mg (07/14/25 1800) **AND** metroNIDAZOLE (FLAGYL) tablet 500 mg, Q12H KETTY    colchicine (COLCRYS) tablet 0.6 mg, Daily    finasteride (PROSCAR) tablet 5 mg, Daily    heparin (porcine) subcutaneous injection 5,000 Units, Q8H [facility]    hydrALAZINE  (APRESOLINE) tablet 75 mg, TID    hydrocortisone (ANUSOL-HC) 2.5 % rectal cream, BID    HYDROmorphone HCl (DILAUDID) injection 0.2 mg, Once    lisinopril (ZESTRIL) tablet 40 mg, Daily    metoprolol succinate (TOPROL-XL) 24 hr tablet 100 mg, Daily    polyethylene glycol (MIRALAX) packet 17 g, Daily    senna-docusate sodium (SENOKOT S) 8.6-50 mg per tablet 1 tablet, BID    Insert peripheral IV, Once **AND** sodium chloride (PF) 0.9 % injection 3 mL, Q8H KETTY    sodium chloride 0.9 % infusion, Continuous, Last Rate: 75 mL/hr (07/15/25 0943)    tamsulosin (FLOMAX) capsule 0.8 mg, Daily With Dinner    Administrative Statements   Today, Patient Was Seen By: Mayra Cast PA-C  I have spent a total time of 40 minutes in caring for this patient on the day of the visit/encounter including Diagnostic results, Instructions for management, Patient and family education, Counseling / Coordination of care, Documenting in the medical record, Reviewing/placing orders in the medical record (including tests, medications, and/or procedures), Obtaining or reviewing history  , and Communicating with other healthcare professionals .    **Please Note: This note may have been constructed using a voice recognition system.**

## 2025-07-16 ENCOUNTER — TRANSITIONAL CARE MANAGEMENT (OUTPATIENT)
Dept: FAMILY MEDICINE CLINIC | Facility: CLINIC | Age: 75
End: 2025-07-16

## 2025-07-16 VITALS
HEIGHT: 73 IN | WEIGHT: 279.8 LBS | RESPIRATION RATE: 16 BRPM | TEMPERATURE: 97.8 F | DIASTOLIC BLOOD PRESSURE: 87 MMHG | BODY MASS INDEX: 37.08 KG/M2 | HEART RATE: 81 BPM | SYSTOLIC BLOOD PRESSURE: 149 MMHG | OXYGEN SATURATION: 95 %

## 2025-07-16 LAB
BASOPHILS # BLD AUTO: 0.05 THOUSANDS/ÂΜL (ref 0–0.1)
BASOPHILS NFR BLD AUTO: 1 % (ref 0–1)
EOSINOPHIL # BLD AUTO: 0.31 THOUSAND/ÂΜL (ref 0–0.61)
EOSINOPHIL NFR BLD AUTO: 3 % (ref 0–6)
ERYTHROCYTE [DISTWIDTH] IN BLOOD BY AUTOMATED COUNT: 13 % (ref 11.6–15.1)
HCT VFR BLD AUTO: 38.2 % (ref 36.5–49.3)
HGB BLD-MCNC: 12.9 G/DL (ref 12–17)
IMM GRANULOCYTES # BLD AUTO: 0.05 THOUSAND/UL (ref 0–0.2)
IMM GRANULOCYTES NFR BLD AUTO: 1 % (ref 0–2)
LYMPHOCYTES # BLD AUTO: 1.55 THOUSANDS/ÂΜL (ref 0.6–4.47)
LYMPHOCYTES NFR BLD AUTO: 15 % (ref 14–44)
MCH RBC QN AUTO: 29.2 PG (ref 26.8–34.3)
MCHC RBC AUTO-ENTMCNC: 33.8 G/DL (ref 31.4–37.4)
MCV RBC AUTO: 86 FL (ref 82–98)
MONOCYTES # BLD AUTO: 1.07 THOUSAND/ÂΜL (ref 0.17–1.22)
MONOCYTES NFR BLD AUTO: 11 % (ref 4–12)
NEUTROPHILS # BLD AUTO: 7.05 THOUSANDS/ÂΜL (ref 1.85–7.62)
NEUTS SEG NFR BLD AUTO: 69 % (ref 43–75)
NRBC BLD AUTO-RTO: 0 /100 WBCS
PLATELET # BLD AUTO: 219 THOUSANDS/UL (ref 149–390)
PMV BLD AUTO: 8.8 FL (ref 8.9–12.7)
RBC # BLD AUTO: 4.42 MILLION/UL (ref 3.88–5.62)
WBC # BLD AUTO: 10.08 THOUSAND/UL (ref 4.31–10.16)

## 2025-07-16 PROCEDURE — 99239 HOSP IP/OBS DSCHRG MGMT >30: CPT

## 2025-07-16 PROCEDURE — 85025 COMPLETE CBC W/AUTO DIFF WBC: CPT | Performed by: PHYSICIAN ASSISTANT

## 2025-07-16 RX ORDER — HYDROCORTISONE 25 MG/G
CREAM TOPICAL 2 TIMES DAILY
Qty: 28 G | Refills: 0 | Status: SHIPPED | OUTPATIENT
Start: 2025-07-16 | End: 2025-07-22

## 2025-07-16 RX ADMIN — METOPROLOL SUCCINATE 100 MG: 100 TABLET, EXTENDED RELEASE ORAL at 08:17

## 2025-07-16 RX ADMIN — ALLOPURINOL 400 MG: 300 TABLET ORAL at 08:17

## 2025-07-16 RX ADMIN — HEPARIN SODIUM 5000 UNITS: 5000 INJECTION INTRAVENOUS; SUBCUTANEOUS at 06:11

## 2025-07-16 RX ADMIN — FINASTERIDE 5 MG: 5 TABLET, FILM COATED ORAL at 08:17

## 2025-07-16 RX ADMIN — HYDRALAZINE HYDROCHLORIDE 75 MG: 25 TABLET ORAL at 08:31

## 2025-07-16 RX ADMIN — COLCHICINE 0.6 MG: 0.6 TABLET ORAL at 08:17

## 2025-07-16 RX ADMIN — SODIUM CHLORIDE, PRESERVATIVE FREE 3 ML: 5 INJECTION INTRAVENOUS at 06:12

## 2025-07-16 RX ADMIN — HYDROCORTISONE: 25 CREAM TOPICAL at 08:22

## 2025-07-16 RX ADMIN — LISINOPRIL 40 MG: 20 TABLET ORAL at 08:17

## 2025-07-16 NOTE — DISCHARGE INSTR - AVS FIRST PAGE
Dear Lobo Don,     It was our pleasure to care for you here at [facility].  It is our hope that we were always able to meet and exceed the expected standards for your care during your stay.  You were hospitalized due to constipation .  You were cared for on the 2nd floor under the service of Mery Schmitz PA-C with the [facility] who covers for your primary care physician (PCP), Yoan Toribio MD, while you were hospitalized.  If you have any questions or concerns related to this hospitalization, you may contact us at .  For follow up, we recommend that you follow up with your primary care physician.  Please review this entire discharge summary as additional general instructions may be provided later as well.  However, at this time we provide for you here, the most important instructions / recommendations at discharge:     You are recommended to increase your fiber intake daily. Metamucil can be obtained at the local pharmacy.   Please continue to monitor of any abdominal pain, fevers, chills or inability to tolerate oral intake  Please limit the use of opioids as this is likely contributing to your constipation       Sincerely,     Mery Schmitz PA-C

## 2025-07-16 NOTE — ASSESSMENT & PLAN NOTE
Home regimen: Enalapril 20 mg daily, hydralazine 75 mg 3 times daily, HCTZ 25 mg daily, metoprolol 100mg daily   Resume above on discharge

## 2025-07-16 NOTE — ASSESSMENT & PLAN NOTE
Presented with leukocytosis of 15K, tachypnea  Was noted to have significantly elevated Pro-Kenneth  CT chest abdomen pelvis revealed no acute abnormalities  Was provided a dose of Zosyn in the emergency department and monitored off antibiotics as no clear source of infection was identified  Monitor fever curve  Was restarted on ceftriaxone and Flagyl 7/14 given febrile episode  Blood cultures negative at 48 hours  Patient remains afebrile, leukocytosis resolved, clinically feeling improved after BM yesterday  D/c abx  Stable for discharge. Recommended to start metamucil daily and limit opioids to prevent continued constipation

## 2025-07-16 NOTE — DISCHARGE SUMMARY
Discharge Summary - Hospitalist   Name: Lobo Don 75 y.o. male I MRN: [id number]  Unit/Bed#: -01 I Date of Admission: 7/13/2025   Date of Service: 7/16/2025 I Hospital Day: 2     Assessment & Plan  SIRS (systemic inflammatory response syndrome) (HCC)  Presented with leukocytosis of 15K, tachypnea  Was noted to have significantly elevated Pro-Kenneth  CT chest abdomen pelvis revealed no acute abnormalities  Was provided a dose of Zosyn in the emergency department and monitored off antibiotics as no clear source of infection was identified  Monitor fever curve  Was restarted on ceftriaxone and Flagyl 7/14 given febrile episode  Blood cultures negative at 48 hours  Patient remains afebrile, leukocytosis resolved, clinically feeling improved after BM yesterday  D/c abx  Stable for discharge. Recommended to start metamucil daily and limit opioids to prevent continued constipation   Hypertension  Home regimen: Enalapril 20 mg daily, hydralazine 75 mg 3 times daily, HCTZ 25 mg daily, metoprolol 100mg daily   Resume above on discharge   Hyperlipidemia  Not on statin  Outpatient follow-up  Benign prostatic hyperplasia with urinary frequency  Continue Flomax, Proscar  Obesity (BMI 30-39.9)  Diet and exercise counseling provided  Constipation  No bowel movements for 5 days  GI consulted, recommending full bowel prep and AVOID narcotics   Much improved with bowel movement  No further abdominal pain/discomfort      Medical Problems       Resolved Problems  Date Reviewed: 7/7/2025   None       Discharging Physician / Practitioner: Mery Schmitz PA-C  PCP: Yoan Toribio MD  Admission Date:   Admission Orders (From admission, onward)       Ordered        07/14/25 1446  INPATIENT ADMISSION  Once            07/13/25 0930  Place in Observation  Once                          Discharge Date: 07/16/25    Next Steps for Physician/AP Assuming Care:  Will need f/u for OA and pain management in setting of constipation related to  opioid use    Test Results Pending at Discharge (will require follow up):  None     Medication Changes for Discharge & Rationale:   Recommend daily fiber with metamucil, avoid opioids as able   See after visit summary for reconciled discharge medications provided to patient and/or family.     Consultations During Hospital Stay:  GI     Procedures Performed:   None     Significant Findings / Test Results:   XR chest portable - 1 view 7/13/2025  Impression: No acute cardiopulmonary disease.     CTA dissection protocol chest/abdomen/pelvis 7/13/2025  Impression: No evidence of acute aortic pathology. Ascending aortic aneurysm measures 4.3 cm. No acute intra-abdominal abnormality. No free air, free fluid, mesenteric inflammatory process, or bowel wall thickening.  Incidental Findings:   None      Hospital Course:   Lobo Don is a 75 y.o. male patient PMH hypertension, hyperlipidemia, BPH, SRAVAN, iron deficiency, CAD, osteoarthritis who originally presented to the hospital on 7/13/2025 due to suprapubic and abdominal pain with associated chills.  Patient met SIRS criteria on admission with leukocytosis, and tachycardia.  He underwent a CT scan without evidence of intra-abdominal pathology.  He was empirically started on IV Zosyn given concern for sepsis in the ED however was monitored off antibiotics.  He ended up spiking a fever 7/14 and was resumed on antibiotics however with unclear source.  He was seen in consultation by GI and was treated with bowel prep for his constipation.  Constipation thought to be secondary to oxycodone use for osteoarthritis.  Patient's symptoms improved significantly with bowel prep.  Antibiotics were discontinued.  Patient eager for discharge. He is recommended to limit opioid use in the outpatient setting. He was recommended to start metamucil daily and encouraged to increase fiber intake. We discussed return to ED precautions including worsening abdominal pain, new fevers, inability  "to tolerate oral intake. He verbalizes understanding.     Please see above list of diagnoses and related plan for additional information.     Discharge Day Visit / Exam:   Subjective: Seen and examined.  No acute events overnight.  Patient reports feeling markedly improved after bowel movements yesterday.  He no longer has abdominal pain or discomfort.  He states he has been taking oxycodone for his osteoarthritis and at most last week had 2 doses a day otherwise usually 1 time daily as needed.  We discussed importance of fiber intake.  Patient is agreeable with plan for discharge today and will continue to monitor for fevers/chills/abdominal pain in the outpatient setting. He was advised to return to the ED with any fevers, worsening abdominal pain, inability to tolerate oral intake.   Vitals: Blood Pressure: 149/87 (07/16/25 0829)  Pulse: 81 (07/16/25 0829)  Temperature: 97.8 °F (36.6 °C) (07/16/25 0727)  Temp Source: Oral (07/16/25 0727)  Respirations: 16 (07/16/25 0727)  Height: 6' 1\" (185.4 cm) (07/13/25 1155)  Weight - Scale: 127 kg (279 lb 12.8 oz) (07/13/25 1155)  SpO2: 95 % (07/16/25 0838)  Physical Exam  Constitutional:       General: He is not in acute distress.     Appearance: He is not toxic-appearing.   HENT:      Head: Normocephalic and atraumatic.      Nose: Nose normal.      Mouth/Throat:      Mouth: Mucous membranes are moist.     Eyes:      Conjunctiva/sclera: Conjunctivae normal.       Cardiovascular:      Rate and Rhythm: Normal rate.      Heart sounds: Normal heart sounds. No murmur heard.     No friction rub. No gallop.   Pulmonary:      Effort: Pulmonary effort is normal.      Breath sounds: No wheezing, rhonchi or rales.   Abdominal:      General: There is distension.      Palpations: Abdomen is soft.      Tenderness: There is no abdominal tenderness.     Musculoskeletal:      Cervical back: Normal range of motion.      Right lower leg: No edema.      Left lower leg: No edema.     Skin:     " General: Skin is warm and dry.     Neurological:      General: No focal deficit present.     Psychiatric:         Mood and Affect: Mood normal.        Discussion with Family: Updated  (wife) at bedside.    Discharge instructions/Information to patient and family:   See after visit summary for information provided to patient and family.      Provisions for Follow-Up Care:  See after visit summary for information related to follow-up care and any pertinent home health orders.      Mobility at time of Discharge:   Basic Mobility Inpatient Raw Score: 23  JH-HLM Goal: 7: Walk 25 feet or more  JH-HLM Achieved: 2: Bed activities/Dependent transfer  HLM Goal NOT achieved. Continue to encourage mobility in post discharge setting.     Disposition:   Home    Planned Readmission: None    Administrative Statements   Discharge Statement:  I have spent a total time of 42 minutes in caring for this patient on the day of the visit/encounter. >30 minutes of time was spent on: Diagnostic results, Prognosis, Risks and benefits of tx options, Instructions for management, Patient and family education, Counseling / Coordination of care, Documenting in the medical record, Reviewing / ordering tests, medicine, procedures  , and Communicating with other healthcare professionals .    **Please Note: This note may have been constructed using a voice recognition system**

## 2025-07-16 NOTE — PLAN OF CARE
Problem: Potential for Falls  Goal: Patient will remain free of falls  Description: INTERVENTIONS:  - Educate patient/family on patient safety including physical limitations  - Instruct patient to call for assistance with activity   - Consider consulting OT/PT to assist with strengthening/mobility based on AM PAC & JH-HLM score  - Consult OT/PT to assist with strengthening/mobility   - Keep Call bell within reach  - Keep bed low and locked with side rails adjusted as appropriate  - Keep care items and personal belongings within reach  - Initiate and maintain comfort rounds  - Make Fall Risk Sign visible to staff  - Offer Toileting every 2 Hours, in advance of need  - Initiate/Maintain 2alarm  - Obtain necessary fall risk management equipment: 2  - Apply yellow socks and bracelet for high fall risk patients  - Consider moving patient to room near nurses station  Outcome: Progressing     Problem: PAIN - ADULT  Goal: Verbalizes/displays adequate comfort level or baseline comfort level  Description: Interventions:  - Encourage patient to monitor pain and request assistance  - Assess pain using appropriate pain scale  - Administer analgesics as ordered based on type and severity of pain and evaluate response  - Implement non-pharmacological measures as appropriate and evaluate response  - Consider cultural and social influences on pain and pain management  - Notify physician/advanced practitioner if interventions unsuccessful or patient reports new pain  - Educate patient/family on pain management process including their role and importance of  reporting pain   - Provide non-pharmacologic/complimentary pain relief interventions  Outcome: Progressing     Problem: INFECTION - ADULT  Goal: Absence or prevention of progression during hospitalization  Description: INTERVENTIONS:  - Assess and monitor for signs and symptoms of infection  - Monitor lab/diagnostic results  - Monitor all insertion sites, i.e. indwelling lines,  tubes, and drains  - Monitor endotracheal if appropriate and nasal secretions for changes in amount and color  - [place] appropriate cooling/warming therapies per order  - Administer medications as ordered  - Instruct and encourage patient and family to use good hand hygiene technique  - Identify and instruct in appropriate isolation precautions for identified infection/condition  Outcome: Progressing  Goal: Absence of fever/infection during neutropenic period  Description: INTERVENTIONS:  - Monitor WBC  - Perform strict hand hygiene  - Limit to healthy visitors only  - No plants, dried, fresh or silk flowers with henry in patient room  Outcome: Progressing     Problem: Knowledge Deficit  Goal: Patient/family/caregiver demonstrates understanding of disease process, treatment plan, medications, and discharge instructions  Description: Complete learning assessment and assess knowledge base.  Interventions:  - Provide teaching at level of understanding  - Provide teaching via preferred learning methods  Outcome: Progressing     Problem: GASTROINTESTINAL - ADULT  Goal: Minimal or absence of nausea and/or vomiting  Description: INTERVENTIONS:  - Administer IV fluids if ordered to ensure adequate hydration  - Maintain NPO status until nausea and vomiting are resolved  - Nasogastric tube if ordered  - Administer ordered antiemetic medications as needed  - Provide nonpharmacologic comfort measures as appropriate  - Advance diet as tolerated, if ordered  - Consider nutrition services referral to assist patient with adequate nutrition and appropriate food choices  Outcome: Progressing  Goal: Maintains or returns to baseline bowel function  Description: INTERVENTIONS:  - Assess bowel function  - Encourage oral fluids to ensure adequate hydration  - Administer IV fluids if ordered to ensure adequate hydration  - Administer ordered medications as needed  - Encourage mobilization and activity  - Consider nutritional services  referral to assist patient with adequate nutrition and appropriate food choices  Outcome: Progressing  Goal: Maintains adequate nutritional intake  Description: INTERVENTIONS:  - Monitor percentage of each meal consumed  - Identify factors contributing to decreased intake, treat as appropriate  - Assist with meals as needed  - Monitor I&O, weight, and lab values if indicated  - Obtain nutrition services referral as needed  Outcome: Progressing  Goal: Establish and maintain optimal ostomy function  Description: INTERVENTIONS:  - Assess bowel function  - Encourage oral fluids to ensure adequate hydration  - Administer IV fluids if ordered to ensure adequate hydration   - Administer ordered medications as needed  - Encourage mobilization and activity  - Nutrition services referral to assist patient with appropriate food choices  - Assess stoma site  - Consider wound care consult   Outcome: Progressing  Goal: Oral mucous membranes remain intact  Description: INTERVENTIONS  - Assess oral mucosa and hygiene practices  - Implement preventative oral hygiene regimen  - Implement oral medicated treatments as ordered  - Initiate Nutrition services referral as needed  Outcome: Progressing     Problem: GENITOURINARY - ADULT  Goal: Maintains or returns to baseline urinary function  Description: INTERVENTIONS:  - Assess urinary function  - Encourage oral fluids to ensure adequate hydration if ordered  - Administer IV fluids as ordered to ensure adequate hydration  - Administer ordered medications as needed  - Offer frequent toileting  - Follow urinary retention protocol if ordered  Outcome: Progressing  Goal: Absence of urinary retention  Description: INTERVENTIONS:  - Assess patient’s ability to void and empty bladder  - Monitor I/O  - Bladder scan as needed  - Discuss with physician/AP medications to alleviate retention as needed  - Discuss catheterization for long term situations as appropriate  Outcome: Progressing  Goal:  Urinary catheter remains patent  Description: INTERVENTIONS:  - Assess patency of urinary catheter  - If patient has a chronic gee, consider changing catheter if non-functioning  - Follow guidelines for intermittent irrigation of non-functioning urinary catheter  Outcome: Progressing

## 2025-07-16 NOTE — ASSESSMENT & PLAN NOTE
No bowel movements for 5 days  GI consulted, recommending full bowel prep and AVOID narcotics   Much improved with bowel movement  No further abdominal pain/discomfort

## 2025-07-16 NOTE — ASSESSMENT & PLAN NOTE
Presented with leukocytosis of 15K, tachypnea  Was noted to have significantly elevated Pro-Kenneth  CT chest abdomen pelvis revealed no acute abnormalities  Was provided a dose of Zosyn in the emergency department  Monitor fever curve  Continue Flagyl and Rocephin  Am labs ordered

## 2025-07-18 LAB
BACTERIA BLD CULT: NORMAL
BACTERIA BLD CULT: NORMAL

## 2025-07-22 ENCOUNTER — OFFICE VISIT (OUTPATIENT)
Dept: FAMILY MEDICINE CLINIC | Facility: CLINIC | Age: 75
End: 2025-07-22
Payer: MEDICARE

## 2025-07-22 VITALS
TEMPERATURE: 98 F | HEIGHT: 73 IN | DIASTOLIC BLOOD PRESSURE: 78 MMHG | OXYGEN SATURATION: 98 % | HEART RATE: 78 BPM | SYSTOLIC BLOOD PRESSURE: 128 MMHG | WEIGHT: 283.6 LBS | BODY MASS INDEX: 37.59 KG/M2

## 2025-07-22 DIAGNOSIS — I10 PRIMARY HYPERTENSION: Chronic | ICD-10-CM

## 2025-07-22 DIAGNOSIS — M17.12 ARTHRITIS OF LEFT KNEE: ICD-10-CM

## 2025-07-22 DIAGNOSIS — R35.0 BENIGN PROSTATIC HYPERPLASIA WITH URINARY FREQUENCY: ICD-10-CM

## 2025-07-22 DIAGNOSIS — K59.00 CONSTIPATION, UNSPECIFIED CONSTIPATION TYPE: ICD-10-CM

## 2025-07-22 DIAGNOSIS — R10.9 ABDOMINAL PAIN, UNSPECIFIED ABDOMINAL LOCATION: ICD-10-CM

## 2025-07-22 DIAGNOSIS — M51.360 DEGENERATION OF INTERVERTEBRAL DISC OF LUMBAR REGION WITH DISCOGENIC BACK PAIN: ICD-10-CM

## 2025-07-22 DIAGNOSIS — R65.10 SIRS (SYSTEMIC INFLAMMATORY RESPONSE SYNDROME) (HCC): Primary | ICD-10-CM

## 2025-07-22 DIAGNOSIS — D50.9 IRON DEFICIENCY ANEMIA, UNSPECIFIED IRON DEFICIENCY ANEMIA TYPE: ICD-10-CM

## 2025-07-22 DIAGNOSIS — M10.9 GOUT, UNSPECIFIED CAUSE, UNSPECIFIED CHRONICITY, UNSPECIFIED SITE: ICD-10-CM

## 2025-07-22 DIAGNOSIS — N40.1 BENIGN PROSTATIC HYPERPLASIA WITH URINARY FREQUENCY: ICD-10-CM

## 2025-07-22 DIAGNOSIS — I71.21 ANEURYSM OF ASCENDING AORTA WITHOUT RUPTURE (HCC): ICD-10-CM

## 2025-07-22 PROBLEM — I71.40 ABDOMINAL AORTIC ANEURYSM (AAA) WITHOUT RUPTURE (HCC): Status: ACTIVE | Noted: 2025-07-22

## 2025-07-22 PROCEDURE — 99496 TRANSJ CARE MGMT HIGH F2F 7D: CPT | Performed by: FAMILY MEDICINE

## 2025-07-22 NOTE — ASSESSMENT & PLAN NOTE
{For anemia, please use [facility] Hematology Work-Up SmartSet to perform initial work-up. If there are still concerns regarding management, an E-Consult to Hematology should be ordered. Click here to go the therapy plan activity if you need to order iron infusions. Search for 'PCP Venofer' therapy plan.  [id number]}

## 2025-07-22 NOTE — PROGRESS NOTES
Transition of Care Visit:  Name: Lobo Don      : 1950      MRN: [id number]  Encounter Provider: Yoan Toribio MD  Encounter Date: 2025   Encounter department: [facility] [address]    Assessment & Plan  SIRS (systemic inflammatory response syndrome) (HCC)         Abdominal pain, unspecified abdominal location         Constipation, unspecified constipation type         Aneurysm of ascending aorta without rupture (HCC)         Primary hypertension  Continue Toprol- mg and enalapril 20 mg daily also hydralazine 75 mg 3 times daily.       Degeneration of intervertebral disc of lumbar region with discogenic back pain         Iron deficiency anemia, unspecified iron deficiency anemia type             Gout, unspecified cause, unspecified chronicity, unspecified site  Continue allopurinol 100 mg daily       Benign prostatic hyperplasia with urinary frequency  Continue tamsulosin 0.4 mg and finasteride 5 mg daily       Arthritis of left knee  We discussed his plans for knee replacement in the near future.            History of Present Illness     Transitional Care Management Review:   Lobo Don is a 75 y.o. male here for TCM follow up.     During the TCM phone call patient stated:  TCM Call (since 2025)       Date and time call was made  2025  2:53 PM    Hospital care reviewed  Records reviewed    Date of Admission  25    Date of discharge  25    Diagnosis  SIRS (systemic inflammatory response syndrome)    Disposition  Home    Were the patients medications reviewed and updated  Yes    Current Symptoms  None          TCM Call (since 2025)       Post hospital issues  None    Scheduled for follow up?  Yes    Did you obtain your prescribed medications  Yes    Do you need help managing your prescriptions or medications  No    Is transportation to your appointment needed  No    I have advised the patient to call PCP with any new or  "worsening symptoms  Emily King/ Practice Coordinator    Living Arrangements  Spouse or Significiant other    Support System  Spouse    The type of support provided  Emotional; Physical    Do you have social support  Yes, as much as I need    Are you recieving home care services  No          Patient comes in for hospital follow-up.  He had SIRS with fever and elevated white blood cell count most likely secondary to severe constipation from opioid use.  He has lumbar degenerative disc disease and this had recently flared.  Laxatives in the hospital and his symptoms resolved.      Review of Systems   Constitutional: Negative.    Respiratory: Negative.     Cardiovascular: Negative.    Musculoskeletal:  Positive for arthralgias, back pain and gait problem.     Objective   /78   Pulse 78   Temp 98 °F (36.7 °C) (Tympanic)   Ht 6' 1\" (1.854 m)   Wt 129 kg (283 lb 9.6 oz)   SpO2 98%   BMI 37.42 kg/m²     Physical Exam  Constitutional:       Appearance: He is well-developed. He is obese.   HENT:      Head: Normocephalic and atraumatic.     Eyes:      Pupils: Pupils are equal, round, and reactive to light.       Cardiovascular:      Rate and Rhythm: Normal rate and regular rhythm.      Heart sounds: Normal heart sounds.   Pulmonary:      Effort: Pulmonary effort is normal.      Breath sounds: Normal breath sounds.   Abdominal:      General: Bowel sounds are normal.      Palpations: Abdomen is soft.     Musculoskeletal:         General: Normal range of motion.      Cervical back: Neck supple.     Skin:     General: Skin is warm and dry.     Neurological:      Mental Status: He is alert.     Psychiatric:         Mood and Affect: Mood normal.         Behavior: Behavior normal.       Medications have been reviewed by provider in current encounter    Administrative Statements   I have spent a total time of 40 minutes in caring for this patient on the day of the visit/encounter including Diagnostic results, Risks and " benefits of tx options, Instructions for management, Impressions, Documenting in the medical record, and Obtaining or reviewing history  .

## 2025-07-29 DIAGNOSIS — M1A.09X0 CHRONIC GOUT OF MULTIPLE SITES, UNSPECIFIED CAUSE: ICD-10-CM

## 2025-07-30 DIAGNOSIS — I10 ESSENTIAL HYPERTENSION: Chronic | ICD-10-CM

## 2025-07-30 RX ORDER — ENALAPRIL MALEATE 20 MG/1
20 TABLET ORAL DAILY
Qty: 90 TABLET | Refills: 1 | Status: SHIPPED | OUTPATIENT
Start: 2025-07-30

## 2025-07-30 RX ORDER — ALLOPURINOL 100 MG/1
TABLET ORAL
Qty: 360 TABLET | Refills: 1 | Status: SHIPPED | OUTPATIENT
Start: 2025-07-30

## 2025-08-04 ENCOUNTER — HOSPITAL ENCOUNTER (OUTPATIENT)
Dept: NON INVASIVE DIAGNOSTICS | Facility: CLINIC | Age: 75
Discharge: HOME | End: 2025-08-04
Attending: INTERNAL MEDICINE
Payer: MEDICARE

## 2025-08-04 VITALS
SYSTOLIC BLOOD PRESSURE: 128 MMHG | HEART RATE: 80 BPM | HEIGHT: 73 IN | DIASTOLIC BLOOD PRESSURE: 78 MMHG | BODY MASS INDEX: 37.51 KG/M2 | WEIGHT: 283 LBS

## 2025-08-04 DIAGNOSIS — N40.1 BENIGN PROSTATIC HYPERPLASIA WITH URINARY FREQUENCY: ICD-10-CM

## 2025-08-04 DIAGNOSIS — I77.810 DILATED AORTIC ROOT (HCC): ICD-10-CM

## 2025-08-04 DIAGNOSIS — I71.21 ANEURYSM OF ASCENDING AORTA WITHOUT RUPTURE (HCC): ICD-10-CM

## 2025-08-04 DIAGNOSIS — I35.0 NONRHEUMATIC AORTIC VALVE STENOSIS: ICD-10-CM

## 2025-08-04 DIAGNOSIS — R35.0 BENIGN PROSTATIC HYPERPLASIA WITH URINARY FREQUENCY: ICD-10-CM

## 2025-08-04 LAB
AORTIC ROOT: 3.6 CM
AORTIC VALVE MEAN VELOCITY: 19.8 M/S
ASCENDING AORTA: 4.3 CM
AV AREA BY CONTINUOUS VTI: 1.7 CM2
AV AREA PEAK VELOCITY: 1.5 CM2
AV LVOT MEAN GRADIENT: 3 MMHG
AV LVOT PEAK GRADIENT: 6 MMHG
AV MEAN PRESS GRAD SYS DOP V1V2: 18 MMHG
AV ORIFICE AREA US: 1.7 CM2
AV PEAK GRADIENT: 34 MMHG
AV VELOCITY RATIO: 0.45
AV VMAX SYS DOP: 2.92 M/S
BSA FOR ECHO PROCEDURE: 2.49 M2
DOP CALC AO VTI: 64.67 CM
DOP CALC LVOT AREA: 3.8 CM2
DOP CALC LVOT CARDIAC INDEX: 3.17 L/MIN/M2
DOP CALC LVOT CARDIAC OUTPUT: 7.94 L/MIN
DOP CALC LVOT DIAMETER: 2.2 CM
DOP CALC LVOT PEAK VEL VTI: 29.02 CM
DOP CALC LVOT PEAK VEL: 1.18 M/S
DOP CALC LVOT STROKE INDEX: 45.6 ML/M2
DOP CALC LVOT STROKE VOLUME: 110.26
E WAVE DECELERATION TIME: 223 MS
E/A RATIO: 0.67
FRACTIONAL SHORTENING: 38 (ref 28–44)
INTERVENTRICULAR SEPTUM IN DIASTOLE (PARASTERNAL SHORT AXIS VIEW): 1.6 CM
INTERVENTRICULAR SEPTUM: 1.6 CM (ref 0.6–1.1)
LAAS-AP2: 22.2 CM2
LAAS-AP4: 24 CM2
LEFT ATRIUM SIZE: 4.1 CM
LEFT ATRIUM VOLUME (MOD BIPLANE): 69 ML
LEFT ATRIUM VOLUME INDEX (MOD BIPLANE): 27.6 ML/M2
LEFT INTERNAL DIMENSION IN SYSTOLE: 3.3 CM (ref 2.1–4)
LEFT VENTRICULAR INTERNAL DIMENSION IN DIASTOLE: 5.3 CM (ref 3.5–6)
LEFT VENTRICULAR POSTERIOR WALL IN END DIASTOLE: 1.6 CM
LEFT VENTRICULAR STROKE VOLUME: 90 ML
LV EF US.2D.A4C+ESTIMATED: 68 %
LVSV (TEICH): 90 ML
MV E'TISSUE VEL-SEP: 7 CM/S
MV PEAK A VEL: 1.21 M/S
MV PEAK E VEL: 81 CM/S
MV STENOSIS PRESSURE HALF TIME: 65 MS
MV VALVE AREA P 1/2 METHOD: 3.38
RIGHT ATRIUM AREA SYSTOLE A4C: 15.4 CM2
RIGHT VENTRICLE ID DIMENSION: 3.8 CM
SINOTUBULAR JUNCTION: 2.8 CM
SL CV LEFT ATRIUM LENGTH A2C: 6.2 CM
SL CV LV EF: 60
SL CV PED ECHO LEFT VENTRICLE DIASTOLIC VOLUME (MOD BIPLANE) 2D: 134 ML
SL CV PED ECHO LEFT VENTRICLE SYSTOLIC VOLUME (MOD BIPLANE) 2D: 43 ML
SL CV SINUS OF VALSALVA 2D: 3.6 CM
STJ: 2.8 CM
TR MAX PG: 33 MMHG
TR PEAK VELOCITY: 2.9 M/S
TRICUSPID ANNULAR PLANE SYSTOLIC EXCURSION: 2.2 CM
TRICUSPID VALVE PEAK REGURGITATION VELOCITY: 2.86 M/S

## 2025-08-04 PROCEDURE — 93306 TTE W/DOPPLER COMPLETE: CPT | Performed by: INTERNAL MEDICINE

## 2025-08-04 PROCEDURE — 93306 TTE W/DOPPLER COMPLETE: CPT

## 2025-08-05 RX ORDER — TAMSULOSIN HYDROCHLORIDE 0.4 MG/1
0.4 CAPSULE ORAL DAILY
Qty: 90 CAPSULE | Refills: 1 | Status: SHIPPED | OUTPATIENT
Start: 2025-08-05

## 2025-08-11 ENCOUNTER — TELEPHONE (OUTPATIENT)
Dept: FAMILY MEDICINE CLINIC | Facility: CLINIC | Age: 75
End: 2025-08-11

## 2025-08-12 ENCOUNTER — OFFICE VISIT (OUTPATIENT)
Dept: FAMILY MEDICINE CLINIC | Facility: CLINIC | Age: 75
End: 2025-08-12
Payer: MEDICARE

## 2025-08-12 ENCOUNTER — TELEPHONE (OUTPATIENT)
Dept: CARDIOLOGY CLINIC | Facility: CLINIC | Age: 75
End: 2025-08-12

## 2025-08-12 PROBLEM — Z01.818 PREOPERATIVE EXAMINATION: Status: ACTIVE | Noted: 2025-08-12

## 2025-08-13 ENCOUNTER — APPOINTMENT (OUTPATIENT)
Age: 75
End: 2025-08-13
Payer: MEDICARE

## 2025-08-13 DIAGNOSIS — Z01.89 ENCOUNTER FOR OTHER SPECIFIED SPECIAL EXAMINATIONS: ICD-10-CM

## 2025-08-13 DIAGNOSIS — Z01.818 OTHER SPECIFIED PRE-OPERATIVE EXAMINATION: ICD-10-CM

## 2025-08-13 LAB
ALBUMIN SERPL BCG-MCNC: 4 G/DL (ref 3.5–5)
ALP SERPL-CCNC: 82 U/L (ref 34–104)
ALT SERPL W P-5'-P-CCNC: 19 U/L (ref 7–52)
ANION GAP SERPL CALCULATED.3IONS-SCNC: 11 MMOL/L (ref 4–13)
AST SERPL W P-5'-P-CCNC: 20 U/L (ref 13–39)
BILIRUB SERPL-MCNC: 0.65 MG/DL (ref 0.2–1)
BUN SERPL-MCNC: 21 MG/DL (ref 5–25)
CALCIUM SERPL-MCNC: 9 MG/DL (ref 8.4–10.2)
CHLORIDE SERPL-SCNC: 103 MMOL/L (ref 96–108)
CO2 SERPL-SCNC: 26 MMOL/L (ref 21–32)
CREAT SERPL-MCNC: 0.79 MG/DL (ref 0.6–1.3)
GFR SERPL CREATININE-BSD FRML MDRD: 89 ML/MIN/1.73SQ M
GLUCOSE P FAST SERPL-MCNC: 95 MG/DL (ref 65–99)
POTASSIUM SERPL-SCNC: 4.2 MMOL/L (ref 3.5–5.3)
PROT SERPL-MCNC: 7 G/DL (ref 6.4–8.4)
SODIUM SERPL-SCNC: 140 MMOL/L (ref 135–147)

## 2025-08-13 PROCEDURE — 80053 COMPREHEN METABOLIC PANEL: CPT

## 2025-08-13 PROCEDURE — 36415 COLL VENOUS BLD VENIPUNCTURE: CPT
